# Patient Record
Sex: FEMALE | Race: WHITE | NOT HISPANIC OR LATINO | Employment: FULL TIME | RURAL
[De-identification: names, ages, dates, MRNs, and addresses within clinical notes are randomized per-mention and may not be internally consistent; named-entity substitution may affect disease eponyms.]

---

## 2017-03-08 ENCOUNTER — TRANSCRIBE ORDERS (OUTPATIENT)
Dept: PHYSICAL THERAPY | Facility: HOSPITAL | Age: 42
End: 2017-03-08

## 2017-03-08 DIAGNOSIS — S52.501A CLOSED FRACTURE OF DISTAL END OF RIGHT RADIUS, UNSPECIFIED FRACTURE MORPHOLOGY, INITIAL ENCOUNTER: Primary | ICD-10-CM

## 2017-03-16 ENCOUNTER — HOSPITAL ENCOUNTER (OUTPATIENT)
Dept: PHYSICAL THERAPY | Facility: HOSPITAL | Age: 42
Setting detail: THERAPIES SERIES
Discharge: HOME | End: 2017-03-16

## 2017-03-16 DIAGNOSIS — S52.501A CLOSED FRACTURE OF DISTAL END OF RIGHT RADIUS, UNSPECIFIED FRACTURE MORPHOLOGY, INITIAL ENCOUNTER: ICD-10-CM

## 2017-03-16 PROCEDURE — 97161 PT EVAL LOW COMPLEX 20 MIN: CPT | Performed by: PHYSICAL THERAPIST

## 2017-03-16 NOTE — PROGRESS NOTES
"    Outpatient Physical Therapy Ortho Initial Evaluation  [facility]  Nola Greene, PT  17       Patient Name: Krupa De La Rosa  : 1975  MRN: [id number]  Today's Date: 3/16/2017      Visit Date: 2017     Subjective Improvement: N/A       Attendance:   Approved: Requires authorization           MD follow up: 17          RC date: 17           There is no problem list on file for this patient.       Past Medical History   Diagnosis Date   • Broken wrist      Right   • Diabetes mellitus         Past Surgical History   Procedure Laterality Date   • Hysterectomy     • Knee arthroscopy w/ lateral release Right    • Knee cartilage surgery Right    • Cholecystectomy     • Exploratory laparotomy       Medications:  Xanax  Lantus  NovoLog  Ibuprofen PRN  Protonix  Prilosec  Elavil    Allergies: Ciprofibrate        Visit Dx:     ICD-10-CM ICD-9-CM   1. Closed fracture of distal end of right radius, unspecified fracture morphology, initial encounter S52.501A 813.42             Patient History       17 0800          History    Chief Complaint Pain  -KG      Type of Pain Wrist pain  -KG      Date Current Problem(s) Began 01/15/17  -KG      Brief Description of Current Complaint Pt presents with R wrist pain s/p R distal radial fracture. Reports it was a clean break and did not have to have surgery. Pt states she went roller skating and fell on an outstretched arm. States she stopped wearing her splint full time ~3 weeks ago and only wears it at work now.Pt states she still has difficulty pronating/supinating her wrist as it feels like its going to \"pop\". Pt states she is still able to do much of what she needs to do but there are still some things she can't do. Pt limited at work with certain things. 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   MD 17  -KG      Hand Dominance left-handed  -KG      Occupation/sports/leisure activities Pt is a " "nurse at [employer].  -KG      What clinical tests have you had for this problem? X-ray  -KG      Results of Clinical Tests Wrist healing well.  -KG      Pain     Pain Location Wrist  -KG      Pain at Present 0   Pain 5/10 with pronation/supination  -KG      Pain Comments Pt states her wrist feels like it catches and it needs to \"pop\"  -KG        User Key  (r) = Recorded By, (t) = Taken By, (c) = Cosigned By    Initials Name Provider Type    KG Nola Greene, PT Physical Therapist                PT Ortho       03/16/17 0800    Subjective Comments    Subjective Comments Pt states her wrist hurts mainly throughout pronation/supination; states overall wrist continues to feel stiff  -KG    Precautions and Contraindications    Precautions No pushing at this time  -KG    Subjective Pain    Able to rate subjective pain? yes  -KG    Pre-Treatment Pain Level 0  -KG    Post-Treatment Pain Level 0  -KG    Subjective Pain Comment Pain at rest; pain with wrist ROM 5/10  -KG    Posture/Observations    Posture- WNL Posture is WNL  -KG    Posture/Observations Comments Pt shows no signs of acute distress at this time. Minimal to no guarding of the R wrist.   -KG    Quarter Clearing    Quarter Clearing Upper Quarter Clearing  -KG    Sensory Screen for Light Touch- Upper Quarter Clearing    C4 (posterior shoulder) Bilateral:;Intact  -KG    C5 (lateral upper arm) Bilateral:;Intact  -KG    C6 (tip of thumb) Right:;Diminished  -KG    C7 (tip of 3rd finger) Bilateral:;Intact  -KG    C8 (tip of 5th finger) Bilateral:;Intact  -KG    T1 (medial lower arm) Bilateral:;Intact  -KG    Special Tests/Palpation    Special Tests/Palpation Wrist/Hand  -KG    Hand/Wrist Palpation- Radial Aspect    Radial Styloid Right:;Tender  -KG    Hand/Wrist Palpation- Radial Aspect? Yes  -KG    Hand/Wrist Palpation- Ulnar Aspect    Ulnar Styloid Right:;Tender  -KG    Distal Radioulnar Joint (DRUJ) Right:;Tender  -KG    Hand/Wrist Palpation- " Ulnar Aspect? Yes  -KG    ROM (Range of Motion)    General ROM upper extremity range of motion deficits identified  -KG    General UE Assessment    ROM elbow/forearm, right: UE ROM deficit;wrist, right: UE ROM deficit  -KG    ROM Detail Pt able to make full fist; able to oppose thumb to all digits without difficulty  -KG    Right Elbow/Forearm    Supination AROM Deficit 30  -KG    Pronation AROM Deficit 65  -KG    Right Wrist    Flexion AROM Deficit 32  -KG    Extension AROM Deficit 40  -KG    Ulnar Deviation AROM Deficit 16  -KG    Radial Deviation AROM Deficit 18  -KG    MMT (Manual Muscle Testing)    General MMT Assessment Detail L  55#, R  45#; wrist MMT not tested at this time  -KG      User Key  (r) = Recorded By, (t) = Taken By, (c) = Cosigned By    Initials Name Provider Type    KG Nola Greene PT Physical Therapist                            Therapy Education       03/16/17 0800          Therapy Education    Given HEP;Symptoms/condition management;Pain management   HEP: wrist AROM flex/ext, sup/pro, RD/UD; theraputty /pinch yellow, wrist flex/ext stretch  -KG      Program New  -KG      How Provided Verbal;Demonstration;Written  -KG      Provided to Patient  -KG      Level of Understanding Teach back education performed;Verbalized;Demonstrated  -KG        User Key  (r) = Recorded By, (t) = Taken By, (c) = Cosigned By    Initials Name Provider Type    YESSICA Greene PT Physical Therapist                PT OP Goals       03/16/17 0800       PT Short Term Goals    STG Date to Achieve 03/30/17  -KG     STG 1 Indep with HEP  -KG     STG 1 Progress New  -KG     STG 2 Tolerate 45 min treatment session without increased pain  -KG     STG 2 Progress New  -KG     STG 3 Demonstrate R wrist supination AROM to 40 deg  -KG     STG 3 Progress New  -KG     STG 4 Demonstrate R wrist flexion AROM to 40 deg  -KG     STG 4 Progress New  -KG     Long Term Goals    LTG Date to Achieve 04/06/17  -KG     LTG  1 Subjectively report 65% improvement or greater  -KG     LTG 1 Progress New  -KG     LTG 2 QuickDASH score to 25% or less  -KG     LTG 2 Progress New  -KG     LTG 3 Demonstrate R  strength to 55# or greater  -KG     LTG 3 Progress New  -KG     LTG 4 Demonstrate R wrist flex/ext strength to 4/5 or greater  -KG     LTG 4 Progress New  -KG     LTG 5 Demonstrate R wrist sup/pro strength to 4/5 or greater  -KG     LTG 5 Progress New  -KG     LTG 6 Demonstrate R wrist flex/ext AROM to 60 deg or greater  -KG     LTG 6 Progress New  -KG     LTG 7 Demonstrate R wrist supination AROM to 60 deg or greater  -KG     LTG 7 Progress New  -KG     Time Calculation    PT Goal Re-Cert Due Date 04/06/17   Visit 1/1, MD 4/4/17  -KG       User Key  (r) = Recorded By, (t) = Taken By, (c) = Cosigned By    Initials Name Provider Type    KG Nola Greene, PT Physical Therapist                PT Assessment/Plan       03/16/17 0800       PT Assessment    Functional Limitations Limitation in home management;Limitations in community activities;Performance in leisure activities;Performance in self-care ADL;Performance in work activities  -KG     Impairments Dexterity;Joint mobility;Motor function;Muscle strength;Pain;Range of motion  -KG     Assessment Comments Pt presents with R wrist pain s/p R distal radius fx with non-surgical managment. Pt demonstrates decreased wrist ROM & strenght & decreased  strength/dexterity which is limiting her performance in ADL's/IADL's & work activities. Pt will benefit from skilled PT to address these limitations for improvements in overall RUE functional mobility/strength.  -KG     Rehab Potential Good  -KG     Patient/caregiver participated in establishment of treatment plan and goals Yes  -KG     Patient would benefit from skilled therapy intervention Yes  -KG     PT Plan    PT Frequency 2x/week  -KG     Predicted Duration of Therapy Intervention (days/wks) 4-6 weeks  -KG     Planned CPT's? PT JOSEAL  LOW COMPLEXITY: 54728;PT THER PROC EA 15 MIN: 40589;PT THER ACT EA 15 MIN: 03283;PT MANUAL THERAPY EA 15 MIN: 78871;PT NEUROMUSC RE-EDUCATION EA 15 MIN: 74515;PT ELECTRICAL STIM UNATTEND: ;PT THER SUPP EA 15 MIN  -KG     Physical Therapy Interventions (Optional Details) fine motor skills;gross motor skills;home exercise program;joint mobilization;manual therapy techniques;modalities;neuromuscular re-education;patient/family education;ROM (Range of Motion);strengthening;stretching  -KG     PT Plan Comments HEP, wrist PROM/AROM, wrist stretching/strengthening, manual therapy, fine motor skills, IFC/ice PRN for pain  -KG       User Key  (r) = Recorded By, (t) = Taken By, (c) = Cosigned By    Initials Name Provider Type    YESSICA Greene, PT Physical Therapist                  Exercises       03/16/17 0800          Subjective Comments    Subjective Comments Pt states her wrist hurts mainly throughout pronation/supination; states overall wrist continues to feel stiff  -KG      Subjective Pain    Able to rate subjective pain? yes  -KG      Pre-Treatment Pain Level 0  -KG      Post-Treatment Pain Level 0  -KG      Subjective Pain Comment Pain at rest; pain with wrist ROM 5/10  -KG        User Key  (r) = Recorded By, (t) = Taken By, (c) = Cosigned By    Initials Name Provider Type    YESSICA Greene, SAMY Physical Therapist                              Outcome Measures       03/16/17 0800          Quick DASH    Open a tight or new jar. 4  -KG      Do heavy household chores (e.g., wash walls, wash floors) 3  -KG      Carry a shopping bag or briefcase 3  -KG      Wash your back 3  -KG      Use a knife to cut food 3  -KG      Recreational activities in which you take some force or impact through your arm, should or hand (e.g. golf, hammering, tennis, etc.) 3  -KG      During the past week, to what extent has your arm, shoulder, or hand problem interfered with your normal social activites with family, friends,  neighbors or groups? 3  -KG      During the past week, were you limited in your work or other regular daily activities as a result of your arm, shoulder or hand problem? 2  -KG      Arm, Shoulder, or hand pain 2  -KG      Tingling (pins and needles) in your arm, shoulder, or hand 1  -KG      During the past week, how much difficulty have you had sleeping because of the pain in your arm, shoulder or hand? 2  -KG      Number of Questions Answered 11  -KG      Quick DASH Score 40.91  -KG      Functional Assessment    Outcome Measure Options Quick DASH  -KG        User Key  (r) = Recorded By, (t) = Taken By, (c) = Cosigned By    Initials Name Provider Type    KG Nola Greene, PT Physical Therapist            Time Calculation:   Start Time: 0856  Stop Time: 0924  Time Calculation (min): 28 min  Total Timed Code Minutes- PT: 0 minute(s)     Therapy Charges for Today     Code Description Service Date Service Provider Modifiers Qty    40827937490 HC PT EVAL LOW COMPLEXITY 1 3/16/2017 Nola Greene, PT GP 2          PT G-Codes  Outcome Measure Options: Quick DASH         Nola Greene, PT  3/16/2017

## 2017-03-29 ENCOUNTER — HOSPITAL ENCOUNTER (OUTPATIENT)
Dept: PHYSICAL THERAPY | Facility: HOSPITAL | Age: 42
Setting detail: THERAPIES SERIES
Discharge: HOME | End: 2017-03-29

## 2017-03-29 DIAGNOSIS — S52.501A CLOSED FRACTURE OF DISTAL END OF RIGHT RADIUS, UNSPECIFIED FRACTURE MORPHOLOGY, INITIAL ENCOUNTER: Primary | ICD-10-CM

## 2017-03-29 PROCEDURE — 97110 THERAPEUTIC EXERCISES: CPT

## 2017-03-29 PROCEDURE — 97140 MANUAL THERAPY 1/> REGIONS: CPT

## 2017-03-29 NOTE — PROGRESS NOTES
Outpatient Physical Therapy Ortho Treatment Note  [facility]  Danyell Pavon, JOSE  17  10:15 AM       Patient Name: Krupa De La Rosa  : 1975  MRN: [id number]  Today's Date: 3/29/2017      Visit Date: 2017  Subjective Improvement:  50%     Attendance:   Approved:        8 visits   MD follow up:            date:     17    Visit Dx:    ICD-10-CM ICD-9-CM   1. Closed fracture of distal end of right radius, unspecified fracture morphology, initial encounter S52.501A 813.42       There is no problem list on file for this patient.       Past Medical History:   Diagnosis Date   • Broken wrist     Right   • Diabetes mellitus         Past Surgical History:   Procedure Laterality Date   • CHOLECYSTECTOMY     • EXPLORATORY LAPAROTOMY     • HYSTERECTOMY     • KNEE ARTHROSCOPY W/ LATERAL RELEASE Right    • KNEE CARTILAGE SURGERY Right              PT Ortho       17 0800    Subjective Comments    Subjective Comments Pt states that wrist hurts with certain movements; not working at present; notes she is R handed. Notes she has been busy and not a lot of HEP.  -PM    Precautions and Contraindications    Precautions No pushing at this time  -PM    Subjective Pain    Able to rate subjective pain? yes  -PM    Pre-Treatment Pain Level 0  -PM    Post-Treatment Pain Level 0  -PM    Posture/Observations    Posture- WNL Posture is WNL  -PM    Posture/Observations Comments Pt shows no signs of acute distress at this time. Minimal to no guarding of the R wrist.   -PM    Quarter Clearing    Quarter Clearing Upper Quarter Clearing  -PM    Sensory Screen for Light Touch- Upper Quarter Clearing    C4 (posterior shoulder) Bilateral:;Intact  -PM    C5 (lateral upper arm) Bilateral:;Intact  -PM    C6 (tip of thumb) Right:;Diminished  -PM    C7 (tip of 3rd finger) Bilateral:;Intact  -PM    C8 (tip of 5th finger) Bilateral:;Intact  -PM    T1 (medial lower arm) Bilateral:;Intact  -PM     Special Tests/Palpation    Special Tests/Palpation Wrist/Hand  -PM    Hand/Wrist Palpation- Radial Aspect    Radial Styloid Right:;Tender  -PM    Hand/Wrist Palpation- Ulnar Aspect    Ulnar Styloid Right:;Tender  -PM    Distal Radioulnar Joint (DRUJ) Right:;Tender  -PM    Hand/Wrist Palpation- Ulnar Aspect? Yes  -PM    Right Wrist    Flexion AROM Deficit 56  -PM    Extension AROM Deficit 44  -PM      User Key  (r) = Recorded By, (t) = Taken By, (c) = Cosigned By    Initials Name Provider Type    PM Danyell Pavon PTA Physical Therapy Assistant                            PT Assessment/Plan       03/29/17 0800       PT Assessment    Functional Limitations Limitation in home management;Limitations in community activities;Performance in leisure activities;Performance in self-care ADL;Performance in work activities  -PM     Impairments Dexterity;Joint mobility;Motor function;Muscle strength;Pain;Range of motion  -PM     Assessment Comments Incr AROM. Cues ther ex; encouraged HEP performance; stiffness with taniat austin duron; and 2/3 MD. Luis Eduardo Rx well  -PM     Rehab Potential Good  -PM     Patient/caregiver participated in establishment of treatment plan and goals Yes  -PM     Patient would benefit from skilled therapy intervention Yes  -PM     PT Plan    PT Frequency 2x/week  -PM     Predicted Duration of Therapy Intervention (days/wks) 4-6 weeks  -PM     PT Plan Comments wrist roller; ; cont manual  -PM       User Key  (r) = Recorded By, (t) = Taken By, (c) = Cosigned By    Initials Name Provider Type    PM Danyell Pavon PTA Physical Therapy Assistant                Modalities       03/29/17 0800          Ice    Patient denies application of Ice Yes  -PM        User Key  (r) = Recorded By, (t) = Taken By, (c) = Cosigned By    Initials Name Provider Type    PM Danyell Pavon PTA Physical Therapy Assistant                Exercises       03/29/17 0800          Subjective Comments    Subjective Comments Pt  states that wrist hurts with certain movements; not working at present; notes she is R handed. Notes she has been busy and not a lot of HEP.  -PM      Subjective Pain    Able to rate subjective pain? yes  -PM      Pre-Treatment Pain Level 0  -PM      Post-Treatment Pain Level 0  -PM      Exercise 1    Exercise Name 1 table top tendon gliding  -PM      Cueing 1 Verbal  -PM      Reps 1 20  -PM      Exercise 2    Exercise Name 2 claw tendon gliding  -PM      Cueing 2 Verbal  -PM      Reps 2 20  -PM      Exercise 3    Exercise Name 3 wrist AROM Flex/Ext/RD-UD  -PM      Cueing 3 Verbal  -PM      Sets 3 2  -PM      Reps 3 15  -PM      Exercise 4    Exercise Name 4 wrist pro-sup AROM  -PM      Cueing 4 Verbal  -PM      Sets 4 2  -PM      Reps 4 15  -PM      Exercise 5    Exercise Name 5 T-Putty /pinc  -PM      Time (Minutes) 5 3  -PM      Exercise 6    Exercise Name 6 T-Putty starburst  -PM      Cueing 6 Verbal  -PM      Time (Minutes) 6 2  -PM      Exercise 7    Exercise Name 7 Dexaciser  -PM      Cueing 7 Demo  -PM      Time (Minutes) 7 2  -PM      Exercise 8    Exercise Name 8 CT1CT2 wrist stretches  -PM      Cueing 8 Verbal  -PM      Sets 8 1  -PM      Reps 8 2  -PM      Time (Seconds) 8 30  -PM        User Key  (r) = Recorded By, (t) = Taken By, (c) = Cosigned By    Initials Name Provider Type    PM Danyell Pavon PTA Physical Therapy Assistant                        Manual Rx (last 36 hours)      Manual Treatments       03/29/17 0800          Manual Rx 1    Manual Rx 1 Location R wrist  -PM      Manual Rx 1 Type MC glides; carpal glides; RU glides; wrist mobs  -PM      Manual Rx 1 Grade I-II  -PM      Manual Rx 1 Duration 10  -PM        User Key  (r) = Recorded By, (t) = Taken By, (c) = Cosigned By    Initials Name Provider Type    PM Danyell Pavon PTA Physical Therapy Assistant                PT OP Goals       03/29/17 0800       PT Short Term Goals    STG Date to Achieve 03/30/17  -PM     STG 1 Indep  with HEP  -PM     STG 1 Progress Progressing  -PM     STG 2 Tolerate 45 min treatment session without increased pain  -PM     STG 2 Progress Progressing  -PM     STG 3 Demonstrate R wrist supination AROM to 40 deg  -PM     STG 3 Progress Progressing  -PM     STG 4 Demonstrate R wrist flexion AROM to 40 deg  -PM     STG 4 Progress Progressing  -PM     Long Term Goals    LTG Date to Achieve 04/06/17  -PM     LTG 1 Subjectively report 65% improvement or greater  -PM     LTG 1 Progress New  -PM     LTG 2 QuickDASH score to 25% or less  -PM     LTG 2 Progress New  -PM     LTG 3 Demonstrate R  strength to 55# or greater  -PM     LTG 3 Progress New  -PM     LTG 4 Demonstrate R wrist flex/ext strength to 4/5 or greater  -PM     LTG 4 Progress New  -PM     LTG 5 Demonstrate R wrist sup/pro strength to 4/5 or greater  -PM     LTG 5 Progress New  -PM     LTG 6 Demonstrate R wrist flex/ext AROM to 60 deg or greater  -PM     LTG 6 Progress New  -PM     LTG 7 Demonstrate R wrist supination AROM to 60 deg or greater  -PM     LTG 7 Progress New  -PM     Time Calculation    PT Goal Re-Cert Due Date 04/06/17 2/2; MD 4/4/2017; 50%  -PM       User Key  (r) = Recorded By, (t) = Taken By, (c) = Cosigned By    Initials Name Provider Type    PM Danyell Pavon PTA Physical Therapy Assistant                Therapy Education       03/29/17 0800          Therapy Education    Given HEP;Symptoms/condition management;Pain management   HEP: wrist AROM flex/ext, sup/pro, RD/UD; theraputty /pinch yellow, wrist flex/ext stretch  -PM      Program Reinforced  -PM      How Provided Verbal;Demonstration;Written  -PM      Provided to Patient  -PM      Level of Understanding Teach back education performed;Verbalized;Demonstrated  -PM        User Key  (r) = Recorded By, (t) = Taken By, (c) = Cosigned By    Initials Name Provider Type    IVONNE Pavon PTA Physical Therapy Assistant                Time Calculation:   Start Time:  0850  Stop Time: 0935  Time Calculation (min): 45 min  Total Timed Code Minutes- PT: 45 minute(s)    Therapy Charges for Today     Code Description Service Date Service Provider Modifiers Qty    23757900358 HC PT THER PROC EA 15 MIN 3/29/2017 Danyell Pavon PTA GP 2    34818624191 HC PT MANUAL THERAPY EA 15 MIN 3/29/2017 Danyell Pavon PTA GP 1                    Danyell Pavon PTA  3/29/2017

## 2017-03-31 ENCOUNTER — HOSPITAL ENCOUNTER (OUTPATIENT)
Dept: PHYSICAL THERAPY | Facility: HOSPITAL | Age: 42
Setting detail: THERAPIES SERIES
Discharge: HOME | End: 2017-03-31

## 2017-03-31 DIAGNOSIS — S52.501A CLOSED FRACTURE OF DISTAL END OF RIGHT RADIUS, UNSPECIFIED FRACTURE MORPHOLOGY, INITIAL ENCOUNTER: Primary | ICD-10-CM

## 2017-03-31 PROCEDURE — 97110 THERAPEUTIC EXERCISES: CPT

## 2017-03-31 PROCEDURE — 97140 MANUAL THERAPY 1/> REGIONS: CPT

## 2017-04-07 ENCOUNTER — HOSPITAL ENCOUNTER (OUTPATIENT)
Dept: PHYSICAL THERAPY | Facility: HOSPITAL | Age: 42
Setting detail: THERAPIES SERIES
Discharge: HOME | End: 2017-04-07

## 2017-04-07 DIAGNOSIS — S52.501A CLOSED FRACTURE OF DISTAL END OF RIGHT RADIUS, UNSPECIFIED FRACTURE MORPHOLOGY, INITIAL ENCOUNTER: Primary | ICD-10-CM

## 2017-04-07 PROCEDURE — 97110 THERAPEUTIC EXERCISES: CPT

## 2017-04-07 PROCEDURE — 97140 MANUAL THERAPY 1/> REGIONS: CPT

## 2017-04-07 NOTE — PROGRESS NOTES
Outpatient Physical Therapy Ortho Re-Evaluation  [facility]  Maninder Bryan, PT  17  10:02 AM       Patient Name: Krupa De La Rosa  : 1975  MRN: [id number]  Today's Date: 2017      Visit Date: 2017     Subjective Improvement: 75%      Attendance:   Approved:          MD follow up: PRN           RC date: 17           There is no problem list on file for this patient.       Past Medical History:   Diagnosis Date   • Broken wrist     Right   • Diabetes mellitus         Past Surgical History:   Procedure Laterality Date   • CHOLECYSTECTOMY     • EXPLORATORY LAPAROTOMY     • HYSTERECTOMY     • KNEE ARTHROSCOPY W/ LATERAL RELEASE Right    • KNEE CARTILAGE SURGERY Right        Visit Dx:     ICD-10-CM ICD-9-CM   1. Closed fracture of distal end of right radius, unspecified fracture morphology, initial encounter S52.501A 813.42                 PT Ortho       17 0900    Precautions and Contraindications    Precautions No pushing at home.  -MD    Posture/Observations    Posture- WNL Posture is WNL  -MD    Posture/Observations Comments Patient shows no signs of acute distress, scar healing well.   -MD    Right Wrist    Flexion AROM Deficit 62  -MD    Extension AROM Deficit 53  -MD    Ulnar Deviation AROM Deficit 30  -MD    Radial Deviation AROM Deficit 22  -MD    MMT (Manual Muscle Testing)    General MMT Assessment Detail R  52#  -MD    Right Wrist    Wrist Flexion Gross Movement (4/5) good  -MD    Wrist Extension Gross Movement (4/5) good  -MD      User Key  (r) = Recorded By, (t) = Taken By, (c) = Cosigned By    Initials Name Provider Type    MD Maninder Bryan, PT Physical Therapist                            Therapy Education       17 1000          Therapy Education    Given HEP;Symptoms/condition management;Pain management;Posture/body mechanics  -MD      Program Reinforced  -MD      How Provided Verbal;Demonstration  -MD      Provided to  Patient  -MD      Level of Understanding Teach back education performed;Verbalized;Demonstrated  -MD        User Key  (r) = Recorded By, (t) = Taken By, (c) = Cosigned By    Initials Name Provider Type    MD Maninder Bryan, PT Physical Therapist                PT OP Goals       04/07/17 0900       PT Short Term Goals    STG Date to Achieve 04/28/17  -MD     STG 1 Indep with HEP  -MD     STG 1 Progress Progressing  -MD     STG 2 Tolerate 45 min treatment session without increased pain  -MD     STG 2 Progress Met  -MD     STG 3 Demonstrate R wrist supination AROM to 40 deg  -MD     STG 3 Progress Met  -MD     STG 4 Demonstrate R wrist flexion AROM to 40 deg  -MD     STG 4 Progress Met  -MD     Long Term Goals    LTG Date to Achieve 04/28/17  -MD     LTG 1 Subjectively report 65% improvement or greater  -MD     LTG 1 Progress Met  -MD     LTG 2 QuickDASH score to 25% or less  -MD     LTG 2 Progress New  -MD     LTG 3 Demonstrate R  strength to 55# or greater  -MD     LTG 3 Progress Progressing  -MD     LTG 4 Demonstrate R wrist flex/ext strength to 4/5 or greater  -MD     LTG 4 Progress Met  -MD     LTG 5 Demonstrate R wrist sup/pro strength to 4/5 or greater  -MD     LTG 5 Progress New  -MD     LTG 6 Demonstrate R wrist flex/ext AROM to 60 deg or greater  -MD     LTG 6 Progress New  -MD     LTG 7 Demonstrate R wrist supination AROM to 60 deg or greater  -MD     LTG 7 Progress Met  -MD     LTG 8 4+/5 R wrist Flexion/Ext or greater.   -MD     LTG 8 Progress New  -MD     Time Calculation    PT Goal Re-Cert Due Date 04/28/17   Visits 4/4, MD PRN, 75%  -MD       User Key  (r) = Recorded By, (t) = Taken By, (c) = Cosigned By    Initials Name Provider Type    MD Maninder Bryan, PT Physical Therapist                PT Assessment/Plan       04/07/17 0900       PT Assessment    Functional Limitations Decreased safety during functional activities;Limitation in home management;Limitations in community  activities;Limitations in functional capacity and performance;Performance in leisure activities;Performance in self-care ADL;Performance in work activities  -MD     Impairments Dexterity;Endurance;Muscle strength;Pain;Range of motion  -MD     Assessment Comments Good AROM and strength noted,  improving at this time.   -MD     Rehab Potential Good  -MD     Patient/caregiver participated in establishment of treatment plan and goals Yes  -MD     Patient would benefit from skilled therapy intervention Yes  -MD     PT Plan    PT Frequency 2x/week  -MD     Predicted Duration of Therapy Intervention (days/wks) 1 week  -MD     PT Plan Comments Make I with HEP next week. DC at that time.   -MD       User Key  (r) = Recorded By, (t) = Taken By, (c) = Cosigned By    Initials Name Provider Type    MD Maninder Bryan, PT Physical Therapist                  Exercises       04/07/17 0900          Subjective Comments    Subjective Comments Wrist is getting better.   -MD      Subjective Pain    Able to rate subjective pain? yes  -MD      Pre-Treatment Pain Level 0  -MD      Post-Treatment Pain Level 0  -MD      Exercise 1    Exercise Name 1 Table Top Tendon Gliding  -MD      Cueing 1 Verbal  -MD      Reps 1 20  -MD      Exercise 2    Exercise Name 2 Wrist Flex/ext AROM  -MD      Reps 2 30  -MD      Exercise 3    Exercise Name 3 PRO/SUP Hammer  -MD      Weights/Plates 3 1  -MD      Sets 3 2  -MD      Reps 3 10  -MD      Exercise 4    Exercise Name 4 Wrsit Roller  -MD      Reps 4 2  -MD      Time (Minutes) 4 1  -MD      Exercise 5    Exercise Name 5  Putty  -MD      Resistance 5 Yellow  -MD      Time (Minutes) 5 2  -MD      Exercise 6    Exercise Name 6 Dexaciser  -MD      Cueing 6 Verbal  -MD      Reps 6 2  -MD      Time (Minutes) 6 1  -MD      Exercise 7    Exercise Name 7 Wrist flex/ext S  -MD      Reps 7 2  -MD      Time (Seconds) 7 30  -MD        User Key  (r) = Recorded By, (t) = Taken By, (c) = Cosigned By    Initials  Name Provider Type    MD Maninder Bryan, PT Physical Therapist           Manual Rx (last 36 hours)      Manual Treatments       04/07/17 0900          Manual Rx 1    Manual Rx 1 Location R wrist   -MD      Manual Rx 1 Type MC glides; carpal glides; RU glides; wrist mobs  -MD      Manual Rx 1 Grade Grade II/III  -MD      Manual Rx 1 Duration 8 min  -MD        User Key  (r) = Recorded By, (t) = Taken By, (c) = Cosigned By    Initials Name Provider Type    MD Maninder Bryan, PT Physical Therapist                            Time Calculation:   Start Time: 0930  Stop Time: 1000  Time Calculation (min): 30 min  Total Timed Code Minutes- PT: 30 minute(s)     Therapy Charges for Today     Code Description Service Date Service Provider Modifiers Qty    82441336798  PT THER PROC EA 15 MIN 4/7/2017 Maninder Bryan, PT GP 1    [id number]  PT MANUAL THERAPY EA 15 MIN 4/7/2017 Maninder Bryan, PT GP 1                    Maninder Bryan, PT  4/7/2017

## 2017-04-11 ENCOUNTER — HOSPITAL ENCOUNTER (OUTPATIENT)
Dept: PHYSICAL THERAPY | Facility: HOSPITAL | Age: 42
Setting detail: THERAPIES SERIES
Discharge: HOME | End: 2017-04-11

## 2017-04-11 DIAGNOSIS — S52.501A CLOSED FRACTURE OF DISTAL END OF RIGHT RADIUS, UNSPECIFIED FRACTURE MORPHOLOGY, INITIAL ENCOUNTER: Primary | ICD-10-CM

## 2017-04-11 PROCEDURE — 97110 THERAPEUTIC EXERCISES: CPT

## 2017-04-11 NOTE — THERAPY DISCHARGE NOTE
Outpatient Physical Therapy Ortho Treatment Note/Discharge Summary  [facility], [place]  Tiffany Jean PTA  17  4:11 PM       Patient Name: Krupa De La Rosa  : 1975  MRN: [id number]  Today's Date: 2017      Visit Date: 2017  Subjective Improvement: 75%       Attendance:    Approved: 8 visits          MD follow up: PRN          RC date: n/a         Visit Dx:    ICD-10-CM ICD-9-CM   1. Closed fracture of distal end of right radius, unspecified fracture morphology, initial encounter S52.501A 813.42       There is no problem list on file for this patient.       Past Medical History:   Diagnosis Date   • Broken wrist     Right   • Diabetes mellitus         Past Surgical History:   Procedure Laterality Date   • CHOLECYSTECTOMY     • EXPLORATORY LAPAROTOMY     • HYSTERECTOMY     • KNEE ARTHROSCOPY W/ LATERAL RELEASE Right    • KNEE CARTILAGE SURGERY Right                              PT Assessment/Plan       17 1600       PT Assessment    Functional Limitations Decreased safety during functional activities;Limitation in home management;Limitations in community activities;Limitations in functional capacity and performance;Performance in leisure activities;Performance in self-care ADL;Performance in work activities  -     Impairments Dexterity;Endurance;Muscle strength;Pain;Range of motion  -     Assessment Comments pt met all STG and LTGs today. Pt had 4+/5 R wrist MMT in all directions.  -ADRI     Rehab Potential Good  -ADRI     Patient/caregiver participated in establishment of treatment plan and goals Yes  -ADRI     Patient would benefit from skilled therapy intervention Yes  -ADRI     PT Plan    PT Plan Comments D/C to I management. Free 30 day fitness membership.  -ADRI       User Key  (r) = Recorded By, (t) = Taken By, (c) = Cosigned By    Initials Name Provider Type    ADRI Jean PTA Physical Therapy Assistant                    Exercises        04/11/17 1500          Subjective Comments    Subjective Comments pt states she is ready to be D/C from therapy. She is able to perform all of her exercises at home.  -ADRI      Subjective Pain    Able to rate subjective pain? yes  -ADRI      Pre-Treatment Pain Level 0  -ADRI      Post-Treatment Pain Level 0  -ADRI      Exercise 1    Exercise Name 1 Table Top Tendon Gliding  -ADRI      Cueing 1 Verbal  -ADRI      Reps 1 20  -ADRI      Exercise 2    Exercise Name 2 Wrist Flex/ext curls  -ADRI      Weights/Plates 2 1  -ADRI      Reps 2 30  -ADRI      Exercise 3    Exercise Name 3 PRO/SUP Hammer  -ADRI      Weights/Plates 3 1  -ADRI      Sets 3 2  -ADRI      Reps 3 10  -ADRI      Exercise 4    Exercise Name 4 Wrsit Roller  -ADRI      Reps 4 2  -ADRI      Time (Minutes) 4 1  -ADRI      Exercise 5    Exercise Name 5  Putty  -ADRI      Resistance 5 Yellow  -ADRI      Time (Minutes) 5 2  -ADRI      Exercise 6    Exercise Name 6 Dexaciser  -ADRI      Cueing 6 Verbal  -ADRI      Reps 6 2  -ADRI      Time (Minutes) 6 1  -ADRI      Exercise 7    Exercise Name 7 Wrist flex/ext S  -ADRI      Reps 7 2  -ADRI      Time (Seconds) 7 30  -ADRI      Exercise 8    Exercise Name 8 Power web: /ext  -ADRI      Reps 8 20   each  -ADRI        User Key  (r) = Recorded By, (t) = Taken By, (c) = Cosigned By    Initials Name Provider Type    ADRI Jean, PTA Physical Therapy Assistant                               PT OP Goals       04/11/17 1600 04/11/17 1500    PT Short Term Goals    STG Date to Achieve  04/28/17  -ADRI    STG 1  Indep with HEP  -ADRI    STG 1 Progress  Progressing  -    STG 2  Tolerate 45 min treatment session without increased pain  -    STG 2 Progress  Met  -ADRI    STG 3  Demonstrate R wrist supination AROM to 40 deg  -ADRI    STG 3 Progress  Met  -ADRI    STG 4  Demonstrate R wrist flexion AROM to 40 deg  -ADRI    STG 4 Progress  Met  -ADRI    Long Term Goals    LTG Date to Achieve  04/28/17  -ADRI    LTG 1  Subjectively report 65% improvement or greater  -ADRI    LTG 1  Progress  Met  -ADRI    LTG 2  QuickDASH score to 25% or less  -ADRI    LTG 2 Progress  Met  -ADRI    LTG 3  Demonstrate R  strength to 55# or greater  -ADRI    LTG 3 Progress  Progressing  -ADRI    LTG 4  Demonstrate R wrist flex/ext strength to 4/5 or greater  -ADRI    LTG 4 Progress  Met  -ADRI    LTG 5  Demonstrate R wrist sup/pro strength to 4/5 or greater  -ADRI    LTG 5 Progress  Met  -ADRI    LTG 6  Demonstrate R wrist flex/ext AROM to 60 deg or greater  -ADRI    LTG 6 Progress  Met  -ADRI    LTG 7  Demonstrate R wrist supination AROM to 60 deg or greater  -ADRI    LTG 7 Progress  Met  -ADRI    LTG 8  4+/5 R wrist Flexion/Ext or greater.   -ADRI    LTG 8 Progress  Met  -ADRI    Time Calculation    PT Goal Re-Cert Due Date 04/28/17   Visits: 5/5 MD: PRN Improvement: 75%  -ADRI       User Key  (r) = Recorded By, (t) = Taken By, (c) = Cosigned By    Initials Name Provider Type    ADRI Jean PTA Physical Therapy Assistant                Therapy Education       04/11/17 1600          Therapy Education    Given HEP  -ADRI      Program Reinforced  -ADRI      How Provided Verbal;Demonstration  -ADRI      Provided to Patient  -ADRI      Level of Understanding Teach back education performed;Verbalized;Demonstrated  -ADRI        User Key  (r) = Recorded By, (t) = Taken By, (c) = Cosigned By    Initials Name Provider Type    ADRI Jean PTA Physical Therapy Assistant                Outcome Measures       04/11/17 1600          Quick DASH    Open a tight or new jar. 2  -ADRI      Do heavy household chores (e.g., wash walls, wash floors) 1  -ADRI      Carry a shopping bag or briefcase 1  -ADRI      Wash your back 2  -ADRI      Use a knife to cut food 2  -ADRI      Recreational activities in which you take some force or impact through your arm, should or hand (e.g. golf, hammering, tennis, etc.) 2  -ADRI      During the past week, to what extent has your arm, shoulder, or hand problem interfered with your normal social activites with family,  friends, neighbors or groups? 2  -ADRI      During the past week, were you limited in your work or other regular daily activities as a result of your arm, shoulder or hand problem? 2  -ADRI      Arm, Shoulder, or hand pain 2  -ADRI      Tingling (pins and needles) in your arm, shoulder, or hand 1  -ADRI      During the past week, how much difficulty have you had sleeping because of the pain in your arm, shoulder or hand? 2  -ADRI      Number of Questions Answered 11  -ADRI      Quick DASH Score 18.18  -ADRI      Functional Assessment    Outcome Measure Options Quick DASH  -ADRI        User Key  (r) = Recorded By, (t) = Taken By, (c) = Cosigned By    Initials Name Provider Type    ADRI Tiffany Jean PTA Physical Therapy Assistant            Time Calculation:   Start Time: 1515  Stop Time: 1600  Time Calculation (min): 45 min  Total Timed Code Minutes- PT: 45 minute(s)    Therapy Charges for Today     Code Description Service Date Service Provider Modifiers Qty    16053827933 HC PT THER PROC EA 15 MIN 4/11/2017 Tiffany Jean PTA GP 3          PT G-Codes  Outcome Measure Options: Quick DASH     OP PT Discharge Summary  Date of Discharge: 04/11/17  Reason for Discharge: All goals achieved  Outcomes Achieved: Able to achieve all goals within established timeline  Discharge Destination: Home with home program      Tiffany Jean PTA  4/11/2017

## 2017-05-05 ENCOUNTER — APPOINTMENT (OUTPATIENT)
Dept: PHYSICAL THERAPY | Facility: HOSPITAL | Age: 42
End: 2017-05-05

## 2018-06-04 ENCOUNTER — OFFICE VISIT (OUTPATIENT)
Dept: CARDIOLOGY | Age: 43
End: 2018-06-04
Payer: MEDICAID

## 2018-06-04 VITALS
WEIGHT: 182 LBS | HEART RATE: 106 BPM | BODY MASS INDEX: 31.07 KG/M2 | DIASTOLIC BLOOD PRESSURE: 72 MMHG | HEIGHT: 64 IN | SYSTOLIC BLOOD PRESSURE: 112 MMHG

## 2018-06-04 DIAGNOSIS — Z88.9 DRUG ALLERGY: ICD-10-CM

## 2018-06-04 DIAGNOSIS — I34.1 MITRAL VALVE PROLAPSE: Primary | ICD-10-CM

## 2018-06-04 PROBLEM — F32.A ANXIETY AND DEPRESSION: Status: ACTIVE | Noted: 2018-06-04

## 2018-06-04 PROBLEM — E10.49 TYPE 1 DIABETES MELLITUS WITH NEUROLOGICAL MANIFESTATIONS (HCC): Status: ACTIVE | Noted: 2018-06-04

## 2018-06-04 PROBLEM — M19.90 DJD (DEGENERATIVE JOINT DISEASE): Status: ACTIVE | Noted: 2018-06-04

## 2018-06-04 PROBLEM — R89.9 ABNORMAL LABORATORY TEST: Status: ACTIVE | Noted: 2018-06-04

## 2018-06-04 PROBLEM — Z72.0 TOBACCO ABUSE: Status: ACTIVE | Noted: 2018-06-04

## 2018-06-04 PROBLEM — F41.9 ANXIETY AND DEPRESSION: Status: ACTIVE | Noted: 2018-06-04

## 2018-06-04 PROCEDURE — 93000 ELECTROCARDIOGRAM COMPLETE: CPT | Performed by: INTERNAL MEDICINE

## 2018-06-04 PROCEDURE — 99202 OFFICE O/P NEW SF 15 MIN: CPT | Performed by: INTERNAL MEDICINE

## 2018-06-04 RX ORDER — GABAPENTIN 100 MG/1
300 CAPSULE ORAL 3 TIMES DAILY
COMMUNITY
End: 2019-12-02 | Stop reason: ALTCHOICE

## 2018-06-04 RX ORDER — PROMETHAZINE HYDROCHLORIDE 25 MG/1
25 TABLET ORAL PRN
COMMUNITY

## 2018-06-04 ASSESSMENT — ENCOUNTER SYMPTOMS
CHOKING: 0
APNEA: 0
ABDOMINAL DISTENTION: 0
WHEEZING: 0
STRIDOR: 0
BLOOD IN STOOL: 0
CHEST TIGHTNESS: 0
NAUSEA: 0
SHORTNESS OF BREATH: 1
BACK PAIN: 0

## 2018-06-15 ENCOUNTER — HOSPITAL ENCOUNTER (OUTPATIENT)
Dept: NUCLEAR MEDICINE | Age: 43
Discharge: HOME | End: 2018-06-17
Payer: MEDICAID

## 2018-06-15 ENCOUNTER — HOSPITAL ENCOUNTER (OUTPATIENT)
Dept: NUCLEAR MEDICINE | Age: 43
End: 2018-06-15
Payer: MEDICAID

## 2018-06-15 ENCOUNTER — HOSPITAL ENCOUNTER (OUTPATIENT)
Dept: NON INVASIVE DIAGNOSTICS | Age: 43
Discharge: HOME | End: 2018-06-15
Payer: MEDICAID

## 2018-06-15 DIAGNOSIS — I34.1 MITRAL VALVE PROLAPSE: ICD-10-CM

## 2018-06-15 LAB
LV EF: 63 %
LVEF MODALITY: NORMAL

## 2018-06-15 PROCEDURE — 78452 HT MUSCLE IMAGE SPECT MULT: CPT

## 2018-06-15 PROCEDURE — 93017 CV STRESS TEST TRACING ONLY: CPT

## 2018-06-15 PROCEDURE — 6360000002 HC RX W HCPCS: Performed by: CLINICAL NURSE SPECIALIST

## 2018-06-15 PROCEDURE — C8929 TTE W OR WO FOL WCON,DOPPLER: HCPCS

## 2018-06-15 PROCEDURE — 3430000000 HC RX DIAGNOSTIC RADIOPHARMACEUTICAL: Performed by: CLINICAL NURSE SPECIALIST

## 2018-06-15 PROCEDURE — 2580000003 HC RX 258: Performed by: INTERNAL MEDICINE

## 2018-06-15 PROCEDURE — 6360000004 HC RX CONTRAST MEDICATION: Performed by: INTERNAL MEDICINE

## 2018-06-15 PROCEDURE — A9500 TC99M SESTAMIBI: HCPCS | Performed by: CLINICAL NURSE SPECIALIST

## 2018-06-15 RX ORDER — SODIUM CHLORIDE 0.9 % (FLUSH) 0.9 %
10 SYRINGE (ML) INJECTION PRN
Status: ACTIVE | OUTPATIENT
Start: 2018-06-15 | End: 2018-06-16

## 2018-06-15 RX ADMIN — TETRAKIS(2-METHOXYISOBUTYLISOCYANIDE)COPPER(I) TETRAFLUOROBORATE 30 MILLICURIE: 1 INJECTION, POWDER, LYOPHILIZED, FOR SOLUTION INTRAVENOUS at 11:36

## 2018-06-15 RX ADMIN — Medication 10 ML: at 09:02

## 2018-06-15 RX ADMIN — REGADENOSON 0.4 MG: 0.08 INJECTION, SOLUTION INTRAVENOUS at 11:36

## 2018-06-15 RX ADMIN — PERFLUTREN 2.2 MG: 6.52 INJECTION, SUSPENSION INTRAVENOUS at 09:04

## 2018-06-15 RX ADMIN — TETRAKIS(2-METHOXYISOBUTYLISOCYANIDE)COPPER(I) TETRAFLUOROBORATE 10 MILLICURIE: 1 INJECTION, POWDER, LYOPHILIZED, FOR SOLUTION INTRAVENOUS at 11:36

## 2018-06-21 ENCOUNTER — OFFICE VISIT (OUTPATIENT)
Dept: CARDIOLOGY | Age: 43
End: 2018-06-21
Payer: MEDICAID

## 2018-06-21 VITALS
WEIGHT: 182 LBS | SYSTOLIC BLOOD PRESSURE: 134 MMHG | HEART RATE: 96 BPM | HEIGHT: 64 IN | RESPIRATION RATE: 18 BRPM | BODY MASS INDEX: 31.07 KG/M2 | DIASTOLIC BLOOD PRESSURE: 84 MMHG

## 2018-06-21 DIAGNOSIS — I10 HYPERTENSION, UNSPECIFIED TYPE: Primary | ICD-10-CM

## 2018-06-21 PROCEDURE — 93000 ELECTROCARDIOGRAM COMPLETE: CPT | Performed by: INTERNAL MEDICINE

## 2018-06-21 PROCEDURE — 99212 OFFICE O/P EST SF 10 MIN: CPT | Performed by: INTERNAL MEDICINE

## 2018-06-26 ENCOUNTER — HOSPITAL ENCOUNTER (INPATIENT)
Dept: CARDIAC CATH/INVASIVE PROCEDURES | Age: 43
LOS: 17 days | Discharge: HOME HEALTH | DRG: 233 | End: 2018-07-13
Attending: INTERNAL MEDICINE | Admitting: INTERNAL MEDICINE
Payer: MEDICAID

## 2018-06-26 ENCOUNTER — APPOINTMENT (OUTPATIENT)
Dept: GENERAL RADIOLOGY | Age: 43
DRG: 233 | End: 2018-06-26
Attending: INTERNAL MEDICINE
Payer: MEDICAID

## 2018-06-26 DIAGNOSIS — G89.18 POSTOPERATIVE PAIN: Primary | ICD-10-CM

## 2018-06-26 DIAGNOSIS — E87.1 HYPONATREMIA: ICD-10-CM

## 2018-06-26 PROBLEM — I20.0 UNSTABLE ANGINA (HCC): Status: ACTIVE | Noted: 2018-06-26

## 2018-06-26 LAB
ANION GAP SERPL CALCULATED.3IONS-SCNC: 11 MMOL/L (ref 7–19)
BUN BLDV-MCNC: 8 MG/DL (ref 6–20)
CALCIUM SERPL-MCNC: 9.4 MG/DL (ref 8.6–10)
CHLORIDE BLD-SCNC: 102 MMOL/L (ref 98–111)
CO2: 26 MMOL/L (ref 22–29)
CREAT SERPL-MCNC: 0.6 MG/DL (ref 0.5–0.9)
GFR NON-AFRICAN AMERICAN: >60
GLUCOSE BLD-MCNC: 181 MG/DL (ref 74–109)
GLUCOSE BLD-MCNC: 188 MG/DL (ref 70–99)
HCT VFR BLD CALC: 44.4 % (ref 37–47)
HEMOGLOBIN: 15 G/DL (ref 12–16)
MCH RBC QN AUTO: 30.4 PG (ref 27–31)
MCHC RBC AUTO-ENTMCNC: 33.8 G/DL (ref 33–37)
MCV RBC AUTO: 89.9 FL (ref 81–99)
PDW BLD-RTO: 12.5 % (ref 11.5–14.5)
PERFORMED ON: ABNORMAL
PLATELET # BLD: 233 K/UL (ref 130–400)
PMV BLD AUTO: 10 FL (ref 9.4–12.3)
POTASSIUM REFLEX MAGNESIUM: 4.2 MMOL/L (ref 3.5–5)
RBC # BLD: 4.94 M/UL (ref 4.2–5.4)
SODIUM BLD-SCNC: 139 MMOL/L (ref 136–145)
WBC # BLD: 10.7 K/UL (ref 4.8–10.8)

## 2018-06-26 PROCEDURE — 6360000002 HC RX W HCPCS

## 2018-06-26 PROCEDURE — 2709999900 HC NON-CHARGEABLE SUPPLY

## 2018-06-26 PROCEDURE — 2580000003 HC RX 258: Performed by: INTERNAL MEDICINE

## 2018-06-26 PROCEDURE — C1887 CATHETER, GUIDING: HCPCS

## 2018-06-26 PROCEDURE — B2111ZZ FLUOROSCOPY OF MULTIPLE CORONARY ARTERIES USING LOW OSMOLAR CONTRAST: ICD-10-PCS | Performed by: INTERNAL MEDICINE

## 2018-06-26 PROCEDURE — 2140000000 HC CCU INTERMEDIATE R&B

## 2018-06-26 PROCEDURE — 71046 X-RAY EXAM CHEST 2 VIEWS: CPT

## 2018-06-26 PROCEDURE — 6360000002 HC RX W HCPCS: Performed by: INTERNAL MEDICINE

## 2018-06-26 PROCEDURE — 2500000003 HC RX 250 WO HCPCS

## 2018-06-26 PROCEDURE — 85027 COMPLETE CBC AUTOMATED: CPT

## 2018-06-26 PROCEDURE — C1894 INTRO/SHEATH, NON-LASER: HCPCS

## 2018-06-26 PROCEDURE — 6370000000 HC RX 637 (ALT 250 FOR IP): Performed by: INTERNAL MEDICINE

## 2018-06-26 PROCEDURE — 93458 L HRT ARTERY/VENTRICLE ANGIO: CPT | Performed by: INTERNAL MEDICINE

## 2018-06-26 PROCEDURE — 82948 REAGENT STRIP/BLOOD GLUCOSE: CPT

## 2018-06-26 PROCEDURE — 4A023N7 MEASUREMENT OF CARDIAC SAMPLING AND PRESSURE, LEFT HEART, PERCUTANEOUS APPROACH: ICD-10-PCS | Performed by: INTERNAL MEDICINE

## 2018-06-26 PROCEDURE — B31N1ZZ FLUOROSCOPY OF OTHER UPPER ARTERIES USING LOW OSMOLAR CONTRAST: ICD-10-PCS | Performed by: INTERNAL MEDICINE

## 2018-06-26 PROCEDURE — B2151ZZ FLUOROSCOPY OF LEFT HEART USING LOW OSMOLAR CONTRAST: ICD-10-PCS | Performed by: INTERNAL MEDICINE

## 2018-06-26 PROCEDURE — 80048 BASIC METABOLIC PNL TOTAL CA: CPT

## 2018-06-26 PROCEDURE — 36415 COLL VENOUS BLD VENIPUNCTURE: CPT

## 2018-06-26 RX ORDER — PROMETHAZINE HYDROCHLORIDE 25 MG/1
25 TABLET ORAL PRN
Status: DISCONTINUED | OUTPATIENT
Start: 2018-06-26 | End: 2018-07-02

## 2018-06-26 RX ORDER — SODIUM CHLORIDE 9 MG/ML
INJECTION, SOLUTION INTRAVENOUS CONTINUOUS
Status: DISCONTINUED | OUTPATIENT
Start: 2018-06-26 | End: 2018-06-27 | Stop reason: ALTCHOICE

## 2018-06-26 RX ORDER — SODIUM CHLORIDE 0.9 % (FLUSH) 0.9 %
10 SYRINGE (ML) INJECTION EVERY 12 HOURS SCHEDULED
Status: DISCONTINUED | OUTPATIENT
Start: 2018-06-26 | End: 2018-07-02 | Stop reason: SDUPTHER

## 2018-06-26 RX ORDER — POLYETHYLENE GLYCOL 3350 17 G/17G
17 POWDER, FOR SOLUTION ORAL DAILY
Status: DISCONTINUED | OUTPATIENT
Start: 2018-06-26 | End: 2018-07-02

## 2018-06-26 RX ORDER — SODIUM CHLORIDE 0.9 % (FLUSH) 0.9 %
10 SYRINGE (ML) INJECTION PRN
Status: DISCONTINUED | OUTPATIENT
Start: 2018-06-26 | End: 2018-07-02 | Stop reason: SDUPTHER

## 2018-06-26 RX ORDER — SODIUM CHLORIDE 9 MG/ML
INJECTION, SOLUTION INTRAVENOUS CONTINUOUS
Status: DISCONTINUED | OUTPATIENT
Start: 2018-06-26 | End: 2018-07-02

## 2018-06-26 RX ORDER — DEXTROSE MONOHYDRATE 25 G/50ML
12.5 INJECTION, SOLUTION INTRAVENOUS PRN
Status: DISCONTINUED | OUTPATIENT
Start: 2018-06-26 | End: 2018-07-02

## 2018-06-26 RX ORDER — ALPRAZOLAM 1 MG/1
1 TABLET ORAL 4 TIMES DAILY
Status: DISCONTINUED | OUTPATIENT
Start: 2018-06-26 | End: 2018-06-27

## 2018-06-26 RX ORDER — GABAPENTIN 300 MG/1
300 CAPSULE ORAL 3 TIMES DAILY
Status: DISCONTINUED | OUTPATIENT
Start: 2018-06-26 | End: 2018-07-02

## 2018-06-26 RX ORDER — NICOTINE POLACRILEX 4 MG
15 LOZENGE BUCCAL PRN
Status: DISCONTINUED | OUTPATIENT
Start: 2018-06-26 | End: 2018-07-02

## 2018-06-26 RX ORDER — IBUPROFEN 400 MG/1
800 TABLET ORAL EVERY 6 HOURS PRN
Status: DISCONTINUED | OUTPATIENT
Start: 2018-06-26 | End: 2018-07-03

## 2018-06-26 RX ORDER — AMITRIPTYLINE HYDROCHLORIDE 50 MG/1
100 TABLET, FILM COATED ORAL NIGHTLY
Status: DISCONTINUED | OUTPATIENT
Start: 2018-06-26 | End: 2018-07-02

## 2018-06-26 RX ORDER — DEXTROSE MONOHYDRATE 50 MG/ML
100 INJECTION, SOLUTION INTRAVENOUS PRN
Status: DISCONTINUED | OUTPATIENT
Start: 2018-06-26 | End: 2018-07-02

## 2018-06-26 RX ORDER — INSULIN GLARGINE 100 [IU]/ML
46 INJECTION, SOLUTION SUBCUTANEOUS DAILY
Status: DISCONTINUED | OUTPATIENT
Start: 2018-06-27 | End: 2018-06-29

## 2018-06-26 RX ADMIN — SODIUM CHLORIDE: 9 INJECTION, SOLUTION INTRAVENOUS at 18:26

## 2018-06-26 RX ADMIN — IBUPROFEN 800 MG: 400 TABLET ORAL at 21:56

## 2018-06-26 RX ADMIN — GABAPENTIN 300 MG: 300 CAPSULE ORAL at 19:59

## 2018-06-26 RX ADMIN — SODIUM CHLORIDE: 9 INJECTION, SOLUTION INTRAVENOUS at 13:34

## 2018-06-26 RX ADMIN — AMITRIPTYLINE HYDROCHLORIDE 100 MG: 50 TABLET, FILM COATED ORAL at 19:59

## 2018-06-26 RX ADMIN — PROMETHAZINE HYDROCHLORIDE 25 MG: 25 TABLET ORAL at 22:55

## 2018-06-26 RX ADMIN — ALPRAZOLAM 1 MG: 1 TABLET ORAL at 19:59

## 2018-06-26 ASSESSMENT — PAIN SCALES - GENERAL: PAINLEVEL_OUTOF10: 10

## 2018-06-26 NOTE — PROCEDURES
Cardiac Catheterization    Patient name:  Urbano Biswas    :  1975  Med Rec No:  [id number]    Room:  [facility] CATH POOL/NONE  Account No:  [unfilled]  Admission date:  2018  Physician:  SEE Salguero MD      Date of procedure:  18    Procedure:    Left heart catheterization with DRAPER left ventriculography, selective coronary arteriography and arteriography of bilateral native internal mammary arteries. Catheters:  5-Icelandic Nathan. Type and site of entry:  Percutaneous right radial artery with modified Seldinger technique. Contrast material:  Isovue-300. Comments: There were no complications. The patient is taken to her room in satisfactory condition. Description:  The right wrist was prepped with chlorhexidine solution, draped in a sterile fashion and anesthetized with 2% plain Xylocaine. With ultrasound-guided and safe 5-Icelandic sheath was placed in the right radial artery. The catheter was advanced into the ascending aorta and the procedure was then performed in the standard fashion without incident after which the access site was closed using a TR band with adequate hemostasis and intact distal pulses. FINDINGS:    Hemodynamics:  LV pressure 136/6. A oh pressure 136/76    DRAPER Left Ventriculography:  The left ventricle has a normal size and configuration and a normal ejection fraction of over 60%. There is no noted mitral regurgitation. Selective Coronary Arteriography:      1. The right coronary artery is nondominant with diffuse disease manifested by multiple areas of luminal irregularity. 2.  The left main coronary artery is free of focal disease. 3.  The circumflex coronary artery is dominant. It gives rise to a fairly small 1st obtuse marginal branch and then a larger 2nd obtuse marginal branch which has a focal 90% stenosis in its proximal segment.  The circumflex distal to this obtuse marginal branch has an irregular area of 50-60% narrowing proximal to the

## 2018-06-26 NOTE — H&P
Cardiology: I have interviewed and examined the patient independently and I agree with Dr. Ulloa Failing office note which also serves as the history and physical for this encounter. The only interval change since his initial note is that a nuclear stress test is positive for anterolateral ischemia. Patient has episodes of vague chest discomfort in the upper center of her chest, she had an episode of borderline elevated troponin and has type I diabetes mellitus and is a smoker. Plan cardiac catheterization with possible percutaneous intervention. Risks benefits and other options have been explained in detail and patient agrees to proceed.

## 2018-06-27 LAB
GLUCOSE BLD-MCNC: 183 MG/DL (ref 70–99)
GLUCOSE BLD-MCNC: 217 MG/DL (ref 70–99)
GLUCOSE BLD-MCNC: 287 MG/DL (ref 70–99)
PERFORMED ON: ABNORMAL

## 2018-06-27 PROCEDURE — 2140000000 HC CCU INTERMEDIATE R&B

## 2018-06-27 PROCEDURE — 82948 REAGENT STRIP/BLOOD GLUCOSE: CPT

## 2018-06-27 PROCEDURE — 6360000002 HC RX W HCPCS: Performed by: INTERNAL MEDICINE

## 2018-06-27 PROCEDURE — 6370000000 HC RX 637 (ALT 250 FOR IP): Performed by: INTERNAL MEDICINE

## 2018-06-27 PROCEDURE — 2580000003 HC RX 258: Performed by: INTERNAL MEDICINE

## 2018-06-27 PROCEDURE — 99225 PR SBSQ OBSERVATION CARE/DAY 25 MINUTES: CPT | Performed by: INTERNAL MEDICINE

## 2018-06-27 RX ORDER — METOPROLOL SUCCINATE 25 MG/1
25 TABLET, EXTENDED RELEASE ORAL DAILY
Status: DISCONTINUED | OUTPATIENT
Start: 2018-06-27 | End: 2018-06-28

## 2018-06-27 RX ORDER — ASPIRIN 81 MG/1
81 TABLET, CHEWABLE ORAL DAILY
Status: DISCONTINUED | OUTPATIENT
Start: 2018-06-27 | End: 2018-07-02

## 2018-06-27 RX ORDER — ALPRAZOLAM 1 MG/1
1 TABLET ORAL EVERY 4 HOURS
Status: DISCONTINUED | OUTPATIENT
Start: 2018-06-27 | End: 2018-06-27

## 2018-06-27 RX ORDER — ALPRAZOLAM 1 MG/1
1 TABLET ORAL EVERY 4 HOURS
Status: DISCONTINUED | OUTPATIENT
Start: 2018-06-27 | End: 2018-07-02

## 2018-06-27 RX ADMIN — ENOXAPARIN SODIUM 40 MG: 40 INJECTION SUBCUTANEOUS at 17:46

## 2018-06-27 RX ADMIN — GABAPENTIN 300 MG: 300 CAPSULE ORAL at 13:31

## 2018-06-27 RX ADMIN — Medication 10 ML: at 08:51

## 2018-06-27 RX ADMIN — Medication 10 ML: at 20:49

## 2018-06-27 RX ADMIN — ALPRAZOLAM 1 MG: 1 TABLET ORAL at 17:41

## 2018-06-27 RX ADMIN — INSULIN GLARGINE 46 UNITS: 100 INJECTION, SOLUTION SUBCUTANEOUS at 07:49

## 2018-06-27 RX ADMIN — GABAPENTIN 300 MG: 300 CAPSULE ORAL at 20:49

## 2018-06-27 RX ADMIN — ALPRAZOLAM 1 MG: 1 TABLET ORAL at 08:51

## 2018-06-27 RX ADMIN — POLYETHYLENE GLYCOL 3350 17 G: 17 POWDER, FOR SOLUTION ORAL at 08:51

## 2018-06-27 RX ADMIN — METOPROLOL SUCCINATE 25 MG: 25 TABLET, EXTENDED RELEASE ORAL at 18:00

## 2018-06-27 RX ADMIN — LINACLOTIDE 290 MCG: 145 CAPSULE, GELATIN COATED ORAL at 05:51

## 2018-06-27 RX ADMIN — ASPIRIN 81 MG CHEWABLE TABLET 81 MG: 81 TABLET CHEWABLE at 17:46

## 2018-06-27 RX ADMIN — GABAPENTIN 300 MG: 300 CAPSULE ORAL at 08:51

## 2018-06-27 RX ADMIN — ALPRAZOLAM 1 MG: 1 TABLET ORAL at 20:49

## 2018-06-27 RX ADMIN — PROMETHAZINE HYDROCHLORIDE 25 MG: 25 TABLET ORAL at 19:48

## 2018-06-27 RX ADMIN — AMITRIPTYLINE HYDROCHLORIDE 100 MG: 50 TABLET, FILM COATED ORAL at 20:49

## 2018-06-27 RX ADMIN — ALPRAZOLAM 1 MG: 1 TABLET ORAL at 13:07

## 2018-06-27 ASSESSMENT — PAIN SCALES - GENERAL
PAINLEVEL_OUTOF10: 0
PAINLEVEL_OUTOF10: 0

## 2018-06-27 NOTE — PROGRESS NOTES
Received care of Pt at [address]. Pt in bed, alert and oriented. Pt had recently come from cath lab. Pt has a right radial puncture site. Site shows no signs of bleeding or hematoma. Pt had c/o headache with a 10/10. Medications given see MAR. At 2200 RN deflated half of the air in the radial band, pt continued to show no signs of hematoma or bleeding, RN deflated radial band completely after five minutes. assessment performed, will continue to monitor.  Electronically signed by Benitez Dodson RN on 6/27/2018 at 3:38 AM

## 2018-06-27 NOTE — PROGRESS NOTES
the last 72 hours. FASTING LIPID PANEL:No results found for: HDL, LDLDIRECT, LDLCALC, TRIG  LIVER PROFILE:No results for input(s): AST, ALT, LABALBU in the last 72 hours. NURSE:  ARGENIS Madison : 1975, Female, 40 y.o. History:  Severe coronary artery disease    Nursing note and diagnostics above reviewed and evaluated    [Allergies/Contraindications:  Ciprofloxacin and Levofloxacin]     Todays status: no major complaints. Anxious. Dyspneic with exertion    Physical Examination    Respiratory -  clear. Cardiovascular   regular  Abdominal -  Soft. Bowel sounds present. Nontender. Extremities -  Cath access site looks good. Assessment/Plan     CTS consult. Patient wishes to stay in the hospital until she discusses her situation with cardiothoracic surgery. Will adjust medications. Discussed with patient and nursing      SEE Lakhani MD  [facility]

## 2018-06-27 NOTE — PLAN OF CARE
Problem: Pain:  Goal: Pain level will decrease  Pain level will decrease   Outcome: Met This Shift    Goal: Control of acute pain  Control of acute pain   Outcome: Met This Shift      Problem: Bleeding:  Goal: Will show no signs and symptoms of excessive bleeding  Will show no signs and symptoms of excessive bleeding  Outcome: Met This Shift

## 2018-06-28 PROBLEM — I25.10 CAD IN NATIVE ARTERY: Status: ACTIVE | Noted: 2018-06-28

## 2018-06-28 LAB
GLUCOSE BLD-MCNC: 166 MG/DL (ref 70–99)
GLUCOSE BLD-MCNC: 209 MG/DL (ref 70–99)
GLUCOSE BLD-MCNC: 289 MG/DL (ref 70–99)
GLUCOSE BLD-MCNC: 313 MG/DL (ref 70–99)
LV EF: 60 %
LVEF MODALITY: NORMAL
PERFORMED ON: ABNORMAL

## 2018-06-28 PROCEDURE — 99232 SBSQ HOSP IP/OBS MODERATE 35: CPT | Performed by: INTERNAL MEDICINE

## 2018-06-28 PROCEDURE — 2580000003 HC RX 258: Performed by: INTERNAL MEDICINE

## 2018-06-28 PROCEDURE — 6360000002 HC RX W HCPCS: Performed by: INTERNAL MEDICINE

## 2018-06-28 PROCEDURE — 99253 IP/OBS CNSLTJ NEW/EST LOW 45: CPT | Performed by: THORACIC SURGERY (CARDIOTHORACIC VASCULAR SURGERY)

## 2018-06-28 PROCEDURE — 6370000000 HC RX 637 (ALT 250 FOR IP): Performed by: INTERNAL MEDICINE

## 2018-06-28 PROCEDURE — 6370000000 HC RX 637 (ALT 250 FOR IP): Performed by: CLINICAL NURSE SPECIALIST

## 2018-06-28 PROCEDURE — 99254 IP/OBS CNSLTJ NEW/EST MOD 60: CPT | Performed by: CLINICAL NURSE SPECIALIST

## 2018-06-28 PROCEDURE — 82948 REAGENT STRIP/BLOOD GLUCOSE: CPT

## 2018-06-28 PROCEDURE — 2140000000 HC CCU INTERMEDIATE R&B

## 2018-06-28 RX ORDER — METOPROLOL SUCCINATE 25 MG/1
25 TABLET, EXTENDED RELEASE ORAL 2 TIMES DAILY
Status: DISCONTINUED | OUTPATIENT
Start: 2018-06-28 | End: 2018-06-29

## 2018-06-28 RX ADMIN — Medication 10 ML: at 21:07

## 2018-06-28 RX ADMIN — ASPIRIN 81 MG CHEWABLE TABLET 81 MG: 81 TABLET CHEWABLE at 08:16

## 2018-06-28 RX ADMIN — ALPRAZOLAM 1 MG: 1 TABLET ORAL at 17:52

## 2018-06-28 RX ADMIN — GABAPENTIN 300 MG: 300 CAPSULE ORAL at 21:05

## 2018-06-28 RX ADMIN — INSULIN GLARGINE 46 UNITS: 100 INJECTION, SOLUTION SUBCUTANEOUS at 09:27

## 2018-06-28 RX ADMIN — INSULIN LISPRO 4 UNITS: 100 INJECTION, SOLUTION INTRAVENOUS; SUBCUTANEOUS at 21:06

## 2018-06-28 RX ADMIN — ALPRAZOLAM 1 MG: 1 TABLET ORAL at 05:29

## 2018-06-28 RX ADMIN — ENOXAPARIN SODIUM 40 MG: 40 INJECTION SUBCUTANEOUS at 17:52

## 2018-06-28 RX ADMIN — ALPRAZOLAM 1 MG: 1 TABLET ORAL at 08:16

## 2018-06-28 RX ADMIN — GABAPENTIN 300 MG: 300 CAPSULE ORAL at 14:04

## 2018-06-28 RX ADMIN — PROMETHAZINE HYDROCHLORIDE 25 MG: 25 TABLET ORAL at 21:11

## 2018-06-28 RX ADMIN — METOPROLOL SUCCINATE 25 MG: 25 TABLET, EXTENDED RELEASE ORAL at 17:52

## 2018-06-28 RX ADMIN — METOPROLOL SUCCINATE 25 MG: 25 TABLET, EXTENDED RELEASE ORAL at 08:16

## 2018-06-28 RX ADMIN — ALPRAZOLAM 1 MG: 1 TABLET ORAL at 21:04

## 2018-06-28 RX ADMIN — ALPRAZOLAM 1 MG: 1 TABLET ORAL at 14:04

## 2018-06-28 RX ADMIN — AMITRIPTYLINE HYDROCHLORIDE 100 MG: 50 TABLET, FILM COATED ORAL at 21:04

## 2018-06-28 RX ADMIN — LINACLOTIDE 290 MCG: 145 CAPSULE, GELATIN COATED ORAL at 05:29

## 2018-06-28 RX ADMIN — ALPRAZOLAM 1 MG: 1 TABLET ORAL at 01:02

## 2018-06-28 RX ADMIN — GABAPENTIN 300 MG: 300 CAPSULE ORAL at 08:15

## 2018-06-28 RX ADMIN — POLYETHYLENE GLYCOL 3350 17 G: 17 POWDER, FOR SOLUTION ORAL at 08:18

## 2018-06-28 ASSESSMENT — ENCOUNTER SYMPTOMS
EYES NEGATIVE: 1
RESPIRATORY NEGATIVE: 1
GASTROINTESTINAL NEGATIVE: 1

## 2018-06-28 ASSESSMENT — PAIN SCALES - GENERAL
PAINLEVEL_OUTOF10: 0
PAINLEVEL_OUTOF10: 0

## 2018-06-28 NOTE — PLAN OF CARE
Problem: Pain:  Goal: Pain level will decrease  Pain level will decrease   Outcome: Ongoing    Goal: Control of acute pain  Control of acute pain   Outcome: Ongoing    Goal: Control of chronic pain  Control of chronic pain   Outcome: Ongoing      Problem: Bleeding:  Goal: Will show no signs and symptoms of excessive bleeding  Will show no signs and symptoms of excessive bleeding   Outcome: Ongoing      Problem: Falls - Risk of:  Goal: Will remain free from falls  Will remain free from falls   Outcome: Ongoing    Goal: Absence of physical injury  Absence of physical injury   Outcome: Ongoing

## 2018-06-28 NOTE — PROGRESS NOTES
[facility]  Progress Note                            Date:  6/28/2018  Patient: Freddy Levy  Admission:  6/26/2018 11:35 AM  Admit DX: Abnormal result of other cardiovascular function study [R94.39]  Unstable angina ([address] [I20.0]  Age:  40 y.o., 1975     LOS: 2 days     Reason for evaluation:   coronary artery disease      SUBJECTIVE:    The patient was seen and examined. Notes and labs reviewed. There were not complications over night. Patient's cardiac review of systems: negative for chest pain. The patient is unchanged. Awaiting CTS evaluation. OBJECTIVE:    Telemetry: Sinus. Lungs clear.  ALPRAZolam  1 mg Oral Q4H    metoprolol succinate  25 mg Oral Daily    aspirin  81 mg Oral Daily    enoxaparin  40 mg Subcutaneous Q24H    amitriptyline  100 mg Oral Nightly    linaclotide  290 mcg Oral QAM AC    gabapentin  300 mg Oral TID    polyethylene glycol  17 g Oral Daily    sodium chloride flush  10 mL Intravenous 2 times per day    insulin glargine  46 Units Subcutaneous Daily        sodium chloride 125 mL/hr at 06/26/18 1334    dextrose             /75   Pulse 87   Temp 96.6 °F (35.9 °C) (Temporal)   Resp 19   Ht 5' 4\" (1.626 m)   Wt 180 lb 8 oz (81.9 kg)   LMP 01/22/2012   SpO2 97%   Breastfeeding? No   BMI 30.98 kg/m²     Intake/Output Summary (Last 24 hours) at 06/28/18 1308  Last data filed at 06/28/18 0916   Gross per 24 hour   Intake             1270 ml   Output             3000 ml   Net            -1730 ml           Labs:   CBC:   Recent Labs      06/26/18   1158   WBC  10.7   HGB  15.0   HCT  44.4   PLT  233     BMP: Recent Labs      06/26/18   1158   NA  139   K  4.2   CO2  26   BUN  8   CREATININE  0.6   LABGLOM  >60   GLUCOSE  181*     BNP: No results for input(s): BNP in the last 72 hours. PT/INR: No results for input(s): PROTIME, INR in the last 72 hours. APTT:No results for input(s): APTT in the last 72 hours.   CARDIAC ENZYMES:No results for input(s): CKTOTAL, CKMB, CKMBINDEX, TROPONINI in the last 72 hours. FASTING LIPID PANEL:No results found for: HDL, LDLDIRECT, LDLCALC, TRIG  LIVER PROFILE:No results for input(s): AST, ALT, LABALBU in the last 72 hours. NURSE:  ARGENIS Diaz : 1975, Female, 40 y.o. History:  Angina with coronary artery disease    Nursing note and diagnostics above reviewed and evaluated    [Allergies/Contraindications:  Ciprofloxacin and Levofloxacin]     Todays status: had an episode of chest discomfort earlier today which resolved spontaneously. Physical Examination    Respiratory -  Lungs clear. Cardiovascular   Regular rhythm without murmur or gallop  Abdominal -  Soft. Bowel sounds present. Nontender. Extremities -  Pulses intact with no edema. Assessment/Plan     adjust anti-anginal medication . Dr. Moe Bourgeois to see in order to arrange potential CABG. Discussed with patient and nursing      C.  Luis Alarcon MD  [facility]

## 2018-06-28 NOTE — CONSULTS
Hospital Medicine Consult    Patient:  Steve Hyde  MRN: [id number]    Consulting Physician: SEE Sunshine MD  Reason for Consult: medical management  Primacy Care Physician: Sandhya Kolb    HISTORY OF PRESENT ILLNESS:   The patient is a 40 y.o. female admitted after cardiac catheterization. Patient is noted to have 3 vessel disease with CTS consult placed. We have been asked to assist with medical management of patient's diabetes, GERD, and IBS. Currently, patient is not having any distress, no complaints of chest pain, shortness of breath, or diaphoresis. Past Medical History:        Diagnosis Date    Arthritis     [facility] ([facility]     CAD (coronary artery disease)     [facility]     Cervical CA    Depression     Diabetes mellitus ([facility] 20 years    Dysplasia of cervix     Endometriosis     GERD (gastroesophageal reflux disease)     History of cone biopsy of cervix     Snapping hip syndrome        Past Surgical History:        Procedure Laterality Date    CHOLECYSTECTOMY      COLONOSCOPY      COLPOSCOPY  2000    ENDOSCOPY, COLON, DIAGNOSTIC      HYSTERECTOMY      Left ovary still there.     KNEE SURGERY Right     Lateral Release    KNEE SURGERY Right     Meniscus Repair    LAPAROSCOPY         Medications: Scheduled Meds:   metoprolol succinate  25 mg Oral BID    ALPRAZolam  1 mg Oral Q4H    aspirin  81 mg Oral Daily    enoxaparin  40 mg Subcutaneous Q24H    amitriptyline  100 mg Oral Nightly    linaclotide  290 mcg Oral QAM AC    gabapentin  300 mg Oral TID    polyethylene glycol  17 g Oral Daily    sodium chloride flush  10 mL Intravenous 2 times per day    insulin glargine  46 Units Subcutaneous Daily     Continuous Infusions:   sodium chloride 125 mL/hr at 06/26/18 1334    dextrose       PRN Meds:.promethazine, sodium chloride flush, ibuprofen, glucose, dextrose, glucagon (rDNA), dextrose    Allergies:  Ciprofloxacin and Levofloxacin    Social History:   TOBACCO:

## 2018-06-28 NOTE — CONSULTS
Inpatient consult to Cardiothoracic Surgery  Consult performed by: Chi Portillo  Consult ordered by: Alexander Tyler  Assessment/Recommendations: Dictated # [id number]

## 2018-06-28 NOTE — CONSULTS
[facility] [facility] [address], [address] [facility]                                   CONSULTATION    PATIENT NAME: Ruth Enriquez                    :        1975  MED REC NO:   [id number]                              ROOM:       [facility]  ACCOUNT NO:   [de-identified]                           ADMIT DATE: 2018  PROVIDER:     Abdi Gore MD    CONSULT DATE:  2018    CONSULTING PHYSICIAN:  Dr. Georgi Ballard. REASON FOR CONSULTATION:  Severe multivessel coronary artery disease. HISTORY:  The patient is a 36-year-old overweight, type 1 juvenile  diabetic, who underwent cardiac catheterization yesterday. Her history  relates back about a month ago when she was visiting a GI physician in  [facility] for diabetic gastroparesis and bowel motility problems. During  that visit, the patient experienced episodes of weakness, dizziness and  near syncope. She was found to be orthostatic hypotensive and was taken to  the emergency department for evaluation. She was given IV fluids. During  that evaluation, a troponin level was drawn that was slightly elevated. The patient was subsequently discharged home and was told to visit her  primary care physician for further cardiac evaluation. The patient denies  any symptoms of angina or congestive heart failure. She has had some  weakness while at work, but this comes and goes, is not on a regular basis. She underwent a Lexiscan, which demonstrated evidence of anterolateral wall  ischemia. For this reason, she was referred to Dr. Katha Mortimer for elective  heart catheterization. Catheterization demonstrates a fairly  normal-appearing left ventricle with an ejection fraction of 70%. The left  main artery is unremarkable. The LAD has severe diffuse disease throughout  the entire vessel. There is a long proximal 60% to 70% stenosis.   There is  a heavily diseased diagonal branch that is severely diseased and not  operable. There is an 80% mid and distal stenosis and disease all the way  to the apex. The left circumflex system is a dominant system. There are  very tiny first and second obtuse marginal branches, which are not  operable. This is followed by 99% stenosis in the midportion of the third  obtuse marginal branch with distal disease in this vessel as well. The  distal circumflex system has tiny PLM branches. They are too small for  bypass grafting. The RCA has 99% mid stenosis. It is nondominant and too  small for bypass grafting. Because of the presence of severe multivessel  disease in a type 1 juvenile diabetic who also smokes cigarettes, I was  asked to see her to consider bypass surgery. PAST MEDICAL HISTORY:  Significant for the above-mentioned diagnosis of  type 1 juvenile diabetes diagnosed at age 13. She tells me that she has  had symptoms since age 7, but there was a delay in diagnosis. She of  course has been on insulin since age 13. Other illnesses include  gastroesophageal reflux disease, bowel dysmotility, endometriosis,  dysplasia of the cervix, cervical cancer, bipolar disorder, arthritis. PREVIOUS OPERATIONS:  Include laparoscopy, knee surgery, hysterectomy,  colposcopy, cholecystectomy. HOME MEDICATIONS:  MiraLax, NovoLog insulin, Neurontin, Phenergan, Motrin,  Linzess, Elavil and Xanax. ALLERGIES:  To CIPRO and LEVOFLOXACIN. HABITS:  Until last month she had smoked one pack of cigarettes per day and  has done so for the past 20 years. Does not drink alcohol on a regular  basis. Does not use illicit drugs. SOCIAL HISTORY:  The patient has been  twice. She has recently  adopted a young girl, who lives with her. She also lives with a brother. 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    FAMILY HISTORY:  Significant for coronary artery disease and hypertension.     REVIEW OF SYSTEMS:  HEENT:  Denies headache, diplopia, sore throat, difficulty swallowing. RESPIRATORY:  Denies shortness of breath, wheezes or hemoptysis. CARDIOVASCULAR:  Denies angina symptoms, palpitations or lower extremity  edema. GI:  She does have some GI dysmotility secondary to complications of her  diabetes, but denies any constipation. :  Denies urinary frequency or dysuria. MUSCULOSKELETAL:  Denies joint pain or swelling. NEUROLOGIC:  Denies diplopia, headache, ataxia. The other 14 systems have been reviewed and are negative. PHYSICAL EXAMINATION:  GENERAL:  Physical exam demonstrates a pleasant middle-aged lady, who  appears in no acute distress at this time. VITAL SIGNS:  5 feet 4 inches tall, 179 pounds, blood pressure is 107/75,  pulse is 87 and regular, respirations 19, afebrile, O2 saturation is 97% on  room air. HEENT EXAMINATION:  Demonstrates a clear throat. There are no neck masses  present. No carotid bruits are heard. Trachea is midline. No oral  lesions are seen. Dentition is in good repair. CHEST:  Slightly coarse breath sounds in the bases, otherwise clear and  equal without wheezes or rhonchi. There is no fremitus or use of accessory  muscles. CARDIOVASCULAR:  Regular rate and rhythm without murmurs or gallops. Peripheral pulses are intact in the femoral, carotid and radial areas. ABDOMEN:  Obese without obvious masses or tenderness. She has normal bowel  sounds. Liver and spleen cannot be palpable. EXTREMITIES:  Show normal range of motion of upper and lower extremities  without joint swelling or tenderness. There are no rashes or lesions seen. NEUROLOGIC:  Cranial nerves II through XII are normal.  There are no  lateralizing findings. PSYCHIATRIC:  No evidence of anxiety, depression or agitation. IMPRESSION:  1. Asymptomatic severe multivessel coronary artery disease. 2.  Type 1 juvenile diabetes, insulin dependent. 3.  Tobacco addiction, now resolved. 4.  Hypertension.   5.

## 2018-06-28 NOTE — PROGRESS NOTES
Patient visited by Spiritual Care volunteer SEE Burnette. ish[employer]owl. Prayer/emotional support. Family present.

## 2018-06-29 LAB
ANION GAP SERPL CALCULATED.3IONS-SCNC: 11 MMOL/L (ref 7–19)
BUN BLDV-MCNC: 10 MG/DL (ref 6–20)
CALCIUM SERPL-MCNC: 8.8 MG/DL (ref 8.6–10)
CHLORIDE BLD-SCNC: 102 MMOL/L (ref 98–111)
CO2: 25 MMOL/L (ref 22–29)
CREAT SERPL-MCNC: 0.7 MG/DL (ref 0.5–0.9)
GFR NON-AFRICAN AMERICAN: >60
GLUCOSE BLD-MCNC: 143 MG/DL (ref 74–109)
GLUCOSE BLD-MCNC: 202 MG/DL (ref 70–99)
GLUCOSE BLD-MCNC: 230 MG/DL (ref 70–99)
GLUCOSE BLD-MCNC: 263 MG/DL (ref 70–99)
GLUCOSE BLD-MCNC: 287 MG/DL (ref 70–99)
GLUCOSE BLD-MCNC: 290 MG/DL (ref 70–99)
HCT VFR BLD CALC: 42.8 % (ref 37–47)
HEMOGLOBIN: 14.3 G/DL (ref 12–16)
MCH RBC QN AUTO: 30 PG (ref 27–31)
MCHC RBC AUTO-ENTMCNC: 33.4 G/DL (ref 33–37)
MCV RBC AUTO: 89.9 FL (ref 81–99)
PDW BLD-RTO: 12.2 % (ref 11.5–14.5)
PERFORMED ON: ABNORMAL
PLATELET # BLD: 233 K/UL (ref 130–400)
PMV BLD AUTO: 10.3 FL (ref 9.4–12.3)
POTASSIUM SERPL-SCNC: 3.6 MMOL/L (ref 3.5–5)
RBC # BLD: 4.76 M/UL (ref 4.2–5.4)
SODIUM BLD-SCNC: 138 MMOL/L (ref 136–145)
WBC # BLD: 11.3 K/UL (ref 4.8–10.8)

## 2018-06-29 PROCEDURE — 6370000000 HC RX 637 (ALT 250 FOR IP): Performed by: INTERNAL MEDICINE

## 2018-06-29 PROCEDURE — 85027 COMPLETE CBC AUTOMATED: CPT

## 2018-06-29 PROCEDURE — 2140000000 HC CCU INTERMEDIATE R&B

## 2018-06-29 PROCEDURE — 80048 BASIC METABOLIC PNL TOTAL CA: CPT

## 2018-06-29 PROCEDURE — 6370000000 HC RX 637 (ALT 250 FOR IP): Performed by: CLINICAL NURSE SPECIALIST

## 2018-06-29 PROCEDURE — 2580000003 HC RX 258: Performed by: INTERNAL MEDICINE

## 2018-06-29 PROCEDURE — 82948 REAGENT STRIP/BLOOD GLUCOSE: CPT

## 2018-06-29 PROCEDURE — 36415 COLL VENOUS BLD VENIPUNCTURE: CPT

## 2018-06-29 PROCEDURE — 99232 SBSQ HOSP IP/OBS MODERATE 35: CPT | Performed by: FAMILY MEDICINE

## 2018-06-29 PROCEDURE — 99232 SBSQ HOSP IP/OBS MODERATE 35: CPT | Performed by: INTERNAL MEDICINE

## 2018-06-29 PROCEDURE — 6360000002 HC RX W HCPCS: Performed by: INTERNAL MEDICINE

## 2018-06-29 RX ORDER — ZOLPIDEM TARTRATE 5 MG/1
5 TABLET ORAL NIGHTLY PRN
Status: DISCONTINUED | OUTPATIENT
Start: 2018-06-29 | End: 2018-07-02

## 2018-06-29 RX ORDER — METOPROLOL SUCCINATE 50 MG/1
50 TABLET, EXTENDED RELEASE ORAL 2 TIMES DAILY
Status: DISCONTINUED | OUTPATIENT
Start: 2018-06-29 | End: 2018-06-30

## 2018-06-29 RX ORDER — INSULIN GLARGINE 100 [IU]/ML
50 INJECTION, SOLUTION SUBCUTANEOUS DAILY
Status: DISCONTINUED | OUTPATIENT
Start: 2018-06-30 | End: 2018-06-30

## 2018-06-29 RX ADMIN — GABAPENTIN 300 MG: 300 CAPSULE ORAL at 14:00

## 2018-06-29 RX ADMIN — ALPRAZOLAM 1 MG: 1 TABLET ORAL at 12:50

## 2018-06-29 RX ADMIN — METOPROLOL SUCCINATE 25 MG: 25 TABLET, EXTENDED RELEASE ORAL at 08:46

## 2018-06-29 RX ADMIN — LINACLOTIDE 290 MCG: 145 CAPSULE, GELATIN COATED ORAL at 06:06

## 2018-06-29 RX ADMIN — INSULIN GLARGINE 46 UNITS: 100 INJECTION, SOLUTION SUBCUTANEOUS at 08:45

## 2018-06-29 RX ADMIN — Medication 10 ML: at 20:00

## 2018-06-29 RX ADMIN — METOPROLOL SUCCINATE 50 MG: 50 TABLET, EXTENDED RELEASE ORAL at 17:53

## 2018-06-29 RX ADMIN — GABAPENTIN 300 MG: 300 CAPSULE ORAL at 08:46

## 2018-06-29 RX ADMIN — ALPRAZOLAM 1 MG: 1 TABLET ORAL at 21:54

## 2018-06-29 RX ADMIN — INSULIN LISPRO 4 UNITS: 100 INJECTION, SOLUTION INTRAVENOUS; SUBCUTANEOUS at 12:50

## 2018-06-29 RX ADMIN — ALPRAZOLAM 1 MG: 1 TABLET ORAL at 06:06

## 2018-06-29 RX ADMIN — ALPRAZOLAM 1 MG: 1 TABLET ORAL at 17:53

## 2018-06-29 RX ADMIN — GABAPENTIN 300 MG: 300 CAPSULE ORAL at 19:59

## 2018-06-29 RX ADMIN — POLYETHYLENE GLYCOL 3350 17 G: 17 POWDER, FOR SOLUTION ORAL at 08:46

## 2018-06-29 RX ADMIN — ASPIRIN 81 MG CHEWABLE TABLET 81 MG: 81 TABLET CHEWABLE at 08:46

## 2018-06-29 RX ADMIN — ALPRAZOLAM 1 MG: 1 TABLET ORAL at 08:46

## 2018-06-29 RX ADMIN — INSULIN LISPRO 6 UNITS: 100 INJECTION, SOLUTION INTRAVENOUS; SUBCUTANEOUS at 17:54

## 2018-06-29 RX ADMIN — AMITRIPTYLINE HYDROCHLORIDE 100 MG: 50 TABLET, FILM COATED ORAL at 19:59

## 2018-06-29 RX ADMIN — INSULIN LISPRO 3 UNITS: 100 INJECTION, SOLUTION INTRAVENOUS; SUBCUTANEOUS at 21:54

## 2018-06-29 RX ADMIN — Medication 10 ML: at 10:39

## 2018-06-29 RX ADMIN — INSULIN LISPRO 6 UNITS: 100 INJECTION, SOLUTION INTRAVENOUS; SUBCUTANEOUS at 08:47

## 2018-06-29 RX ADMIN — ENOXAPARIN SODIUM 40 MG: 40 INJECTION SUBCUTANEOUS at 17:53

## 2018-06-29 RX ADMIN — ALPRAZOLAM 1 MG: 1 TABLET ORAL at 01:14

## 2018-06-29 ASSESSMENT — PAIN SCALES - GENERAL: PAINLEVEL_OUTOF10: 0

## 2018-06-29 NOTE — PROGRESS NOTES
Assumed care of patient at 1900. Pt is alert and oriented. No complaints voiced. Assessment complete, medications given, call light within reach. Will continue to monitor.  Electronically signed by Violet Elkins RN on 6/29/2018 at 12:43 AM

## 2018-06-30 ENCOUNTER — PREP FOR PROCEDURE (OUTPATIENT)
Dept: CARDIOTHORACIC SURGERY | Age: 43
End: 2018-06-30

## 2018-06-30 LAB
ABO/RH: NORMAL
ANTIBODY SCREEN: NORMAL
BILIRUBIN URINE: NEGATIVE
BLOOD, URINE: NEGATIVE
CHOLESTEROL, TOTAL: 179 MG/DL (ref 160–199)
CLARITY: CLEAR
COLOR: YELLOW
GLUCOSE BLD-MCNC: 157 MG/DL (ref 70–99)
GLUCOSE BLD-MCNC: 242 MG/DL (ref 70–99)
GLUCOSE BLD-MCNC: 285 MG/DL (ref 70–99)
GLUCOSE BLD-MCNC: 57 MG/DL (ref 70–99)
GLUCOSE BLD-MCNC: 71 MG/DL (ref 70–99)
GLUCOSE BLD-MCNC: 77 MG/DL (ref 70–99)
GLUCOSE BLD-MCNC: 91 MG/DL (ref 70–99)
GLUCOSE URINE: >=1000 MG/DL
HDLC SERPL-MCNC: 41 MG/DL (ref 65–121)
KETONES, URINE: NEGATIVE MG/DL
LDL CHOLESTEROL CALCULATED: 119 MG/DL
LEUKOCYTE ESTERASE, URINE: NEGATIVE
NITRITE, URINE: NEGATIVE
PERFORMED ON: ABNORMAL
PERFORMED ON: NORMAL
PH UA: 7.5
PROTEIN UA: NEGATIVE MG/DL
SPECIFIC GRAVITY UA: 1.01
TRIGL SERPL-MCNC: 96 MG/DL (ref 0–149)
UROBILINOGEN, URINE: 0.2 E.U./DL

## 2018-06-30 PROCEDURE — 6370000000 HC RX 637 (ALT 250 FOR IP): Performed by: CLINICAL NURSE SPECIALIST

## 2018-06-30 PROCEDURE — 2140000000 HC CCU INTERMEDIATE R&B

## 2018-06-30 PROCEDURE — 81003 URINALYSIS AUTO W/O SCOPE: CPT

## 2018-06-30 PROCEDURE — 99233 SBSQ HOSP IP/OBS HIGH 50: CPT | Performed by: INTERNAL MEDICINE

## 2018-06-30 PROCEDURE — 82948 REAGENT STRIP/BLOOD GLUCOSE: CPT

## 2018-06-30 PROCEDURE — 87070 CULTURE OTHR SPECIMN AEROBIC: CPT

## 2018-06-30 PROCEDURE — 2580000003 HC RX 258: Performed by: INTERNAL MEDICINE

## 2018-06-30 PROCEDURE — 94375 RESPIRATORY FLOW VOLUME LOOP: CPT

## 2018-06-30 PROCEDURE — 6360000002 HC RX W HCPCS: Performed by: INTERNAL MEDICINE

## 2018-06-30 PROCEDURE — 80061 LIPID PANEL: CPT

## 2018-06-30 PROCEDURE — 86901 BLOOD TYPING SEROLOGIC RH(D): CPT

## 2018-06-30 PROCEDURE — 99232 SBSQ HOSP IP/OBS MODERATE 35: CPT | Performed by: FAMILY MEDICINE

## 2018-06-30 PROCEDURE — 86900 BLOOD TYPING SEROLOGIC ABO: CPT

## 2018-06-30 PROCEDURE — 6370000000 HC RX 637 (ALT 250 FOR IP): Performed by: FAMILY MEDICINE

## 2018-06-30 PROCEDURE — 36415 COLL VENOUS BLD VENIPUNCTURE: CPT

## 2018-06-30 PROCEDURE — 6370000000 HC RX 637 (ALT 250 FOR IP): Performed by: INTERNAL MEDICINE

## 2018-06-30 PROCEDURE — 86850 RBC ANTIBODY SCREEN: CPT

## 2018-06-30 PROCEDURE — 99999 PR OFFICE/OUTPT VISIT,PROCEDURE ONLY: CPT | Performed by: THORACIC SURGERY (CARDIOTHORACIC VASCULAR SURGERY)

## 2018-06-30 RX ORDER — SODIUM CHLORIDE 0.9 % (FLUSH) 0.9 %
10 SYRINGE (ML) INJECTION PRN
Status: CANCELLED | OUTPATIENT
Start: 2018-06-30 | End: 2019-06-30

## 2018-06-30 RX ORDER — SODIUM CHLORIDE 0.9 % (FLUSH) 0.9 %
10 SYRINGE (ML) INJECTION EVERY 12 HOURS SCHEDULED
Status: CANCELLED | OUTPATIENT
Start: 2018-06-30 | End: 2019-06-30

## 2018-06-30 RX ORDER — INSULIN GLARGINE 100 [IU]/ML
46 INJECTION, SOLUTION SUBCUTANEOUS DAILY
Status: DISCONTINUED | OUTPATIENT
Start: 2018-06-30 | End: 2018-07-02

## 2018-06-30 RX ORDER — ATORVASTATIN CALCIUM 40 MG/1
40 TABLET, FILM COATED ORAL NIGHTLY
Status: DISCONTINUED | OUTPATIENT
Start: 2018-06-30 | End: 2018-07-02

## 2018-06-30 RX ORDER — METOPROLOL SUCCINATE 25 MG/1
12.5 TABLET, EXTENDED RELEASE ORAL 2 TIMES DAILY
Status: DISCONTINUED | OUTPATIENT
Start: 2018-07-01 | End: 2018-07-02

## 2018-06-30 RX ORDER — CHLORHEXIDINE GLUCONATE 4 G/100ML
SOLUTION TOPICAL SEE ADMIN INSTRUCTIONS
Status: CANCELLED | OUTPATIENT
Start: 2018-07-01 | End: 2038-07-01

## 2018-06-30 RX ORDER — CHLORHEXIDINE GLUCONATE 0.12 MG/ML
15 RINSE ORAL ONCE
Status: CANCELLED | OUTPATIENT
Start: 2018-07-01

## 2018-06-30 RX ADMIN — GABAPENTIN 300 MG: 300 CAPSULE ORAL at 21:08

## 2018-06-30 RX ADMIN — GABAPENTIN 300 MG: 300 CAPSULE ORAL at 09:20

## 2018-06-30 RX ADMIN — BISACODYL 10 MG: 5 TABLET, COATED ORAL at 14:59

## 2018-06-30 RX ADMIN — PROMETHAZINE HYDROCHLORIDE 25 MG: 25 TABLET ORAL at 14:59

## 2018-06-30 RX ADMIN — ALPRAZOLAM 1 MG: 1 TABLET ORAL at 21:08

## 2018-06-30 RX ADMIN — Medication 10 ML: at 21:06

## 2018-06-30 RX ADMIN — INSULIN LISPRO 6 UNITS: 100 INJECTION, SOLUTION INTRAVENOUS; SUBCUTANEOUS at 13:00

## 2018-06-30 RX ADMIN — ALPRAZOLAM 1 MG: 1 TABLET ORAL at 05:21

## 2018-06-30 RX ADMIN — INSULIN GLARGINE 46 UNITS: 100 INJECTION, SOLUTION SUBCUTANEOUS at 09:21

## 2018-06-30 RX ADMIN — ALPRAZOLAM 1 MG: 1 TABLET ORAL at 09:20

## 2018-06-30 RX ADMIN — ENOXAPARIN SODIUM 40 MG: 40 INJECTION SUBCUTANEOUS at 21:14

## 2018-06-30 RX ADMIN — ALPRAZOLAM 1 MG: 1 TABLET ORAL at 01:44

## 2018-06-30 RX ADMIN — POLYETHYLENE GLYCOL 3350 17 G: 17 POWDER, FOR SOLUTION ORAL at 09:20

## 2018-06-30 RX ADMIN — INSULIN LISPRO 2 UNITS: 100 INJECTION, SOLUTION INTRAVENOUS; SUBCUTANEOUS at 21:06

## 2018-06-30 RX ADMIN — GABAPENTIN 300 MG: 300 CAPSULE ORAL at 13:00

## 2018-06-30 RX ADMIN — INSULIN LISPRO 2 UNITS: 100 INJECTION, SOLUTION INTRAVENOUS; SUBCUTANEOUS at 18:11

## 2018-06-30 RX ADMIN — ATORVASTATIN CALCIUM 40 MG: 40 TABLET, FILM COATED ORAL at 21:08

## 2018-06-30 RX ADMIN — ALPRAZOLAM 1 MG: 1 TABLET ORAL at 18:11

## 2018-06-30 RX ADMIN — BISACODYL 10 MG: 5 TABLET, COATED ORAL at 21:14

## 2018-06-30 RX ADMIN — ALPRAZOLAM 1 MG: 1 TABLET ORAL at 13:00

## 2018-06-30 RX ADMIN — ASPIRIN 81 MG CHEWABLE TABLET 81 MG: 81 TABLET CHEWABLE at 09:20

## 2018-06-30 RX ADMIN — LINACLOTIDE 290 MCG: 145 CAPSULE, GELATIN COATED ORAL at 05:43

## 2018-06-30 RX ADMIN — Medication 10 ML: at 09:21

## 2018-06-30 RX ADMIN — AMITRIPTYLINE HYDROCHLORIDE 100 MG: 50 TABLET, FILM COATED ORAL at 21:08

## 2018-06-30 ASSESSMENT — PAIN SCALES - GENERAL: PAINLEVEL_OUTOF10: 0

## 2018-06-30 NOTE — PROGRESS NOTES
Consents signed for CABG and blood products administration. Urine specimen sent for analysis and MRSA swab sent to lab for processing. Procedure planned for Monday. Patient still needs BM before surgery time, she has a paralyzed intestine with chronic issues having BM. Patient states Monday was her last BM and this is not uncommon for her. Okay received to restart home medications for bowel regimen. Will continue to follow and address with bowel issues prior to surgery on Monday.     Electronically signed by Joseph Crawford RN on 6/30/2018 at 1:09 PM

## 2018-06-30 NOTE — PROGRESS NOTES
Hospitalist Progress Note  6/30/2018 8:40 AM  Subjective:   Admit Date: 6/26/2018  PCP: Jina Vera    Chief Complaint: diabetes mellitus    Subjective: Patient skipped dinner last night due to lack of hunger and unappetizing appearance. Blood sugars are running a little low today. Sugary snacks are noted within her room. History is otherwise unchanged. Cumulative Hospital History:   6-26: Admitted with chest discomfort for cardiac catheterization. Showed severe multivessel disease. 6-27: Cardiothoracic surgery consulted. 6-28: CTS plans CABG on 7-2. Hospitalist service consulted for medical management. 6-29: Insomnia, Ambien ordered strictly PRN only. Patient cheating on diet, will not adjust Lantus dose as she states she is willing to comply with diet now. 6-30: Lantus adjusted due to hyperglycemia yesterday, will revert changes due to dietary noncompliance and risk of hypoglycemia. ROS: 14 point review of systems is negative except as specifically addressed above. DIET CARB CONTROL; Low Fiber    Intake/Output Summary (Last 24 hours) at 06/30/18 0840  Last data filed at 06/30/18 0458   Gross per 24 hour   Intake             2280 ml   Output             1400 ml   Net              880 ml     Medications:   sodium chloride 125 mL/hr at 06/26/18 1334    dextrose       Current Facility-Administered Medications   Medication Dose Route Frequency Provider Last Rate Last Dose    insulin glargine (LANTUS) injection vial 46 Units  46 Units Subcutaneous Daily Sunday Braun, DO        zolpidem (AMBIEN) tablet 5 mg  5 mg Oral Nightly PRN Mei Elaine DO        metoprolol succinate (TOPROL XL) extended release tablet 50 mg  50 mg Oral BID SEE Keita MD   50 mg at 06/29/18 1753    insulin lispro (HUMALOG) injection vial 0-12 Units  0-12 Units Subcutaneous TID  NAVDEEP Alejo   6 Units at 06/29/18 1754    insulin lispro (HUMALOG) injection vial 0-6 Units  0-6 Units Subcutaneous Nightly Trey Santos

## 2018-07-01 ENCOUNTER — ANESTHESIA EVENT (OUTPATIENT)
Dept: OPERATING ROOM | Age: 43
DRG: 233 | End: 2018-07-01
Payer: MEDICAID

## 2018-07-01 LAB
ALBUMIN SERPL-MCNC: 3.3 G/DL (ref 3.5–5.2)
ALP BLD-CCNC: 67 U/L (ref 35–104)
ALT SERPL-CCNC: 7 U/L (ref 5–33)
ANION GAP SERPL CALCULATED.3IONS-SCNC: 12 MMOL/L (ref 7–19)
AST SERPL-CCNC: 8 U/L (ref 5–32)
BASOPHILS ABSOLUTE: 0 K/UL (ref 0–0.2)
BASOPHILS MANUAL: 0 %
BASOPHILS RELATIVE PERCENT: 0 % (ref 0–1)
BILIRUB SERPL-MCNC: <0.2 MG/DL (ref 0.2–1.2)
BUN BLDV-MCNC: 12 MG/DL (ref 6–20)
CALCIUM SERPL-MCNC: 8.9 MG/DL (ref 8.6–10)
CHLORIDE BLD-SCNC: 100 MMOL/L (ref 98–111)
CO2: 26 MMOL/L (ref 22–29)
CREAT SERPL-MCNC: 0.8 MG/DL (ref 0.5–0.9)
EOSINOPHILS ABSOLUTE: 0.25 K/UL (ref 0–0.6)
EOSINOPHILS RELATIVE PERCENT: 2 % (ref 0–5)
FIBRINOGEN: 362 MG/DL (ref 238–505)
GFR NON-AFRICAN AMERICAN: >60
GLUCOSE BLD-MCNC: 133 MG/DL (ref 70–99)
GLUCOSE BLD-MCNC: 135 MG/DL (ref 74–109)
GLUCOSE BLD-MCNC: 170 MG/DL (ref 70–99)
GLUCOSE BLD-MCNC: 220 MG/DL (ref 70–99)
GLUCOSE BLD-MCNC: 251 MG/DL (ref 70–99)
HBA1C MFR BLD: 8.2 %
HCT VFR BLD CALC: 38.1 % (ref 37–47)
HEMOGLOBIN: 12.9 G/DL (ref 12–16)
INR BLD: 1.02 (ref 0.88–1.18)
LYMPHOCYTES ABSOLUTE: 4.4 K/UL (ref 1.1–4.5)
LYMPHOCYTES RELATIVE PERCENT: 35 % (ref 20–40)
MAGNESIUM: 1.8 MG/DL (ref 1.6–2.6)
MCH RBC QN AUTO: 30.6 PG (ref 27–31)
MCHC RBC AUTO-ENTMCNC: 33.9 G/DL (ref 33–37)
MCV RBC AUTO: 90.3 FL (ref 81–99)
MONOCYTES ABSOLUTE: 0.5 K/UL (ref 0–0.9)
MONOCYTES RELATIVE PERCENT: 4 % (ref 0–10)
MRSA CULTURE ONLY: NORMAL
NEUTROPHILS ABSOLUTE: 7.4 K/UL (ref 1.5–7.5)
NEUTROPHILS MANUAL: 59 %
NEUTROPHILS RELATIVE PERCENT: 59 % (ref 50–65)
PDW BLD-RTO: 12.3 % (ref 11.5–14.5)
PERFORMED ON: ABNORMAL
PLATELET # BLD: 218 K/UL (ref 130–400)
PLATELET SLIDE REVIEW: ADEQUATE
PMV BLD AUTO: 10.9 FL (ref 9.4–12.3)
POTASSIUM REFLEX MAGNESIUM: 3.8 MMOL/L (ref 3.5–5)
PROTHROMBIN TIME: 13.3 SEC (ref 12–14.6)
RBC # BLD: 4.22 M/UL (ref 4.2–5.4)
RBC # BLD: NORMAL 10*6/UL
SODIUM BLD-SCNC: 138 MMOL/L (ref 136–145)
TOTAL PROTEIN: 5.8 G/DL (ref 6.6–8.7)
WBC # BLD: 12.5 K/UL (ref 4.8–10.8)

## 2018-07-01 PROCEDURE — 83036 HEMOGLOBIN GLYCOSYLATED A1C: CPT

## 2018-07-01 PROCEDURE — 6360000002 HC RX W HCPCS: Performed by: INTERNAL MEDICINE

## 2018-07-01 PROCEDURE — 99231 SBSQ HOSP IP/OBS SF/LOW 25: CPT | Performed by: INTERNAL MEDICINE

## 2018-07-01 PROCEDURE — 6370000000 HC RX 637 (ALT 250 FOR IP): Performed by: FAMILY MEDICINE

## 2018-07-01 PROCEDURE — 99232 SBSQ HOSP IP/OBS MODERATE 35: CPT | Performed by: FAMILY MEDICINE

## 2018-07-01 PROCEDURE — 85610 PROTHROMBIN TIME: CPT

## 2018-07-01 PROCEDURE — 6370000000 HC RX 637 (ALT 250 FOR IP): Performed by: CLINICAL NURSE SPECIALIST

## 2018-07-01 PROCEDURE — 6370000000 HC RX 637 (ALT 250 FOR IP): Performed by: INTERNAL MEDICINE

## 2018-07-01 PROCEDURE — 82948 REAGENT STRIP/BLOOD GLUCOSE: CPT

## 2018-07-01 PROCEDURE — 85025 COMPLETE CBC W/AUTO DIFF WBC: CPT

## 2018-07-01 PROCEDURE — 85384 FIBRINOGEN ACTIVITY: CPT

## 2018-07-01 PROCEDURE — 36415 COLL VENOUS BLD VENIPUNCTURE: CPT

## 2018-07-01 PROCEDURE — 80053 COMPREHEN METABOLIC PANEL: CPT

## 2018-07-01 PROCEDURE — 2580000003 HC RX 258: Performed by: INTERNAL MEDICINE

## 2018-07-01 PROCEDURE — 83735 ASSAY OF MAGNESIUM: CPT

## 2018-07-01 PROCEDURE — 2140000000 HC CCU INTERMEDIATE R&B

## 2018-07-01 RX ADMIN — ATORVASTATIN CALCIUM 40 MG: 40 TABLET, FILM COATED ORAL at 20:41

## 2018-07-01 RX ADMIN — METOPROLOL SUCCINATE 12.5 MG: 25 TABLET, EXTENDED RELEASE ORAL at 20:46

## 2018-07-01 RX ADMIN — INSULIN LISPRO 2 UNITS: 100 INJECTION, SOLUTION INTRAVENOUS; SUBCUTANEOUS at 20:51

## 2018-07-01 RX ADMIN — Medication 10 ML: at 20:41

## 2018-07-01 RX ADMIN — PROMETHAZINE HYDROCHLORIDE 25 MG: 25 TABLET ORAL at 20:41

## 2018-07-01 RX ADMIN — ALPRAZOLAM 1 MG: 1 TABLET ORAL at 00:24

## 2018-07-01 RX ADMIN — GABAPENTIN 300 MG: 300 CAPSULE ORAL at 08:09

## 2018-07-01 RX ADMIN — BISACODYL 10 MG: 5 TABLET, COATED ORAL at 08:09

## 2018-07-01 RX ADMIN — ALPRAZOLAM 1 MG: 1 TABLET ORAL at 08:10

## 2018-07-01 RX ADMIN — ZOLPIDEM TARTRATE 5 MG: 5 TABLET ORAL at 23:33

## 2018-07-01 RX ADMIN — AMITRIPTYLINE HYDROCHLORIDE 100 MG: 50 TABLET, FILM COATED ORAL at 20:41

## 2018-07-01 RX ADMIN — ALPRAZOLAM 1 MG: 1 TABLET ORAL at 18:16

## 2018-07-01 RX ADMIN — GABAPENTIN 300 MG: 300 CAPSULE ORAL at 14:14

## 2018-07-01 RX ADMIN — Medication 10 ML: at 08:09

## 2018-07-01 RX ADMIN — METOPROLOL SUCCINATE 12.5 MG: 25 TABLET, EXTENDED RELEASE ORAL at 08:09

## 2018-07-01 RX ADMIN — ALPRAZOLAM 1 MG: 1 TABLET ORAL at 20:41

## 2018-07-01 RX ADMIN — POLYETHYLENE GLYCOL 3350 17 G: 17 POWDER, FOR SOLUTION ORAL at 08:09

## 2018-07-01 RX ADMIN — ASPIRIN 81 MG CHEWABLE TABLET 81 MG: 81 TABLET CHEWABLE at 08:09

## 2018-07-01 RX ADMIN — ALPRAZOLAM 1 MG: 1 TABLET ORAL at 05:15

## 2018-07-01 RX ADMIN — INSULIN LISPRO 6 UNITS: 100 INJECTION, SOLUTION INTRAVENOUS; SUBCUTANEOUS at 18:16

## 2018-07-01 RX ADMIN — INSULIN GLARGINE 46 UNITS: 100 INJECTION, SOLUTION SUBCUTANEOUS at 08:16

## 2018-07-01 RX ADMIN — LINACLOTIDE 290 MCG: 145 CAPSULE, GELATIN COATED ORAL at 05:15

## 2018-07-01 RX ADMIN — ENOXAPARIN SODIUM 40 MG: 40 INJECTION SUBCUTANEOUS at 20:53

## 2018-07-01 RX ADMIN — GABAPENTIN 300 MG: 300 CAPSULE ORAL at 20:41

## 2018-07-01 RX ADMIN — ALPRAZOLAM 1 MG: 1 TABLET ORAL at 14:14

## 2018-07-01 ASSESSMENT — PAIN SCALES - GENERAL: PAINLEVEL_OUTOF10: 0

## 2018-07-01 NOTE — PROGRESS NOTES
[address]    Daily HOSPITAL Progress Note        I personally saw the patient and rounded with:  Duy Washington, charge nurse on PCU                           Date:  6/30/18  Patient: Jerri Barrientos  Admission:  6/26/2018 11:35 AM  Admit DX: Abnormal result of other cardiovascular function study [R94.39]  Unstable angina ([address] [I20.0]  Age:  40 y.o., 1975     LOS: 4 days       Reason for initial evaluation or the patient's initial complaint    CAD      SUBJECTIVE:      The patient was seen and examined. All daily progress notes were reviewed. Pertinent laboratory and imaging studies were critiqued. Family present and in room during examination:  No     There were no new complications over night. History of the Present Illness / Chief Complaint / current status today:       According to the patient:  \"going to have surgery on Monday\"    Subjective:  Ms. Jerri Barrientos is:  unchanged. Stable, the patient has no complaints or symptoms. There are no new problems noted overnight. Still no BM. Complaint / Symptom Yes / No and Description if Yes       Chest Pain No   Shortness of Air No   Presyncope / Syncope No   Palpitations No       The patient has the following cardiac specific problems listed in the problem list:      Specialty Problems        Cardiology Problems    Unstable angina (HCC)        Unstable angina pectoris (HCC)        * (Principal)CAD (coronary artery disease)                PFSH:  New information:  no        Any Change: no          ROS:    System Any new information? Any change? Cardiovascular No No   Pulmonary / Respiratory No No         OBJECTIVE:      BP (!) 135/93   Pulse 92   Temp 98 °F (36.7 °C) (Temporal)   Resp 14   Ht 5' 4\" (1.626 m)   Wt 182 lb 9.6 oz (82.8 kg)   LMP 01/22/2012   SpO2 99%   Breastfeeding?  No   BMI 31.34 kg/m²     Intake/Output Summary (Last 24 hours) at 06/30/18 1947  Last data filed at 06/30/18 1830   Gross per results for input(s): AST, ALT, LABALBU in the last 72 hours. Current Inpatient Medications:     insulin glargine  46 Units Subcutaneous Daily    [START ON 7/1/2018] metoprolol succinate  12.5 mg Oral BID    insulin lispro  0-12 Units Subcutaneous TID WC    insulin lispro  0-6 Units Subcutaneous Nightly    ALPRAZolam  1 mg Oral Q4H    aspirin  81 mg Oral Daily    enoxaparin  40 mg Subcutaneous Q24H    amitriptyline  100 mg Oral Nightly    linaclotide  290 mcg Oral QAM AC    gabapentin  300 mg Oral TID    polyethylene glycol  17 g Oral Daily    sodium chloride flush  10 mL Intravenous 2 times per day       IV Infusions (if any):     sodium chloride 125 mL/hr at 06/26/18 1334    dextrose         Diagnostics:      EKG and or Telemetry:  which was personally reviewed me:  Sinus rhythm,   Pulse Readings from Last 3 Encounters:   06/30/18 92   06/21/18 96   06/04/18 106    bpm      ASSESSMENT:    CARDIOLOGY PROBLEMS ARE LISTED ABOVE; HOWEVER, THE FOLLOWING SPECIFIC CARDIAC PROBLEMS EITHER LISTED ABOVE OR NOT,  WERE ADDRESSED AND TREATED DURING THE HOSPITAL VISIT TODAY:                                                                                                 MEDICAL DECISION MAKING             Cardiac Specific Problem / Diagnosis  Discussion and Data Reviewed Diagnostic / Therapeutic  Procedures Ordered Management Options Selected           1.  CAD Initial presentation during this evaluation    6/15/2018  Echo  Normal LVFX  6/15/2018  lexiscan Positive for anterior lateral myocardial ischemia, EF 65%,   6/28/2018  Cath  Severe TVD, normal LVFX   Yes: OHS soon Continue current medications             2. hypertension show no change   The blood pressure for the lastr 36 hours has been:  Systolic (23PEI), LRK:998 , Min:82 , KAYCEE:272    Diastolic (03VEG), LHE:11, Min:55, Max:93    No Continue current medications:                      3. hypercholesteremia show no change Patient has a history of hypercholesteremia, which is managed and is on current therapy. The lipid profile is:    Lab Results   Component Value Date    HDL 41 06/30/2018    LDLCALC 119 06/30/2018    TRIG 96 06/30/2018     No Continue current medications:                 PLAN:    1. Continue present medications except for changes as noted above  2. Continue to monitor rhythm  3. Further orders per clinical course. 4. Will add lipitor           Discussed with patient and nursing.     Electronically signed by Sanjeev Burgess MD on 6/30/18    [facility]

## 2018-07-01 NOTE — PROGRESS NOTES
Patient has had many stools after bowel regimen started this morning. Patient is sleepy and decreased appetite at this time. Patient wants to wait on insulin admin at this time due to no appetite. Will continue to monitor and give medications as able.   Electronically signed by Alea Montes RN on 7/1/2018 at 12:30 PM

## 2018-07-01 NOTE — PROGRESS NOTES
06/26/2018     Priority: Low    Abnormal laboratory test 06/04/2018     Priority: Low    [facility] 06/04/2018     Priority: Low    Anxiety and depression 06/04/2018     Priority: Low    DJD (degenerative joint disease) 06/04/2018     Priority: Low    Tobacco abuse 06/04/2018     Priority: Low    Drug allergy 06/04/2018     Priority: Low    Change in bowel habits 07/21/2014     Priority: Low    Constipation 07/21/2014     Priority: Low    Abdominal distention 07/21/2014     Priority: Low    Acid reflux 07/21/2014     Priority: Low    Insomnia 06/19/2012     Priority: Low     Current Facility-Administered Medications   Medication Dose Route Frequency Provider Last Rate Last Dose    insulin glargine (LANTUS) injection vial 46 Units  46 Units Subcutaneous Daily Sunday Braun, DO   46 Units at 07/01/18 0816    bisacodyl (DULCOLAX) EC tablet 10 mg  10 mg Oral BID PRN Nroman Jerome MD   10 mg at 07/01/18 0809    metoprolol succinate (TOPROL XL) extended release tablet 12.5 mg  12.5 mg Oral BID Norman Jerome MD   12.5 mg at 07/01/18 0809    atorvastatin (LIPITOR) tablet 40 mg  40 mg Oral Nightly Norman Jreome MD   40 mg at 06/30/18 2108    zolpidem (AMBIEN) tablet 5 mg  5 mg Oral Nightly PRN Nory Torres,         insulin lispro (HUMALOG) injection vial 0-12 Units  0-12 Units Subcutaneous TID WC Fantasma Ramírez, APRN   2 Units at 06/30/18 1811    insulin lispro (HUMALOG) injection vial 0-6 Units  0-6 Units Subcutaneous Nightly Fantasma Ramírez, APRN   2 Units at 06/30/18 2106    ALPRAZolam Nohemi Dines) tablet 1 mg  1 mg Oral Q4H SEE Rivera MD   1 mg at 07/01/18 1414    aspirin chewable tablet 81 mg  81 mg Oral Daily SEE Rivera MD   81 mg at 07/01/18 0809    enoxaparin (LOVENOX) injection 40 mg  40 mg Subcutaneous Q24H SEE Rivera MD   40 mg at 06/30/18 2114    0.9 % sodium chloride infusion   Intravenous Continuous C.  Elva Rivera MD 125 mL/hr at 06/26/18 1334      amitriptyline (ELAVIL) tablet 100 mg  100 mg Oral Nightly SEE Gilliland MD   100 mg at 06/30/18 2108    linaclotide (LINZESS) capsule 290 mcg  290 mcg Oral QAM AC SEE Gilliland MD   290 mcg at 07/01/18 0515    gabapentin (NEURONTIN) capsule 300 mg  300 mg Oral TID C. Caitlin Gilliland MD   300 mg at 07/01/18 1414    promethazine (PHENERGAN) tablet 25 mg  25 mg Oral PRN C. Caitlin Gilliland MD   25 mg at 06/30/18 1459    polyethylene glycol (GLYCOLAX) packet 17 g  17 g Oral Daily SEE Gilliland MD   17 g at 07/01/18 0809    sodium chloride flush 0.9 % injection 10 mL  10 mL Intravenous 2 times per day SEE Gilliland MD   10 mL at 07/01/18 0809    sodium chloride flush 0.9 % injection 10 mL  10 mL Intravenous PRN CReynaldo Gilliland MD        ibuprofen (ADVIL;MOTRIN) tablet 800 mg  800 mg Oral Q6H PRN C. Caitlin Gilliland MD   800 mg at 06/26/18 2156    glucose (GLUTOSE) 40 % oral gel 15 g  15 g Oral PRN CReynaldo Gilliland MD        dextrose 50 % solution 12.5 g  12.5 g Intravenous PRN SEE Gilliland MD        glucagon (rDNA) injection 1 mg  1 mg Intramuscular PRN GINA. Caitlin Gilliland MD        dextrose 5 % solution  100 mL/hr Intravenous PRN SEE Gilliland MD         Allergies: Ciprofloxacin and Levofloxacin  Past Medical History:   Diagnosis Date    Arthritis     [facility]     CAD (coronary artery disease)     Cancer ([facility]     Cervical CA    Depression     Diabetes mellitus ([facility] 20 years    Dysplasia of cervix     Endometriosis     GERD (gastroesophageal reflux disease)     History of cone biopsy of cervix     Snapping hip syndrome      Past Surgical History:   Procedure Laterality Date    CHOLECYSTECTOMY      COLONOSCOPY      COLPOSCOPY  2000    ENDOSCOPY, COLON, DIAGNOSTIC      HYSTERECTOMY      Left ovary still there.     KNEE SURGERY Right     Lateral Release    KNEE SURGERY Right     Meniscus Repair    LAPAROSCOPY       Family History   Problem Relation Age of Onset    Cancer Maternal Grandmother     Cancer Paternal Grandfather     Colon Polyps Mother     Colon Cancer Neg Hx     Esophageal Cancer Neg Hx     Liver Cancer Neg Hx     Stomach Cancer Neg Hx      Social History   Substance Use Topics    Smoking status: Former Smoker     Types: Cigarettes     Quit date: 5/30/2018    Smokeless tobacco: Never Used    Alcohol use Yes      Comment: social          Review of Systems:    General:      Complaint / Symptom Yes / No / Description if Yes       Fatigue Yes:  chronic   Weight gain NA   Insomnia NA       Respiratory:        Complaint / Symptom Yes / No / Description if Yes       Cough No   Horseness NA       Cardiovascular:    Complaint / Symptom Yes / No / Description if Yes       Chest Pain No   Shortness of Air / Orthopnea Yes: chronic and stable   Presyncope / Syncope No   Palpitations No         Objective:    BP (!) 92/53   Pulse 92   Temp 98.5 °F (36.9 °C) (Temporal)   Resp 18   Ht 5' 4\" (1.626 m)   Wt 185 lb 12.8 oz (84.3 kg)   LMP 01/22/2012   SpO2 96%   Breastfeeding? No   BMI 31.89 kg/m² ,   Intake/Output Summary (Last 24 hours) at 07/01/18 1435  Last data filed at 07/01/18 1422   Gross per 24 hour   Intake             3810 ml   Output                0 ml   Net             3810 ml       GENERAL - well developed and well nourished, is an active participant in this examination  HEENT   PERRLA, Hearing appears normal, conjunctiva and lids are normal, ears and nose appear normal  NECK - no thyromegaly, no JVD, trachea is in the midline  CARDIOVASCULAR  PMI is in the left mid line clavicular position, Normal S1 and S2 with a grade 1/6 systolic murmur. No S3 or S4    PULMONARY  No respiratory distress. scattered wheezes and rales.   Breath sounds in both  lung fields are Normal  ABDOMEN   soft, non tender, no rebound, no hepatomegaly or splenomegaly  MUSCULOSKELETAL   Prone/Supine,

## 2018-07-01 NOTE — PROGRESS NOTES
MD   40 mg at 06/30/18 2108    zolpidem (AMBIEN) tablet 5 mg  5 mg Oral Nightly PRN Ivy Bermudez,         insulin lispro (HUMALOG) injection vial 0-12 Units  0-12 Units Subcutaneous TID  Johnathon Brunner, APRN   2 Units at 06/30/18 1811    insulin lispro (HUMALOG) injection vial 0-6 Units  0-6 Units Subcutaneous Nightly Johnathon Brunner, APRN   2 Units at 06/30/18 2106    ALPRAZolam Vonzella Deck) tablet 1 mg  1 mg Oral Q4H SEE Bonilla MD   1 mg at 07/01/18 0810    aspirin chewable tablet 81 mg  81 mg Oral Daily SEE Bonilla MD   81 mg at 07/01/18 0809    enoxaparin (LOVENOX) injection 40 mg  40 mg Subcutaneous Q24H SEE Bonilla MD   40 mg at 06/30/18 2114    0.9 % sodium chloride infusion   Intravenous Continuous SEE Bonilla  mL/hr at 06/26/18 1334      amitriptyline (ELAVIL) tablet 100 mg  100 mg Oral Nightly SEE Bonilla MD   100 mg at 06/30/18 2108    linaclotide (LINZESS) capsule 290 mcg  290 mcg Oral QAM AC SEE Bonilla MD   290 mcg at 07/01/18 0515    gabapentin (NEURONTIN) capsule 300 mg  300 mg Oral TID SEE Bonilla MD   300 mg at 07/01/18 0809    promethazine (PHENERGAN) tablet 25 mg  25 mg Oral PRN SEE Bonilla MD   25 mg at 06/30/18 1459    polyethylene glycol (GLYCOLAX) packet 17 g  17 g Oral Daily SEE Bonilla MD   17 g at 07/01/18 0809    sodium chloride flush 0.9 % injection 10 mL  10 mL Intravenous 2 times per day SEE Bonilla MD   10 mL at 07/01/18 0809    sodium chloride flush 0.9 % injection 10 mL  10 mL Intravenous PRN SEE Bonilla MD        ibuprofen (ADVIL;MOTRIN) tablet 800 mg  800 mg Oral Q6H PRN SEE Bonilla MD   800 mg at 06/26/18 2156    glucose (GLUTOSE) 40 % oral gel 15 g  15 g Oral PRN SEE Bonilla MD        dextrose 50 % solution 12.5 g  12.5 g Intravenous PRN SEE Bonilla MD        glucagon (rDNA) injection 1 mg  1 mg Intramuscular PRN SEE Bonilla MD        dextrose 5 % solution  100 mL/hr Intravenous PRN SEE Ballard MD            Labs:     Recent Labs      06/29/18   0154  07/01/18 0156   WBC  11.3*  12.5*   RBC  4.76  4.22   HGB  14.3  12.9   HCT  42.8  38.1   MCV  89.9  90.3   MCH  30.0  30.6   MCHC  33.4  33.9   PLT  233  218     Recent Labs      06/29/18   0154  07/01/18   0156   NA  138  138   K  3.6  3.8   ANIONGAP  11  12   CL  102  100   CO2  25  26   BUN  10  12   CREATININE  0.7  0.8   GLUCOSE  143*  135*   CALCIUM  8.8  8.9     Recent Labs      07/01/18   0156   MG  1.8     Recent Labs      07/01/18 0156   AST  8   ALT  7   BILITOT  <0.2   ALKPHOS  67     ABGs:No results for input(s): PHART, TOI3RQN, PO2ART, SEE7NME, BEART, HGBAE, J5OMJJWZ, CARBOXHGBART, 02THERAPY in the last 72 hours. HgBA1c: Invalid input(s): HGBA1C  FLP:    Lab Results   Component Value Date    TRIG 96 06/30/2018    HDL 41 06/30/2018    LDLCALC 119 06/30/2018     TSH:  No results found for: TSH  Troponin T: No results for input(s): TROPONINI in the last 72 hours. INR:   Recent Labs      07/01/18 0156   INR  1.02       Objective:   Vitals: /80   Pulse 95   Temp 97 °F (36.1 °C) (Temporal)   Resp 16   Ht 5' 4\" (1.626 m)   Wt 185 lb 12.8 oz (84.3 kg)   LMP 01/22/2012   SpO2 96%   Breastfeeding?  No   BMI 31.89 kg/m²   24HR INTAKE/OUTPUT:      Intake/Output Summary (Last 24 hours) at 07/01/18 3871  Last data filed at 07/01/18 0024   Gross per 24 hour   Intake             4520 ml   Output                0 ml   Net             4520 ml     General appearance: alert and cooperative with exam  HEENT: atraumatic, eyes with clear conjunctiva and normal lids, pupils and irises normal, external ears and nose are normal, lips normal.  Neck without masses, lympadenopathy, bruit, thyroid normal  Lungs: no increased work of breathing, \"clear to auscultation bilaterally\" without rales, rhonchi or wheezes  Heart: regular rate and rhythm, S1, S2 normal, no murmur, click, rub or gallop  Abdomen: soft, non-tender; bowel sounds normal; no masses,  no organomegaly  Extremities: extremities normal, atraumatic, no cyanosis or edema  Neurologic: No focal neurologic deficits, normal sensation, alert and oriented, affect and mood appropriate. Skin: no rashes, nodules. Assessment and Plan:   Principal Problem:    CAD (coronary artery disease)  Active Problems:    Insomnia    Constipation    Unstable angina (HCC)    Abnormal nuclear stress test    Unstable angina pectoris (HCC)    Type 1 diabetes mellitus with complication (HCC)  Resolved Problems:    * No resolved hospital problems. *    Discussed multiple treatments for constipation but patient refuses any change in current therapy including dietary changes. Blood sugars currently well controlled. Advance Directive: Full Code    DVT prophylaxis: SCDs    Discharge planning: To home following CABG.       DO Ivett Sidhu Hospitalist

## 2018-07-02 ENCOUNTER — ANESTHESIA (OUTPATIENT)
Dept: OPERATING ROOM | Age: 43
DRG: 233 | End: 2018-07-02
Payer: MEDICAID

## 2018-07-02 ENCOUNTER — APPOINTMENT (OUTPATIENT)
Dept: GENERAL RADIOLOGY | Age: 43
DRG: 233 | End: 2018-07-02
Attending: INTERNAL MEDICINE
Payer: MEDICAID

## 2018-07-02 VITALS
TEMPERATURE: 98.2 F | DIASTOLIC BLOOD PRESSURE: 54 MMHG | OXYGEN SATURATION: 100 % | RESPIRATION RATE: 9 BRPM | SYSTOLIC BLOOD PRESSURE: 84 MMHG

## 2018-07-02 LAB
ANION GAP SERPL CALCULATED.3IONS-SCNC: 14 MMOL/L (ref 7–19)
APTT: 30.9 SEC (ref 26–36.2)
BASE EXCESS ARTERIAL: -1.1 MMOL/L (ref -2–2)
BASE EXCESS ARTERIAL: -2 (ref -3–3)
BASE EXCESS ARTERIAL: -2 (ref -3–3)
BASE EXCESS ARTERIAL: -2.8 MMOL/L (ref -2–2)
BASE EXCESS ARTERIAL: 0 (ref -3–3)
BASE EXCESS ARTERIAL: 1 (ref -3–3)
BASE EXCESS VENOUS: -2
BUN BLDV-MCNC: 7 MG/DL (ref 6–20)
CALCIUM IONIZED: 0.99 MMOL/L (ref 1.1–1.3)
CALCIUM IONIZED: 1.04 MMOL/L (ref 1.1–1.3)
CALCIUM IONIZED: 1.08 MMOL/L (ref 1.1–1.3)
CALCIUM IONIZED: 1.18 MMOL/L (ref 1.1–1.3)
CALCIUM IONIZED: 1.22 MMOL/L (ref 1.1–1.3)
CALCIUM SERPL-MCNC: 7.6 MG/DL (ref 8.6–10)
CARBOXYHEMOGLOBIN ARTERIAL: 1.9 % (ref 0–5)
CARBOXYHEMOGLOBIN ARTERIAL: 2 % (ref 0–5)
CHLORIDE BLD-SCNC: 106 MMOL/L (ref 98–111)
CO2: 20 MMOL/L (ref 22–29)
CO2: 23 MEQ/L (ref 21–32)
CO2: 23 MEQ/L (ref 21–32)
CO2: 24 MEQ/L (ref 21–32)
CO2: 24 MEQ/L (ref 21–32)
CO2: 25 MEQ/L (ref 21–32)
CREAT SERPL-MCNC: 0.5 MG/DL (ref 0.5–0.9)
GFR NON-AFRICAN AMERICAN: >60
GFR NON-AFRICAN AMERICAN: >60
GLUCOSE BLD-MCNC: 104 MG/DL (ref 70–99)
GLUCOSE BLD-MCNC: 105 MG/DL (ref 70–99)
GLUCOSE BLD-MCNC: 114 MG/DL (ref 70–99)
GLUCOSE BLD-MCNC: 115 MG/DL (ref 70–99)
GLUCOSE BLD-MCNC: 117 MG/DL (ref 70–99)
GLUCOSE BLD-MCNC: 121 MG/DL (ref 70–99)
GLUCOSE BLD-MCNC: 121 MG/DL (ref 70–99)
GLUCOSE BLD-MCNC: 123 MG/DL (ref 70–99)
GLUCOSE BLD-MCNC: 127 MG/DL (ref 70–99)
GLUCOSE BLD-MCNC: 131 MG/DL (ref 70–99)
GLUCOSE BLD-MCNC: 138 MG/DL (ref 70–99)
GLUCOSE BLD-MCNC: 145 MG/DL (ref 70–99)
GLUCOSE BLD-MCNC: 167 MG/DL (ref 74–109)
GLUCOSE BLD-MCNC: 182 MG/DL (ref 70–99)
GLUCOSE BLD-MCNC: 196 MG/DL (ref 70–99)
GLUCOSE BLD-MCNC: 200 MG/DL (ref 70–99)
GLUCOSE BLD-MCNC: 229 MG/DL (ref 70–99)
GLUCOSE BLD-MCNC: 99 MG/DL (ref 70–99)
HCO3 ARTERIAL: 21.9 MMOL/L (ref 22–26)
HCO3 ARTERIAL: 22.4 MMOL/L (ref 22–26)
HCT VFR BLD CALC: 35.4 % (ref 37–47)
HCT VFR BLD CALC: 38.3 % (ref 37–47)
HEMOGLOBIN, ART, EXTENDED: 12.8 G/DL (ref 12–16)
HEMOGLOBIN, ART, EXTENDED: 13.3 G/DL (ref 12–16)
HEMOGLOBIN: 12.1 G/DL (ref 12–16)
HEMOGLOBIN: 12.9 G/DL (ref 12–16)
HEMOGLOBIN: 13.1 GM/DL (ref 12–18)
HEMOGLOBIN: 13.6 GM/DL (ref 12–18)
HEMOGLOBIN: 8.9 GM/DL (ref 12–18)
HEMOGLOBIN: 9.4 GM/DL (ref 12–18)
HEMOGLOBIN: 9.6 GM/DL (ref 12–18)
INR BLD: 1.17 (ref 0.88–1.18)
MAGNESIUM: 1.6 MG/DL (ref 1.6–2.6)
MCH RBC QN AUTO: 30.4 PG (ref 27–31)
MCH RBC QN AUTO: 30.7 PG (ref 27–31)
MCHC RBC AUTO-ENTMCNC: 33.7 G/DL (ref 33–37)
MCHC RBC AUTO-ENTMCNC: 34.2 G/DL (ref 33–37)
MCV RBC AUTO: 89.8 FL (ref 81–99)
MCV RBC AUTO: 90.3 FL (ref 81–99)
METHEMOGLOBIN ARTERIAL: 0.9 %
METHEMOGLOBIN ARTERIAL: 1.3 %
O2 CONTENT ARTERIAL: 18 ML/DL
O2 CONTENT ARTERIAL: 18.2 ML/DL
O2 SAT, ARTERIAL: 100 % (ref 93–100)
O2 SAT, ARTERIAL: 96.3 %
O2 SAT, ARTERIAL: 97.4 %
O2 SAT, VEN: 76 %
O2 THERAPY: ABNORMAL
O2 THERAPY: ABNORMAL
PCO2 ARTERIAL: 33 MMHG (ref 35–45)
PCO2 ARTERIAL: 35 MM HG (ref 35–48)
PCO2 ARTERIAL: 35 MM HG (ref 35–48)
PCO2 ARTERIAL: 36 MM HG (ref 35–48)
PCO2 ARTERIAL: 36 MM HG (ref 35–48)
PCO2 ARTERIAL: 37 MMHG (ref 35–45)
PCO2, VEN: 46.6 MM HG (ref 40–50)
PDW BLD-RTO: 12.3 % (ref 11.5–14.5)
PDW BLD-RTO: 12.5 % (ref 11.5–14.5)
PERFORMED ON: ABNORMAL
PH ARTERIAL: 7.38 (ref 7.35–7.45)
PH ARTERIAL: 7.4 (ref 7.3–7.5)
PH ARTERIAL: 7.4 (ref 7.3–7.5)
PH ARTERIAL: 7.44 (ref 7.35–7.45)
PH ARTERIAL: 7.44 (ref 7.3–7.5)
PH ARTERIAL: 7.46 (ref 7.3–7.5)
PH VENOUS: 7.33
PLATELET # BLD: 144 K/UL (ref 130–400)
PLATELET # BLD: 177 K/UL (ref 130–400)
PMV BLD AUTO: 10.7 FL (ref 9.4–12.3)
PMV BLD AUTO: 10.9 FL (ref 9.4–12.3)
PO2 ARTERIAL: 112 MMHG (ref 80–100)
PO2 ARTERIAL: 212 MMHG (ref 80–100)
PO2 ARTERIAL: 219 MM HG (ref 83–108)
PO2 ARTERIAL: 474 MM HG (ref 83–108)
PO2 ARTERIAL: 607 MM HG (ref 83–108)
PO2 ARTERIAL: 655 MM HG (ref 83–108)
PO2, VEN: 44 MM HG
POC ANION GAP: 14
POC CHLORIDE: 105 MEQ/L (ref 99–110)
POC CREATININE: 0.4 MG/DL (ref 0.3–1.3)
POC HEMATOCRIT: 26 % (ref 37–52)
POC HEMATOCRIT: 28 % (ref 37–52)
POC HEMATOCRIT: 28 % (ref 37–52)
POC HEMATOCRIT: 38 % (ref 37–52)
POC HEMATOCRIT: 40 % (ref 37–52)
POC POTASSIUM: 3.5 MEQ/L (ref 3.5–5.1)
POC POTASSIUM: 3.7 MEQ/L (ref 3.5–5.1)
POC POTASSIUM: 3.7 MEQ/L (ref 3.5–5.1)
POC POTASSIUM: 3.8 MEQ/L (ref 3.5–5.1)
POC POTASSIUM: 4 MEQ/L (ref 3.5–5.1)
POC SAMPLE TYPE: ABNORMAL
POC SODIUM: 142 MEQ/L (ref 136–145)
POC SODIUM: 143 MEQ/L (ref 136–145)
POC SODIUM: 144 MEQ/L (ref 136–145)
POTASSIUM SERPL-SCNC: 3.7 MMOL/L (ref 3.5–5)
POTASSIUM SERPL-SCNC: 3.9 MMOL/L (ref 3.5–5)
POTASSIUM SERPL-SCNC: 4 MMOL/L (ref 3.5–5)
POTASSIUM, WHOLE BLOOD: 3.4
POTASSIUM, WHOLE BLOOD: 3.8
PROTHROMBIN TIME: 14.8 SEC (ref 12–14.6)
RBC # BLD: 3.94 M/UL (ref 4.2–5.4)
RBC # BLD: 4.24 M/UL (ref 4.2–5.4)
SODIUM BLD-SCNC: 140 MMOL/L (ref 136–145)
TCO2 ARTERIAL: 24 MMOL/L
TCO2 ARTERIAL: 24 MMOL/L
TCO2 ARTERIAL: 25 MMOL/L
TCO2 ARTERIAL: 26 MMOL/L
TCO2 CALC VENOUS: 26 MMOL/L
WBC # BLD: 14.3 K/UL (ref 4.8–10.8)
WBC # BLD: 19.6 K/UL (ref 4.8–10.8)

## 2018-07-02 PROCEDURE — 33517 CABG ARTERY-VEIN SINGLE: CPT | Performed by: THORACIC SURGERY (CARDIOTHORACIC VASCULAR SURGERY)

## 2018-07-02 PROCEDURE — 94002 VENT MGMT INPAT INIT DAY: CPT

## 2018-07-02 PROCEDURE — 85730 THROMBOPLASTIN TIME PARTIAL: CPT

## 2018-07-02 PROCEDURE — 80048 BASIC METABOLIC PNL TOTAL CA: CPT

## 2018-07-02 PROCEDURE — C1729 CATH, DRAINAGE: HCPCS | Performed by: THORACIC SURGERY (CARDIOTHORACIC VASCULAR SURGERY)

## 2018-07-02 PROCEDURE — 2500000003 HC RX 250 WO HCPCS

## 2018-07-02 PROCEDURE — C1894 INTRO/SHEATH, NON-LASER: HCPCS | Performed by: THORACIC SURGERY (CARDIOTHORACIC VASCULAR SURGERY)

## 2018-07-02 PROCEDURE — 85610 PROTHROMBIN TIME: CPT

## 2018-07-02 PROCEDURE — 85027 COMPLETE CBC AUTOMATED: CPT

## 2018-07-02 PROCEDURE — 3700000001 HC ADD 15 MINUTES (ANESTHESIA): Performed by: THORACIC SURGERY (CARDIOTHORACIC VASCULAR SURGERY)

## 2018-07-02 PROCEDURE — 84295 ASSAY OF SERUM SODIUM: CPT

## 2018-07-02 PROCEDURE — 3600000018 HC SURGERY OHS ADDTL 15MIN: Performed by: THORACIC SURGERY (CARDIOTHORACIC VASCULAR SURGERY)

## 2018-07-02 PROCEDURE — 82803 BLOOD GASES ANY COMBINATION: CPT

## 2018-07-02 PROCEDURE — 2720000010 HC SURG SUPPLY STERILE: Performed by: THORACIC SURGERY (CARDIOTHORACIC VASCULAR SURGERY)

## 2018-07-02 PROCEDURE — 2000000000 HC ICU R&B

## 2018-07-02 PROCEDURE — 2580000003 HC RX 258: Performed by: NURSE ANESTHETIST, CERTIFIED REGISTERED

## 2018-07-02 PROCEDURE — 36600 WITHDRAWAL OF ARTERIAL BLOOD: CPT

## 2018-07-02 PROCEDURE — 2780000010 HC IMPLANT OTHER: Performed by: THORACIC SURGERY (CARDIOTHORACIC VASCULAR SURGERY)

## 2018-07-02 PROCEDURE — 2580000003 HC RX 258: Performed by: THORACIC SURGERY (CARDIOTHORACIC VASCULAR SURGERY)

## 2018-07-02 PROCEDURE — 2720000001 HC MISC SURG SUPPLY STERILE $51-500: Performed by: THORACIC SURGERY (CARDIOTHORACIC VASCULAR SURGERY)

## 2018-07-02 PROCEDURE — 6370000000 HC RX 637 (ALT 250 FOR IP): Performed by: THORACIC SURGERY (CARDIOTHORACIC VASCULAR SURGERY)

## 2018-07-02 PROCEDURE — 2500000003 HC RX 250 WO HCPCS: Performed by: THORACIC SURGERY (CARDIOTHORACIC VASCULAR SURGERY)

## 2018-07-02 PROCEDURE — 82374 ASSAY BLOOD CARBON DIOXIDE: CPT

## 2018-07-02 PROCEDURE — 84132 ASSAY OF SERUM POTASSIUM: CPT

## 2018-07-02 PROCEDURE — 36556 INSERT NON-TUNNEL CV CATH: CPT | Performed by: NURSE ANESTHETIST, CERTIFIED REGISTERED

## 2018-07-02 PROCEDURE — 82810 BLOOD GASES O2 SAT ONLY: CPT

## 2018-07-02 PROCEDURE — 6360000002 HC RX W HCPCS: Performed by: THORACIC SURGERY (CARDIOTHORACIC VASCULAR SURGERY)

## 2018-07-02 PROCEDURE — 85530 HEPARIN-PROTAMINE TOLERANCE: CPT | Performed by: THORACIC SURGERY (CARDIOTHORACIC VASCULAR SURGERY)

## 2018-07-02 PROCEDURE — 02100Z9 BYPASS CORONARY ARTERY, ONE ARTERY FROM LEFT INTERNAL MAMMARY, OPEN APPROACH: ICD-10-PCS | Performed by: THORACIC SURGERY (CARDIOTHORACIC VASCULAR SURGERY)

## 2018-07-02 PROCEDURE — 94664 DEMO&/EVAL PT USE INHALER: CPT

## 2018-07-02 PROCEDURE — 85014 HEMATOCRIT: CPT

## 2018-07-02 PROCEDURE — 36592 COLLECT BLOOD FROM PICC: CPT

## 2018-07-02 PROCEDURE — 71045 X-RAY EXAM CHEST 1 VIEW: CPT

## 2018-07-02 PROCEDURE — 021009W BYPASS CORONARY ARTERY, ONE ARTERY FROM AORTA WITH AUTOLOGOUS VENOUS TISSUE, OPEN APPROACH: ICD-10-PCS | Performed by: THORACIC SURGERY (CARDIOTHORACIC VASCULAR SURGERY)

## 2018-07-02 PROCEDURE — 2700000000 HC OXYGEN THERAPY PER DAY

## 2018-07-02 PROCEDURE — 82435 ASSAY OF BLOOD CHLORIDE: CPT

## 2018-07-02 PROCEDURE — 3600000090 HC PERFUSION PUMP ON: Performed by: THORACIC SURGERY (CARDIOTHORACIC VASCULAR SURGERY)

## 2018-07-02 PROCEDURE — 3700000000 HC ANESTHESIA ATTENDED CARE: Performed by: THORACIC SURGERY (CARDIOTHORACIC VASCULAR SURGERY)

## 2018-07-02 PROCEDURE — 94640 AIRWAY INHALATION TREATMENT: CPT

## 2018-07-02 PROCEDURE — 6370000000 HC RX 637 (ALT 250 FOR IP): Performed by: INTERNAL MEDICINE

## 2018-07-02 PROCEDURE — 99233 SBSQ HOSP IP/OBS HIGH 50: CPT | Performed by: FAMILY MEDICINE

## 2018-07-02 PROCEDURE — 5A1221Z PERFORMANCE OF CARDIAC OUTPUT, CONTINUOUS: ICD-10-PCS | Performed by: THORACIC SURGERY (CARDIOTHORACIC VASCULAR SURGERY)

## 2018-07-02 PROCEDURE — 85347 COAGULATION TIME ACTIVATED: CPT | Performed by: THORACIC SURGERY (CARDIOTHORACIC VASCULAR SURGERY)

## 2018-07-02 PROCEDURE — 33533 CABG ARTERIAL SINGLE: CPT | Performed by: THORACIC SURGERY (CARDIOTHORACIC VASCULAR SURGERY)

## 2018-07-02 PROCEDURE — L8612 AQUEOUS SHUNT PROSTHESIS: HCPCS | Performed by: THORACIC SURGERY (CARDIOTHORACIC VASCULAR SURGERY)

## 2018-07-02 PROCEDURE — 33508 ENDOSCOPIC VEIN HARVEST: CPT | Performed by: THORACIC SURGERY (CARDIOTHORACIC VASCULAR SURGERY)

## 2018-07-02 PROCEDURE — 2720000000 HC MISC SURG SUPPLY STERILE $0-50: Performed by: THORACIC SURGERY (CARDIOTHORACIC VASCULAR SURGERY)

## 2018-07-02 PROCEDURE — 82800 BLOOD PH: CPT

## 2018-07-02 PROCEDURE — 6360000002 HC RX W HCPCS: Performed by: NURSE ANESTHETIST, CERTIFIED REGISTERED

## 2018-07-02 PROCEDURE — 2720000003 HC MISC SUTURE/STAPLES/RELOADS/ETC: Performed by: THORACIC SURGERY (CARDIOTHORACIC VASCULAR SURGERY)

## 2018-07-02 PROCEDURE — 2500000003 HC RX 250 WO HCPCS: Performed by: NURSE ANESTHETIST, CERTIFIED REGISTERED

## 2018-07-02 PROCEDURE — 82330 ASSAY OF CALCIUM: CPT

## 2018-07-02 PROCEDURE — 82565 ASSAY OF CREATININE: CPT

## 2018-07-02 PROCEDURE — 6370000000 HC RX 637 (ALT 250 FOR IP): Performed by: FAMILY MEDICINE

## 2018-07-02 PROCEDURE — 82948 REAGENT STRIP/BLOOD GLUCOSE: CPT

## 2018-07-02 PROCEDURE — 06BP4ZZ EXCISION OF RIGHT SAPHENOUS VEIN, PERCUTANEOUS ENDOSCOPIC APPROACH: ICD-10-PCS | Performed by: THORACIC SURGERY (CARDIOTHORACIC VASCULAR SURGERY)

## 2018-07-02 PROCEDURE — 3600000008 HC SURGERY OHS BASE: Performed by: THORACIC SURGERY (CARDIOTHORACIC VASCULAR SURGERY)

## 2018-07-02 PROCEDURE — 83735 ASSAY OF MAGNESIUM: CPT

## 2018-07-02 DEVICE — DISK-SHAPED STYLE, SILICONE (1 PER STERILE PKG)
Type: IMPLANTABLE DEVICE | Site: AORTA | Status: FUNCTIONAL
Brand: SCANLAN® RADIOMARK® GRAFT MARKERS

## 2018-07-02 RX ORDER — PROPOFOL 10 MG/ML
INJECTION, EMULSION INTRAVENOUS PRN
Status: DISCONTINUED | OUTPATIENT
Start: 2018-07-02 | End: 2018-07-02 | Stop reason: SDUPTHER

## 2018-07-02 RX ORDER — SODIUM CHLORIDE 9 MG/ML
INJECTION, SOLUTION INTRAVENOUS CONTINUOUS PRN
Status: DISCONTINUED | OUTPATIENT
Start: 2018-07-02 | End: 2018-07-02 | Stop reason: SDUPTHER

## 2018-07-02 RX ORDER — IPRATROPIUM BROMIDE AND ALBUTEROL SULFATE 2.5; .5 MG/3ML; MG/3ML
1 SOLUTION RESPIRATORY (INHALATION)
Status: DISCONTINUED | OUTPATIENT
Start: 2018-07-02 | End: 2018-07-06

## 2018-07-02 RX ORDER — KETOROLAC TROMETHAMINE 30 MG/ML
30 INJECTION, SOLUTION INTRAMUSCULAR; INTRAVENOUS EVERY 6 HOURS PRN
Status: DISCONTINUED | OUTPATIENT
Start: 2018-07-02 | End: 2018-07-05

## 2018-07-02 RX ORDER — SUFENTANIL CITRATE 50 UG/ML
INJECTION EPIDURAL; INTRAVENOUS PRN
Status: DISCONTINUED | OUTPATIENT
Start: 2018-07-02 | End: 2018-07-02 | Stop reason: SDUPTHER

## 2018-07-02 RX ORDER — MIDAZOLAM HYDROCHLORIDE 1 MG/ML
INJECTION INTRAMUSCULAR; INTRAVENOUS PRN
Status: DISCONTINUED | OUTPATIENT
Start: 2018-07-02 | End: 2018-07-02 | Stop reason: SDUPTHER

## 2018-07-02 RX ORDER — ONDANSETRON 2 MG/ML
4 INJECTION INTRAMUSCULAR; INTRAVENOUS EVERY 8 HOURS PRN
Status: DISCONTINUED | OUTPATIENT
Start: 2018-07-02 | End: 2018-07-13 | Stop reason: HOSPADM

## 2018-07-02 RX ORDER — FENTANYL CITRATE 50 UG/ML
25 INJECTION, SOLUTION INTRAMUSCULAR; INTRAVENOUS
Status: CANCELLED | OUTPATIENT
Start: 2018-07-02 | End: 2018-07-02

## 2018-07-02 RX ORDER — ACETAMINOPHEN 325 MG/1
650 TABLET ORAL EVERY 4 HOURS PRN
Status: DISCONTINUED | OUTPATIENT
Start: 2018-07-02 | End: 2018-07-13 | Stop reason: HOSPADM

## 2018-07-02 RX ORDER — SODIUM CHLORIDE, SODIUM LACTATE, POTASSIUM CHLORIDE, CALCIUM CHLORIDE 600; 310; 30; 20 MG/100ML; MG/100ML; MG/100ML; MG/100ML
INJECTION, SOLUTION INTRAVENOUS CONTINUOUS
Status: CANCELLED | OUTPATIENT
Start: 2018-07-02

## 2018-07-02 RX ORDER — CLOPIDOGREL BISULFATE 75 MG/1
75 TABLET ORAL DAILY
Status: DISCONTINUED | OUTPATIENT
Start: 2018-07-03 | End: 2018-07-13 | Stop reason: HOSPADM

## 2018-07-02 RX ORDER — MORPHINE SULFATE 1 MG/ML
2 INJECTION, SOLUTION EPIDURAL; INTRATHECAL; INTRAVENOUS
Status: DISCONTINUED | OUTPATIENT
Start: 2018-07-02 | End: 2018-07-05

## 2018-07-02 RX ORDER — NICOTINE POLACRILEX 4 MG
15 LOZENGE BUCCAL PRN
Status: DISCONTINUED | OUTPATIENT
Start: 2018-07-02 | End: 2018-07-06 | Stop reason: SDUPTHER

## 2018-07-02 RX ORDER — OXYCODONE HYDROCHLORIDE 5 MG/1
10 TABLET ORAL EVERY 4 HOURS PRN
Status: DISCONTINUED | OUTPATIENT
Start: 2018-07-02 | End: 2018-07-13 | Stop reason: HOSPADM

## 2018-07-02 RX ORDER — PROTAMINE SULFATE 10 MG/ML
INJECTION, SOLUTION INTRAVENOUS PRN
Status: DISCONTINUED | OUTPATIENT
Start: 2018-07-02 | End: 2018-07-02 | Stop reason: SDUPTHER

## 2018-07-02 RX ORDER — POTASSIUM CHLORIDE 29.8 MG/ML
10 INJECTION INTRAVENOUS PRN
Status: DISCONTINUED | OUTPATIENT
Start: 2018-07-02 | End: 2018-07-13 | Stop reason: HOSPADM

## 2018-07-02 RX ORDER — ROCURONIUM BROMIDE 10 MG/ML
INJECTION, SOLUTION INTRAVENOUS PRN
Status: DISCONTINUED | OUTPATIENT
Start: 2018-07-02 | End: 2018-07-02 | Stop reason: SDUPTHER

## 2018-07-02 RX ORDER — SODIUM CHLORIDE 0.9 % (FLUSH) 0.9 %
10 SYRINGE (ML) INJECTION EVERY 12 HOURS SCHEDULED
Status: DISCONTINUED | OUTPATIENT
Start: 2018-07-02 | End: 2018-07-13 | Stop reason: HOSPADM

## 2018-07-02 RX ORDER — FENTANYL CITRATE 50 UG/ML
50 INJECTION, SOLUTION INTRAMUSCULAR; INTRAVENOUS
Status: CANCELLED | OUTPATIENT
Start: 2018-07-02 | End: 2018-07-02

## 2018-07-02 RX ORDER — MEPERIDINE HYDROCHLORIDE 50 MG/ML
25 INJECTION INTRAMUSCULAR; INTRAVENOUS; SUBCUTANEOUS
Status: COMPLETED | OUTPATIENT
Start: 2018-07-02 | End: 2018-07-02

## 2018-07-02 RX ORDER — GABAPENTIN 300 MG/1
300 CAPSULE ORAL 3 TIMES DAILY
Status: DISCONTINUED | OUTPATIENT
Start: 2018-07-02 | End: 2018-07-13 | Stop reason: HOSPADM

## 2018-07-02 RX ORDER — INSULIN GLARGINE 100 [IU]/ML
0.15 INJECTION, SOLUTION SUBCUTANEOUS NIGHTLY
Status: DISCONTINUED | OUTPATIENT
Start: 2018-07-03 | End: 2018-07-03

## 2018-07-02 RX ORDER — CEFAZOLIN SODIUM 1 G/3ML
INJECTION, POWDER, FOR SOLUTION INTRAMUSCULAR; INTRAVENOUS PRN
Status: DISCONTINUED | OUTPATIENT
Start: 2018-07-02 | End: 2018-07-02 | Stop reason: SDUPTHER

## 2018-07-02 RX ORDER — SODIUM CHLORIDE 0.9 % (FLUSH) 0.9 %
10 SYRINGE (ML) INJECTION PRN
Status: DISCONTINUED | OUTPATIENT
Start: 2018-07-02 | End: 2018-07-13 | Stop reason: HOSPADM

## 2018-07-02 RX ORDER — ATORVASTATIN CALCIUM 20 MG/1
20 TABLET, FILM COATED ORAL NIGHTLY
Status: DISCONTINUED | OUTPATIENT
Start: 2018-07-03 | End: 2018-07-04

## 2018-07-02 RX ORDER — METOPROLOL TARTRATE 5 MG/5ML
INJECTION INTRAVENOUS
Status: COMPLETED
Start: 2018-07-02 | End: 2018-07-02

## 2018-07-02 RX ORDER — MIDAZOLAM HYDROCHLORIDE 1 MG/ML
2 INJECTION INTRAMUSCULAR; INTRAVENOUS
Status: CANCELLED | OUTPATIENT
Start: 2018-07-02 | End: 2018-07-02

## 2018-07-02 RX ORDER — OXYCODONE HYDROCHLORIDE 5 MG/1
5 TABLET ORAL EVERY 4 HOURS PRN
Status: DISCONTINUED | OUTPATIENT
Start: 2018-07-02 | End: 2018-07-13 | Stop reason: HOSPADM

## 2018-07-02 RX ORDER — PROTAMINE SULFATE 10 MG/ML
50 INJECTION, SOLUTION INTRAVENOUS
Status: ACTIVE | OUTPATIENT
Start: 2018-07-02 | End: 2018-07-02

## 2018-07-02 RX ORDER — SODIUM CHLORIDE 9 MG/ML
INJECTION, SOLUTION INTRAVENOUS CONTINUOUS
Status: DISCONTINUED | OUTPATIENT
Start: 2018-07-02 | End: 2018-07-04

## 2018-07-02 RX ORDER — AMITRIPTYLINE HYDROCHLORIDE 50 MG/1
100 TABLET, FILM COATED ORAL NIGHTLY
Status: DISCONTINUED | OUTPATIENT
Start: 2018-07-02 | End: 2018-07-13 | Stop reason: HOSPADM

## 2018-07-02 RX ORDER — LIDOCAINE HYDROCHLORIDE 10 MG/ML
INJECTION, SOLUTION INFILTRATION; PERINEURAL PRN
Status: DISCONTINUED | OUTPATIENT
Start: 2018-07-02 | End: 2018-07-02 | Stop reason: SDUPTHER

## 2018-07-02 RX ORDER — SODIUM CHLORIDE 450 MG/100ML
INJECTION, SOLUTION INTRAVENOUS CONTINUOUS PRN
Status: DISCONTINUED | OUTPATIENT
Start: 2018-07-02 | End: 2018-07-02 | Stop reason: SDUPTHER

## 2018-07-02 RX ORDER — FAMOTIDINE 20 MG/1
20 TABLET, FILM COATED ORAL 2 TIMES DAILY
Status: DISCONTINUED | OUTPATIENT
Start: 2018-07-02 | End: 2018-07-08

## 2018-07-02 RX ORDER — SODIUM CHLORIDE 0.9 % (FLUSH) 0.9 %
10 SYRINGE (ML) INJECTION EVERY 12 HOURS SCHEDULED
Status: CANCELLED | OUTPATIENT
Start: 2018-07-02

## 2018-07-02 RX ORDER — ALPRAZOLAM 1 MG/1
1 TABLET ORAL 4 TIMES DAILY PRN
Status: DISCONTINUED | OUTPATIENT
Start: 2018-07-02 | End: 2018-07-03

## 2018-07-02 RX ORDER — ASPIRIN 81 MG/1
81 TABLET ORAL DAILY
Status: DISCONTINUED | OUTPATIENT
Start: 2018-07-02 | End: 2018-07-13 | Stop reason: HOSPADM

## 2018-07-02 RX ORDER — DEXTROSE MONOHYDRATE 25 G/50ML
12.5 INJECTION, SOLUTION INTRAVENOUS PRN
Status: DISCONTINUED | OUTPATIENT
Start: 2018-07-02 | End: 2018-07-06 | Stop reason: SDUPTHER

## 2018-07-02 RX ORDER — SODIUM CHLORIDE 0.9 % (FLUSH) 0.9 %
10 SYRINGE (ML) INJECTION PRN
Status: CANCELLED | OUTPATIENT
Start: 2018-07-02

## 2018-07-02 RX ORDER — METHYLENE BLUE 10 MG/ML
INJECTION INTRAVENOUS PRN
Status: DISCONTINUED | OUTPATIENT
Start: 2018-07-02 | End: 2018-07-02 | Stop reason: HOSPADM

## 2018-07-02 RX ORDER — MORPHINE SULFATE 1 MG/ML
4 INJECTION, SOLUTION EPIDURAL; INTRATHECAL; INTRAVENOUS
Status: DISCONTINUED | OUTPATIENT
Start: 2018-07-02 | End: 2018-07-05

## 2018-07-02 RX ORDER — DEXTROSE MONOHYDRATE 50 MG/ML
100 INJECTION, SOLUTION INTRAVENOUS PRN
Status: DISCONTINUED | OUTPATIENT
Start: 2018-07-02 | End: 2018-07-06 | Stop reason: SDUPTHER

## 2018-07-02 RX ORDER — HEPARIN SODIUM 1000 [USP'U]/ML
INJECTION, SOLUTION INTRAVENOUS; SUBCUTANEOUS PRN
Status: DISCONTINUED | OUTPATIENT
Start: 2018-07-02 | End: 2018-07-02 | Stop reason: SDUPTHER

## 2018-07-02 RX ORDER — METOPROLOL TARTRATE 5 MG/5ML
5 INJECTION INTRAVENOUS ONCE
Status: COMPLETED | OUTPATIENT
Start: 2018-07-02 | End: 2018-07-02

## 2018-07-02 RX ORDER — ZOLPIDEM TARTRATE 5 MG/1
5 TABLET ORAL NIGHTLY PRN
Status: DISCONTINUED | OUTPATIENT
Start: 2018-07-03 | End: 2018-07-13 | Stop reason: HOSPADM

## 2018-07-02 RX ORDER — DOCUSATE SODIUM 100 MG/1
100 CAPSULE, LIQUID FILLED ORAL 2 TIMES DAILY
Status: DISCONTINUED | OUTPATIENT
Start: 2018-07-02 | End: 2018-07-13 | Stop reason: HOSPADM

## 2018-07-02 RX ORDER — LIDOCAINE HYDROCHLORIDE 10 MG/ML
1 INJECTION, SOLUTION EPIDURAL; INFILTRATION; INTRACAUDAL; PERINEURAL
Status: CANCELLED | OUTPATIENT
Start: 2018-07-02 | End: 2018-07-02

## 2018-07-02 RX ORDER — 0.9 % SODIUM CHLORIDE 0.9 %
500 INTRAVENOUS SOLUTION INTRAVENOUS CONTINUOUS PRN
Status: DISCONTINUED | OUTPATIENT
Start: 2018-07-02 | End: 2018-07-05

## 2018-07-02 RX ADMIN — Medication 2 G: at 15:51

## 2018-07-02 RX ADMIN — IPRATROPIUM BROMIDE AND ALBUTEROL SULFATE 1 AMPULE: .5; 3 SOLUTION RESPIRATORY (INHALATION) at 11:17

## 2018-07-02 RX ADMIN — Medication 4 MG: at 11:20

## 2018-07-02 RX ADMIN — SUFENTANIL CITRATE 10 MCG: 50 INJECTION EPIDURAL; INTRAVENOUS at 09:05

## 2018-07-02 RX ADMIN — Medication 4 MG: at 16:31

## 2018-07-02 RX ADMIN — PHENYLEPHRINE HYDROCHLORIDE 40 MCG: 10 INJECTION INTRAVENOUS at 08:37

## 2018-07-02 RX ADMIN — OXYCODONE HYDROCHLORIDE 10 MG: 5 TABLET ORAL at 20:45

## 2018-07-02 RX ADMIN — Medication 10 ML: at 13:06

## 2018-07-02 RX ADMIN — SODIUM CHLORIDE: 9 INJECTION, SOLUTION INTRAVENOUS at 17:53

## 2018-07-02 RX ADMIN — Medication 2 G: at 23:47

## 2018-07-02 RX ADMIN — SUFENTANIL CITRATE 20 MCG: 50 INJECTION EPIDURAL; INTRAVENOUS at 07:21

## 2018-07-02 RX ADMIN — METOPROLOL TARTRATE 5 MG: 5 INJECTION INTRAVENOUS at 10:40

## 2018-07-02 RX ADMIN — MEPERIDINE HYDROCHLORIDE 25 MG: 50 INJECTION, SOLUTION INTRAMUSCULAR; INTRAVENOUS; SUBCUTANEOUS at 10:59

## 2018-07-02 RX ADMIN — SODIUM CHLORIDE 4.2 UNITS/HR: 9 INJECTION, SOLUTION INTRAVENOUS at 11:14

## 2018-07-02 RX ADMIN — PHENYLEPHRINE HYDROCHLORIDE 80 MCG: 10 INJECTION INTRAVENOUS at 08:19

## 2018-07-02 RX ADMIN — OXYCODONE HYDROCHLORIDE 10 MG: 5 TABLET ORAL at 15:50

## 2018-07-02 RX ADMIN — AMITRIPTYLINE HYDROCHLORIDE 100 MG: 50 TABLET, FILM COATED ORAL at 20:45

## 2018-07-02 RX ADMIN — ALPRAZOLAM 1 MG: 1 TABLET ORAL at 05:53

## 2018-07-02 RX ADMIN — SODIUM CHLORIDE: 4.5 INJECTION, SOLUTION INTRAVENOUS at 07:05

## 2018-07-02 RX ADMIN — SODIUM CHLORIDE: 9 INJECTION, SOLUTION INTRAVENOUS at 23:13

## 2018-07-02 RX ADMIN — MIDAZOLAM HYDROCHLORIDE 2 MG: 1 INJECTION, SOLUTION INTRAMUSCULAR; INTRAVENOUS at 07:09

## 2018-07-02 RX ADMIN — SODIUM CHLORIDE: 9 INJECTION, SOLUTION INTRAVENOUS at 07:29

## 2018-07-02 RX ADMIN — Medication 10 ML: at 20:46

## 2018-07-02 RX ADMIN — ROCURONIUM BROMIDE 20 MG: 10 INJECTION INTRAVENOUS at 09:05

## 2018-07-02 RX ADMIN — FAMOTIDINE 20 MG: 20 TABLET ORAL at 20:45

## 2018-07-02 RX ADMIN — ROCURONIUM BROMIDE 50 MG: 10 INJECTION INTRAVENOUS at 07:22

## 2018-07-02 RX ADMIN — PHENYLEPHRINE HYDROCHLORIDE 40 MCG: 10 INJECTION INTRAVENOUS at 08:34

## 2018-07-02 RX ADMIN — HEPARIN SODIUM 25000 UNITS: 1000 INJECTION, SOLUTION INTRAVENOUS; SUBCUTANEOUS at 08:20

## 2018-07-02 RX ADMIN — IPRATROPIUM BROMIDE AND ALBUTEROL SULFATE 1 AMPULE: .5; 3 SOLUTION RESPIRATORY (INHALATION) at 15:27

## 2018-07-02 RX ADMIN — SUFENTANIL CITRATE 10 MCG: 50 INJECTION EPIDURAL; INTRAVENOUS at 09:15

## 2018-07-02 RX ADMIN — LIDOCAINE HYDROCHLORIDE 5 ML: 10 INJECTION, SOLUTION INFILTRATION; PERINEURAL at 07:22

## 2018-07-02 RX ADMIN — ONDANSETRON 4 MG: 2 INJECTION INTRAMUSCULAR; INTRAVENOUS at 22:08

## 2018-07-02 RX ADMIN — MIDAZOLAM HYDROCHLORIDE 2 MG: 1 INJECTION, SOLUTION INTRAMUSCULAR; INTRAVENOUS at 07:16

## 2018-07-02 RX ADMIN — DEXTROSE MONOHYDRATE 5 MG/HR: 50 INJECTION, SOLUTION INTRAVENOUS at 10:55

## 2018-07-02 RX ADMIN — GABAPENTIN 300 MG: 300 CAPSULE ORAL at 20:45

## 2018-07-02 RX ADMIN — IPRATROPIUM BROMIDE AND ALBUTEROL SULFATE 1 AMPULE: .5; 3 SOLUTION RESPIRATORY (INHALATION) at 19:48

## 2018-07-02 RX ADMIN — INSULIN LISPRO 2 UNITS: 100 INJECTION, SOLUTION INTRAVENOUS; SUBCUTANEOUS at 17:00

## 2018-07-02 RX ADMIN — INSULIN LISPRO 2 UNITS: 100 INJECTION, SOLUTION INTRAVENOUS; SUBCUTANEOUS at 23:09

## 2018-07-02 RX ADMIN — HYDROMORPHONE HYDROCHLORIDE 0.5 MG: 1 INJECTION, SOLUTION INTRAMUSCULAR; INTRAVENOUS; SUBCUTANEOUS at 09:57

## 2018-07-02 RX ADMIN — SUFENTANIL CITRATE 20 MCG: 50 INJECTION EPIDURAL; INTRAVENOUS at 07:41

## 2018-07-02 RX ADMIN — ROCURONIUM BROMIDE 10 MG: 10 INJECTION INTRAVENOUS at 09:43

## 2018-07-02 RX ADMIN — GABAPENTIN 300 MG: 300 CAPSULE ORAL at 17:18

## 2018-07-02 RX ADMIN — SUFENTANIL CITRATE 20 MCG: 50 INJECTION EPIDURAL; INTRAVENOUS at 07:47

## 2018-07-02 RX ADMIN — DOCUSATE SODIUM 100 MG: 100 CAPSULE, LIQUID FILLED ORAL at 20:45

## 2018-07-02 RX ADMIN — SODIUM CHLORIDE: 9 INJECTION, SOLUTION INTRAVENOUS at 10:54

## 2018-07-02 RX ADMIN — SUFENTANIL CITRATE 20 MCG: 50 INJECTION EPIDURAL; INTRAVENOUS at 07:54

## 2018-07-02 RX ADMIN — PROPOFOL 140 MG: 10 INJECTION, EMULSION INTRAVENOUS at 07:22

## 2018-07-02 RX ADMIN — SUFENTANIL CITRATE 20 MCG: 50 INJECTION EPIDURAL; INTRAVENOUS at 07:32

## 2018-07-02 RX ADMIN — CEFAZOLIN 1000 MG: 1 INJECTION, POWDER, FOR SOLUTION INTRAMUSCULAR; INTRAVENOUS; PARENTERAL at 07:27

## 2018-07-02 RX ADMIN — SODIUM CHLORIDE 500 ML: 9 INJECTION, SOLUTION INTRAVENOUS at 17:20

## 2018-07-02 RX ADMIN — SUFENTANIL CITRATE 10 MCG: 50 INJECTION EPIDURAL; INTRAVENOUS at 09:23

## 2018-07-02 RX ADMIN — METOPROLOL SUCCINATE 12.5 MG: 25 TABLET, EXTENDED RELEASE ORAL at 05:12

## 2018-07-02 RX ADMIN — Medication 2 MG: at 14:30

## 2018-07-02 RX ADMIN — METOROPROLOL TARTRATE 5 MG: 5 INJECTION, SOLUTION INTRAVENOUS at 10:40

## 2018-07-02 RX ADMIN — KETOROLAC TROMETHAMINE 30 MG: 30 INJECTION, SOLUTION INTRAMUSCULAR at 19:37

## 2018-07-02 RX ADMIN — ALPRAZOLAM 1 MG: 1 TABLET ORAL at 16:43

## 2018-07-02 RX ADMIN — PROTAMINE SULFATE 200 MG: 10 INJECTION, SOLUTION INTRAVENOUS at 09:28

## 2018-07-02 RX ADMIN — ROCURONIUM BROMIDE 10 MG: 10 INJECTION INTRAVENOUS at 07:54

## 2018-07-02 RX ADMIN — SODIUM CHLORIDE: 9 INJECTION, SOLUTION INTRAVENOUS at 08:23

## 2018-07-02 RX ADMIN — SUFENTANIL CITRATE 20 MCG: 50 INJECTION EPIDURAL; INTRAVENOUS at 09:29

## 2018-07-02 RX ADMIN — HYDROMORPHONE HYDROCHLORIDE 0.5 MG: 1 INJECTION, SOLUTION INTRAMUSCULAR; INTRAVENOUS; SUBCUTANEOUS at 10:23

## 2018-07-02 ASSESSMENT — PAIN SCALES - GENERAL
PAINLEVEL_OUTOF10: 10
PAINLEVEL_OUTOF10: 9
PAINLEVEL_OUTOF10: 10
PAINLEVEL_OUTOF10: 10
PAINLEVEL_OUTOF10: 7
PAINLEVEL_OUTOF10: 10
PAINLEVEL_OUTOF10: 10

## 2018-07-02 ASSESSMENT — LIFESTYLE VARIABLES: SMOKING_STATUS: 0

## 2018-07-02 ASSESSMENT — PULMONARY FUNCTION TESTS
PIF_VALUE: 9.9
PIF_VALUE: 7.8
PIF_VALUE: 7.1

## 2018-07-02 NOTE — PROGRESS NOTES
Second hibiclens bath given at this time.  Electronically signed by Reagan Maravilla RN on 7/2/2018 at 5:16 AM

## 2018-07-02 NOTE — ANESTHESIA PRE PROCEDURE
Department of Anesthesiology  Preprocedure Note       Name:  John Isabel   Age:  40 y.o.  :  1975                                          MRN:  [id number]         Date:  2018      Surgeon: Mejia Marlow):  Carl Mares MD    Procedure: Procedure(s):  CABG CORONARY ARTERY BYPASS    Medications prior to admission:   Prior to Admission medications    Medication Sig Start Date End Date Taking? Authorizing Provider   Alpha-D-Galactosidase (BEANO PO) Take by mouth   Yes Historical Provider, MD   insulin aspart (NOVOLOG) 100 UNIT/ML injection pen Inject into the skin daily Sliding scale 14  Yes Historical Provider, MD   gabapentin (NEURONTIN) 100 MG capsule Take 300 mg by mouth 3 times daily. .   Yes Historical Provider, MD   promethazine (PHENERGAN) 25 MG tablet Take 25 mg by mouth as needed for Nausea   Yes Historical Provider, MD   linaclotide (LINZESS) 290 MCG CAPS capsule Take 1 capsule by mouth every morning (before breakfast). 14  Yes NAVDEEP Bradshaw   amitriptyline (ELAVIL) 100 MG tablet Take 100 mg by mouth nightly. Yes Historical Provider, MD   alprazolam Orbie Melgar) 1 MG tablet Take 1 mg by mouth 4 times daily. Yes Historical Provider, MD   Polyethylene Glycol 3350 (MIRALAX PO) Take by mouth    Historical Provider, MD   ibuprofen (ADVIL;MOTRIN) 800 MG tablet Take 1 tablet by mouth every 6 hours as needed for Pain.  14   Zulma Saldaña MD       Current medications:    Current Facility-Administered Medications   Medication Dose Route Frequency Provider Last Rate Last Dose    insulin glargine (LANTUS) injection vial 46 Units  46 Units Subcutaneous Daily Dimitry Van DO   46 Units at 18 0816    bisacodyl (DULCOLAX) EC tablet 10 mg  10 mg Oral BID PRN Moriah Vasquez MD   10 mg at 18 0809    metoprolol succinate (TOPROL XL) extended release tablet 12.5 mg  12.5 mg Oral BID Moriah Vasquez MD   12.5 mg at 18 0512    atorvastatin (LIPITOR) tablet 40 mg  40 mg Oral Nightly Rohit Raymond MD   40 mg at 07/01/18 2041    zolpidem (AMBIEN) tablet 5 mg  5 mg Oral Nightly PRN Mei Elaine DO   5 mg at 07/01/18 2333    insulin lispro (HUMALOG) injection vial 0-12 Units  0-12 Units Subcutaneous TID WC Chanuna Kugel, APRN   6 Units at 07/01/18 1816    insulin lispro (HUMALOG) injection vial 0-6 Units  0-6 Units Subcutaneous Nightly Chanuna Kugel, APRN   2 Units at 07/01/18 2051    ALPRAZolam Danauvkelly Gomes) tablet 1 mg  1 mg Oral Q4H SEE Keita MD   1 mg at 07/02/18 0553    aspirin chewable tablet 81 mg  81 mg Oral Daily SEE Keita MD   81 mg at 07/01/18 0809    enoxaparin (LOVENOX) injection 40 mg  40 mg Subcutaneous Q24H SEE Keita MD   40 mg at 07/01/18 2053    0.9 % sodium chloride infusion   Intravenous Continuous SEE Keita  mL/hr at 06/26/18 1334      amitriptyline (ELAVIL) tablet 100 mg  100 mg Oral Nightly SEE Keita MD   100 mg at 07/01/18 2041    linaclotide (LINZESS) capsule 290 mcg  290 mcg Oral QAM AC SEE Keita MD   290 mcg at 07/01/18 0515    gabapentin (NEURONTIN) capsule 300 mg  300 mg Oral TID SEE Keita MD   300 mg at 07/01/18 2041    promethazine (PHENERGAN) tablet 25 mg  25 mg Oral PRN SEE Keita MD   25 mg at 07/01/18 2041    polyethylene glycol (GLYCOLAX) packet 17 g  17 g Oral Daily SEE Keita MD   17 g at 07/01/18 0809    sodium chloride flush 0.9 % injection 10 mL  10 mL Intravenous 2 times per day SEE Keita MD   10 mL at 07/01/18 2041    sodium chloride flush 0.9 % injection 10 mL  10 mL Intravenous PRN SEE Keita MD        ibuprofen (ADVIL;MOTRIN) tablet 800 mg  800 mg Oral Q6H PRN SEE Keita MD   800 mg at 06/26/18 2156    glucose (GLUTOSE) 40 % oral gel 15 g  15 g Oral PRN SEE Keita MD        dextrose 50 % solution 12.5 g  12.5 g Intravenous PRN SEE Keita MD        glucagon (rDNA) injection 1 mg  1 mg [facility]    Anesthesia Evaluation  Patient summary reviewed and Nursing notes reviewed no history of anesthetic complications:   Airway:         Dental:          Pulmonary:       (-) not a current smoker          Patient did not smoke on day of surgery. Cardiovascular:    (+) CAD:,       ECG reviewed               Beta Blocker:  Not on Beta Blocker      ROS comment: Hemodynamics:  LV pressure 136/6. A oh pressure 136/76    DRAPER Left Ventriculography:  The left ventricle has a normal size   and configuration and a normal ejection fraction of over 60%. There is no noted mitral regurgitation. Selective Coronary Arteriography:      1.  The right coronary artery is nondominant with diffuse disease   manifested by multiple areas of luminal irregularity. 2.  The left main coronary artery is free of focal disease. 3.  The circumflex coronary artery is dominant. It gives rise to   a fairly small 1st obtuse marginal branch and then a larger 2nd   obtuse marginal branch which has a focal 90% stenosis in its   proximal segment. The circumflex distal to this obtuse marginal   branch has an irregular area of 50-60% narrowing proximal to the   posterior descending and posterolateral branches. The 2nd obtuse   marginal branch appears bypassable and at least one of the   posterior branches is also likely to be bypassable. 4.  The left anterior descending coronary artery is involved with   a severe diffuse disease process beginning at its ostium. There   is a very long area of up to 90% stenosis involving the proximal   3rd of the vessel . The LAD then becomes larger in diameter but   there is a 75% stenosis in the mid vessel. The distal and apical   LAD appears to be a fairly normal diameter vessel and would be   bypassable. Both native internal mammary arteries are large caliber vessels   that would appear to be useful his bypass conduits.     Impression:      1.  Normal left ventricular systolic

## 2018-07-02 NOTE — PROGRESS NOTES
pH, Arterial 7.350 - 7.450 7.440    pCO2, Arterial 35.0 - 45.0 mmHg 33.0     pO2, Arterial 80.0 - 100.0 mmHg 212.0     HCO3, Arterial 22.0 - 26.0 mmol/L 22.4    Base Excess, Arterial -2.0 - 2.0 mmol/L -1.1    Hemoglobin, Art, Extended 12.0 - 16.0 g/dL 12.8    O2 Sat, Arterial >92 % 97.4    Carboxyhgb, Arterial 0.0 - 5.0 % 1.9    Comment:      0.0-1.5   (Smokers 1.5-5.0)    Methemoglobin, Arterial <1.5 % 0.9    O2 Content, Arterial Not Established mL/dL 18.0    O2 Therapy  Unknown    Resulting Agency  [address] [facility]        Specimen Collected: 07/02/18 1035 Last Resulted: 07/02/18 1042 View Other Order Details        Patient on SIMV 10, 650, 100% + 5 peep. Drawn via arterial line.

## 2018-07-02 NOTE — PROGRESS NOTES
Hospitalist Progress Note  7/2/2018 4:00 PM  Subjective:   Admit Date: 6/26/2018  PCP: Sukhwinder Preston    Chief Complaint: diabetes mellitus    Subjective: Patient had a bowel movement. Underwent CABG this morning, is in severe pain in her chest wall since arriving to ICU. Also complaining of some chronic pain in her back and neck. Would like home medications reviewed and restarted. History is otherwise unchanged. Cumulative Hospital History:   6-26: Admitted with chest discomfort for cardiac catheterization. Showed severe multivessel disease. 6-27: Cardiothoracic surgery consulted. 6-28: CTS plans CABG on 7-2. Hospitalist service consulted for medical management. 6-29: Insomnia, Ambien ordered strictly PRN only. Patient cheating on diet, will not adjust Lantus dose as she states she is willing to comply with diet now. 6-30: Lantus adjusted due to hyperglycemia yesterday, will revert changes due to dietary noncompliance and risk of hypoglycemia. 7-1: Constipated, refuses change in pharmacotherapy. 7-2: CABG, postoperative pain. ROS: 14 point review of systems is negative except as specifically addressed above.          Intake/Output Summary (Last 24 hours) at 07/02/18 1600  Last data filed at 07/02/18 1500   Gross per 24 hour   Intake           1528.7 ml   Output             1521 ml   Net              7.7 ml     Medications:   sodium chloride 75 mL/hr at 07/02/18 1054    sodium chloride      insulin (HUMAN R) non-weight based infusion 1.8 Units/hr (07/02/18 1557)    dextrose      niCARdipine Stopped (07/02/18 1105)     Current Facility-Administered Medications   Medication Dose Route Frequency Provider Last Rate Last Dose    protamine injection 50 mg  50 mg Intravenous Once PRN Rosalia Pacheco MD        0.9 % sodium chloride infusion   Intravenous Continuous Rosalia Pacheco MD 75 mL/hr at 07/02/18 1054      sodium chloride flush 0.9 % injection 10 mL  10 mL Intravenous 2 times per day Hunter Ramos Katharine Haywood MD   10 mL at 07/02/18 1306    sodium chloride flush 0.9 % injection 10 mL  10 mL Intravenous PRN Rosalio Brown MD        potassium chloride 20 mEq/50 mL IVPB (Central Line)  10 mEq Intravenous PRN Rosalio Brown MD        ceFAZolin (ANCEF) 2 g in 0.9% sodium chloride 50 mL IVPB  2 g Intravenous Q8H Rosalio Brown MD   Stopped at 07/02/18 1621    acetaminophen (TYLENOL) tablet 650 mg  650 mg Oral Q4H PRN Rosalio Brown MD        oxyCODONE (ROXICODONE) immediate release tablet 5 mg  5 mg Oral Q4H PRN Rosalio Brown MD        Or    oxyCODONE (ROXICODONE) immediate release tablet 10 mg  10 mg Oral Q4H PRN Rosalio Brown MD   10 mg at 07/02/18 1550    morphine (PF) injection 2 mg  2 mg Intravenous Q2H PRN Rosalio Brown MD   2 mg at 07/02/18 1430    Or    morphine (PF) injection 4 mg  4 mg Intravenous Q2H PRN Rosalio Brown MD   4 mg at 07/02/18 1120    [START ON 7/3/2018] zolpidem (AMBIEN) tablet 5 mg  5 mg Oral Nightly PRN Rosalio Brown MD        docusate sodium (COLACE) capsule 100 mg  100 mg Oral BID Rosalio Brown MD        magnesium hydroxide (MILK OF MAGNESIA) 400 MG/5ML suspension 30 mL  30 mL Oral Daily PRN Rosalio Brown MD        ondansetron [facility]) injection 4 mg  4 mg Intravenous Q8H PRN Rosalio Brown MD        famotidine (PEPCID) tablet 20 mg  20 mg Oral BID MD Blaire Romero ON 7/3/2018] atorvastatin (LIPITOR) tablet 20 mg  20 mg Oral Nightly Rosalio Brown MD        aspirin EC tablet 81 mg  81 mg Oral Daily Rosalio Brown MD   Stopped at 07/02/18 1305    [START ON 7/3/2018] clopidogrel (PLAVIX) tablet 75 mg  75 mg Oral Daily Rosalio Brown MD        ipratropium-albuterol (DUONEB) nebulizer solution 1 ampule  1 ampule Inhalation Q4H WA Rosalio Brown MD   1 ampule at 07/02/18 1527    0.9 % sodium chloride bolus  500 mL Intravenous Continuous PRN Rosalio Brown MD        insulin regular (HUMULIN R;NOVOLIN R) 100 Units in sodium chloride 0.9 % 100 mL infusion  1 Units/hr Intravenous Continuous Padmini Saldana MD 1.8 mL/hr at 07/02/18 1557 1.8 Units/hr at 07/02/18 1557    [START ON 7/3/2018] insulin glargine (LANTUS) injection vial 13 Units  0.15 Units/kg Subcutaneous Nightly Padmini Saldana MD        glucose (GLUTOSE) 40 % oral gel 15 g  15 g Oral PRN Padmini Saldana MD        dextrose 50 % solution 12.5 g  12.5 g Intravenous PRN Padmini Saldana MD        glucagon (rDNA) injection 1 mg  1 mg Intramuscular PRN Padmini Saldnaa MD        dextrose 5 % solution  100 mL/hr Intravenous PRN Padmini Saldana MD        insulin lispro (HUMALOG) injection vial 0-12 Units  0-12 Units Subcutaneous TID WC Padmini Saldana MD        insulin lispro (HUMALOG) injection vial 0-6 Units  0-6 Units Subcutaneous Nightly Padmini Saldana MD        niCARdipine (CARDENE) 25 mg in dextrose 5 % 250 mL infusion  5 mg/hr Intravenous Continuous Padmini Saldana MD   Stopped at 07/02/18 1105    amitriptyline (ELAVIL) tablet 100 mg  100 mg Oral Nightly Jerrald [facility] Braun, DO        ALPRAZolam Davian Mariza) tablet 1 mg  1 mg Oral 4x Daily PRN Soundra Copping, DO        gabapentin (NEURONTIN) capsule 300 mg  300 mg Oral TID Soundra Copping, DO        [facility] Hold] ibuprofen (ADVIL;MOTRIN) tablet 800 mg  800 mg Oral Q6H PRN C. Freya Mckeon MD   800 mg at 06/26/18 2156        Labs:     Recent Labs      07/01/18   0156   07/02/18   0947  07/02/18   1031  07/02/18   1400   WBC  12.5*   --    --   14.3*  19.6*   RBC  4.22   --    --   3.94*  4.24   HGB  12.9   < >  8.9*  12.1  12.9   HCT  38.1   --    --   35.4*  38.3   MCV  90.3   --    --   89.8  90.3   MCH  30.6   --    --   30.7  30.4   MCHC  33.9   --    --   34.2  33.7   PLT  218   --    --   144  177    < > = values in this interval not displayed.      Recent Labs      07/01/18   0156   07/02/18   0849  07/02/18   0919  07/02/18   0947  07/02/18   1031  07/02/18   1035  07/02/18   1400  07/02/18 ml   Net              7.7 ml     General appearance: alert and cooperative with exam  HEENT: atraumatic, eyes with clear conjunctiva and normal lids, pupils and irises normal, external ears and nose are normal, lips normal.  Neck without masses, lympadenopathy, bruit, thyroid normal  Lungs: no increased work of breathing, \"clear to auscultation bilaterally\" without rales, rhonchi or wheezes  Heart: regular rate and rhythm, S1, S2 normal, no murmur, click, rub or gallop  Abdomen: soft, non-tender; bowel sounds normal; no masses,  no organomegaly  Extremities: extremities normal, atraumatic, no cyanosis or edema  Neurologic: No focal neurologic deficits, normal sensation, alert and oriented, affect and mood appropriate. Skin: no rashes, nodules. Assessment and Plan:   Principal Problem:    CAD (coronary artery disease)  Active Problems:    Insomnia    Constipation    Unstable angina (HCC)    Abnormal nuclear stress test    Unstable angina pectoris (HCC)    Type 1 diabetes mellitus with complication (HCC)  Resolved Problems:    * No resolved hospital problems. *    Reviewed home medications and restarted as appropriate. Patient is on opiates and benzodiazepines, will watch closely. Advance Directive: Full Code    DVT prophylaxis: SCDs    Discharge planning: To home.       DO Ivett Carlson Hospitalist

## 2018-07-02 NOTE — PROGRESS NOTES
[facility]  Progress Note                            Date:  7/2/2018  Patient: Katharina Schultz  Admission:  6/26/2018 11:35 AM  Admit DX: Abnormal result of other cardiovascular function study [R94.39]  Unstable angina ([address] [I20.0]  Age:  40 y.o., 1975     LOS: 6 days     Reason for evaluation:   coronary artery disease and CABG      SUBJECTIVE:    The patient was seen and examined. Notes and labs reviewed. There were not complications over night. Patient's cardiac review of systems: negative. The patient is  Seen in ICU post op. OBJECTIVE:    Telemetry: Sinus       sodium chloride flush  10 mL Intravenous 2 times per day    ceFAZolin (ANCEF) IVPB  2 g Intravenous Q8H    docusate sodium  100 mg Oral BID    famotidine  20 mg Oral BID    [START ON 7/3/2018] atorvastatin  20 mg Oral Nightly    aspirin  81 mg Oral Daily    [START ON 7/3/2018] clopidogrel  75 mg Oral Daily    ipratropium-albuterol  1 ampule Inhalation Q4H WA    [START ON 7/3/2018] insulin glargine  0.15 Units/kg Subcutaneous Nightly    insulin lispro  0-12 Units Subcutaneous TID WC    insulin lispro  0-6 Units Subcutaneous Nightly        sodium chloride 75 mL/hr at 07/02/18 1054    sodium chloride      insulin (HUMAN R) non-weight based infusion 4.08 Units/hr (07/02/18 1156)    dextrose      niCARdipine Stopped (07/02/18 1105)           /67   Pulse 104   Temp 98.6 °F (37 °C) (Core)   Resp 16   Ht 5' 4\" (1.626 m)   Wt 185 lb 12.8 oz (84.3 kg)   LMP 01/22/2012   SpO2 97%   Breastfeeding?  No   BMI 31.89 kg/m²     Intake/Output Summary (Last 24 hours) at 07/02/18 1222  Last data filed at 07/02/18 1200   Gross per 24 hour   Intake          1644.05 ml   Output             1224 ml   Net           420.05 ml           Labs:   CBC:   Recent Labs      07/01/18   0156   07/02/18   0947  07/02/18   1031   WBC  12.5*   --    --   14.3*   HGB  12.9   < >  8.9*  12.1   HCT  38.1   --    --

## 2018-07-02 NOTE — OP NOTE
[facility] MELY [address], [address] [facility]                                 OPERATIVE REPORT    PATIENT NAME: Serena Murphy                    :        1975  MED REC NO:   [id number]                              ROOM:       [facility]  ACCOUNT NO:   [de-identified]                           ADMIT DATE: 2018  PROVIDER:     Dante Gusman MD    DATE OF PROCEDURE:  2018    PREOPERATIVE DIAGNOSES:  1. Severe multivessel coronary artery disease. 2.  Type 1 juvenile diabetes. POSTOPERATIVE DIAGNOSES:  1. Severe multivessel coronary artery disease. 2.  Type 1 juvenile diabetes. 3.  Severe diffuse advanced coronary atherosclerosis. OPERATIVE PROCEDURE:  1.  Two-vessel perfusion-assisted coronary artery bypass grafting placing  the left internal mammary artery to the left anterior descending artery and  a saphenous vein bypass graft to the third obtuse marginal branch. 2.  Endoscopic saphenous vein harvest, right leg. SURGEON:  Dante Gusman MD    ASSISTANT:  Amada Lebron CFA    ANESTHESIA:  General    HISTORY AND FINDINGS:  The patient is a 43-year-old type 1 juvenile  diabetic, who received some of her care at [facility] at the [address] [place] [address] for diabetic gastroparesis. During a recent visit, she was found to  be orthostatic and hypotensive due to dehydration. She was taken to the  emergency room. During the evaluation and resuscitation, her troponin was  slightly elevated and therefore, it was recommended that she undergo  further cardiac evaluation; although, she was having no angina and returned  home. She underwent Lexiscan, which demonstrated evidence of anterior wall  ischemia. Cardiac catheterization demonstrates a fairly normal-appearing  left ventricle with ejection fraction of 70%. The left main artery is  unremarkable. The LAD has severely diseased and calcified in the proximal  third.   There is severe diffuse surgery. DESCRIPTION OF OPERATION:  The patient was taken to the operating room and  placed in the supine position. General anesthesia was then administered. The patient was then prepped and draped in sterile fashion for cardiac  surgery. The chest was then entered through a median sternotomy incision. The left internal mammary artery was then dissected off the left chest wall  as a pedicle. The patient was then systemically heparinized. It was  cannulated in usual fashion. The mammary artery was then retrieved from  the left chest.  This was a fairly good conduit with good length. The  distal lumen measured about 2 mm in size with excellent flows. The patient  was then placed on cardiopulmonary bypass and maintained at 37-degree  centigrade using the perfusion-assisted beating heart technique. I then  used a cardiac stabilizer to help dissect out the distal vessels. I  approached the third obtuse marginal branch first.  A proximal vessel loop  was then placed. The cardiac stabilizer was then applied. The vessels  opened sharply. I was able to squeeze in a 1.5 mm shunt and a segment of  saphenous vein was anastomosed to the side to the third obtuse marginal  branch with a running 7-0 Prolene suture. Attention was then turned to the  LAD. The left internal mammary artery was then brought through a  pericardial tunnel and prepared for distal anastomosis. A proximal vessel  loop was then placed. A cardiac stabilizer was then applied. The vessels  opened sharply. I can barely squeeze in a 1.5 mm shunt in this very tiny  vessel and the left internal mammary artery was anastomosed to the side to  the LAD with a running 7-0 Prolene suture. The stabilizer was then removed  and hemostasis was secured. A partial clamp was then placed on the  ascending aorta. The single proximal vein graft anastomosis was then  performed with a running 6-0 Prolene suture.   The partial clamp was then  removed and the vein graft was de-aired. Both distal anastomoses were then  checked for hemostasis. I then placed a 24 Jorge Luis drain into the left  pleural space. Two right atrial and single bipolar right ventricular  epicardial pacemaker lead were then inserted. The patient maintained a  sinus rhythm in the 80s. Ventilations then resumed. The patient was then  weaned from cardiopulmonary bypass, which she tolerated well, coming off  with a systemic pressure of 110, a cardiac output of 4.5 liters per minute  and pulmonary artery pressures of 24/12. Protamine was then administered  and the cannulas were then removed. As the patient remained stable, the  pericardium was reapproximated. Two Jorge Luis mediastinal chest drains were  then placed. The sternum was then reapproximated with 6 single stainless  steel wires. The fascia was then closed with a running 0 PDS suture. The  subcutaneous tissue and skin were closed with 2-0 and 4-0 Vicryl suture  with a perineal dressing. Sponge and needle counts were correct. There  were no apparent complications during the operation. No blood was  transfused. Total cardiopulmonary bypass time was 45 minutes. The  crossclamp was not used. The patient was then taken to the Intensive Care  Unit in stable serious condition.         Ck Mendoza MD    D: 07/02/2018 11:17:42      T: 07/02/2018 11:20:49     [email or web address]  Job#: [id number]     Doc#: [id number]    CC:  Dale Petit, MD Esaw Claude, MD

## 2018-07-02 NOTE — ANESTHESIA PROCEDURE NOTES
Procedure Performed: ABHISHEK       Start Time:  7/2/2018 8:06 AM       End Time:   7/2/2018 8:06 AM    Preanesthesia Checklist:  Patient identified, IV assessed, risks and benefits discussed, monitors and equipment assessed, procedure being performed at surgeon's request and anesthesia consent obtained. General Procedure Information  Diagnostic Indications for Echo:  assessment of ascending aorta, assessment of surgical repair, defect repair evaluation and assessment of valve function  Physician Requesting Echo: Cindy Agosto  Location performed:  OR  Intubated  Bite block placed  Heart visualized  Probe Insertion:  Easy  Probe Type:  Mulitplane  Modalities:  2D only, color flow mapping, continuous wave Doppler and pulse wave Doppler    Echocardiographic and Doppler Measurements    Ventricles    Right Ventricle:  Cavity size dilated. Hypertrophy not present. Thrombus not present. Global function normal.    Left Ventricle:  Cavity size normal.  Hypertrophy present. Thrombus not present. Global Function mildly impaired. Valves    Aortic Valve: Annulus normal.  Stenosis not present. Regurgitation none. Leaflets normal.  Leaflet motions normal.      Mitral Valve:  Stenosis not present. Regurgitation none. Leaflets normal, myxomatous and thickened. Tricuspid Valve: Annulus normal.  Stenosis mild. Regurgitation none. Leaflets normal.          Aorta    Ascending Aorta:  Size normal.  Dissection not present. Aortic Arch:  Size normal.  Dissection not present. Descending Aorta:  Size normal.  Dissection not present. Atria    Right Atrium:  Size normal.  Spontaneous echo contrast not present. Thrombus not present. Tumor not present. Device present. Left Atrium:  Size normal.  Spontaneous echo contrast not present. Thrombus not present. Tumor not present. Device not present.     Left atrial appendage normal.      Septa    Atrial Septum:  Intra-atrial septal morphology

## 2018-07-02 NOTE — ANESTHESIA POSTPROCEDURE EVALUATION
Department of Anesthesiology  Postprocedure Note    Patient: Rudi Akhtar  MRN: [id number]  YOB: 1975  Date of evaluation: 7/2/2018  Time:  10:34 AM     Procedure Summary     Date:  07/02/18 Room / Location:  [facility] OR  / [facility] OR    Anesthesia Start:  0712 Anesthesia Stop:      Procedure:  CORONARY ARTERY BYPASS GRAFT X2 WITH LEFT INTERNAL MAMMARY ARTERY WITH ENDOSCOPIC VEIN HARVESTING WITH PERFUSION TRANSESOPHAGEAL ECHOCARDIOGRAM (N/A ) Diagnosis:  (CAD)    Surgeon:  Rehan Link MD Responsible Provider:  NAVDEEP Soria CRNA    Anesthesia Type:  general ASA Status:  4          Anesthesia Type: general    Vasu Phase I:      Vasu Phase II:      Last vitals: Reviewed and per EMR flowsheets.        Anesthesia Post Evaluation    Patient location during evaluation: ICU  Patient participation: complete - patient cannot participate  Level of consciousness: sedated and ventilated  Pain score: 0  Airway patency: patent  Nausea & Vomiting: no nausea and no vomiting  Complications: no  Cardiovascular status: hemodynamically stable (Cardene drip initiated to control rate)  Respiratory status: acceptable, ventilator and intubated  Hydration status: stable

## 2018-07-02 NOTE — PLAN OF CARE
Problem: Pain:  Goal: Pain level will decrease  Pain level will decrease   Outcome: Ongoing    Goal: Control of acute pain  Control of acute pain   Outcome: Ongoing    Goal: Control of chronic pain  Control of chronic pain   Outcome: Ongoing      Problem: Bleeding:  Goal: Will show no signs and symptoms of excessive bleeding  Will show no signs and symptoms of excessive bleeding   Outcome: Ongoing      Problem: Falls - Risk of:  Goal: Will remain free from falls  Will remain free from falls   Outcome: Ongoing    Goal: Absence of physical injury  Absence of physical injury   Outcome: Ongoing      Problem: Tissue Perfusion - Cardiopulmonary, Altered:  Goal: Absence of angina  Absence of angina   Outcome: Ongoing    Goal: Hemodynamic stability will improve  Hemodynamic stability will improve   Outcome: Ongoing

## 2018-07-03 ENCOUNTER — APPOINTMENT (OUTPATIENT)
Dept: GENERAL RADIOLOGY | Age: 43
DRG: 233 | End: 2018-07-03
Attending: INTERNAL MEDICINE
Payer: MEDICAID

## 2018-07-03 PROBLEM — F41.9 ANXIETY AND DEPRESSION: Chronic | Status: ACTIVE | Noted: 2018-06-04

## 2018-07-03 PROBLEM — E10.49 TYPE 1 DIABETES MELLITUS WITH NEUROLOGICAL MANIFESTATIONS (HCC): Chronic | Status: ACTIVE | Noted: 2018-06-04

## 2018-07-03 PROBLEM — F32.A ANXIETY AND DEPRESSION: Chronic | Status: ACTIVE | Noted: 2018-06-04

## 2018-07-03 LAB
ANION GAP SERPL CALCULATED.3IONS-SCNC: 13 MMOL/L (ref 7–19)
BASE EXCESS ARTERIAL: -3.6 MMOL/L (ref -2–2)
BUN BLDV-MCNC: 10 MG/DL (ref 6–20)
CALCIUM IONIZED: 1.02 MMOL/L (ref 1.12–1.32)
CALCIUM SERPL-MCNC: 7.1 MG/DL (ref 8.6–10)
CARBOXYHEMOGLOBIN ARTERIAL: 3 % (ref 0–5)
CHLORIDE BLD-SCNC: 100 MMOL/L (ref 98–111)
CO2: 18 MMOL/L (ref 22–29)
CREAT SERPL-MCNC: 0.6 MG/DL (ref 0.5–0.9)
GFR NON-AFRICAN AMERICAN: >60
GLUCOSE BLD-MCNC: 142 MG/DL (ref 70–99)
GLUCOSE BLD-MCNC: 151 MG/DL (ref 70–99)
GLUCOSE BLD-MCNC: 153 MG/DL (ref 74–109)
GLUCOSE BLD-MCNC: 175 MG/DL (ref 70–99)
GLUCOSE BLD-MCNC: 179 MG/DL (ref 70–99)
GLUCOSE BLD-MCNC: 190 MG/DL (ref 70–99)
GLUCOSE BLD-MCNC: 212 MG/DL (ref 70–99)
GLUCOSE BLD-MCNC: 212 MG/DL (ref 70–99)
GLUCOSE BLD-MCNC: 232 MG/DL (ref 70–99)
GLUCOSE BLD-MCNC: 263 MG/DL (ref 70–99)
GLUCOSE BLD-MCNC: 264 MG/DL (ref 70–99)
GLUCOSE BLD-MCNC: 295 MG/DL (ref 70–99)
GLUCOSE BLD-MCNC: 299 MG/DL (ref 70–99)
GLUCOSE BLD-MCNC: 308 MG/DL (ref 70–99)
GLUCOSE BLD-MCNC: 319 MG/DL (ref 70–99)
HCO3 ARTERIAL: 20.1 MMOL/L (ref 22–26)
HCT VFR BLD CALC: 32.7 % (ref 37–47)
HEMOGLOBIN, ART, EXTENDED: 11.2 G/DL (ref 12–16)
HEMOGLOBIN: 10.9 G/DL (ref 12–16)
MCH RBC QN AUTO: 30.4 PG (ref 27–31)
MCHC RBC AUTO-ENTMCNC: 33.3 G/DL (ref 33–37)
MCV RBC AUTO: 91.1 FL (ref 81–99)
METHEMOGLOBIN ARTERIAL: 1 %
O2 CONTENT ARTERIAL: 14.1 ML/DL
O2 SAT, ARTERIAL: 89.8 %
O2 THERAPY: ABNORMAL
PCO2 ARTERIAL: 31 MMHG (ref 35–45)
PDW BLD-RTO: 12.6 % (ref 11.5–14.5)
PERFORMED ON: ABNORMAL
PH ARTERIAL: 7.42 (ref 7.35–7.45)
PLATELET # BLD: 156 K/UL (ref 130–400)
PMV BLD AUTO: 10.9 FL (ref 9.4–12.3)
PO2 ARTERIAL: 54 MMHG (ref 80–100)
POTASSIUM SERPL-SCNC: 4.1 MMOL/L (ref 3.5–5)
POTASSIUM, WHOLE BLOOD: 3.9
RBC # BLD: 3.59 M/UL (ref 4.2–5.4)
SODIUM BLD-SCNC: 131 MMOL/L (ref 136–145)
WBC # BLD: 15.4 K/UL (ref 4.8–10.8)

## 2018-07-03 PROCEDURE — 2000000000 HC ICU R&B

## 2018-07-03 PROCEDURE — 82803 BLOOD GASES ANY COMBINATION: CPT

## 2018-07-03 PROCEDURE — 82330 ASSAY OF CALCIUM: CPT

## 2018-07-03 PROCEDURE — 6370000000 HC RX 637 (ALT 250 FOR IP): Performed by: THORACIC SURGERY (CARDIOTHORACIC VASCULAR SURGERY)

## 2018-07-03 PROCEDURE — 2700000000 HC OXYGEN THERAPY PER DAY

## 2018-07-03 PROCEDURE — 6370000000 HC RX 637 (ALT 250 FOR IP): Performed by: FAMILY MEDICINE

## 2018-07-03 PROCEDURE — 99024 POSTOP FOLLOW-UP VISIT: CPT | Performed by: THORACIC SURGERY (CARDIOTHORACIC VASCULAR SURGERY)

## 2018-07-03 PROCEDURE — 2580000003 HC RX 258: Performed by: THORACIC SURGERY (CARDIOTHORACIC VASCULAR SURGERY)

## 2018-07-03 PROCEDURE — 71045 X-RAY EXAM CHEST 1 VIEW: CPT

## 2018-07-03 PROCEDURE — 6360000002 HC RX W HCPCS: Performed by: NURSE PRACTITIONER

## 2018-07-03 PROCEDURE — 80048 BASIC METABOLIC PNL TOTAL CA: CPT

## 2018-07-03 PROCEDURE — 82948 REAGENT STRIP/BLOOD GLUCOSE: CPT

## 2018-07-03 PROCEDURE — 36592 COLLECT BLOOD FROM PICC: CPT

## 2018-07-03 PROCEDURE — 84132 ASSAY OF SERUM POTASSIUM: CPT

## 2018-07-03 PROCEDURE — 85027 COMPLETE CBC AUTOMATED: CPT

## 2018-07-03 PROCEDURE — 36600 WITHDRAWAL OF ARTERIAL BLOOD: CPT

## 2018-07-03 PROCEDURE — P9045 ALBUMIN (HUMAN), 5%, 250 ML: HCPCS | Performed by: THORACIC SURGERY (CARDIOTHORACIC VASCULAR SURGERY)

## 2018-07-03 PROCEDURE — 6360000002 HC RX W HCPCS: Performed by: THORACIC SURGERY (CARDIOTHORACIC VASCULAR SURGERY)

## 2018-07-03 PROCEDURE — 99231 SBSQ HOSP IP/OBS SF/LOW 25: CPT | Performed by: INTERNAL MEDICINE

## 2018-07-03 PROCEDURE — 93005 ELECTROCARDIOGRAM TRACING: CPT

## 2018-07-03 PROCEDURE — 99233 SBSQ HOSP IP/OBS HIGH 50: CPT | Performed by: FAMILY MEDICINE

## 2018-07-03 PROCEDURE — 94640 AIRWAY INHALATION TREATMENT: CPT

## 2018-07-03 RX ORDER — NICOTINE POLACRILEX 4 MG
15 LOZENGE BUCCAL PRN
Status: DISCONTINUED | OUTPATIENT
Start: 2018-07-03 | End: 2018-07-13 | Stop reason: HOSPADM

## 2018-07-03 RX ORDER — DEXTROSE MONOHYDRATE 50 MG/ML
100 INJECTION, SOLUTION INTRAVENOUS PRN
Status: DISCONTINUED | OUTPATIENT
Start: 2018-07-03 | End: 2018-07-13 | Stop reason: HOSPADM

## 2018-07-03 RX ORDER — ALBUMIN, HUMAN INJ 5% 5 %
25 SOLUTION INTRAVENOUS ONCE
Status: COMPLETED | OUTPATIENT
Start: 2018-07-03 | End: 2018-07-03

## 2018-07-03 RX ORDER — METOCLOPRAMIDE HYDROCHLORIDE 5 MG/ML
10 INJECTION INTRAMUSCULAR; INTRAVENOUS
Status: DISCONTINUED | OUTPATIENT
Start: 2018-07-03 | End: 2018-07-13 | Stop reason: HOSPADM

## 2018-07-03 RX ORDER — POLYETHYLENE GLYCOL 3350 17 G/17G
17 POWDER, FOR SOLUTION ORAL 2 TIMES DAILY
Status: DISCONTINUED | OUTPATIENT
Start: 2018-07-03 | End: 2018-07-13 | Stop reason: HOSPADM

## 2018-07-03 RX ORDER — INSULIN GLARGINE 100 [IU]/ML
20 INJECTION, SOLUTION SUBCUTANEOUS ONCE
Status: COMPLETED | OUTPATIENT
Start: 2018-07-03 | End: 2018-07-03

## 2018-07-03 RX ORDER — INSULIN GLARGINE 100 [IU]/ML
45 INJECTION, SOLUTION SUBCUTANEOUS DAILY
Status: DISCONTINUED | OUTPATIENT
Start: 2018-07-04 | End: 2018-07-05

## 2018-07-03 RX ORDER — ALPRAZOLAM 1 MG/1
1 TABLET ORAL 4 TIMES DAILY PRN
Status: DISCONTINUED | OUTPATIENT
Start: 2018-07-03 | End: 2018-07-03

## 2018-07-03 RX ORDER — INSULIN GLARGINE 100 [IU]/ML
20 INJECTION, SOLUTION SUBCUTANEOUS NIGHTLY
Status: DISCONTINUED | OUTPATIENT
Start: 2018-07-03 | End: 2018-07-03

## 2018-07-03 RX ORDER — DIAZEPAM 5 MG/1
5 TABLET ORAL EVERY 6 HOURS PRN
Status: DISCONTINUED | OUTPATIENT
Start: 2018-07-03 | End: 2018-07-04

## 2018-07-03 RX ORDER — INSULIN GLARGINE 100 [IU]/ML
20 INJECTION, SOLUTION SUBCUTANEOUS DAILY
Status: DISCONTINUED | OUTPATIENT
Start: 2018-07-03 | End: 2018-07-03

## 2018-07-03 RX ORDER — ALPRAZOLAM 1 MG/1
1 TABLET ORAL EVERY 4 HOURS PRN
Status: DISCONTINUED | OUTPATIENT
Start: 2018-07-03 | End: 2018-07-03

## 2018-07-03 RX ORDER — DEXTROSE MONOHYDRATE 25 G/50ML
12.5 INJECTION, SOLUTION INTRAVENOUS PRN
Status: DISCONTINUED | OUTPATIENT
Start: 2018-07-03 | End: 2018-07-13 | Stop reason: HOSPADM

## 2018-07-03 RX ORDER — CALCIUM CARBONATE 200(500)MG
1000 TABLET,CHEWABLE ORAL 2 TIMES DAILY
Status: DISCONTINUED | OUTPATIENT
Start: 2018-07-03 | End: 2018-07-13 | Stop reason: HOSPADM

## 2018-07-03 RX ADMIN — Medication 10 ML: at 21:03

## 2018-07-03 RX ADMIN — Medication 2 G: at 23:39

## 2018-07-03 RX ADMIN — KETOROLAC TROMETHAMINE 30 MG: 30 INJECTION, SOLUTION INTRAMUSCULAR at 21:02

## 2018-07-03 RX ADMIN — INSULIN GLARGINE 20 UNITS: 100 INJECTION, SOLUTION SUBCUTANEOUS at 09:11

## 2018-07-03 RX ADMIN — INSULIN HUMAN 20 UNITS: 100 INJECTION, SOLUTION PARENTERAL at 15:50

## 2018-07-03 RX ADMIN — DIAZEPAM 5 MG: 5 TABLET ORAL at 16:57

## 2018-07-03 RX ADMIN — OXYCODONE HYDROCHLORIDE 10 MG: 5 TABLET ORAL at 03:17

## 2018-07-03 RX ADMIN — Medication 4 MG: at 21:59

## 2018-07-03 RX ADMIN — CLOPIDOGREL BISULFATE 75 MG: 75 TABLET ORAL at 08:46

## 2018-07-03 RX ADMIN — ALBUMIN (HUMAN) 25 G: 12.5 INJECTION, SOLUTION INTRAVENOUS at 06:14

## 2018-07-03 RX ADMIN — ASPIRIN 81 MG: 81 TABLET, COATED ORAL at 08:46

## 2018-07-03 RX ADMIN — ALPRAZOLAM 1 MG: 1 TABLET ORAL at 11:37

## 2018-07-03 RX ADMIN — IPRATROPIUM BROMIDE AND ALBUTEROL SULFATE 1 AMPULE: .5; 3 SOLUTION RESPIRATORY (INHALATION) at 06:54

## 2018-07-03 RX ADMIN — INSULIN LISPRO 2 UNITS: 100 INJECTION, SOLUTION INTRAVENOUS; SUBCUTANEOUS at 18:06

## 2018-07-03 RX ADMIN — OXYCODONE HYDROCHLORIDE 10 MG: 5 TABLET ORAL at 18:21

## 2018-07-03 RX ADMIN — INSULIN GLARGINE 20 UNITS: 100 INJECTION, SOLUTION SUBCUTANEOUS at 18:23

## 2018-07-03 RX ADMIN — INSULIN LISPRO 1 UNITS: 100 INJECTION, SOLUTION INTRAVENOUS; SUBCUTANEOUS at 09:04

## 2018-07-03 RX ADMIN — OXYCODONE HYDROCHLORIDE 10 MG: 5 TABLET ORAL at 23:05

## 2018-07-03 RX ADMIN — Medication 2 G: at 08:46

## 2018-07-03 RX ADMIN — FAMOTIDINE 20 MG: 20 TABLET ORAL at 08:46

## 2018-07-03 RX ADMIN — CALCIUM CARBONATE 1000 MG: 500 TABLET, CHEWABLE ORAL at 21:03

## 2018-07-03 RX ADMIN — Medication 2 G: at 15:17

## 2018-07-03 RX ADMIN — IPRATROPIUM BROMIDE AND ALBUTEROL SULFATE 1 AMPULE: .5; 3 SOLUTION RESPIRATORY (INHALATION) at 18:24

## 2018-07-03 RX ADMIN — ALPRAZOLAM 1 MG: 1 TABLET ORAL at 07:11

## 2018-07-03 RX ADMIN — Medication 2 MG: at 08:06

## 2018-07-03 RX ADMIN — INSULIN HUMAN 20 UNITS: 100 INJECTION, SOLUTION PARENTERAL at 12:38

## 2018-07-03 RX ADMIN — KETOROLAC TROMETHAMINE 30 MG: 30 INJECTION, SOLUTION INTRAMUSCULAR at 11:04

## 2018-07-03 RX ADMIN — CALCIUM CARBONATE 1000 MG: 500 TABLET, CHEWABLE ORAL at 12:22

## 2018-07-03 RX ADMIN — IPRATROPIUM BROMIDE AND ALBUTEROL SULFATE 1 AMPULE: .5; 3 SOLUTION RESPIRATORY (INHALATION) at 10:08

## 2018-07-03 RX ADMIN — Medication 2 MG: at 15:16

## 2018-07-03 RX ADMIN — IPRATROPIUM BROMIDE AND ALBUTEROL SULFATE 1 AMPULE: .5; 3 SOLUTION RESPIRATORY (INHALATION) at 13:44

## 2018-07-03 RX ADMIN — ATORVASTATIN CALCIUM 20 MG: 20 TABLET, FILM COATED ORAL at 21:03

## 2018-07-03 RX ADMIN — GABAPENTIN 300 MG: 300 CAPSULE ORAL at 08:46

## 2018-07-03 RX ADMIN — Medication 2 MG: at 10:26

## 2018-07-03 RX ADMIN — OXYCODONE HYDROCHLORIDE 10 MG: 5 TABLET ORAL at 12:18

## 2018-07-03 RX ADMIN — DOCUSATE SODIUM 100 MG: 100 CAPSULE, LIQUID FILLED ORAL at 08:46

## 2018-07-03 RX ADMIN — OXYCODONE HYDROCHLORIDE 10 MG: 5 TABLET ORAL at 07:14

## 2018-07-03 RX ADMIN — KETOROLAC TROMETHAMINE 30 MG: 30 INJECTION, SOLUTION INTRAMUSCULAR at 02:06

## 2018-07-03 RX ADMIN — AMITRIPTYLINE HYDROCHLORIDE 100 MG: 50 TABLET, FILM COATED ORAL at 21:03

## 2018-07-03 RX ADMIN — GABAPENTIN 300 MG: 300 CAPSULE ORAL at 21:03

## 2018-07-03 RX ADMIN — FAMOTIDINE 20 MG: 20 TABLET ORAL at 21:03

## 2018-07-03 RX ADMIN — GABAPENTIN 300 MG: 300 CAPSULE ORAL at 14:11

## 2018-07-03 ASSESSMENT — PAIN SCALES - GENERAL
PAINLEVEL_OUTOF10: 10
PAINLEVEL_OUTOF10: 9
PAINLEVEL_OUTOF10: 10
PAINLEVEL_OUTOF10: 8
PAINLEVEL_OUTOF10: 10
PAINLEVEL_OUTOF10: 9
PAINLEVEL_OUTOF10: 10
PAINLEVEL_OUTOF10: 7
PAINLEVEL_OUTOF10: 10
PAINLEVEL_OUTOF10: 7
PAINLEVEL_OUTOF10: 5
PAINLEVEL_OUTOF10: 10
PAINLEVEL_OUTOF10: 5
PAINLEVEL_OUTOF10: 10
PAINLEVEL_OUTOF10: 4
PAINLEVEL_OUTOF10: 6

## 2018-07-03 ASSESSMENT — PAIN DESCRIPTION - LOCATION: LOCATION: CHEST

## 2018-07-03 ASSESSMENT — PAIN DESCRIPTION - PAIN TYPE: TYPE: SURGICAL PAIN

## 2018-07-03 NOTE — PROGRESS NOTES
7/3/2018  4:33 AM - Aj Naqvi Incoming Lab Results From [facility]     Component Results     Component Value Ref Range & Units Status Collected Lab   pH, Arterial 7.420  7.350 - 7.450 Final 07/03/2018  4:30 AM [facility]   pCO2, Arterial 31.0   35.0 - 45.0 mmHg Final 07/03/2018  4:30 AM [facility] Lab   pO2, Arterial 54.0   80.0 - 100.0 mmHg Final 07/03/2018  4:30 AM [facility]   HCO3, Arterial 20.1   22.0 - 26.0 mmol/L Final 07/03/2018  4:30 AM [facility] Excess, Arterial -3.6   -2.0 - 2.0 mmol/L Final 07/03/2018  4:30 AM [facility]   Hemoglobin, Art, Extended 11.2   12.0 - 16.0 g/dL Final 07/03/2018  4:30 AM [facility]   O2 Sat, Arterial 89.8   >92 % Final 07/03/2018  4:30 AM [facility]   Carboxyhgb, Arterial 3.0  0.0 - 5.0 % Final 07/03/2018  4:30 AM [facility]        0.0-1.5   (Smokers 1.5-5.0)

## 2018-07-03 NOTE — PLAN OF CARE
Problem: Pain:  Goal: Pain level will decrease  Pain level will decrease   Outcome: Ongoing    Goal: Control of acute pain  Control of acute pain   Outcome: Ongoing    Goal: Control of chronic pain  Control of chronic pain   Outcome: Ongoing      Problem: Bleeding:  Goal: Will show no signs and symptoms of excessive bleeding  Will show no signs and symptoms of excessive bleeding   Outcome: Ongoing      Problem: Falls - Risk of:  Goal: Will remain free from falls  Will remain free from falls   Outcome: Ongoing    Goal: Absence of physical injury  Absence of physical injury   Outcome: Ongoing      Problem: Tissue Perfusion - Cardiopulmonary, Altered:  Goal: Absence of angina  Absence of angina   Outcome: Ongoing      Problem: Nutrition  Goal: Optimal nutrition therapy  Nutrition Problem: Increased nutrient needs  Intervention: Food and/or Nutrient Delivery: Continue NPO  Nutritional Goals: po intake 50% or greater     Outcome: Ongoing      Problem: Risk for Impaired Skin Integrity  Goal: Tissue integrity - skin and mucous membranes  Structural intactness and normal physiological function of skin and  mucous membranes.    Outcome: Ongoing

## 2018-07-03 NOTE — PROGRESS NOTES
Hospitalist Progress Note  7/3/2018 8:13 AM  Subjective:   Admit Date: 6/26/2018  PCP: Adriana Martinez    Chief Complaint: diabetes mellitus    Subjective: Patient's pain is poorly controlled on oxycodone. Did not receive morphine due to hypotension overnight. Requesting that Ativan be ordered q4H PRN anxiety again as she is having trouble dealing with stress at present. States she takes Lantus in the morning and carb-counting bolus insulin after meals, requests that the same be done here. History is otherwise unchanged. Cumulative Hospital History:   6-26: Admitted with chest discomfort for cardiac catheterization. Showed severe multivessel disease. 6-27: Cardiothoracic surgery consulted. 6-28: CTS plans CABG on 7-2. Hospitalist service consulted for medical management. 6-29: Insomnia, Ambien ordered strictly PRN only. Patient cheating on diet, will not adjust Lantus dose as she states she is willing to comply with diet now. 6-30: Lantus adjusted due to hyperglycemia yesterday, will revert changes due to dietary noncompliance and risk of hypoglycemia. 7-1: Constipated, refuses change in pharmacotherapy. 7-2: CABG, postoperative pain. 7-3: Poor pain control. Increase Xanax frequency. Change Lantus and bolus insulin regimen. ROS: 14 point review of systems is negative except as specifically addressed above. DIET GENERAL; Carb Control: 3 carb choices (45 gms)/meal; Low Sodium (2 GM);  Low Fiber    Intake/Output Summary (Last 24 hours) at 07/03/18 0813  Last data filed at 07/03/18 0800   Gross per 24 hour   Intake          3807.24 ml   Output             2110 ml   Net          1697.24 ml     Medications:   sodium chloride 75 mL/hr at 07/02/18 2313    sodium chloride Stopped (07/02/18 1920)    dextrose      niCARdipine Stopped (07/02/18 1105)     Current Facility-Administered Medications   Medication Dose Route Frequency Provider Last Rate Last Dose    polyethylene glycol (GLYCOLAX) packet 17 g  17 g ANIONGAP  12   --    --    --    --   14   --    --   13   --    CL  100   --    --    --    --   106   --    --   100   --    CO2  26   < >  25   < >  23  20*   --    --   18*   --    BUN  12   --    --    --    --   7   --    --   10   --    CREATININE  0.8   --   0.4   --    --   0.5   --    --   0.6   --    GLUCOSE  135*   --    --    --    --   167*   --    --   153*   --    CALCIUM  8.9   --    --    --    --   7.6*   --    --   7.1*   --     < > = values in this interval not displayed. Recent Labs      07/01/18   0156  07/02/18   1031   MG  1.8  1.6     Recent Labs      07/01/18   0156   AST  8   ALT  7   BILITOT  <0.2   ALKPHOS  67     ABGs:  Recent Labs      07/02/18   0919  07/02/18   0947  07/02/18   1035  07/02/18   1420  07/03/18   0430   PHART  7.439  7.403  7.440  7.380  7.420   PCX8TAL  35  36  33.0*  37.0  31.0*   PO2ART  607*  219*  212.0*  112.0*  54.0*   LPZ2XTX   --    --   22.4  21.9*  20.1*   BEART  0  -2  -1.1  -2.8*  -3.6*   HGBAE   --    --   12.8  13.3  11.2*   Z1JLKGCV  100  100  97.4  96.3  89.8*   CARBOXHGBART   --    --   1.9  2.0  3.0   02THERAPY   --    --   Unknown  Unknown  Unknown     HgBA1c: Invalid input(s): HGBA1C  FLP:    Lab Results   Component Value Date    TRIG 96 06/30/2018    HDL 41 06/30/2018    LDLCALC 119 06/30/2018     TSH:  No results found for: TSH  Troponin T: No results for input(s): TROPONINI in the last 72 hours. INR:   Recent Labs      07/01/18   0156  07/02/18   1031   INR  1.02  1.17       Objective:   Vitals: /80   Pulse 105   Temp 99 °F (37.2 °C) (Temporal)   Resp 23   Ht 5' 4\" (1.626 m)   Wt 194 lb 1.6 oz (88 kg)   LMP 01/22/2012   SpO2 98%   Breastfeeding?  No   BMI 33.32 kg/m²   24HR INTAKE/OUTPUT:      Intake/Output Summary (Last 24 hours) at 07/03/18 0813  Last data filed at 07/03/18 0800   Gross per 24 hour   Intake          3807.24 ml   Output             2110 ml   Net          1697.24 ml     General appearance: alert and cooperative with exam  HEENT: atraumatic, eyes with clear conjunctiva and normal lids, pupils and irises normal, external ears and nose are normal, lips normal.  Neck without masses, lympadenopathy, bruit, thyroid normal  Lungs: no increased work of breathing, \"clear to auscultation bilaterally\" without rales, rhonchi or wheezes  Heart: regular rate and rhythm, S1, S2 normal, no murmur, click, rub or gallop  Abdomen: soft, non-tender; bowel sounds normal; no masses,  no organomegaly  Extremities: extremities normal, atraumatic, no cyanosis or edema  Neurologic: No focal neurologic deficits, normal sensation, alert and oriented, affect and mood appropriate. Skin: no rashes, nodules. 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 *    Agree with the changes that patient requests. Change Lantus to morning dosing and will institute carb-counting bolus regimen rather than sliding scale based on Acchuchecks. Advance Directive: Full Code    DVT prophylaxis: SCDs    Discharge planning: To home.       DO Ivett Garcia Hospitalist

## 2018-07-03 NOTE — PROGRESS NOTES
[id number] [facility]  Progress Note                            Date:  7/3/2018  Patient: Freddy Levy  Admission:  6/26/2018 11:35 AM  Admit DX: Abnormal result of other cardiovascular function study [R94.39]  Unstable angina ([address] [I20.0]  Age:  40 y.o., 1975     LOS: 7 days     Reason for evaluation:   coronary artery disease and CABG      SUBJECTIVE:    The patient was seen and examined. Notes and labs reviewed. There were not complications over night. Patient's cardiac review of systems: negative. The patient is gradually improving. I feel better today      OBJECTIVE:    Telemetry: Sinus  Lungs clear. Chest tube removed  Blood sugar labile. To stay in ICU today.  polyethylene glycol  17 g Oral BID    metoclopramide  10 mg Intravenous 4x Daily AC & HS    insulin glargine  20 Units Subcutaneous Daily    insulin lispro  1-6 Units Subcutaneous TID WC    calcium carbonate  1,000 mg Oral BID    insulin regular  20 Units Subcutaneous Once    sodium chloride flush  10 mL Intravenous 2 times per day    ceFAZolin (ANCEF) IVPB  2 g Intravenous Q8H    docusate sodium  100 mg Oral BID    famotidine  20 mg Oral BID    atorvastatin  20 mg Oral Nightly    aspirin  81 mg Oral Daily    clopidogrel  75 mg Oral Daily    ipratropium-albuterol  1 ampule Inhalation Q4H WA    amitriptyline  100 mg Oral Nightly    gabapentin  300 mg Oral TID        sodium chloride 75 mL/hr at 07/02/18 2313    sodium chloride Stopped (07/02/18 1920)    dextrose      niCARdipine Stopped (07/02/18 1105)           BP (!) 114/54   Pulse 97   Temp 98.3 °F (36.8 °C) (Temporal)   Resp 28   Ht 5' 4\" (1.626 m)   Wt 194 lb 1.6 oz (88 kg)   LMP 01/22/2012   SpO2 96%   Breastfeeding?  No   BMI 33.32 kg/m²     Intake/Output Summary (Last 24 hours) at 07/03/18 1238  Last data filed at 07/03/18 1200   Gross per 24 hour   Intake          3383.19 ml   Output             2036 ml   Net          1347.19 ml

## 2018-07-04 ENCOUNTER — APPOINTMENT (OUTPATIENT)
Dept: GENERAL RADIOLOGY | Age: 43
DRG: 233 | End: 2018-07-04
Attending: INTERNAL MEDICINE
Payer: MEDICAID

## 2018-07-04 PROBLEM — E87.1 HYPONATREMIA: Status: ACTIVE | Noted: 2018-07-04

## 2018-07-04 PROBLEM — D64.9 NORMOCYTIC ANEMIA: Status: ACTIVE | Noted: 2018-07-04

## 2018-07-04 LAB
ANION GAP SERPL CALCULATED.3IONS-SCNC: 16 MMOL/L (ref 7–19)
BLOOD BANK DISPENSE STATUS: NORMAL
BLOOD BANK DISPENSE STATUS: NORMAL
BLOOD BANK PRODUCT CODE: NORMAL
BLOOD BANK PRODUCT CODE: NORMAL
BPU ID: NORMAL
BPU ID: NORMAL
BUN BLDV-MCNC: 8 MG/DL (ref 6–20)
CALCIUM SERPL-MCNC: 8.1 MG/DL (ref 8.6–10)
CHLORIDE BLD-SCNC: 95 MMOL/L (ref 98–111)
CO2: 18 MMOL/L (ref 22–29)
CREAT SERPL-MCNC: 0.5 MG/DL (ref 0.5–0.9)
DESCRIPTION BLOOD BANK: NORMAL
DESCRIPTION BLOOD BANK: NORMAL
EKG P AXIS: 24 DEGREES
EKG P-R INTERVAL: 94 MS
EKG Q-T INTERVAL: 352 MS
EKG QRS DURATION: 76 MS
EKG QTC CALCULATION (BAZETT): 422 MS
EKG T AXIS: 14 DEGREES
GFR NON-AFRICAN AMERICAN: >60
GLUCOSE BLD-MCNC: 204 MG/DL (ref 74–109)
GLUCOSE BLD-MCNC: 289 MG/DL (ref 70–99)
GLUCOSE BLD-MCNC: 303 MG/DL (ref 70–99)
GLUCOSE BLD-MCNC: 316 MG/DL (ref 70–99)
GLUCOSE BLD-MCNC: 345 MG/DL (ref 70–99)
HCT VFR BLD CALC: 28.7 % (ref 37–47)
HEMOGLOBIN: 9.7 G/DL (ref 12–16)
MCH RBC QN AUTO: 30.8 PG (ref 27–31)
MCHC RBC AUTO-ENTMCNC: 33.8 G/DL (ref 33–37)
MCV RBC AUTO: 91.1 FL (ref 81–99)
OSMOLALITY URINE: 350 MOSM/KG (ref 250–1200)
PDW BLD-RTO: 12.7 % (ref 11.5–14.5)
PERFORMED ON: ABNORMAL
PLATELET # BLD: 130 K/UL (ref 130–400)
PMV BLD AUTO: 11.2 FL (ref 9.4–12.3)
POTASSIUM SERPL-SCNC: 4 MMOL/L (ref 3.5–5)
RBC # BLD: 3.15 M/UL (ref 4.2–5.4)
SODIUM BLD-SCNC: 129 MMOL/L (ref 136–145)
SODIUM URINE: <20 MMOL/L
WBC # BLD: 11.9 K/UL (ref 4.8–10.8)

## 2018-07-04 PROCEDURE — 6370000000 HC RX 637 (ALT 250 FOR IP): Performed by: INTERNAL MEDICINE

## 2018-07-04 PROCEDURE — 6370000000 HC RX 637 (ALT 250 FOR IP): Performed by: FAMILY MEDICINE

## 2018-07-04 PROCEDURE — 6370000000 HC RX 637 (ALT 250 FOR IP): Performed by: THORACIC SURGERY (CARDIOTHORACIC VASCULAR SURGERY)

## 2018-07-04 PROCEDURE — 6370000000 HC RX 637 (ALT 250 FOR IP): Performed by: NURSE PRACTITIONER

## 2018-07-04 PROCEDURE — 83935 ASSAY OF URINE OSMOLALITY: CPT

## 2018-07-04 PROCEDURE — 6360000002 HC RX W HCPCS: Performed by: NURSE PRACTITIONER

## 2018-07-04 PROCEDURE — 80048 BASIC METABOLIC PNL TOTAL CA: CPT

## 2018-07-04 PROCEDURE — 84300 ASSAY OF URINE SODIUM: CPT

## 2018-07-04 PROCEDURE — 99233 SBSQ HOSP IP/OBS HIGH 50: CPT | Performed by: INTERNAL MEDICINE

## 2018-07-04 PROCEDURE — 94640 AIRWAY INHALATION TREATMENT: CPT

## 2018-07-04 PROCEDURE — 2140000000 HC CCU INTERMEDIATE R&B

## 2018-07-04 PROCEDURE — 2700000000 HC OXYGEN THERAPY PER DAY

## 2018-07-04 PROCEDURE — 85027 COMPLETE CBC AUTOMATED: CPT

## 2018-07-04 PROCEDURE — 82948 REAGENT STRIP/BLOOD GLUCOSE: CPT

## 2018-07-04 PROCEDURE — 71045 X-RAY EXAM CHEST 1 VIEW: CPT

## 2018-07-04 PROCEDURE — 6360000002 HC RX W HCPCS: Performed by: THORACIC SURGERY (CARDIOTHORACIC VASCULAR SURGERY)

## 2018-07-04 PROCEDURE — 2580000003 HC RX 258: Performed by: THORACIC SURGERY (CARDIOTHORACIC VASCULAR SURGERY)

## 2018-07-04 RX ORDER — ALPRAZOLAM 1 MG/1
1 TABLET ORAL 4 TIMES DAILY PRN
Status: DISCONTINUED | OUTPATIENT
Start: 2018-07-04 | End: 2018-07-06

## 2018-07-04 RX ORDER — ATORVASTATIN CALCIUM 40 MG/1
40 TABLET, FILM COATED ORAL NIGHTLY
Status: DISCONTINUED | OUTPATIENT
Start: 2018-07-04 | End: 2018-07-13 | Stop reason: HOSPADM

## 2018-07-04 RX ORDER — METOPROLOL SUCCINATE 25 MG/1
25 TABLET, EXTENDED RELEASE ORAL DAILY
Status: DISCONTINUED | OUTPATIENT
Start: 2018-07-04 | End: 2018-07-13 | Stop reason: HOSPADM

## 2018-07-04 RX ADMIN — INSULIN GLARGINE 45 UNITS: 100 INJECTION, SOLUTION SUBCUTANEOUS at 09:48

## 2018-07-04 RX ADMIN — INSULIN LISPRO 4 UNITS: 100 INJECTION, SOLUTION INTRAVENOUS; SUBCUTANEOUS at 12:41

## 2018-07-04 RX ADMIN — METOPROLOL SUCCINATE 25 MG: 25 TABLET, EXTENDED RELEASE ORAL at 09:49

## 2018-07-04 RX ADMIN — DOCUSATE SODIUM 100 MG: 100 CAPSULE, LIQUID FILLED ORAL at 20:06

## 2018-07-04 RX ADMIN — ALPRAZOLAM 1 MG: 1 TABLET ORAL at 20:07

## 2018-07-04 RX ADMIN — METOCLOPRAMIDE 10 MG: 5 INJECTION, SOLUTION INTRAMUSCULAR; INTRAVENOUS at 12:30

## 2018-07-04 RX ADMIN — FAMOTIDINE 20 MG: 20 TABLET ORAL at 08:52

## 2018-07-04 RX ADMIN — GABAPENTIN 300 MG: 300 CAPSULE ORAL at 14:35

## 2018-07-04 RX ADMIN — KETOROLAC TROMETHAMINE 30 MG: 30 INJECTION, SOLUTION INTRAMUSCULAR at 06:14

## 2018-07-04 RX ADMIN — INSULIN LISPRO 4 UNITS: 100 INJECTION, SOLUTION INTRAVENOUS; SUBCUTANEOUS at 17:55

## 2018-07-04 RX ADMIN — IPRATROPIUM BROMIDE AND ALBUTEROL SULFATE 1 AMPULE: .5; 3 SOLUTION RESPIRATORY (INHALATION) at 19:49

## 2018-07-04 RX ADMIN — IPRATROPIUM BROMIDE AND ALBUTEROL SULFATE 1 AMPULE: .5; 3 SOLUTION RESPIRATORY (INHALATION) at 07:09

## 2018-07-04 RX ADMIN — FAMOTIDINE 20 MG: 20 TABLET ORAL at 20:07

## 2018-07-04 RX ADMIN — OXYCODONE HYDROCHLORIDE 10 MG: 5 TABLET ORAL at 14:34

## 2018-07-04 RX ADMIN — CLOPIDOGREL BISULFATE 75 MG: 75 TABLET ORAL at 08:52

## 2018-07-04 RX ADMIN — CALCIUM CARBONATE 1000 MG: 500 TABLET, CHEWABLE ORAL at 08:52

## 2018-07-04 RX ADMIN — Medication 4 MG: at 04:48

## 2018-07-04 RX ADMIN — IPRATROPIUM BROMIDE AND ALBUTEROL SULFATE 1 AMPULE: .5; 3 SOLUTION RESPIRATORY (INHALATION) at 14:14

## 2018-07-04 RX ADMIN — POLYETHYLENE GLYCOL 3350 17 G: 17 POWDER, FOR SOLUTION ORAL at 20:06

## 2018-07-04 RX ADMIN — INSULIN LISPRO 2 UNITS: 100 INJECTION, SOLUTION INTRAVENOUS; SUBCUTANEOUS at 21:01

## 2018-07-04 RX ADMIN — ATORVASTATIN CALCIUM 40 MG: 40 TABLET, FILM COATED ORAL at 20:07

## 2018-07-04 RX ADMIN — Medication 10 ML: at 08:47

## 2018-07-04 RX ADMIN — OXYCODONE HYDROCHLORIDE 10 MG: 5 TABLET ORAL at 20:06

## 2018-07-04 RX ADMIN — GABAPENTIN 300 MG: 300 CAPSULE ORAL at 08:52

## 2018-07-04 RX ADMIN — DOCUSATE SODIUM 100 MG: 100 CAPSULE, LIQUID FILLED ORAL at 08:52

## 2018-07-04 RX ADMIN — INSULIN LISPRO 3 UNITS: 100 INJECTION, SOLUTION INTRAVENOUS; SUBCUTANEOUS at 08:36

## 2018-07-04 RX ADMIN — SODIUM CHLORIDE: 9 INJECTION, SOLUTION INTRAVENOUS at 02:16

## 2018-07-04 RX ADMIN — IPRATROPIUM BROMIDE AND ALBUTEROL SULFATE 1 AMPULE: .5; 3 SOLUTION RESPIRATORY (INHALATION) at 11:20

## 2018-07-04 RX ADMIN — ALPRAZOLAM 1 MG: 1 TABLET ORAL at 00:22

## 2018-07-04 RX ADMIN — ASPIRIN 81 MG: 81 TABLET, COATED ORAL at 08:52

## 2018-07-04 RX ADMIN — METOCLOPRAMIDE 10 MG: 5 INJECTION, SOLUTION INTRAMUSCULAR; INTRAVENOUS at 08:52

## 2018-07-04 RX ADMIN — KETOROLAC TROMETHAMINE 30 MG: 30 INJECTION, SOLUTION INTRAMUSCULAR at 17:01

## 2018-07-04 RX ADMIN — METOCLOPRAMIDE 10 MG: 5 INJECTION, SOLUTION INTRAMUSCULAR; INTRAVENOUS at 17:36

## 2018-07-04 RX ADMIN — AMITRIPTYLINE HYDROCHLORIDE 100 MG: 50 TABLET, FILM COATED ORAL at 20:14

## 2018-07-04 RX ADMIN — POLYETHYLENE GLYCOL 3350 17 G: 17 POWDER, FOR SOLUTION ORAL at 08:52

## 2018-07-04 RX ADMIN — OXYCODONE HYDROCHLORIDE 10 MG: 5 TABLET ORAL at 06:14

## 2018-07-04 RX ADMIN — GABAPENTIN 300 MG: 300 CAPSULE ORAL at 20:07

## 2018-07-04 RX ADMIN — CALCIUM CARBONATE 1000 MG: 500 TABLET, CHEWABLE ORAL at 20:06

## 2018-07-04 ASSESSMENT — PAIN SCALES - GENERAL
PAINLEVEL_OUTOF10: 0
PAINLEVEL_OUTOF10: 2
PAINLEVEL_OUTOF10: 0
PAINLEVEL_OUTOF10: 0
PAINLEVEL_OUTOF10: 10
PAINLEVEL_OUTOF10: 10
PAINLEVEL_OUTOF10: 8
PAINLEVEL_OUTOF10: 10
PAINLEVEL_OUTOF10: 9
PAINLEVEL_OUTOF10: 0
PAINLEVEL_OUTOF10: 10
PAINLEVEL_OUTOF10: 7

## 2018-07-04 NOTE — PROGRESS NOTES
Pt care transferred to myself from [facility]. Report given. I went to pt's room and introduced myself and explained I would be taking over her care for the rest of the night per her request.  She explained that she wanted her xanax switched back from valium and she was concerned about her blood glucose being checked q1hr not having any insulin coverage. I told her the hospitalist had been paged and I would address it with him as soon as he called the unit. I asked her if there was anything else I need to address or anything else I could do for at that time. She asked that I shut the door and turn off the vanity light. I did so, then I made sure she had her call button within reach as well as her bedside table and water mug.

## 2018-07-04 NOTE — PROGRESS NOTES
Priority: Low    Unstable angina (HCC) 06/26/2018     Priority: Low    Abnormal laboratory test 06/04/2018     Priority: Low    Type 1 diabetes mellitus with neurological manifestations ([facility] [address] 06/04/2018     Priority: Low    Anxiety and depression 06/04/2018     Priority: Low    DJD (degenerative joint disease) 06/04/2018     Priority: Low    Tobacco abuse 06/04/2018     Priority: Low    Drug allergy 06/04/2018     Priority: Low    Constipation 07/21/2014     Priority: Low    Abdominal distention 07/21/2014     Priority: Low    Gastroesophageal reflux disease without esophagitis 07/21/2014     Priority: Low    Insomnia 06/19/2012     Priority: Low     Current Facility-Administered Medications   Medication Dose Route Frequency Provider Last Rate Last Dose    ALPRAZolam (XANAX) tablet 1 mg  1 mg Oral 4x Daily PRN Anabela Washington MD   1 mg at 07/04/18 0022    polyethylene glycol (GLYCOLAX) packet 17 g  17 g Oral BID Chestine Juan, APRN   17 g at 07/04/18 0852    metoclopramide (REGLAN) injection 10 mg  10 mg Intravenous 4x Daily AC & HS Chestine Juan, APRN   10 mg at 07/04/18 0852    insulin lispro (HUMALOG) injection vial 1-6 Units  1-6 Units Subcutaneous TID  Ivy Bermudez, DO   Stopped at 07/04/18 0847    calcium carbonate (TUMS) chewable tablet 1,000 mg  1,000 mg Oral BID Ivy Bermudez, DO   1,000 mg at 07/04/18 0852    insulin glargine (LANTUS) injection vial 45 Units  45 Units Subcutaneous Daily Sunday Braun DO        glucose (GLUTOSE) 40 % oral gel 15 g  15 g Oral PRN Shorty Aponte MD        dextrose 50 % solution 12.5 g  12.5 g Intravenous PRN Shorty Aponte MD        glucagon (rDNA) injection 1 mg  1 mg Intramuscular PRN Shorty Aponte MD        dextrose 5 % solution  100 mL/hr Intravenous PRN Shorty Aponte MD        insulin lispro (HUMALOG) injection vial 0-6 Units  0-6 Units Subcutaneous TID  Anabela Washington MD   3 Units at 07/04/18 0836    insulin lispro (HUMALOG) injection vial 0-3 Units  0-3 Units Subcutaneous Nightly Alexandro MD Blane   Stopped at 07/04/18 0230    sodium chloride flush 0.9 % injection 10 mL  10 mL Intravenous 2 times per day Shannan Loza MD   10 mL at 07/04/18 0847    sodium chloride flush 0.9 % injection 10 mL  10 mL Intravenous PRN Shannan Loza MD        potassium chloride 20 mEq/50 mL IVPB (Central Line)  10 mEq Intravenous PRN Shannan Loaz MD        acetaminophen (TYLENOL) tablet 650 mg  650 mg Oral Q4H PRN Shannan Loza MD        oxyCODONE (ROXICODONE) immediate release tablet 5 mg  5 mg Oral Q4H PRN Shannan Loza MD        Or    oxyCODONE (ROXICODONE) immediate release tablet 10 mg  10 mg Oral Q4H PRN Shannan Loza MD   10 mg at 07/04/18 0565    morphine (PF) injection 2 mg  2 mg Intravenous Q2H PRN Royanne Banana, APRN   2 mg at 07/03/18 1516    Or    morphine (PF) injection 4 mg  4 mg Intravenous Q2H PRN Royanne Banana, APRN   4 mg at 07/04/18 0448    zolpidem (AMBIEN) tablet 5 mg  5 mg Oral Nightly PRN Shannan Loza MD        docusate sodium (COLACE) capsule 100 mg  100 mg Oral BID Shannan Loza MD   100 mg at 07/04/18 0942    magnesium hydroxide (MILK OF MAGNESIA) 400 MG/5ML suspension 30 mL  30 mL Oral Daily PRN Shannan Loza MD        ondansetron [facility]) injection 4 mg  4 mg Intravenous Q8H PRN Shannan Loza MD   4 mg at 07/02/18 2208    famotidine (PEPCID) tablet 20 mg  20 mg Oral BID Shannan Loza MD   20 mg at 07/04/18 3984    atorvastatin (LIPITOR) tablet 20 mg  20 mg Oral Nightly Shannan Loza MD   20 mg at 07/03/18 2103    aspirin EC tablet 81 mg  81 mg Oral Daily Shannan Loza MD   81 mg at 07/04/18 4102    clopidogrel (PLAVIX) tablet 75 mg  75 mg Oral Daily Shannan Loza MD   75 mg at 07/04/18 0852    ipratropium-albuterol (DUONEB) nebulizer solution 1 ampule  1 ampule Inhalation Q4H WA Shannan Loza MD   1 ampule at 07/04/18 0709    0.9 % sodium chloride bolus 500 mL Intravenous Continuous PRN Kun Alas MD   Stopped at 07/02/18 1920    glucose (GLUTOSE) 40 % oral gel 15 g  15 g Oral PRN Kun Alas MD        dextrose 50 % solution 12.5 g  12.5 g Intravenous PRN Kun Alas MD        glucagon (rDNA) injection 1 mg  1 mg Intramuscular PRN Kun Alas MD        dextrose 5 % solution  100 mL/hr Intravenous PRN Kun Alas MD        niCARdipine (CARDENE) 25 mg in dextrose 5 % 250 mL infusion  5 mg/hr Intravenous Continuous Kun Alas MD   Stopped at 07/02/18 1105    amitriptyline (ELAVIL) tablet 100 mg  100 mg Oral Nightly Sunday Braun, DO   100 mg at 07/03/18 2103    gabapentin (NEURONTIN) capsule 300 mg  300 mg Oral TID Nory Lapine, DO   300 mg at 07/04/18 9347    ketorolac (TORADOL) injection 30 mg  30 mg Intravenous Q6H PRN Kun Alas MD   30 mg at 07/04/18 6132     Allergies: Ciprofloxacin and Levofloxacin  Past Medical History:   Diagnosis Date    Arthritis     Bipolar disorder ([facility] [address]     CAD (coronary artery disease)     Cancer ([facility] [address]     Cervical CA    Depression     Diabetes mellitus ([facility] [address] 20 years    Dysplasia of cervix     Endometriosis     GERD (gastroesophageal reflux disease)     History of cone biopsy of cervix     Snapping hip syndrome      Past Surgical History:   Procedure Laterality Date    CHOLECYSTECTOMY      COLONOSCOPY      COLPOSCOPY  2000    ENDOSCOPY, COLON, DIAGNOSTIC      HYSTERECTOMY      Left ovary still there.     KNEE SURGERY Right     Lateral Release    KNEE SURGERY Right     Meniscus Repair    LAPAROSCOPY      NV CABG, ARTERY-VEIN, TWO N/A 7/2/2018    CORONARY ARTERY BYPASS GRAFT X2 WITH LEFT INTERNAL MAMMARY ARTERY WITH ENDOSCOPIC VEIN HARVESTING WITH PERFUSION TRANSESOPHAGEAL ECHOCARDIOGRAM performed by Kun Alas MD at [facility] OR     Family History   Problem Relation Age of Onset    Cancer Maternal Grandmother     Cancer Paternal Grandfather     Colon Polyps Mother    Cam Seng

## 2018-07-04 NOTE — PROGRESS NOTES
POST OP CARDIOTHORACIC SURGERY PROGRESS NOTE    Post op day 2    SUBJECTIVE:  Feels better    BP (!) 154/68   Pulse 111   Temp 98.2 °F (36.8 °C) (Temporal)   Resp 28   Ht 5' 4\" (1.626 m)   Wt 198 lb (89.8 kg)   LMP 2012   SpO2 90%   Breastfeeding? No   BMI 33.99 kg/m²   Average, Min, and Max for last 24 hours Vitals:  TEMPERATURE:  Temp  Av °F (36.7 °C)  Min: 97.6 °F (36.4 °C)  Max: 98.2 °F (36.8 °C)  RESPIRATIONS RANGE: Resp  Av.3  Min: 16  Max: 35  PULSE RANGE: Pulse  Av.6  Min: 97  Max: 113  BLOOD PRESSURE RANGE:  Systolic (71YHW), SGL:055 , Min:95 , XXS:981   ; Diastolic (55IGJ), IOF:74, Min:54, Max:81    PULSE OXIMETRY RANGE: SpO2  Av.2 %  Min: 90 %  Max: 99 %    I/O last 3 completed shifts: In: 3350 [P.O.:1400;  I.V.:1800; IV Piggyback:150]  Out: 3000 [Urine:3000]    CHEST: lungs clear    CARDIOVASCULAR: regular rhythm, no murmurs    INCISION: no drainage, skin edges intact        LABS:  CBC with Differential:    Lab Results   Component Value Date    WBC 11.9 2018    RBC 3.15 2018    HGB 9.7 2018    HCT 28.7 2018    HCT 44.8 2012     2018     2012    MCV 91.1 2018    MCH 30.8 2018    MCHC 33.8 2018    RDW 12.7 2018    LYMPHOPCT 35.0 2018    MONOPCT 4.0 2018    EOSPCT 2.3 2012    BASOPCT 0.0 2018    MONOSABS 0.50 2018    LYMPHSABS 4.4 2018    EOSABS 0.25 2018    BASOSABS 0.00 2018     Platelets:    Lab Results   Component Value Date     2018     2012     BMP:    Lab Results   Component Value Date     2018     2012    K 4.0 2018    K 3.8 2018    K 4.1 2012    CL 95 2018     2012    CO2 18 2018    BUN 8 2018    LABALBU 3.3 2018    LABALBU 4.7 2012    CREATININE 0.5 2018    CREATININE 0.8 2012    CALCIUM 8.1 2018    LABGLOM >60

## 2018-07-04 NOTE — PROGRESS NOTES
Output             2875 ml   Net              205 ml     General appearance: alert and cooperative with exam  HEENT: atraumatic, eyes with clear conjunctiva and normal lids, pupils and irises normal, external ears and nose are normal, lips normal.  Neck without masses, lympadenopathy, bruit, thyroid normal  Lungs: no increased work of breathing, diminished breath sounds base - left, rales base - left and \"clear to auscultation bilaterally\" without rales, rhonchi or wheezes  Heart: regular rate and rhythm, S1, S2 normal, no murmur, click, rub or gallop and tachycardic, S1 S2, no rubs appreciated  Abdomen: soft, non-tender; bowel sounds normal; no masses,  no organomegaly  Extremities: extremities normal, atraumatic, no cyanosis or edema  Neurologic: No focal neurologic deficits, normal sensation, alert and oriented, affect and mood appropriate. Skin: no rashes, nodules. Assessment and Plan:   Principal Problem:    Coronary artery disease involving native coronary artery of native heart without angina pectoris  - s/p CABG on 7/2  - ICU management, supportive care  - consider transfer out of ICU today  - continue pain management  - Blood glucose still uncontrolled. Will increase to 2 units per10g of carbs. Continue 45 units lantus daily. Active Problems:    Insomnia    Constipation    Gastroesophageal reflux disease without esophagitis    Type 1 diabetes mellitus with neurological manifestations ([address]  - Blood glucose still uncontrolled. Will increase to 2 units per10g of carbs. Continue 45 units lantus daily. Anxiety and depression    Unstable angina (HCC)    Abnormal nuclear stress test    Unstable angina pectoris (HCC)    Type 1 diabetes mellitus with complication (HCC)    Hyponatremia    Normocytic anemia  Resolved Problems:    * No resolved hospital problems. *    Increase insulin to 2 units per 10g of carbs. Continue lantus 45 units daily.   If remains elevated consistently, will increase basal dose

## 2018-07-05 ENCOUNTER — APPOINTMENT (OUTPATIENT)
Dept: GENERAL RADIOLOGY | Age: 43
DRG: 233 | End: 2018-07-05
Attending: INTERNAL MEDICINE
Payer: MEDICAID

## 2018-07-05 PROBLEM — R06.89 RESPIRATORY INSUFFICIENCY: Status: ACTIVE | Noted: 2018-07-05

## 2018-07-05 LAB
ANION GAP SERPL CALCULATED.3IONS-SCNC: 16 MMOL/L (ref 7–19)
BASE EXCESS ARTERIAL: -0.7 MMOL/L (ref -2–2)
BASE EXCESS ARTERIAL: -2.5 MMOL/L (ref -2–2)
BASE EXCESS ARTERIAL: -4.9 MMOL/L (ref -2–2)
BASE EXCESS ARTERIAL: -6.1 MMOL/L (ref -2–2)
BASE EXCESS ARTERIAL: -6.2 MMOL/L (ref -2–2)
BASE EXCESS ARTERIAL: -6.6 MMOL/L (ref -2–2)
BILIRUBIN URINE: NEGATIVE
BLOOD, URINE: NEGATIVE
BUN BLDV-MCNC: 9 MG/DL (ref 6–20)
CALCIUM SERPL-MCNC: 8.7 MG/DL (ref 8.6–10)
CARBOXYHEMOGLOBIN ARTERIAL: 1.9 % (ref 0–5)
CARBOXYHEMOGLOBIN ARTERIAL: 2.1 % (ref 0–5)
CARBOXYHEMOGLOBIN ARTERIAL: 2.2 % (ref 0–5)
CARBOXYHEMOGLOBIN ARTERIAL: 2.3 % (ref 0–5)
CARBOXYHEMOGLOBIN ARTERIAL: 2.4 % (ref 0–5)
CARBOXYHEMOGLOBIN ARTERIAL: 2.4 % (ref 0–5)
CHLORIDE BLD-SCNC: 96 MMOL/L (ref 98–111)
CLARITY: CLEAR
CO2: 16 MMOL/L (ref 22–29)
COLOR: ABNORMAL
CREAT SERPL-MCNC: 0.5 MG/DL (ref 0.5–0.9)
GFR NON-AFRICAN AMERICAN: >60
GLUCOSE BLD-MCNC: 209 MG/DL (ref 70–99)
GLUCOSE BLD-MCNC: 215 MG/DL (ref 74–109)
GLUCOSE BLD-MCNC: 240 MG/DL (ref 70–99)
GLUCOSE BLD-MCNC: 263 MG/DL (ref 70–99)
GLUCOSE BLD-MCNC: 268 MG/DL (ref 70–99)
GLUCOSE BLD-MCNC: 331 MG/DL (ref 70–99)
GLUCOSE URINE: 250 MG/DL
HCO3 ARTERIAL: 18.1 MMOL/L (ref 22–26)
HCO3 ARTERIAL: 18.5 MMOL/L (ref 22–26)
HCO3 ARTERIAL: 18.6 MMOL/L (ref 22–26)
HCO3 ARTERIAL: 19.4 MMOL/L (ref 22–26)
HCO3 ARTERIAL: 20.6 MMOL/L (ref 22–26)
HCO3 ARTERIAL: 22.3 MMOL/L (ref 22–26)
HCT VFR BLD CALC: 27.9 % (ref 37–47)
HEMOGLOBIN, ART, EXTENDED: 8.3 G/DL (ref 12–16)
HEMOGLOBIN, ART, EXTENDED: 8.4 G/DL (ref 12–16)
HEMOGLOBIN, ART, EXTENDED: 8.5 G/DL (ref 12–16)
HEMOGLOBIN, ART, EXTENDED: 8.6 G/DL (ref 12–16)
HEMOGLOBIN, ART, EXTENDED: 9.5 G/DL (ref 12–16)
HEMOGLOBIN, ART, EXTENDED: 9.8 G/DL (ref 12–16)
HEMOGLOBIN: 9.3 G/DL (ref 12–16)
KETONES, URINE: NEGATIVE MG/DL
LEUKOCYTE ESTERASE, URINE: NEGATIVE
MCH RBC QN AUTO: 30.3 PG (ref 27–31)
MCHC RBC AUTO-ENTMCNC: 33.3 G/DL (ref 33–37)
MCV RBC AUTO: 90.9 FL (ref 81–99)
METHEMOGLOBIN ARTERIAL: 1.1 %
METHEMOGLOBIN ARTERIAL: 1.2 %
METHEMOGLOBIN ARTERIAL: 1.2 %
METHEMOGLOBIN ARTERIAL: 1.3 %
METHEMOGLOBIN ARTERIAL: 1.4 %
METHEMOGLOBIN ARTERIAL: 1.4 %
NITRITE, URINE: NEGATIVE
O2 CONTENT ARTERIAL: 10.2 ML/DL
O2 CONTENT ARTERIAL: 10.4 ML/DL
O2 CONTENT ARTERIAL: 11.5 ML/DL
O2 CONTENT ARTERIAL: 11.8 ML/DL
O2 CONTENT ARTERIAL: 9.9 ML/DL
O2 CONTENT ARTERIAL: 9.9 ML/DL
O2 SAT, ARTERIAL: 81.6 %
O2 SAT, ARTERIAL: 85 %
O2 SAT, ARTERIAL: 85.3 %
O2 SAT, ARTERIAL: 86.1 %
O2 SAT, ARTERIAL: 86.5 %
O2 SAT, ARTERIAL: 86.6 %
O2 THERAPY: ABNORMAL
PCO2 ARTERIAL: 29 MMHG (ref 35–45)
PCO2 ARTERIAL: 30 MMHG (ref 35–45)
PCO2 ARTERIAL: 32 MMHG (ref 35–45)
PCO2 ARTERIAL: 33 MMHG (ref 35–45)
PDW BLD-RTO: 12.7 % (ref 11.5–14.5)
PERFORMED ON: ABNORMAL
PH ARTERIAL: 7.36 (ref 7.35–7.45)
PH ARTERIAL: 7.36 (ref 7.35–7.45)
PH ARTERIAL: 7.37 (ref 7.35–7.45)
PH ARTERIAL: 7.39 (ref 7.35–7.45)
PH ARTERIAL: 7.46 (ref 7.35–7.45)
PH ARTERIAL: 7.48 (ref 7.35–7.45)
PH UA: 5.5
PLATELET # BLD: 146 K/UL (ref 130–400)
PMV BLD AUTO: 11.4 FL (ref 9.4–12.3)
PO2 ARTERIAL: 36 MMHG (ref 80–100)
PO2 ARTERIAL: 42 MMHG (ref 80–100)
PO2 ARTERIAL: 44 MMHG (ref 80–100)
PO2 ARTERIAL: 45 MMHG (ref 80–100)
PO2 ARTERIAL: 46 MMHG (ref 80–100)
PO2 ARTERIAL: 46 MMHG (ref 80–100)
POTASSIUM SERPL-SCNC: 4.6 MMOL/L (ref 3.5–5)
POTASSIUM, WHOLE BLOOD: 3.6
POTASSIUM, WHOLE BLOOD: 4.1
POTASSIUM, WHOLE BLOOD: 4.1
POTASSIUM, WHOLE BLOOD: 4.2
POTASSIUM, WHOLE BLOOD: 4.2
POTASSIUM, WHOLE BLOOD: 4.6
PROTEIN UA: NEGATIVE MG/DL
RBC # BLD: 3.07 M/UL (ref 4.2–5.4)
SODIUM BLD-SCNC: 128 MMOL/L (ref 136–145)
SPECIFIC GRAVITY UA: 1.02
UROBILINOGEN, URINE: 1 E.U./DL
WBC # BLD: 11.3 K/UL (ref 4.8–10.8)

## 2018-07-05 PROCEDURE — 6370000000 HC RX 637 (ALT 250 FOR IP): Performed by: INTERNAL MEDICINE

## 2018-07-05 PROCEDURE — 94002 VENT MGMT INPAT INIT DAY: CPT

## 2018-07-05 PROCEDURE — 2580000003 HC RX 258: Performed by: HOSPITALIST

## 2018-07-05 PROCEDURE — G8979 MOBILITY GOAL STATUS: HCPCS

## 2018-07-05 PROCEDURE — 87205 SMEAR GRAM STAIN: CPT

## 2018-07-05 PROCEDURE — 2580000003 HC RX 258: Performed by: THORACIC SURGERY (CARDIOTHORACIC VASCULAR SURGERY)

## 2018-07-05 PROCEDURE — 71045 X-RAY EXAM CHEST 1 VIEW: CPT

## 2018-07-05 PROCEDURE — 80048 BASIC METABOLIC PNL TOTAL CA: CPT

## 2018-07-05 PROCEDURE — 6370000000 HC RX 637 (ALT 250 FOR IP): Performed by: NURSE PRACTITIONER

## 2018-07-05 PROCEDURE — 6370000000 HC RX 637 (ALT 250 FOR IP): Performed by: FAMILY MEDICINE

## 2018-07-05 PROCEDURE — 6360000002 HC RX W HCPCS: Performed by: NURSE PRACTITIONER

## 2018-07-05 PROCEDURE — G8978 MOBILITY CURRENT STATUS: HCPCS

## 2018-07-05 PROCEDURE — 94640 AIRWAY INHALATION TREATMENT: CPT

## 2018-07-05 PROCEDURE — 6360000002 HC RX W HCPCS: Performed by: HOSPITALIST

## 2018-07-05 PROCEDURE — 6370000000 HC RX 637 (ALT 250 FOR IP): Performed by: THORACIC SURGERY (CARDIOTHORACIC VASCULAR SURGERY)

## 2018-07-05 PROCEDURE — 2000000000 HC ICU R&B

## 2018-07-05 PROCEDURE — 31500 INSERT EMERGENCY AIRWAY: CPT

## 2018-07-05 PROCEDURE — 82803 BLOOD GASES ANY COMBINATION: CPT

## 2018-07-05 PROCEDURE — 2500000003 HC RX 250 WO HCPCS: Performed by: NURSE PRACTITIONER

## 2018-07-05 PROCEDURE — 97161 PT EVAL LOW COMPLEX 20 MIN: CPT

## 2018-07-05 PROCEDURE — 2500000003 HC RX 250 WO HCPCS: Performed by: THORACIC SURGERY (CARDIOTHORACIC VASCULAR SURGERY)

## 2018-07-05 PROCEDURE — 85027 COMPLETE CBC AUTOMATED: CPT

## 2018-07-05 PROCEDURE — 99233 SBSQ HOSP IP/OBS HIGH 50: CPT | Performed by: INTERNAL MEDICINE

## 2018-07-05 PROCEDURE — 99231 SBSQ HOSP IP/OBS SF/LOW 25: CPT | Performed by: INTERNAL MEDICINE

## 2018-07-05 PROCEDURE — 84132 ASSAY OF SERUM POTASSIUM: CPT

## 2018-07-05 PROCEDURE — 82948 REAGENT STRIP/BLOOD GLUCOSE: CPT

## 2018-07-05 PROCEDURE — 6360000002 HC RX W HCPCS

## 2018-07-05 PROCEDURE — 87070 CULTURE OTHR SPECIMN AEROBIC: CPT

## 2018-07-05 PROCEDURE — 36415 COLL VENOUS BLD VENIPUNCTURE: CPT

## 2018-07-05 PROCEDURE — 2500000003 HC RX 250 WO HCPCS: Performed by: HOSPITALIST

## 2018-07-05 PROCEDURE — 2700000000 HC OXYGEN THERAPY PER DAY

## 2018-07-05 PROCEDURE — 6360000002 HC RX W HCPCS: Performed by: INTERNAL MEDICINE

## 2018-07-05 PROCEDURE — 36600 WITHDRAWAL OF ARTERIAL BLOOD: CPT

## 2018-07-05 RX ORDER — CHLORHEXIDINE GLUCONATE 0.12 MG/ML
15 RINSE ORAL 2 TIMES DAILY
Status: DISCONTINUED | OUTPATIENT
Start: 2018-07-05 | End: 2018-07-06

## 2018-07-05 RX ORDER — PROPOFOL 10 MG/ML
INJECTION, EMULSION INTRAVENOUS
Status: COMPLETED
Start: 2018-07-05 | End: 2018-07-05

## 2018-07-05 RX ORDER — INSULIN GLARGINE 100 [IU]/ML
50 INJECTION, SOLUTION SUBCUTANEOUS DAILY
Status: DISCONTINUED | OUTPATIENT
Start: 2018-07-06 | End: 2018-07-06

## 2018-07-05 RX ORDER — BUMETANIDE 0.25 MG/ML
2 INJECTION, SOLUTION INTRAMUSCULAR; INTRAVENOUS ONCE
Status: COMPLETED | OUTPATIENT
Start: 2018-07-05 | End: 2018-07-05

## 2018-07-05 RX ORDER — LISINOPRIL 5 MG/1
5 TABLET ORAL DAILY
Status: DISCONTINUED | OUTPATIENT
Start: 2018-07-05 | End: 2018-07-13 | Stop reason: HOSPADM

## 2018-07-05 RX ORDER — INSULIN GLARGINE 100 [IU]/ML
5 INJECTION, SOLUTION SUBCUTANEOUS ONCE
Status: COMPLETED | OUTPATIENT
Start: 2018-07-05 | End: 2018-07-05

## 2018-07-05 RX ORDER — GUAIFENESIN 600 MG/1
600 TABLET, EXTENDED RELEASE ORAL 2 TIMES DAILY
Status: DISCONTINUED | OUTPATIENT
Start: 2018-07-05 | End: 2018-07-13 | Stop reason: HOSPADM

## 2018-07-05 RX ORDER — FUROSEMIDE 10 MG/ML
40 INJECTION INTRAMUSCULAR; INTRAVENOUS ONCE
Status: COMPLETED | OUTPATIENT
Start: 2018-07-05 | End: 2018-07-05

## 2018-07-05 RX ORDER — BUMETANIDE 0.25 MG/ML
2 INJECTION, SOLUTION INTRAMUSCULAR; INTRAVENOUS 2 TIMES DAILY
Status: DISCONTINUED | OUTPATIENT
Start: 2018-07-05 | End: 2018-07-06

## 2018-07-05 RX ADMIN — ALPRAZOLAM 1 MG: 1 TABLET ORAL at 05:47

## 2018-07-05 RX ADMIN — FAMOTIDINE 20 MG: 20 TABLET ORAL at 09:38

## 2018-07-05 RX ADMIN — VASOPRESSIN 0.04 UNITS/MIN: 20 INJECTION INTRAVENOUS at 22:45

## 2018-07-05 RX ADMIN — GABAPENTIN 300 MG: 300 CAPSULE ORAL at 09:37

## 2018-07-05 RX ADMIN — FUROSEMIDE 40 MG: 10 INJECTION, SOLUTION INTRAMUSCULAR; INTRAVENOUS at 07:58

## 2018-07-05 RX ADMIN — GUAIFENESIN 600 MG: 600 TABLET, EXTENDED RELEASE ORAL at 09:37

## 2018-07-05 RX ADMIN — VASOPRESSIN 0.04 UNITS/MIN: 20 INJECTION INTRAVENOUS at 23:32

## 2018-07-05 RX ADMIN — INSULIN LISPRO 6 UNITS: 100 INJECTION, SOLUTION INTRAVENOUS; SUBCUTANEOUS at 18:35

## 2018-07-05 RX ADMIN — IPRATROPIUM BROMIDE AND ALBUTEROL SULFATE 1 AMPULE: .5; 3 SOLUTION RESPIRATORY (INHALATION) at 14:47

## 2018-07-05 RX ADMIN — AMITRIPTYLINE HYDROCHLORIDE 100 MG: 50 TABLET, FILM COATED ORAL at 18:56

## 2018-07-05 RX ADMIN — VASOPRESSIN 0.04 UNITS/MIN: 20 INJECTION INTRAVENOUS at 23:25

## 2018-07-05 RX ADMIN — VASOPRESSIN 0.04 UNITS/MIN: 20 INJECTION INTRAVENOUS at 22:50

## 2018-07-05 RX ADMIN — VASOPRESSIN 0.04 UNITS/MIN: 20 INJECTION INTRAVENOUS at 22:30

## 2018-07-05 RX ADMIN — MIDAZOLAM HYDROCHLORIDE 5 MG/HR: 5 INJECTION, SOLUTION INTRAMUSCULAR; INTRAVENOUS at 23:18

## 2018-07-05 RX ADMIN — CALCIUM CARBONATE 1000 MG: 500 TABLET, CHEWABLE ORAL at 09:38

## 2018-07-05 RX ADMIN — OXYCODONE HYDROCHLORIDE 10 MG: 5 TABLET ORAL at 16:46

## 2018-07-05 RX ADMIN — DOCUSATE SODIUM 100 MG: 100 CAPSULE, LIQUID FILLED ORAL at 09:37

## 2018-07-05 RX ADMIN — IPRATROPIUM BROMIDE AND ALBUTEROL SULFATE 1 AMPULE: .5; 3 SOLUTION RESPIRATORY (INHALATION) at 07:01

## 2018-07-05 RX ADMIN — BUMETANIDE 2 MG: 0.25 INJECTION INTRAMUSCULAR; INTRAVENOUS at 21:06

## 2018-07-05 RX ADMIN — IPRATROPIUM BROMIDE AND ALBUTEROL SULFATE 1 AMPULE: .5; 3 SOLUTION RESPIRATORY (INHALATION) at 18:28

## 2018-07-05 RX ADMIN — OXYCODONE HYDROCHLORIDE 10 MG: 5 TABLET ORAL at 05:47

## 2018-07-05 RX ADMIN — LISINOPRIL 5 MG: 5 TABLET ORAL at 09:38

## 2018-07-05 RX ADMIN — INSULIN LISPRO 9 UNITS: 100 INJECTION, SOLUTION INTRAVENOUS; SUBCUTANEOUS at 13:18

## 2018-07-05 RX ADMIN — METOCLOPRAMIDE 10 MG: 5 INJECTION, SOLUTION INTRAMUSCULAR; INTRAVENOUS at 13:05

## 2018-07-05 RX ADMIN — METOPROLOL SUCCINATE 25 MG: 25 TABLET, EXTENDED RELEASE ORAL at 09:38

## 2018-07-05 RX ADMIN — VASOPRESSIN 0.04 UNITS/MIN: 20 INJECTION INTRAVENOUS at 23:48

## 2018-07-05 RX ADMIN — BUMETANIDE 2 MG: 0.25 INJECTION INTRAMUSCULAR; INTRAVENOUS at 23:24

## 2018-07-05 RX ADMIN — CLOPIDOGREL BISULFATE 75 MG: 75 TABLET ORAL at 09:37

## 2018-07-05 RX ADMIN — METOCLOPRAMIDE 10 MG: 5 INJECTION, SOLUTION INTRAMUSCULAR; INTRAVENOUS at 16:46

## 2018-07-05 RX ADMIN — VANCOMYCIN HYDROCHLORIDE 1250 MG: 10 INJECTION, POWDER, LYOPHILIZED, FOR SOLUTION INTRAVENOUS at 23:58

## 2018-07-05 RX ADMIN — ALPRAZOLAM 1 MG: 1 TABLET ORAL at 16:50

## 2018-07-05 RX ADMIN — PROPOFOL 1000 MG: 10 INJECTION, EMULSION INTRAVENOUS at 22:15

## 2018-07-05 RX ADMIN — METOCLOPRAMIDE 10 MG: 5 INJECTION, SOLUTION INTRAMUSCULAR; INTRAVENOUS at 07:58

## 2018-07-05 RX ADMIN — OXYCODONE HYDROCHLORIDE 10 MG: 5 TABLET ORAL at 01:24

## 2018-07-05 RX ADMIN — IPRATROPIUM BROMIDE AND ALBUTEROL SULFATE 1 AMPULE: .5; 3 SOLUTION RESPIRATORY (INHALATION) at 10:26

## 2018-07-05 RX ADMIN — ENOXAPARIN SODIUM 40 MG: 40 INJECTION SUBCUTANEOUS at 10:49

## 2018-07-05 RX ADMIN — INSULIN GLARGINE 45 UNITS: 100 INJECTION, SOLUTION SUBCUTANEOUS at 09:48

## 2018-07-05 RX ADMIN — ASPIRIN 81 MG: 81 TABLET, COATED ORAL at 09:38

## 2018-07-05 RX ADMIN — OXYCODONE HYDROCHLORIDE 10 MG: 5 TABLET ORAL at 10:49

## 2018-07-05 RX ADMIN — FENTANYL CITRATE 125 MCG/HR: 50 INJECTION, SOLUTION INTRAMUSCULAR; INTRAVENOUS at 23:19

## 2018-07-05 RX ADMIN — INSULIN LISPRO 12 UNITS: 100 INJECTION, SOLUTION INTRAVENOUS; SUBCUTANEOUS at 09:47

## 2018-07-05 RX ADMIN — Medication 1 G: at 23:24

## 2018-07-05 RX ADMIN — Medication 10 ML: at 07:59

## 2018-07-05 RX ADMIN — VASOPRESSIN 0.04 UNITS/MIN: 20 INJECTION INTRAVENOUS at 23:00

## 2018-07-05 RX ADMIN — INSULIN GLARGINE 5 UNITS: 100 INJECTION, SOLUTION SUBCUTANEOUS at 10:50

## 2018-07-05 RX ADMIN — GABAPENTIN 300 MG: 300 CAPSULE ORAL at 13:05

## 2018-07-05 ASSESSMENT — PAIN SCALES - GENERAL
PAINLEVEL_OUTOF10: 0
PAINLEVEL_OUTOF10: 0
PAINLEVEL_OUTOF10: 10
PAINLEVEL_OUTOF10: 10
PAINLEVEL_OUTOF10: 7
PAINLEVEL_OUTOF10: 10
PAINLEVEL_OUTOF10: 7
PAINLEVEL_OUTOF10: 0

## 2018-07-05 ASSESSMENT — PAIN DESCRIPTION - LOCATION: LOCATION: CHEST

## 2018-07-05 ASSESSMENT — PAIN DESCRIPTION - PAIN TYPE: TYPE: SURGICAL PAIN

## 2018-07-05 ASSESSMENT — PULMONARY FUNCTION TESTS: PIF_VALUE: 30.8

## 2018-07-05 NOTE — PROGRESS NOTES
18   0430  18   0431   WBC  11.9*  11.3*   HGB  9.7*  9.3*   HCT  28.7*  27.9*   PLT  130  146     BMP: Recent Labs      18   0430  18   0159  18   0729   NA  129*  128*   --    K  4.0  4.6  4.1   CO2  18*  16*   --    BUN  8  9   --    CREATININE  0.5  0.5   --    LABGLOM  >60  >60   --    GLUCOSE  204*  215*   --      BNP: No results for input(s): BNP in the last 72 hours. PT/INR: No results for input(s): PROTIME, INR in the last 72 hours. APTT:No results for input(s): APTT in the last 72 hours. CARDIAC ENZYMES:No results for input(s): CKTOTAL, CKMB, CKMBINDEX, TROPONINI in the last 72 hours. FASTING LIPID PANEL:  Lab Results   Component Value Date    HDL 41 2018    LDLCALC 119 2018    TRIG 96 2018     LIVER PROFILE:No results for input(s): AST, ALT, LABALBU in the last 72 hours. NURSE:  Irwin Odom, RN     Jenn All : 1975, Female, 40 y.o. History:  Status post CABG    Nursing note and diagnostics above reviewed and evaluated    [Allergies/Contraindications:  Ciprofloxacin and Levofloxacin]     Todays status: dyspneic and edematous. Physical Examination    Respiratory -  Basilar rales. Cardiovascular   Regular rhythm with distant heart tones  Abdominal -  Soft. Bowel sounds present. Nontender. Extremities -  Pulses intact with 2+ pretibial edema. Assessment/Plan     diuresis    Discussed with patient and nursing      CReynaldo Rosado MD  [facility]

## 2018-07-05 NOTE — PROGRESS NOTES
Hospitalist Progress Note  7/5/2018 10:08 AM  Subjective:   Admit Date: 6/26/2018  PCP: Sherry Murray    Chief Complaint: diabetes mellitus    Subjective: patient reports some SOB. Still c/o CP and back pain. Patient has become hypoxic and increased O2 supplementation now on ventimask. Sugars remain uncontrolled. No other acute events per nursing. Cumulative Hospital History:   6-26: Admitted with chest discomfort for cardiac catheterization. Showed severe multivessel disease. 6-27: Cardiothoracic surgery consulted. 6-28: CTS plans CABG on 7-2. Hospitalist service consulted for medical management. 6-29: Insomnia, Ambien ordered strictly PRN only. Patient cheating on diet, will not adjust Lantus dose as she states she is willing to comply with diet now. 6-30: Lantus adjusted due to hyperglycemia yesterday, will revert changes due to dietary noncompliance and risk of hypoglycemia. 7-1: Constipated, refuses change in pharmacotherapy. 7-2: CABG, postoperative pain. 7-3: Poor pain control. Increase Xanax frequency. Change Lantus and bolus insulin regimen. 7-4: Patient clinically improved. Glucose with better control. Sodium trending down. D/c IVF. Urine studies sent. Likely transfer out of ICU today. 7-5: Patient requiring increased O2. Receiving diuresis per CTS. CXR with effusions/opacities ? ARDS. Sugars uncontrolled    ROS: 14 point review of systems is negative except as specifically addressed above. DIET GENERAL; Carb Control: 4 carb choices (60 gms)/meal; Low Sodium (2 GM);  Daily Fluid Restriction: 2000 ml; Low Fiber    Intake/Output Summary (Last 24 hours) at 07/05/18 1008  Last data filed at 07/05/18 0929   Gross per 24 hour   Intake             1240 ml   Output             3600 ml   Net            -2360 ml     Medications:   dextrose      dextrose       Current Facility-Administered Medications   Medication Dose Route Frequency Provider Last Rate Last Dose    guaiFENesin (Sandy 598) extended release tablet 600 mg  600 mg Oral BID Andrew Wick, APRN   600 mg at 07/05/18 5364    lisinopril (PRINIVIL;ZESTRIL) tablet 5 mg  5 mg Oral Daily Andrew Wick, APRN   5 mg at 07/05/18 1609    insulin lispro (HUMALOG) injection vial 0-18 Units  0-18 Units Subcutaneous TID WC Andrew Wick, APRN   12 Units at 07/05/18 5600    insulin lispro (HUMALOG) injection vial 0-9 Units  0-9 Units Subcutaneous Nightly Andrew Wick, APRN        bumetanide (BUMEX) injection 2 mg  2 mg Intravenous BID Andrew Wick, APRN        insulin glargine (LANTUS) injection vial 5 Units  5 Units Subcutaneous Once Leno Ribeiro MD        [START ON 7/6/2018] insulin glargine (LANTUS) injection vial 50 Units  50 Units Subcutaneous Daily Leno Ribeiro MD        enoxaparin (LOVENOX) injection 40 mg  40 mg Subcutaneous Daily Leno Ribeiro MD        ALPRAZolam Royetta Field) tablet 1 mg  1 mg Oral 4x Daily PRN Mahsa Garcia MD   1 mg at 07/05/18 0547    atorvastatin (LIPITOR) tablet 40 mg  40 mg Oral Nightly Sam Frey MD   40 mg at 07/04/18 2007    metoprolol succinate (TOPROL XL) extended release tablet 25 mg  25 mg Oral Daily Sam Frey MD   25 mg at 07/05/18 4343    polyethylene glycol (GLYCOLAX) packet 17 g  17 g Oral BID Andrew Wick, APRN   17 g at 07/04/18 2006    metoclopramide (REGLAN) injection 10 mg  10 mg Intravenous 4x Daily AC & HS Andrew Wick, APRN   10 mg at 07/05/18 0758    calcium carbonate (TUMS) chewable tablet 1,000 mg  1,000 mg Oral BID Jamarcus Kennedy DO   1,000 mg at 07/05/18 1471    glucose (GLUTOSE) 40 % oral gel 15 g  15 g Oral PRN Shorty Aponte MD        dextrose 50 % solution 12.5 g  12.5 g Intravenous PRN Mahsa Garcia MD        glucagon (rDNA) injection 1 mg  1 mg Intramuscular PRN Shorty Aponte MD        dextrose 5 % solution  100 mL/hr Intravenous PRN Shorty Aponte MD        sodium chloride flush 0.9 % injection 10 mL  10 mL Intravenous 2 times to monitor      Insomnia    Constipation    Gastroesophageal reflux disease without esophagitis    Anxiety and depression    Unstable angina (HCC)    Abnormal nuclear stress test    Unstable angina pectoris ([address]    Type 1 diabetes mellitus with complication ([address]        Advance Directive: Full Code    DVT prophylaxis: lovenox    Discharge planning:  To home      Eugenio No MD  Rounding Hospitalist

## 2018-07-05 NOTE — PROGRESS NOTES
POST OP CARDIOTHORACIC SURGERY PROGRESS NOTE    Post op day 3    SUBJECTIVE:  Sitting up in bed. Mother at bedside. In fair pain control. /76   Pulse 116   Temp 98.7 °F (37.1 °C) (Temporal)   Resp 18   Ht 5' 4\" (1.626 m)   Wt 200 lb (90.7 kg)   LMP 2012   SpO2 91%   Breastfeeding? No   BMI 34.33 kg/m²   Average, Min, and Max for last 24 hours Vitals:  TEMPERATURE:  Temp  Av.8 °F (36.6 °C)  Min: 96.8 °F (36 °C)  Max: 99 °F (37.2 °C)  RESPIRATIONS RANGE: Resp  Av  Min: 14  Max: 25  PULSE RANGE: Pulse  Av.3  Min: 94  Max: 116  BLOOD PRESSURE RANGE:  Systolic (82CEV), XNO:740 , Min:83 , PVN:479   ; Diastolic (58UDC), ZEX:94, Min:54, Max:83    PULSE OXIMETRY RANGE: SpO2  Av.9 %  Min: 89 %  Max: 97 %    I/O last 3 completed shifts: In: 8891 [P.O.:1220]  Out: 2700 [Urine:2700]    CHEST: Clear, but diminished bilateral breath sounds without wheezes or rhonchi. CARDIOVASCULAR: Normal HT with RRR. No murmurs, gallops or rubs. INCISION:  Sternal incision with Mepilex dressing dry and intact. Right EVH incision with Mepilex dressing dry and intact. LABS:  CBC:   Lab Results   Component Value Date    WBC 11.3 2018    RBC 3.07 2018    HGB 9.3 2018    HCT 27.9 2018    MCV 90.9 2018    MCH 30.3 2018    MCHC 33.3 2018    RDW 12.7 2018     2018    MPV 11.4 2018     BMP:    Lab Results   Component Value Date     2018    K 4.6 2018    CL 96 2018    CO2 16 2018    BUN 9 2018    CREATININE 0.5 2018    CALCIUM 8.7 2018    LABGLOM >60 2018    GLUCOSE 215 2018     CXR: Normal postoperative changes with increased vascular congestion, small left pleural effusion. ? ARDS pattern. ASSESSMENT:     1. Severe Multivessel Coronary Artery Disease - S/P 2V PACABG on 2018  2. Type 1 Juvenile DM  3.          PMHx Chronic

## 2018-07-05 NOTE — PROGRESS NOTES
Complexity  REQUIRES PT FOLLOW UP: Yes  Activity Tolerance  Activity Tolerance: Patient limited by endurance         Plan   Plan  Times per week: 7  Times per day: Daily  Plan weeks: 2  Current Treatment Recommendations: Strengthening, Gait Training    G-Code  PT G-Codes  Functional Assessment Tool Used: Ambulate 150 feet with 2 turns  Score: SBA  Functional Limitation: Mobility: Walking and moving around  Mobility: Walking and Moving Around Current Status (): At least 1 percent but less than 20 percent impaired, limited or restricted  Mobility: Walking and Moving Around Goal Status ():  At least 1 percent but less than 20 percent impaired, limited or restricted  OutComes Score                                           AM-PAC Score             Goals  Short term goals  Time Frame for Short term goals: 2 weeks  Short term goal 1: Ambulate 400 feet independently       Therapy Time   Individual Concurrent Group Co-treatment   Time In           Time Out           Minutes                   Marlen Rosas PT       Electronically signed by Marlen Rosas PT on 7/5/2018 at 11:21 AM

## 2018-07-06 ENCOUNTER — APPOINTMENT (OUTPATIENT)
Dept: GENERAL RADIOLOGY | Age: 43
DRG: 233 | End: 2018-07-06
Attending: INTERNAL MEDICINE
Payer: MEDICAID

## 2018-07-06 PROBLEM — J80 ARDS (ADULT RESPIRATORY DISTRESS SYNDROME) (HCC): Status: ACTIVE | Noted: 2018-07-06

## 2018-07-06 PROBLEM — J18.9 PNEUMONIA: Status: ACTIVE | Noted: 2018-07-06

## 2018-07-06 PROBLEM — E87.29 INCREASED ANION GAP METABOLIC ACIDOSIS: Status: ACTIVE | Noted: 2018-07-06

## 2018-07-06 LAB
ALBUMIN SERPL-MCNC: 2.5 G/DL (ref 3.5–5.2)
ALP BLD-CCNC: 170 U/L (ref 35–104)
ALT SERPL-CCNC: 19 U/L (ref 5–33)
ANION GAP SERPL CALCULATED.3IONS-SCNC: 14 MMOL/L (ref 7–19)
ANION GAP SERPL CALCULATED.3IONS-SCNC: 15 MMOL/L (ref 7–19)
ANION GAP SERPL CALCULATED.3IONS-SCNC: 16 MMOL/L (ref 7–19)
ANION GAP SERPL CALCULATED.3IONS-SCNC: 16 MMOL/L (ref 7–19)
ANION GAP SERPL CALCULATED.3IONS-SCNC: 17 MMOL/L (ref 7–19)
ANION GAP SERPL CALCULATED.3IONS-SCNC: 17 MMOL/L (ref 7–19)
AST SERPL-CCNC: 27 U/L (ref 5–32)
BASE EXCESS ARTERIAL: -1.5 MMOL/L (ref -2–2)
BASE EXCESS ARTERIAL: -4.1 MMOL/L (ref -2–2)
BASE EXCESS ARTERIAL: -4.4 MMOL/L (ref -2–2)
BASE EXCESS ARTERIAL: 0.3 MMOL/L (ref -2–2)
BASE EXCESS ARTERIAL: 3.4 MMOL/L (ref -2–2)
BILIRUB SERPL-MCNC: 1 MG/DL (ref 0.2–1.2)
BUN BLDV-MCNC: 10 MG/DL (ref 6–20)
BUN BLDV-MCNC: 10 MG/DL (ref 6–20)
BUN BLDV-MCNC: 11 MG/DL (ref 6–20)
BUN BLDV-MCNC: 12 MG/DL (ref 6–20)
BUN BLDV-MCNC: 12 MG/DL (ref 6–20)
BUN BLDV-MCNC: 13 MG/DL (ref 6–20)
CALCIUM SERPL-MCNC: 7.4 MG/DL (ref 8.6–10)
CALCIUM SERPL-MCNC: 7.7 MG/DL (ref 8.6–10)
CALCIUM SERPL-MCNC: 7.9 MG/DL (ref 8.6–10)
CALCIUM SERPL-MCNC: 7.9 MG/DL (ref 8.6–10)
CALCIUM SERPL-MCNC: 8 MG/DL (ref 8.6–10)
CALCIUM SERPL-MCNC: 8 MG/DL (ref 8.6–10)
CARBOXYHEMOGLOBIN ARTERIAL: 1.7 % (ref 0–5)
CARBOXYHEMOGLOBIN ARTERIAL: 2 % (ref 0–5)
CARBOXYHEMOGLOBIN ARTERIAL: 2.2 % (ref 0–5)
CHLORIDE BLD-SCNC: 89 MMOL/L (ref 98–111)
CHLORIDE BLD-SCNC: 91 MMOL/L (ref 98–111)
CHLORIDE BLD-SCNC: 92 MMOL/L (ref 98–111)
CHLORIDE BLD-SCNC: 93 MMOL/L (ref 98–111)
CO2: 17 MMOL/L (ref 22–29)
CO2: 20 MMOL/L (ref 22–29)
CO2: 21 MMOL/L (ref 22–29)
CO2: 22 MMOL/L (ref 22–29)
CO2: 24 MMOL/L (ref 22–29)
CO2: 25 MMOL/L (ref 22–29)
CREAT SERPL-MCNC: 0.6 MG/DL (ref 0.5–0.9)
CREAT SERPL-MCNC: 0.8 MG/DL (ref 0.5–0.9)
CREAT SERPL-MCNC: 0.8 MG/DL (ref 0.5–0.9)
GFR NON-AFRICAN AMERICAN: >60
GLUCOSE BLD-MCNC: 101 MG/DL (ref 70–99)
GLUCOSE BLD-MCNC: 101 MG/DL (ref 70–99)
GLUCOSE BLD-MCNC: 125 MG/DL (ref 70–99)
GLUCOSE BLD-MCNC: 127 MG/DL (ref 74–109)
GLUCOSE BLD-MCNC: 140 MG/DL (ref 70–99)
GLUCOSE BLD-MCNC: 150 MG/DL (ref 74–109)
GLUCOSE BLD-MCNC: 154 MG/DL (ref 70–99)
GLUCOSE BLD-MCNC: 157 MG/DL (ref 70–99)
GLUCOSE BLD-MCNC: 159 MG/DL (ref 70–99)
GLUCOSE BLD-MCNC: 170 MG/DL (ref 70–99)
GLUCOSE BLD-MCNC: 175 MG/DL (ref 70–99)
GLUCOSE BLD-MCNC: 179 MG/DL (ref 70–99)
GLUCOSE BLD-MCNC: 189 MG/DL (ref 74–109)
GLUCOSE BLD-MCNC: 248 MG/DL (ref 70–99)
GLUCOSE BLD-MCNC: 251 MG/DL (ref 70–99)
GLUCOSE BLD-MCNC: 257 MG/DL (ref 70–99)
GLUCOSE BLD-MCNC: 305 MG/DL (ref 74–109)
GLUCOSE BLD-MCNC: 363 MG/DL (ref 70–99)
GLUCOSE BLD-MCNC: 368 MG/DL (ref 70–99)
GLUCOSE BLD-MCNC: 412 MG/DL (ref 74–109)
GLUCOSE BLD-MCNC: 92 MG/DL (ref 70–99)
GLUCOSE BLD-MCNC: 99 MG/DL (ref 74–109)
HCO3 ARTERIAL: 18.3 MMOL/L (ref 22–26)
HCO3 ARTERIAL: 18.8 MMOL/L (ref 22–26)
HCO3 ARTERIAL: 19.7 MMOL/L (ref 22–26)
HCO3 ARTERIAL: 22.2 MMOL/L (ref 22–26)
HCO3 ARTERIAL: 26.1 MMOL/L (ref 22–26)
HCT VFR BLD CALC: 24.3 % (ref 37–47)
HEMOGLOBIN, ART, EXTENDED: 10.1 G/DL (ref 12–16)
HEMOGLOBIN, ART, EXTENDED: 8.7 G/DL (ref 12–16)
HEMOGLOBIN, ART, EXTENDED: 8.7 G/DL (ref 12–16)
HEMOGLOBIN, ART, EXTENDED: 9.4 G/DL (ref 12–16)
HEMOGLOBIN, ART, EXTENDED: 9.4 G/DL (ref 12–16)
HEMOGLOBIN: 7.9 G/DL (ref 12–16)
INR BLD: 1.3 (ref 0.88–1.18)
MAGNESIUM: 1.5 MG/DL (ref 1.6–2.6)
MAGNESIUM: 1.6 MG/DL (ref 1.6–2.6)
MAGNESIUM: 1.7 MG/DL (ref 1.6–2.6)
MAGNESIUM: 2 MG/DL (ref 1.6–2.6)
MAGNESIUM: 2.1 MG/DL (ref 1.6–2.6)
MCH RBC QN AUTO: 29.6 PG (ref 27–31)
MCHC RBC AUTO-ENTMCNC: 32.5 G/DL (ref 33–37)
MCV RBC AUTO: 91 FL (ref 81–99)
METHEMOGLOBIN ARTERIAL: 1.1 %
METHEMOGLOBIN ARTERIAL: 1.1 %
METHEMOGLOBIN ARTERIAL: 1.2 %
METHEMOGLOBIN ARTERIAL: 1.3 %
METHEMOGLOBIN ARTERIAL: 1.4 %
O2 CONTENT ARTERIAL: 12.1 ML/DL
O2 CONTENT ARTERIAL: 12.1 ML/DL
O2 CONTENT ARTERIAL: 12.9 ML/DL
O2 CONTENT ARTERIAL: 13.2 ML/DL
O2 CONTENT ARTERIAL: 13.7 ML/DL
O2 SAT, ARTERIAL: 95.9 %
O2 SAT, ARTERIAL: 96.1 %
O2 SAT, ARTERIAL: 96.8 %
O2 SAT, ARTERIAL: 96.9 %
O2 SAT, ARTERIAL: 97 %
O2 THERAPY: ABNORMAL
PCO2 ARTERIAL: 22 MMHG (ref 35–45)
PCO2 ARTERIAL: 24 MMHG (ref 35–45)
PCO2 ARTERIAL: 26 MMHG (ref 35–45)
PCO2 ARTERIAL: 27 MMHG (ref 35–45)
PCO2 ARTERIAL: 32 MMHG (ref 35–45)
PDW BLD-RTO: 12.4 % (ref 11.5–14.5)
PERFORMED ON: ABNORMAL
PERFORMED ON: NORMAL
PH ARTERIAL: 7.45 (ref 7.35–7.45)
PH ARTERIAL: 7.49 (ref 7.35–7.45)
PH ARTERIAL: 7.52 (ref 7.35–7.45)
PH ARTERIAL: 7.54 (ref 7.35–7.45)
PH ARTERIAL: 7.56 (ref 7.35–7.45)
PHOSPHORUS: 3.3 MG/DL (ref 2.5–4.5)
PHOSPHORUS: 3.4 MG/DL (ref 2.5–4.5)
PHOSPHORUS: 3.4 MG/DL (ref 2.5–4.5)
PHOSPHORUS: 3.5 MG/DL (ref 2.5–4.5)
PHOSPHORUS: 3.7 MG/DL (ref 2.5–4.5)
PLATELET # BLD: 166 K/UL (ref 130–400)
PMV BLD AUTO: 11.2 FL (ref 9.4–12.3)
PO2 ARTERIAL: 131 MMHG (ref 80–100)
PO2 ARTERIAL: 139 MMHG (ref 80–100)
PO2 ARTERIAL: 168 MMHG (ref 80–100)
PO2 ARTERIAL: 90 MMHG (ref 80–100)
PO2 ARTERIAL: 96 MMHG (ref 80–100)
POTASSIUM REFLEX MAGNESIUM: 4.1 MMOL/L (ref 3.5–5)
POTASSIUM SERPL-SCNC: 2.8 MMOL/L (ref 3.5–5)
POTASSIUM SERPL-SCNC: 3 MMOL/L (ref 3.5–5)
POTASSIUM SERPL-SCNC: 3.1 MMOL/L (ref 3.5–5)
POTASSIUM SERPL-SCNC: 3.1 MMOL/L (ref 3.5–5)
POTASSIUM SERPL-SCNC: 3.5 MMOL/L (ref 3.5–5)
POTASSIUM, WHOLE BLOOD: 2.7
POTASSIUM, WHOLE BLOOD: 3.1
POTASSIUM, WHOLE BLOOD: 3.7
POTASSIUM, WHOLE BLOOD: 3.8
POTASSIUM, WHOLE BLOOD: 4
PROTHROMBIN TIME: 16.1 SEC (ref 12–14.6)
RBC # BLD: 2.67 M/UL (ref 4.2–5.4)
SODIUM BLD-SCNC: 123 MMOL/L (ref 136–145)
SODIUM BLD-SCNC: 128 MMOL/L (ref 136–145)
SODIUM BLD-SCNC: 130 MMOL/L (ref 136–145)
SODIUM BLD-SCNC: 130 MMOL/L (ref 136–145)
SODIUM BLD-SCNC: 131 MMOL/L (ref 136–145)
SODIUM BLD-SCNC: 131 MMOL/L (ref 136–145)
TOTAL PROTEIN: 4.8 G/DL (ref 6.6–8.7)
WBC # BLD: 15 K/UL (ref 4.8–10.8)

## 2018-07-06 PROCEDURE — 2580000003 HC RX 258: Performed by: THORACIC SURGERY (CARDIOTHORACIC VASCULAR SURGERY)

## 2018-07-06 PROCEDURE — 36620 INSERTION CATHETER ARTERY: CPT

## 2018-07-06 PROCEDURE — 2500000003 HC RX 250 WO HCPCS: Performed by: HOSPITALIST

## 2018-07-06 PROCEDURE — 36415 COLL VENOUS BLD VENIPUNCTURE: CPT

## 2018-07-06 PROCEDURE — 84132 ASSAY OF SERUM POTASSIUM: CPT

## 2018-07-06 PROCEDURE — 99232 SBSQ HOSP IP/OBS MODERATE 35: CPT | Performed by: INTERNAL MEDICINE

## 2018-07-06 PROCEDURE — 36592 COLLECT BLOOD FROM PICC: CPT

## 2018-07-06 PROCEDURE — 94644 CONT INHLJ TX 1ST HOUR: CPT

## 2018-07-06 PROCEDURE — 84100 ASSAY OF PHOSPHORUS: CPT

## 2018-07-06 PROCEDURE — 2580000003 HC RX 258: Performed by: HOSPITALIST

## 2018-07-06 PROCEDURE — 2500000003 HC RX 250 WO HCPCS: Performed by: INTERNAL MEDICINE

## 2018-07-06 PROCEDURE — 87086 URINE CULTURE/COLONY COUNT: CPT

## 2018-07-06 PROCEDURE — 81003 URINALYSIS AUTO W/O SCOPE: CPT

## 2018-07-06 PROCEDURE — 85610 PROTHROMBIN TIME: CPT

## 2018-07-06 PROCEDURE — 2500000003 HC RX 250 WO HCPCS: Performed by: THORACIC SURGERY (CARDIOTHORACIC VASCULAR SURGERY)

## 2018-07-06 PROCEDURE — 71045 X-RAY EXAM CHEST 1 VIEW: CPT

## 2018-07-06 PROCEDURE — 94640 AIRWAY INHALATION TREATMENT: CPT

## 2018-07-06 PROCEDURE — 87040 BLOOD CULTURE FOR BACTERIA: CPT

## 2018-07-06 PROCEDURE — 99291 CRITICAL CARE FIRST HOUR: CPT | Performed by: INTERNAL MEDICINE

## 2018-07-06 PROCEDURE — 36556 INSERT NON-TUNNEL CV CATH: CPT

## 2018-07-06 PROCEDURE — 2580000003 HC RX 258: Performed by: INTERNAL MEDICINE

## 2018-07-06 PROCEDURE — 2000000000 HC ICU R&B

## 2018-07-06 PROCEDURE — 6370000000 HC RX 637 (ALT 250 FOR IP): Performed by: HOSPITALIST

## 2018-07-06 PROCEDURE — 6360000002 HC RX W HCPCS: Performed by: INTERNAL MEDICINE

## 2018-07-06 PROCEDURE — 82803 BLOOD GASES ANY COMBINATION: CPT

## 2018-07-06 PROCEDURE — 6360000002 HC RX W HCPCS: Performed by: THORACIC SURGERY (CARDIOTHORACIC VASCULAR SURGERY)

## 2018-07-06 PROCEDURE — 83735 ASSAY OF MAGNESIUM: CPT

## 2018-07-06 PROCEDURE — 80053 COMPREHEN METABOLIC PANEL: CPT

## 2018-07-06 PROCEDURE — 82948 REAGENT STRIP/BLOOD GLUCOSE: CPT

## 2018-07-06 PROCEDURE — 6370000000 HC RX 637 (ALT 250 FOR IP): Performed by: INTERNAL MEDICINE

## 2018-07-06 PROCEDURE — 94645 CONT INHLJ TX EACH ADDL HOUR: CPT

## 2018-07-06 PROCEDURE — 6360000002 HC RX W HCPCS: Performed by: HOSPITALIST

## 2018-07-06 PROCEDURE — 51702 INSERT TEMP BLADDER CATH: CPT

## 2018-07-06 PROCEDURE — 94002 VENT MGMT INPAT INIT DAY: CPT

## 2018-07-06 PROCEDURE — 6360000002 HC RX W HCPCS: Performed by: NURSE PRACTITIONER

## 2018-07-06 PROCEDURE — 36600 WITHDRAWAL OF ARTERIAL BLOOD: CPT

## 2018-07-06 PROCEDURE — 36569 INSJ PICC 5 YR+ W/O IMAGING: CPT | Performed by: HOSPITALIST

## 2018-07-06 PROCEDURE — 2700000000 HC OXYGEN THERAPY PER DAY

## 2018-07-06 PROCEDURE — 85027 COMPLETE CBC AUTOMATED: CPT

## 2018-07-06 RX ORDER — BUMETANIDE 0.25 MG/ML
0.5 INJECTION, SOLUTION INTRAMUSCULAR; INTRAVENOUS CONTINUOUS
Status: DISCONTINUED | OUTPATIENT
Start: 2018-07-06 | End: 2018-07-06

## 2018-07-06 RX ORDER — DEXTROSE AND SODIUM CHLORIDE 5; .45 G/100ML; G/100ML
INJECTION, SOLUTION INTRAVENOUS CONTINUOUS PRN
Status: DISCONTINUED | OUTPATIENT
Start: 2018-07-06 | End: 2018-07-13 | Stop reason: HOSPADM

## 2018-07-06 RX ORDER — DEXTROSE MONOHYDRATE 25 G/50ML
12.5 INJECTION, SOLUTION INTRAVENOUS PRN
Status: DISCONTINUED | OUTPATIENT
Start: 2018-07-06 | End: 2018-07-13 | Stop reason: HOSPADM

## 2018-07-06 RX ORDER — SODIUM CHLORIDE 9 MG/ML
INJECTION, SOLUTION INTRAVENOUS CONTINUOUS
Status: DISCONTINUED | OUTPATIENT
Start: 2018-07-06 | End: 2018-07-06

## 2018-07-06 RX ORDER — ACETAMINOPHEN 650 MG/1
650 SUPPOSITORY RECTAL ONCE
Status: COMPLETED | OUTPATIENT
Start: 2018-07-06 | End: 2018-07-06

## 2018-07-06 RX ORDER — MAGNESIUM SULFATE 1 G/100ML
1 INJECTION INTRAVENOUS PRN
Status: DISCONTINUED | OUTPATIENT
Start: 2018-07-06 | End: 2018-07-10

## 2018-07-06 RX ORDER — PROPOFOL 10 MG/ML
10 INJECTION, EMULSION INTRAVENOUS
Status: DISCONTINUED | OUTPATIENT
Start: 2018-07-06 | End: 2018-07-10

## 2018-07-06 RX ORDER — MAGNESIUM SULFATE IN WATER 40 MG/ML
2 INJECTION, SOLUTION INTRAVENOUS ONCE
Status: COMPLETED | OUTPATIENT
Start: 2018-07-06 | End: 2018-07-06

## 2018-07-06 RX ORDER — 0.9 % SODIUM CHLORIDE 0.9 %
15 INTRAVENOUS SOLUTION INTRAVENOUS ONCE
Status: DISCONTINUED | OUTPATIENT
Start: 2018-07-06 | End: 2018-07-10

## 2018-07-06 RX ORDER — CHLORHEXIDINE GLUCONATE 0.12 MG/ML
15 RINSE ORAL 2 TIMES DAILY
Status: DISCONTINUED | OUTPATIENT
Start: 2018-07-06 | End: 2018-07-10

## 2018-07-06 RX ORDER — POLYVINYL ALCOHOL 14 MG/ML
1 SOLUTION/ DROPS OPHTHALMIC EVERY 4 HOURS
Status: DISCONTINUED | OUTPATIENT
Start: 2018-07-06 | End: 2018-07-06

## 2018-07-06 RX ORDER — LORAZEPAM 2 MG/ML
2 INJECTION INTRAMUSCULAR
Status: DISCONTINUED | OUTPATIENT
Start: 2018-07-06 | End: 2018-07-10

## 2018-07-06 RX ORDER — IPRATROPIUM BROMIDE AND ALBUTEROL SULFATE 2.5; .5 MG/3ML; MG/3ML
1 SOLUTION RESPIRATORY (INHALATION) 4 TIMES DAILY
Status: DISCONTINUED | OUTPATIENT
Start: 2018-07-06 | End: 2018-07-07 | Stop reason: ALTCHOICE

## 2018-07-06 RX ORDER — MIDAZOLAM HYDROCHLORIDE 1 MG/ML
2 INJECTION INTRAMUSCULAR; INTRAVENOUS EVERY 10 MIN PRN
Status: ACTIVE | OUTPATIENT
Start: 2018-07-06 | End: 2018-07-06

## 2018-07-06 RX ADMIN — POTASSIUM CHLORIDE 10 MEQ: 400 INJECTION, SOLUTION INTRAVENOUS at 19:01

## 2018-07-06 RX ADMIN — MAGNESIUM SULFATE HEPTAHYDRATE 1 G: 1 INJECTION, SOLUTION INTRAVENOUS at 07:54

## 2018-07-06 RX ADMIN — PROPOFOL 10 MCG/KG/MIN: 10 INJECTION, EMULSION INTRAVENOUS at 11:47

## 2018-07-06 RX ADMIN — SODIUM CHLORIDE 12 NG/KG/MIN: 9 INJECTION, SOLUTION INTRAVENOUS at 08:03

## 2018-07-06 RX ADMIN — ENOXAPARIN SODIUM 40 MG: 40 INJECTION SUBCUTANEOUS at 11:22

## 2018-07-06 RX ADMIN — MAGNESIUM SULFATE HEPTAHYDRATE 1 G: 1 INJECTION, SOLUTION INTRAVENOUS at 05:37

## 2018-07-06 RX ADMIN — METOCLOPRAMIDE 10 MG: 5 INJECTION, SOLUTION INTRAMUSCULAR; INTRAVENOUS at 11:23

## 2018-07-06 RX ADMIN — CHLORHEXIDINE GLUCONATE 15 ML: 1.2 RINSE ORAL at 09:00

## 2018-07-06 RX ADMIN — Medication 10 ML: at 20:56

## 2018-07-06 RX ADMIN — MAGNESIUM SULFATE HEPTAHYDRATE 2 G: 40 INJECTION, SOLUTION INTRAVENOUS at 19:35

## 2018-07-06 RX ADMIN — Medication 1 G: at 08:06

## 2018-07-06 RX ADMIN — POTASSIUM CHLORIDE 20 MEQ: 400 INJECTION, SOLUTION INTRAVENOUS at 04:09

## 2018-07-06 RX ADMIN — VANCOMYCIN HYDROCHLORIDE 1250 MG: 10 INJECTION, POWDER, LYOPHILIZED, FOR SOLUTION INTRAVENOUS at 12:58

## 2018-07-06 RX ADMIN — FENTANYL CITRATE 125 MCG/HR: 50 INJECTION, SOLUTION INTRAMUSCULAR; INTRAVENOUS at 08:38

## 2018-07-06 RX ADMIN — METOCLOPRAMIDE 10 MG: 5 INJECTION, SOLUTION INTRAMUSCULAR; INTRAVENOUS at 08:06

## 2018-07-06 RX ADMIN — SODIUM CHLORIDE 12 NG/KG/MIN: 9 INJECTION, SOLUTION INTRAVENOUS at 01:25

## 2018-07-06 RX ADMIN — WATER: 1 SOLUTION INTRAVENOUS at 11:02

## 2018-07-06 RX ADMIN — MINERAL OIL AND PETROLATUM: 150; 830 OINTMENT OPHTHALMIC at 17:52

## 2018-07-06 RX ADMIN — METOCLOPRAMIDE 10 MG: 5 INJECTION, SOLUTION INTRAMUSCULAR; INTRAVENOUS at 20:54

## 2018-07-06 RX ADMIN — PROPOFOL 35 MCG/KG/MIN: 10 INJECTION, EMULSION INTRAVENOUS at 23:11

## 2018-07-06 RX ADMIN — SODIUM CHLORIDE 4.76 UNITS/HR: 9 INJECTION, SOLUTION INTRAVENOUS at 14:17

## 2018-07-06 RX ADMIN — SODIUM CHLORIDE 9.1 UNITS/HR: 9 INJECTION, SOLUTION INTRAVENOUS at 02:47

## 2018-07-06 RX ADMIN — ACETAMINOPHEN 650 MG: 650 SUPPOSITORY RECTAL at 01:08

## 2018-07-06 RX ADMIN — BUMETANIDE 0.5 MG/HR: 0.25 INJECTION INTRAMUSCULAR; INTRAVENOUS at 07:53

## 2018-07-06 RX ADMIN — PROPOFOL 25 MCG/KG/MIN: 10 INJECTION, EMULSION INTRAVENOUS at 18:41

## 2018-07-06 RX ADMIN — Medication 1 G: at 16:56

## 2018-07-06 RX ADMIN — VECURONIUM BROMIDE 1 MCG/KG/MIN: 1 INJECTION, POWDER, LYOPHILIZED, FOR SOLUTION INTRAVENOUS at 00:35

## 2018-07-06 RX ADMIN — VASOPRESSIN 0.04 UNITS/MIN: 20 INJECTION INTRAVENOUS at 08:05

## 2018-07-06 RX ADMIN — WATER: 1 SOLUTION INTRAVENOUS at 20:02

## 2018-07-06 RX ADMIN — CHLORHEXIDINE GLUCONATE 15 ML: 1.2 RINSE ORAL at 20:54

## 2018-07-06 RX ADMIN — Medication 1 G: at 23:46

## 2018-07-06 RX ADMIN — METOCLOPRAMIDE 10 MG: 5 INJECTION, SOLUTION INTRAMUSCULAR; INTRAVENOUS at 16:56

## 2018-07-06 RX ADMIN — FENTANYL CITRATE 125 MCG/HR: 50 INJECTION, SOLUTION INTRAMUSCULAR; INTRAVENOUS at 20:50

## 2018-07-06 RX ADMIN — MINERAL OIL AND PETROLATUM: 150; 830 OINTMENT OPHTHALMIC at 23:07

## 2018-07-06 ASSESSMENT — PULMONARY FUNCTION TESTS
PIF_VALUE: 31.4
PIF_VALUE: 35.1
PIF_VALUE: 28.1
PIF_VALUE: 27.6

## 2018-07-06 NOTE — PROGRESS NOTES
Ref Range & Units 07/06/18 0105    pH, Arterial 7.350 - 7.450 7.490     pCO2, Arterial 35.0 - 45.0 mmHg 24.0     pO2, Arterial 80.0 - 100.0 mmHg 131.0     HCO3, Arterial 22.0 - 26.0 mmol/L 18.3     Base Excess, Arterial -2.0 - 2.0 mmol/L -4.1     Hemoglobin, Art, Extended 12.0 - 16.0 g/dL 8.7     O2 Sat, Arterial >92 % 96.9    Carboxyhgb, Arterial 0.0 - 5.0 % 2.2    Comment:      0.0-1.5   (Smokers 1.5-5.0)    Methemoglobin, Arterial <1.5 % 1.2    O2 Content, Arterial Not Established mL/dL 12.1      PRVC 28, Vt 420, 80% FIO2, +14 PEEP  A-LINE GAS

## 2018-07-06 NOTE — PROGRESS NOTES
Component Results     Component Value Ref Range & Units Status Collected Lab   pH, Arterial 7.560   7.350 - 7.450 Final 07/06/2018  3:42 AM [facility]   pCO2, Arterial 22.0   35.0 - 45.0 mmHg Final 07/06/2018  3:42 AM [facility]   pO2, Arterial 168.0   80.0 - 100.0 mmHg Final 07/06/2018  3:42 AM [facility]   HCO3, Arterial 19.7   22.0 - 26.0 mmol/L Final 07/06/2018  3:42 AM [facility] Excess, Arterial -1.5  -2.0 - 2.0 mmol/L Final 07/06/2018  3:42 AM [facility]   Hemoglobin, Art, Extended 9.4   12.0 - 16.0 g/dL Final 07/06/2018  3:42 AM [facility]   O2 Sat, Arterial 97.0  >92 % Final 07/06/2018  3:42 AM [facility]   Carboxyhgb, Arterial 2.0  0.0 - 5.0 % Final 07/06/2018  3:42 AM [facility]        0.0-1.5   (Smokers 1.5-5.0)    Methemoglobin, Arterial 1.1  <1.5 % Final 07/06/2018  3:42 AM [facility]   O2 Content, Arterial 13.2  Not Established mL/dL Final 07/06/2018  3:42 AM [facility]   O2 Therapy          Prvc,28,420,70% + 14 peep  rei

## 2018-07-06 NOTE — PROGRESS NOTES
7/5/18 2200 Report received on pt while en route to ICU. Pt arrived, Dr Casey Newsome and Dr Shania Beach at bedside. Pt alert, speech slurred, slightly disoriented. At 2205, 50mg of propofol given IVP for sedation per Dr Shania Beach verbal order, unsuccessful attempt made to intubate by Dr Shania Beach. 2210 propofol 50mg IVP administered per Dr Shania Beach verbal order, 2212 propofol 50mg IVP give per Dr Shania Beach VO, 2213 propofol 50mg IVP given per Dr Shania Beach VO, 2214 propofol 50mg given per Dr Shania Beach. At 2218 100mg of succinylcholine IVP given per Dr Casey Newsome VO. At 2219 pt intubated per Dr Casey Newsome, 7.5mm tube secured at 23 at lip. At 2235 2 units of vasopressin given IVP per Dr Casey Newsome verbal order followed by bolus. Pt received a total of 122 units of vasopressin before being placed on IVP at 0.04units/hr at 2348  At 2240 Left femoral art line placed by Dr Casey Newsome, immediately followed by left femoral CVC placed by Dr Shania Beach  During this time we were unable to obtain o2 sats on pt, orders received for ABG. At 2255 10 mg vecuronium given IVP per Dr Sneha Mullins  8699 gaspar cath placed per Dr Shania Beach verbal order  5042 Dr Valerie Guillen at bedside. 7/6/18 0000 pt placed in prone position  0015 Flolan (per RT) started  Decision made to transfer pt to [facility] for ECMO. ECMO team at bedside at  [facility]. After discussing pt's rapid improvements with Dr Casey Newsome, decision was made not to transfer pt.

## 2018-07-06 NOTE — CONSULTS
CRITICAL CARE NOTE - Denzel Ochoa   MR# [id number]  Acct# [de-identified]  7/6/2018   12:01 AM    Referring Provider: SEE Muir MD    Chief Complaint: acute respiratory failure    HPI: We are consulted by Dr. Portillo Settler on call for Dr. Ximena Hollingsworth to see this critically ill 40y.o. year old female born on 1975 who presented on 6/26/2018. She was found to have CAD in setting of type 1 diabetes, and underwent CABG on 7/2, 2 vessel LIMA to LAD and SVG to OM3. Preoperative pft were normal.  Pt has had some postoperative hypoxemia which has been mild but quickly deteriorated over the course of today leading to transfer to icu, intubation and mechanical ventilation. She has had refractory hypoxemia despite this. Pt is unable to provide history. Pt had fever earlier    Past Medical History  Past Medical History:   Diagnosis Date    Arthritis     [facility]     CAD (coronary artery disease)     Cancer ([facility]     Cervical CA    Depression     Diabetes mellitus ([facility] 20 years    Dysplasia of cervix     Endometriosis     GERD (gastroesophageal reflux disease)     History of cone biopsy of cervix     Snapping hip syndrome      Past Surgical History:   Procedure Laterality Date    CHOLECYSTECTOMY      COLONOSCOPY      COLPOSCOPY  2000    ENDOSCOPY, COLON, DIAGNOSTIC      HYSTERECTOMY      Left ovary still there.     KNEE SURGERY Right     Lateral Release    KNEE SURGERY Right     Meniscus Repair    LAPAROSCOPY      MD CABG, ARTERY-VEIN, TWO N/A 7/2/2018    CORONARY ARTERY BYPASS GRAFT X2 WITH LEFT INTERNAL MAMMARY ARTERY WITH ENDOSCOPIC VEIN HARVESTING WITH PERFUSION TRANSESOPHAGEAL ECHOCARDIOGRAM performed by Vee Patton MD at [address] [facility] [place] OR     Allergies   Allergen Reactions    Ciprofloxacin     Levofloxacin Rash     Medications    guaiFENesin 600 mg Oral BID   lisinopril 5 mg Oral Daily   insulin lispro 0-18 Units Subcutaneous TID WC   insulin lispro 0-9 Units 18.1*  18.6*  18.5*   [id number]  85.0*  86.5*  86.6*   BEART  -6.6*  -6.2*  -6.1*     Recent Labs      07/05/18   0159   CALCIUM  8.7     No results for input(s): BC, LABGRAM, CULTRESP, BFCX in the last 72 hours. Other noteworthy results in past 24 hours: Color, UA DK YELLOW Clarity, UA Clear Glucose, Ur 250 (A) mg/dL Bilirubin Urine Negative Ketones, Urine Negative mg/dL Specific Gravity, UA 1.019 Blood, Urine Negative pH, UA 5.5 Protein, UA Negative mg/dL Urobilinogen, Urine 1.0 E.U./dL Nitrite, Urine Negative Leukocyte Esterase, Urine Negative    Recent films:  Xr Chest Standard (2 Vw)    Result Date: 6/26/2018  Examination. XR CHEST (2 VW) History: Precath workup. The PA and lateral views of the chest are compared with the previous study dated 3/27/2012. There are several small granulomas in the lungs bilaterally. There is no pleural effusion, pulmonary congestion or pneumothorax. The heart size in the normal range. There is no bony abnormality. No active cardiopulmonary disease. Old granulomatous lung changes. The above finding are recorded on a digital voice clip in PACS. Signed by Dr Jenna Lentz on 6/26/2018 12:35 PM    Xr Chest Portable    Result Date: 7/5/2018  XR CHEST PORTABLE 7/5/2018 8:30 PM HISTORY: Hypoxia COMPARISON: 7/5/2018. FINDINGS: Increasing bilateral perihilar consolidations on this exam as compared with the recent prior. There is dense consolidation in the left lung base which appears to be secondary to a layering pleural effusion. This is stable. Stable heart size. Reidentified pulmonary congestion and interstitial edema. Prior sternotomy. No pneumothorax. No acute bony abnormality is. 1. Increasing bilateral perihilar infiltrates reflecting worsening pulmonary edema. 2. Stable layering left pleural effusion.  Signed by Dr Rosa Joseph on 7/5/2018 9:44 PM    Xr Chest Portable    Result Date: 7/5/2018  EXAMINATION: XR CHEST PORTABLE 7/5/2018 7:57 AM HISTORY: Hypoxia, possible ARDS COMPARISON: 7/4/2018 FINDINGS: The heart appears normal in size. There has been prior median sternotomy. The aorta is mildly tortuous. There is consolidation at the left lung base. Interstitial opacities are seen bilaterally. Opacities are also noted in the medial right lung base. A left pleural effusion is present. There is no appreciable pneumothorax. The pulmonary vasculature is indistinct. Findings suggest pulmonary vascular congestion and pulmonary edema. A left pleural effusion is present as well as bibasilar atelectasis. Consolidation in the left lung base could represent an infectious process. Correlate clinically. Signed by Dr Kandis Jarvis on 7/5/2018 7:59 AM    Xr Chest Portable    Result Date: 7/4/2018  XR CHEST PORTABLE 7/4/2018 4:00 AM HISTORY:   Postop CABG  Single view. COMPARISONS:  7/3/2018 FINDINGS: Mediastinal drains and left chest tube no longer observed. There are no pneumothoraces. The level inspiration is shallow and lung volumes diminished. Perihilar interstitial prominence appreciated with left-sided pleural effusion and probable small right pleural effusion with lower lobe airspace disease, differential considerations include atelectasis. Increasing left pleural effusion suspected. 1. Mediastinal drain and left chest tube have been removed. No pneumothorax. 2. Increasing left pleural effusion suspected with persistent bilateral perihilar interstitial infiltrates and lower lobe airspace opacities. Signed by Dr Randi Anders on 7/4/2018 7:51 AM    Xr Chest Portable    Result Date: 7/3/2018  EXAMINATION: XR CHEST PORTABLE 7/3/2018 7:57 AM HISTORY: Postop CABG COMPARISON: 7/2/2018 FINDINGS: Heart appears normal in size. There has been prior median sternotomy. Low lung volumes exaggerate the interstitial markings. There is pulmonary vascular congestion and mild pulmonary edema. The right IJ Harriman-Alex catheter endotracheal tube have been removed.  Mediastinal drain is coiled over

## 2018-07-06 NOTE — DISCHARGE SUMMARY
Discharge Summary    Marissa Carcamo  :  1975  MRN:  [id number]    ADMIT DATE:  2018  DISCHARGE DATE:  2018    PRIMARY CARE PHYSICIAN:  Neena Garcia    VISIT STATUS: Admission    CODE STATUS:  Full Code    DISCHARGE DIAGNOSES:  Principal Problem:    Coronary artery disease involving native coronary artery of native heart without angina pectoris  Active Problems:    Insomnia    Constipation    Gastroesophageal reflux disease without esophagitis    Type 1 diabetes mellitus with neurological manifestations (HCC)    Anxiety and depression    Unstable angina (HCC)    Abnormal nuclear stress test    Unstable angina pectoris ([address]    Type 1 diabetes mellitus with complication (HCC)    Hyponatremia    Normocytic anemia    Respiratory insufficiency  Resolved Problems:    * No resolved hospital problems. *      HOSPITAL COURSE:  Admitted following a cardic cath revealing 3vv CAD she was scheduled for CABG. Two vessels were grafted, The patient has had a course complicated by apparent HCAP of RUL and bilateral ARDS with pulmonary edema. SIGNIFICANT DIAGNOSTIC STUDIES:  Chest Radiographs  CONSULTANTS:  Pulmonary Medicine  RECOMMENDED NEXT STEPS:   Transfer for emergent ECMO     DISCHARGE MEDICATIONS:       Eladia Gates   Home Medication Instructions LTR:[id number]    Printed on:18 [id number]   Medication Information                      Alpha-D-Galactosidase (BEANO PO)  Take by mouth             alprazolam (XANAX) 1 MG tablet  Take 1 mg by mouth 4 times daily. amitriptyline (ELAVIL) 100 MG tablet  Take 100 mg by mouth nightly.             gabapentin (NEURONTIN) 100 MG capsule  Take 300 mg by mouth 3 times daily. Harriet Urbina ibuprofen (ADVIL;MOTRIN) 800 MG tablet  Take 1 tablet by mouth every 6 hours as needed for Pain.              insulin aspart (NOVOLOG) 100 UNIT/ML injection pen  Inject into the skin daily Sliding scale             linaclotide (LINZESS) 290 MCG CAPS capsule  Take 1 capsule

## 2018-07-06 NOTE — PROGRESS NOTES
Component Value Ref Range & Units Status Collected Lab   pH, Arterial 7.360  7.350 - 7.450 Final 07/05/2018 11:39 PM [facility] Lab   pCO2, Arterial 33.0   35.0 - 45.0 mmHg Final 07/05/2018 11:39 PM [facility] Lab   pO2, Arterial 45.0   80.0 - 100.0 mmHg Final 07/05/2018 11:39 PM [facility] Lab   HCO3, Arterial 18.6   22.0 - 26.0 mmol/L Final 07/05/2018 11:39 PM [facility] Excess, Arterial -6.2   -2.0 - 2.0 mmol/L Final 07/05/2018 11:39 PM [address] [place] [address] Lab   Hemoglobin, Art, Extended 8.4   12.0 - 16.0 g/dL Final 07/05/2018 11:39 PM [address] [place] [address] Lab   O2 Sat, Arterial 86.5   >92 % Final 07/05/2018 11:39 PM [facility]   Carboxyhgb, Arterial 2.1  0.0 - 5.0 % Final 07/05/2018 11:39 PM [facility] Lab        0.0-1.5   (Smokers 1.5-5.0)    Methemoglobin, Arterial 1.3  <1.5 % Final 07/05/2018 11:39 PM [address] [place] [address] Lab   O2 Content, Arterial 10.2  Not Established mL/dL Final 07/05/2018 11:39 PM [address] [place] [address] Lab   O2 Therapy Unknown   Final 07/05/2018 11:39 PM [address] [place] [address] Lab   Testing Performed By     [facility],16,550,100% + 15 louise minaya

## 2018-07-06 NOTE — PROGRESS NOTES
[facility]  Progress Note                            Date:  7/6/2018  Patient: Reva Roper  Admission:  6/26/2018 11:35 AM  Admit DX: Abnormal result of other cardiovascular function study [R94.39]  Unstable angina ([address] [I20.0]  Age:  40 y.o., 1975     LOS: 10 days     Reason for evaluation:   coronary artery disease, CABG and ARDS      SUBJECTIVE:    The patient was seen and examined. Notes and labs reviewed. There were not complications over night. Patient's cardiac review of systems: positive for respiratory distress requiring ventilation. The patient is  unchanged. Pt sedated and intubated and on ventilator.         OBJECTIVE:    Telemetry: Sinus    Pt is lying prone per pulmonology     polyvinyl alcohol  1 drop Both Eyes Q4H    And    AKWA TEARS   Both Eyes Q4H    chlorhexidine  15 mL Mouth/Throat BID    sodium chloride  15 mL/kg Intravenous Once    ipratropium-albuterol  1 ampule Inhalation 4x daily    guaiFENesin  600 mg Oral BID    lisinopril  5 mg Oral Daily    enoxaparin  40 mg Subcutaneous Q24H    cefepime  1 g Intravenous Q8H    vancomycin  1,250 mg Intravenous Q12H    vancomycin (VANCOCIN) intermittent dosing (placeholder)   Other RX Placeholder    atorvastatin  40 mg Oral Nightly    metoprolol succinate  25 mg Oral Daily    polyethylene glycol  17 g Oral BID    metoclopramide  10 mg Intravenous 4x Daily AC & HS    calcium carbonate  1,000 mg Oral BID    sodium chloride flush  10 mL Intravenous 2 times per day    docusate sodium  100 mg Oral BID    famotidine  20 mg Oral BID    aspirin  81 mg Oral Daily    clopidogrel  75 mg Oral Daily    amitriptyline  100 mg Oral Nightly    gabapentin  300 mg Oral TID        vecuronium (NORCURON) infusion 0.6 mcg/kg/min (07/06/18 1142)    propofol 10 mcg/kg/min (07/06/18 1147)    dextrose 5 % and 0.45 % NaCl      insulin (HUMAN R) non-weight based infusion 3.2 Units/hr (07/06/18 1300)    dextrose

## 2018-07-06 NOTE — PROGRESS NOTES
patient started on empiric abx Vanc/Cefepime. Pulm colnsulted.     ROS: Unable to obtain    Diet NPO Effective Now    Intake/Output Summary (Last 24 hours) at 07/06/18 0806  Last data filed at 07/05/18 2028   Gross per 24 hour   Intake              930 ml   Output             3050 ml   Net            -2120 ml     Medications:   vecuronium (NORCURON) infusion 0.7 mcg/kg/min (07/06/18 0727)    propofol      epoprostenol (VELETRI) nebulization solution 12 ng/kg/min (07/06/18 0803)    dextrose 5 % and 0.45 % NaCl      insulin (HUMAN R) non-weight based infusion 9.5 Units/hr (07/06/18 0727)    dextrose 5 % and 0.45 % NaCl      bumetanide 0.1 mg/mL infusion 0.5 mg/hr (07/06/18 0753)    midazolam 5 mg/hr (07/05/18 2318)    fentaNYL (SUBLIMAZE) 1250 mcg in sodium chloride 0.9 % 250 mL 125 mcg/hr (07/05/18 2319)    vasopressin infusion 0.04 Units/min (07/06/18 0805)    dextrose      dextrose       Current Facility-Administered Medications   Medication Dose Route Frequency Provider Last Rate Last Dose    polyvinyl alcohol (LIQUIFILM TEARS) 1.4 % ophthalmic solution 1 drop  1 drop Both Eyes Q4H Paradise Garcia MD        And    AKWA TEARS (LACRILUBE) 2-15-83 % opthalmic ointment   Both Eyes Q4H Paradise Garcia MD        chlorhexidine (PERIDEX) 0.12 % solution 15 mL  15 mL Mouth/Throat BID Paradise Garcia MD        LORazepam (ATIVAN) injection 2 mg  2 mg Intravenous Q1H PRN Paradise Garcia MD        vecuronium (NORCURON) 100 mg in sodium chloride 0.9 % 100 mL infusion  1 mcg/kg/min Intravenous Continuous Fidelia Tapia MD 3.8 mL/hr at 07/06/18 0727 0.7 mcg/kg/min at 07/06/18 6457    propofol 1000 MG/100ML injection  10 mcg/kg/min Intravenous Titrated Paradise Garcia MD        epoprostenol 500 mcg in sodium chloride 0.9 % 50 mL nebulization solution  12 ng/kg/min Nebulization Continuous Paradise Garcia MD 6.5 mL/hr at 07/06/18 0803 12 ng/kg/min at 07/06/18 0803    dextrose 50 % solution 12.5 g  12.5 g Intravenous --    --   16   --    CL  96*   --   89*   --    --   92*   --    CO2  16*   --   17*   --    --   20*   --    BUN  9   --   13   --    --   12   --    CREATININE  0.5   --   0.8   --    --   0.8   --    GLUCOSE  215*   --   412*   --    --   305*   --    CALCIUM  8.7   --   7.4*   --    --   7.7*   --     < > = values in this interval not displayed. Recent Labs      07/06/18   0420   MG  1.5*   PHOS  3.5     Recent Labs      07/06/18   0015   AST  27   ALT  19   BILITOT  1.0   ALKPHOS  170*     ABGs:  Recent Labs      07/05/18   2351  07/06/18   0041  07/06/18   0105  07/06/18   0342  07/06/18   0638   PHART  7.370  7.450  7.490*  7.560*  7.540*   OAZ5PGH  32.0*  27.0*  24.0*  22.0*  26.0*   PO2ART  44.0*  139.0*  131.0*  168.0*  96.0   WTT6VVS  18.5*  18.8*  18.3*  19.7*  22.2   BEART  -6.1*  -4.4*  -4.1*  -1.5  0.3   HGBAE  8.5*  8.7*  8.7*  9.4*  9.4*   V3BJKQZC  86.6*  96.8  96.9  97.0  96.1   CARBOXHGBART  2.2  2.0  2.2  2.0  2.0   02THERAPY  Unknown  Unknown  Unknown  Unknown  Unknown     HgBA1c: Invalid input(s): HGBA1C  FLP:    Lab Results   Component Value Date    TRIG 96 06/30/2018    HDL 41 06/30/2018    LDLCALC 119 06/30/2018     TSH:  No results found for: TSH  Troponin T: No results for input(s): TROPONINI in the last 72 hours. INR:   Recent Labs      07/06/18   0015   INR  1.30*       Objective:   Vitals: BP (!) 63/35   Pulse 89   Temp 101.1 °F (38.4 °C) (Temporal)   Resp 22   Ht 5' 4\" (1.626 m)   Wt 200 lb (90.7 kg)   LMP 01/22/2012   SpO2 100%   Breastfeeding? No   BMI 34.33 kg/m²   24HR INTAKE/OUTPUT:      Intake/Output Summary (Last 24 hours) at 07/06/18 0806  Last data filed at 07/05/18 2028   Gross per 24 hour   Intake              930 ml   Output             3050 ml   Net            -2120 ml     General appearance: intubated, sedated, paralyzed  HEENT: atraumatic, ET tube in place  Lungs: Mechanically ventilated, posteriorly crackles heard BL without wheeze.   Heart: unable to appreciate heart sounds posteriorly as patient is prone. NSR on tele. Extremities: +1 edema in BL upper ext. Compression stockings over BLLE  Neurologic: sedated and paralyzed  Skin: no rashes, nodules. Assessment and Plan:   Principal Problem:  # ARDS  - Tansferred to ICU.  - MV day 1  - Pulm consult  - Paralyzed and pronated at this time  - FiO2 requirements down trending  - Continue to monitor  - Diureses per CTS  - daily CXR and ABG  - Evaluated by [place] for transport and ?ECMO, but patient was stabilizing by time of evaluation and decision made not to transfer. #  Coronary artery disease involving native coronary artery of native heart without angina pectoris  - s/p CABG on 7/2  - Transferred out of ICU 07/04, transferred back 07/05 for ARDS. - Insulin gtt restarted on 07/05  - Continue medical management per cards. #  Type 1 diabetes mellitus with neurological manifestations ([address]  - Insulin gtt restarted 07/05  - will continue while in ICU  - q4 BMP  - monitor lytes and replete as needed    # Anion Gap metabolic acidosis  - insulin gtt as above  - Patient currently hypervolemic, being diuresed. - Gap downtrending  - Will continue to monitor      #  Hyponatremia, likely due to elevated glucose  - Continue with insulin gtt as above  - Monitor BMP    #  Normocytic anemia  - Acute, likely multifactorial in the setting of hemodilution and blood loss postop  -Continue to monitor    #Hospital acquired pneumonia  - New infiltrate seen on CXR  - Broad spectrum abx started 07/05 Vanc, cefepime  - Cultures redrawn, will follow      Insomnia    Constipation    Gastroesophageal reflux disease without esophagitis    Anxiety and depression    Unstable angina (HCC)    Abnormal nuclear stress test    Unstable angina pectoris ([address]    Type 1 diabetes mellitus with complication ([address]        Advance Directive: Full Code    DVT prophylaxis: lovenox    Discharge planning:  To home      MD Ivett Laurent Hospitalist

## 2018-07-06 NOTE — PROGRESS NOTES
7/5/2018  9:48 PM - Aj Naqvi Incoming Lab Results From [employer]     Component Results     Component Value Ref Range & Units Status Collected Lab   pH, Arterial 7.480   7.350 - 7.450 Final 07/05/2018  9:47 PM [address] [place] [address] Lab   pCO2, Arterial 30.0   35.0 - 45.0 mmHg Final 07/05/2018  9:47 PM [facility] Lab   pO2, Arterial 46.0   80.0 - 100.0 mmHg Final 07/05/2018  9:47 PM [facility] Lab   HCO3, Arterial 22.3  22.0 - 26.0 mmol/L Final 07/05/2018  9:47 PM [facility] Excess, Arterial -0.7  -2.0 - 2.0 mmol/L Final 07/05/2018  9:47 PM [address] [place] [address] Lab   Hemoglobin, Art, Extended 9.5   12.0 - 16.0 g/dL Final 07/05/2018  9:47 PM [address] [place] [address] Lab   O2 Sat, Arterial 86.1   >92 % Final 07/05/2018  9:47 PM [facility] Lab   Carboxyhgb, Arterial 1.9  0.0 - 5.0 % Final 07/05/2018  9:47 PM [facility] Lab        0.0-1.5   (Smokers 1.5-5.0)    Methemoglobin, Arterial 1.4  <1.5 % Final 07/05/2018  9:47 PM [address] [place] [address] Lab   O2 Content, Arterial 11.5  Not Established mL/dL Final 07/05/2018  9:47 PM [address] [place] [address] Lab   O2 Therapy Unknown   Final 07/05/2018  9:47 PM [address] [place] [address] Lab   Testing Performed By     Juan Miguel Espinosa Name Director Address Valid Date Range   [facility] - [address] [facility] [place] [place]  Elisabet Espinosa,[address]  [address] [facility] [address] [address] 08/30/17 1233-Present   Narrative     Nicole Brooks [facility] tel. , called to Dr. Judy Sandy  called to Dr. Judy Sandy, 07/05/2018 21:48, by OZZJONY   Lab and Collection     Blood Gas, Arterial on 7/5/2018   Result History     Lt.  Brach, RR 22, 100% non rebreather

## 2018-07-06 NOTE — PLAN OF CARE
Problem: Nutrition  Goal: Optimal nutrition therapy  Nutrition Problem: Inadequate oral intake  Intervention: Food and/or Nutrient Delivery: Continue NPO  Nutritional Goals: Nutritional needs will be met via EN or PO intake once pt is extubated       Outcome: Ongoing

## 2018-07-06 NOTE — PROGRESS NOTES
POST OP CARDIOTHORACIC SURGERY PROGRESS NOTE    Post op day 4    SUBJECTIVE:  Events of last night noted. She developed \"full blown \" ARDS. With resp distress. Had to be emergently intubated for profound hypoxia. Evaluated by the ECMO team from [facility] . They were satisfied that she would stabilize, which she has the past 8 hours. Now down to 45% FIO2. . Sedated Chemically paralyzed    BP (!) 63/35   Pulse 89   Temp 101.1 °F (38.4 °C) (Temporal)   Resp 22   Ht 5' 4\" (1.626 m)   Wt 200 lb (90.7 kg)   LMP 2012   SpO2 99%   Breastfeeding? No   BMI 34.33 kg/m²   Average, Min, and Max for last 24 hours Vitals:  TEMPERATURE:  Temp  Av.8 °F (37.1 °C)  Min: 97.4 °F (36.3 °C)  Max: 101.1 °F (38.4 °C)  RESPIRATIONS RANGE: Resp  Av.8  Min: 18  Max: 38  PULSE RANGE: Pulse  Av.1  Min: 89  Max: 117  BLOOD PRESSURE RANGE:  Systolic (18VHL), DWK:86 , Min:63 , RRV:992   ; Diastolic (75QXH), EBA:15, Min:28, Max:73    PULSE OXIMETRY RANGE: SpO2  Av %  Min: 63 %  Max: 99 %    I/O last 3 completed shifts: In: 1080 [P.O.:1070; I.V.:10]  Out: 3650 [Urine:3650]    CHEST: coarse breath  sounds.      CARDIOVASCULAR: regular rhythm, no murmurs    INCISION: no drainage, skin edges intact    DRAINS:     LABS:  CBC with Differential:    Lab Results   Component Value Date    WBC 15.0 2018    RBC 2.67 2018    HGB 7.9 2018    HCT 24.3 2018    HCT 44.8 2012     2018     2012    MCV 91.0 2018    MCH 29.6 2018    MCHC 32.5 2018    RDW 12.4 2018    LYMPHOPCT 35.0 2018    MONOPCT 4.0 2018    EOSPCT 2.3 2012    BASOPCT 0.0 2018    MONOSABS 0.50 2018    LYMPHSABS 4.4 2018    EOSABS 0.25 2018    BASOSABS 0.00 2018     BMP:    Lab Results   Component Value Date     2018     2012    K 4.0 2018    K 2.8 2018    K 4.1 2018    K 4.1 2012    CL 92

## 2018-07-06 NOTE — PROGRESS NOTES
IM Follow Up Consult       Contacted by RN team that patient was hypoxemic  Patient seen and examined      SUBJECTIVE:    Patient states that she feels fine. Her mother states that she has been having altered mental status, specifically drowsiness, since approximately 5:30pm.  The patient again states that she felt fine. OBJECTIVE:    Blood pressure (!) 92/56, pulse 116, temperature 101.1 °F (38.4 °C), temperature source Temporal, resp. rate 18, height 5' 4\" (1.626 m), weight 200 lb (90.7 kg), last menstrual period 01/22/2012, SpO2 93 %, not currently breastfeeding. Physical Exam:  VS as per above, hypotensive and warm  Head: NC, AT  Neck: Supple, Trachea Appears Midline  PUL: lungs have decreased but symmetric breath sounds, breathing on NRB mask when initially seen and now on vent  COR: RRR, No M/R/G, has large well healed thoracotomy scar  ABD: Normal Bowel sounds, No G/R/T, has abdominal obesity   MSK: no pitting pretibial edema, no wasting of muscle stores, increased fat stores  Integument: warm, nondiaphoretic  PSY: appropriate affect expressed, answered questions appropriately      ASESSMENTS & PLANS:    ARDS  HCAP  Acute Hypoxemic Respiratory Failure    Transferred to MICU  Intubated, sedated, and mechanically ventilated  Placed on Vasopressin GGT  Cefepime 1g IVPB Q8h begun  Vancomycin 1g IVPB begun, pharmacy consulted for further vanco dosing  Placed ET tube (CT surgery)  Placed Arterial line (CT Surgery)  Placed Femoral line (IM Hospitalist)  Contacted [place] for emergent ECMO, patient status pending administration?  The triage DM has already agreed that she is a good cancidiate for intervention  [address], pending discussion with possible accepting provider      1 hour of critical care time, excludes all separately billable procedures

## 2018-07-06 NOTE — PROGRESS NOTES
Pulmonary and Critical Care Progress Note [facility]    Patient: Aubrey Zaragoza    1975    MR# [id number]    Acct# [de-identified]   07/06/18   11:37 AM  Referring Provider: SEE Alexandra MD    Chief Complaint: Mechanically ventilated    Interval history: Since my last visit early this am, she has had dramatic response to what at the time were hoped to be stopgap interventions, including prone positioning and flolan aerosol. Pt remains prone currently. Remains paralyzed on vecuronium, midazolam, fentanyl and insulin drips, vasopressin and basal ivf.   Pt unable to provide history    polyvinyl alcohol 1 drop Both Eyes Q4H   And      AKWA TEARS  Both Eyes Q4H   chlorhexidine 15 mL Mouth/Throat BID   sodium chloride 15 mL/kg Intravenous Once   guaiFENesin 600 mg Oral BID   lisinopril 5 mg Oral Daily   enoxaparin 40 mg Subcutaneous Q24H   cefepime 1 g Intravenous Q8H   vancomycin 1,250 mg Intravenous Q12H   vancomycin (VANCOCIN) intermittent dosing (placeholder)  Other RX Placeholder   atorvastatin 40 mg Oral Nightly   metoprolol succinate 25 mg Oral Daily   polyethylene glycol 17 g Oral BID   metoclopramide 10 mg Intravenous 4x Daily AC & HS   calcium carbonate 1,000 mg Oral BID   sodium chloride flush 10 mL Intravenous 2 times per day   docusate sodium 100 mg Oral BID   famotidine 20 mg Oral BID   aspirin 81 mg Oral Daily   clopidogrel 75 mg Oral Daily   ipratropium-albuterol 1 ampule Inhalation Q4H WA   amitriptyline 100 mg Oral Nightly   gabapentin 300 mg Oral TID      vecuronium (NORCURON) infusion 0.8 mcg/kg/min (07/06/18 0830)    propofol      dextrose 5 % and 0.45 % NaCl      insulin (HUMAN R) non-weight based infusion 6.45 Units/hr (07/06/18 1100)    dextrose 5 % and 0.45 % NaCl      bumetanide 0.1 mg/mL infusion 0.5 mg/hr (07/06/18 2713)    inhalation builder      midazolam 5 mg/hr (07/05/18 7846)    fentaNYL (SUBLIMAZE) 1250 mcg in sodium chloride 0.9 % 250 mL 125 mcg/hr (07/06/18 MCV  91.1  90.9  91.0   MCH  30.8  30.3  29.6   MCHC  33.8  33.3  32.5*   RDW  12.7  12.7  12.4      Recent Labs      07/06/18   0015   07/06/18   0420  07/06/18   0638  07/06/18   1000   NA  123*   --   128*   --   130*   K  4.1   < >  2.8*  4.0  3.5   CL  89*   --   92*   --   93*   CO2  17*   --   20*   --   21*   BUN  13   --   12   --   12   CREATININE  0.8   --   0.8   --   0.6   CALCIUM  7.4*   --   7.7*   --   8.0*   GLUCOSE  412*   --   305*   --   189*    < > = values in this interval not displayed. Recent Labs      07/06/18   0105  07/06/18   0342  07/06/18   0638   PHART  7.490*  7.560*  7.540*   VNN9VTU  24.0*  22.0*  26.0*   PO2ART  131.0*  168.0*  96.0   HCZ5DSJ  18.3*  19.7*  22.2   R4PVDKEU  96.9  97.0  96.1   BEART  -4.1*  -1.5  0.3     Recent Labs      07/06/18   0015   07/06/18   1000   AST  27   --    --    ALT  19   --    --    ALKPHOS  170*   --    --    BILITOT  1.0   --    --    MG   --    < >  2.1   CALCIUM  7.4*   < >  8.0*   PHOS   --    < >  3.4   INR  1.30*   --    --     < > = values in this interval not displayed. Phosphorus 3.4 mg/dL  Magnesium 2.1 mg/dL    No results for input(s): BC, LABGRAM, CULTRESP, BFCX in the last 72 hours. Radiograph: pending for today. Unable to do pcxr in prone position   My radiograph interpretation: pending    Pulmonary Assessment:  1. ARDS with severe acute RF with hypoxia. Postop. Responding to low Vt high peep vent, flolan and prone positioning  2. Cad s/p cabg, stable blood pressure  3. Diabetes poorly controlled, on insulin drip currently  4. Abn alk phos, uncertain significance  5.  Fever and questionable pneumoinia    Recommend:   · Discussed plan below with nursing  · Lengthy visit with family to discuss current status and treatment plan  · Transition midazolam to propofol to try to avoid adverse effect of accumulated midazolam metabolites  · Wean off vasopressin  · Taper down and if possible wean off paralytics, mostly helpful in first

## 2018-07-06 NOTE — PROGRESS NOTES
Ref Range & Units Status Collected Lab    pH, Arterial 7.540   7.350 - 7.450 Final 07/06/2018  6:38 AM [facility] Lab   pCO2, Arterial 26.0   35.0 - 45.0 mmHg Final 07/06/2018  6:38 AM [facility]   pO2, Arterial 96.0  80.0 - 100.0 mmHg Final 07/06/2018  6:38 AM [facility]   HCO3, Arterial 22.2  22.0 - 26.0 mmol/L Final 07/06/2018  6:38 AM [facility] Excess, Arterial 0.3  -2.0 - 2.0 mmol/L Final 07/06/2018  6:38 AM [facility]   Hemoglobin, Art, Extended 9.4   12.0 - 16.0 g/dL Final 07/06/2018  6:38 AM [facility]   O2 Sat, Arterial 96.1  >92 % Final 07/06/2018  6:38 AM [facility]   Carboxyhgb, Arterial 2.0  0.0 - 5.0 % Final 07/06/2018  6:38 AM [facility]        0.0-1.5   (Smokers 1.5-5.0)    Methemoglobin, Arterial 1.3  <1.5 % Final 07/06/2018  6:38 AM [facility]   O2 Content, Arterial 12.9  Not Established mL/dL Final 07/06/2018  6:38 AM [facility]   O2 Therapy Unknown          Pt on prvc 22 420 +12 40%  Art line

## 2018-07-07 ENCOUNTER — APPOINTMENT (OUTPATIENT)
Dept: GENERAL RADIOLOGY | Age: 43
DRG: 233 | End: 2018-07-07
Attending: INTERNAL MEDICINE
Payer: MEDICAID

## 2018-07-07 LAB
ANION GAP SERPL CALCULATED.3IONS-SCNC: 13 MMOL/L (ref 7–19)
ANION GAP SERPL CALCULATED.3IONS-SCNC: 15 MMOL/L (ref 7–19)
ANION GAP SERPL CALCULATED.3IONS-SCNC: 17 MMOL/L (ref 7–19)
BASE EXCESS ARTERIAL: 5.3 MMOL/L (ref -2–2)
BASE EXCESS ARTERIAL: 6.2 MMOL/L (ref -2–2)
BUN BLDV-MCNC: 10 MG/DL (ref 6–20)
BUN BLDV-MCNC: 10 MG/DL (ref 6–20)
BUN BLDV-MCNC: 11 MG/DL (ref 6–20)
BUN BLDV-MCNC: 7 MG/DL (ref 6–20)
BUN BLDV-MCNC: 9 MG/DL (ref 6–20)
CALCIUM SERPL-MCNC: 5.6 MG/DL (ref 8.6–10)
CALCIUM SERPL-MCNC: 8.2 MG/DL (ref 8.6–10)
CALCIUM SERPL-MCNC: 8.8 MG/DL (ref 8.6–10)
CALCIUM SERPL-MCNC: 8.8 MG/DL (ref 8.6–10)
CALCIUM SERPL-MCNC: 9 MG/DL (ref 8.6–10)
CARBOXYHEMOGLOBIN ARTERIAL: 2 % (ref 0–5)
CARBOXYHEMOGLOBIN ARTERIAL: 2 % (ref 0–5)
CHLORIDE BLD-SCNC: 111 MMOL/L (ref 98–111)
CHLORIDE BLD-SCNC: 95 MMOL/L (ref 98–111)
CHLORIDE BLD-SCNC: 96 MMOL/L (ref 98–111)
CHLORIDE BLD-SCNC: 96 MMOL/L (ref 98–111)
CHLORIDE BLD-SCNC: 97 MMOL/L (ref 98–111)
CO2: 18 MMOL/L (ref 22–29)
CO2: 22 MMOL/L (ref 22–29)
CO2: 25 MMOL/L (ref 22–29)
CO2: 27 MMOL/L (ref 22–29)
CO2: 27 MMOL/L (ref 22–29)
CREAT SERPL-MCNC: 0.5 MG/DL (ref 0.5–0.9)
CREAT SERPL-MCNC: 0.6 MG/DL (ref 0.5–0.9)
GFR NON-AFRICAN AMERICAN: >60
GLUCOSE BLD-MCNC: 108 MG/DL (ref 70–99)
GLUCOSE BLD-MCNC: 110 MG/DL (ref 70–99)
GLUCOSE BLD-MCNC: 113 MG/DL (ref 70–99)
GLUCOSE BLD-MCNC: 119 MG/DL (ref 70–99)
GLUCOSE BLD-MCNC: 123 MG/DL (ref 70–99)
GLUCOSE BLD-MCNC: 140 MG/DL (ref 70–99)
GLUCOSE BLD-MCNC: 155 MG/DL (ref 74–109)
GLUCOSE BLD-MCNC: 156 MG/DL (ref 74–109)
GLUCOSE BLD-MCNC: 167 MG/DL (ref 70–99)
GLUCOSE BLD-MCNC: 179 MG/DL (ref 74–109)
GLUCOSE BLD-MCNC: 87 MG/DL (ref 74–109)
GLUCOSE BLD-MCNC: 90 MG/DL (ref 74–109)
GLUCOSE BLD-MCNC: 99 MG/DL (ref 70–99)
HCO3 ARTERIAL: 29 MMOL/L (ref 22–26)
HCO3 ARTERIAL: 29.2 MMOL/L (ref 22–26)
HCT VFR BLD CALC: 26.8 % (ref 37–47)
HEMOGLOBIN, ART, EXTENDED: 11.2 G/DL (ref 12–16)
HEMOGLOBIN, ART, EXTENDED: 9.5 G/DL (ref 12–16)
HEMOGLOBIN: 9 G/DL (ref 12–16)
MAGNESIUM: 1.2 MG/DL (ref 1.6–2.6)
MAGNESIUM: 1.9 MG/DL (ref 1.6–2.6)
MAGNESIUM: 2 MG/DL (ref 1.6–2.6)
MAGNESIUM: 2.3 MG/DL (ref 1.6–2.6)
MAGNESIUM: 2.4 MG/DL (ref 1.6–2.6)
MCH RBC QN AUTO: 30.3 PG (ref 27–31)
MCHC RBC AUTO-ENTMCNC: 33.6 G/DL (ref 33–37)
MCV RBC AUTO: 90.2 FL (ref 81–99)
METHEMOGLOBIN ARTERIAL: 0.9 %
METHEMOGLOBIN ARTERIAL: 1.5 %
O2 CONTENT ARTERIAL: 13.1 ML/DL
O2 CONTENT ARTERIAL: 15.3 ML/DL
O2 SAT, ARTERIAL: 95.9 %
O2 SAT, ARTERIAL: 96.7 %
O2 THERAPY: ABNORMAL
O2 THERAPY: ABNORMAL
PCO2 ARTERIAL: 35 MMHG (ref 35–45)
PCO2 ARTERIAL: 38 MMHG (ref 35–45)
PDW BLD-RTO: 12.4 % (ref 11.5–14.5)
PERFORMED ON: ABNORMAL
PERFORMED ON: NORMAL
PH ARTERIAL: 7.49 (ref 7.35–7.45)
PH ARTERIAL: 7.53 (ref 7.35–7.45)
PHOSPHORUS: 2 MG/DL (ref 2.5–4.5)
PHOSPHORUS: 3 MG/DL (ref 2.5–4.5)
PHOSPHORUS: 4.1 MG/DL (ref 2.5–4.5)
PHOSPHORUS: 5.4 MG/DL (ref 2.5–4.5)
PHOSPHORUS: 5.6 MG/DL (ref 2.5–4.5)
PLATELET # BLD: 219 K/UL (ref 130–400)
PMV BLD AUTO: 10.3 FL (ref 9.4–12.3)
PO2 ARTERIAL: 102 MMHG (ref 80–100)
PO2 ARTERIAL: 107 MMHG (ref 80–100)
POTASSIUM SERPL-SCNC: 2.3 MMOL/L (ref 3.5–5)
POTASSIUM SERPL-SCNC: 3.2 MMOL/L (ref 3.5–5)
POTASSIUM SERPL-SCNC: 3.3 MMOL/L (ref 3.5–5)
POTASSIUM, WHOLE BLOOD: 3
POTASSIUM, WHOLE BLOOD: 3.4
PRO-BNP: 568 PG/ML (ref 0–450)
RBC # BLD: 2.97 M/UL (ref 4.2–5.4)
SODIUM BLD-SCNC: 136 MMOL/L (ref 136–145)
SODIUM BLD-SCNC: 136 MMOL/L (ref 136–145)
SODIUM BLD-SCNC: 137 MMOL/L (ref 136–145)
SODIUM BLD-SCNC: 138 MMOL/L (ref 136–145)
SODIUM BLD-SCNC: 142 MMOL/L (ref 136–145)
VANCOMYCIN TROUGH: 10.7 UG/ML (ref 10–20)
WBC # BLD: 8.8 K/UL (ref 4.8–10.8)

## 2018-07-07 PROCEDURE — 2500000003 HC RX 250 WO HCPCS: Performed by: THORACIC SURGERY (CARDIOTHORACIC VASCULAR SURGERY)

## 2018-07-07 PROCEDURE — 84100 ASSAY OF PHOSPHORUS: CPT

## 2018-07-07 PROCEDURE — 80048 BASIC METABOLIC PNL TOTAL CA: CPT

## 2018-07-07 PROCEDURE — 36592 COLLECT BLOOD FROM PICC: CPT

## 2018-07-07 PROCEDURE — 71045 X-RAY EXAM CHEST 1 VIEW: CPT

## 2018-07-07 PROCEDURE — 6360000002 HC RX W HCPCS: Performed by: NURSE PRACTITIONER

## 2018-07-07 PROCEDURE — 2000000000 HC ICU R&B

## 2018-07-07 PROCEDURE — 80202 ASSAY OF VANCOMYCIN: CPT

## 2018-07-07 PROCEDURE — 83735 ASSAY OF MAGNESIUM: CPT

## 2018-07-07 PROCEDURE — 82803 BLOOD GASES ANY COMBINATION: CPT

## 2018-07-07 PROCEDURE — 6360000002 HC RX W HCPCS: Performed by: HOSPITALIST

## 2018-07-07 PROCEDURE — 99233 SBSQ HOSP IP/OBS HIGH 50: CPT | Performed by: INTERNAL MEDICINE

## 2018-07-07 PROCEDURE — 6360000002 HC RX W HCPCS: Performed by: THORACIC SURGERY (CARDIOTHORACIC VASCULAR SURGERY)

## 2018-07-07 PROCEDURE — 2700000000 HC OXYGEN THERAPY PER DAY

## 2018-07-07 PROCEDURE — 2580000003 HC RX 258: Performed by: INTERNAL MEDICINE

## 2018-07-07 PROCEDURE — 2580000003 HC RX 258: Performed by: HOSPITALIST

## 2018-07-07 PROCEDURE — 6360000002 HC RX W HCPCS: Performed by: INTERNAL MEDICINE

## 2018-07-07 PROCEDURE — 36600 WITHDRAWAL OF ARTERIAL BLOOD: CPT

## 2018-07-07 PROCEDURE — 94645 CONT INHLJ TX EACH ADDL HOUR: CPT

## 2018-07-07 PROCEDURE — 2580000003 HC RX 258: Performed by: THORACIC SURGERY (CARDIOTHORACIC VASCULAR SURGERY)

## 2018-07-07 PROCEDURE — 84132 ASSAY OF SERUM POTASSIUM: CPT

## 2018-07-07 PROCEDURE — 94644 CONT INHLJ TX 1ST HOUR: CPT

## 2018-07-07 PROCEDURE — 85027 COMPLETE CBC AUTOMATED: CPT

## 2018-07-07 PROCEDURE — 6370000000 HC RX 637 (ALT 250 FOR IP): Performed by: INTERNAL MEDICINE

## 2018-07-07 PROCEDURE — 94003 VENT MGMT INPAT SUBQ DAY: CPT

## 2018-07-07 PROCEDURE — 99024 POSTOP FOLLOW-UP VISIT: CPT | Performed by: NURSE PRACTITIONER

## 2018-07-07 PROCEDURE — 83880 ASSAY OF NATRIURETIC PEPTIDE: CPT

## 2018-07-07 PROCEDURE — 94640 AIRWAY INHALATION TREATMENT: CPT

## 2018-07-07 PROCEDURE — 2500000003 HC RX 250 WO HCPCS: Performed by: HOSPITALIST

## 2018-07-07 PROCEDURE — 82948 REAGENT STRIP/BLOOD GLUCOSE: CPT

## 2018-07-07 RX ORDER — MORPHINE SULFATE 1 MG/ML
2 INJECTION, SOLUTION EPIDURAL; INTRATHECAL; INTRAVENOUS
Status: DISCONTINUED | OUTPATIENT
Start: 2018-07-07 | End: 2018-07-10

## 2018-07-07 RX ORDER — MORPHINE SULFATE 1 MG/ML
1 INJECTION, SOLUTION EPIDURAL; INTRATHECAL; INTRAVENOUS
Status: DISCONTINUED | OUTPATIENT
Start: 2018-07-07 | End: 2018-07-10

## 2018-07-07 RX ORDER — IPRATROPIUM BROMIDE AND ALBUTEROL SULFATE 2.5; .5 MG/3ML; MG/3ML
1 SOLUTION RESPIRATORY (INHALATION) EVERY 4 HOURS PRN
Status: DISCONTINUED | OUTPATIENT
Start: 2018-07-07 | End: 2018-07-13 | Stop reason: HOSPADM

## 2018-07-07 RX ORDER — BUMETANIDE 0.25 MG/ML
2 INJECTION, SOLUTION INTRAMUSCULAR; INTRAVENOUS EVERY 12 HOURS
Status: DISCONTINUED | OUTPATIENT
Start: 2018-07-07 | End: 2018-07-09

## 2018-07-07 RX ADMIN — Medication 2 MG: at 16:55

## 2018-07-07 RX ADMIN — POTASSIUM CHLORIDE 10 MEQ: 400 INJECTION, SOLUTION INTRAVENOUS at 13:29

## 2018-07-07 RX ADMIN — LORAZEPAM 2 MG: 2 INJECTION INTRAMUSCULAR; INTRAVENOUS at 16:10

## 2018-07-07 RX ADMIN — SODIUM PHOSPHATE, MONOBASIC, MONOHYDRATE 15 MMOL: 276; 142 INJECTION, SOLUTION INTRAVENOUS at 11:00

## 2018-07-07 RX ADMIN — MINERAL OIL AND PETROLATUM: 150; 830 OINTMENT OPHTHALMIC at 14:32

## 2018-07-07 RX ADMIN — PROPOFOL 50 MCG/KG/MIN: 10 INJECTION, EMULSION INTRAVENOUS at 02:42

## 2018-07-07 RX ADMIN — INSULIN LISPRO 2 UNITS: 100 INJECTION, SOLUTION INTRAVENOUS; SUBCUTANEOUS at 12:13

## 2018-07-07 RX ADMIN — PROPOFOL 60 MCG/KG/MIN: 10 INJECTION, EMULSION INTRAVENOUS at 20:36

## 2018-07-07 RX ADMIN — VANCOMYCIN HYDROCHLORIDE 1250 MG: 10 INJECTION, POWDER, LYOPHILIZED, FOR SOLUTION INTRAVENOUS at 17:57

## 2018-07-07 RX ADMIN — METOCLOPRAMIDE 10 MG: 5 INJECTION, SOLUTION INTRAMUSCULAR; INTRAVENOUS at 20:41

## 2018-07-07 RX ADMIN — Medication 2 G: at 10:59

## 2018-07-07 RX ADMIN — MAGNESIUM SULFATE HEPTAHYDRATE 1 G: 1 INJECTION, SOLUTION INTRAVENOUS at 09:43

## 2018-07-07 RX ADMIN — Medication 1 G: at 15:47

## 2018-07-07 RX ADMIN — POTASSIUM CHLORIDE 10 MEQ: 400 INJECTION, SOLUTION INTRAVENOUS at 04:28

## 2018-07-07 RX ADMIN — CHLORHEXIDINE GLUCONATE 15 ML: 1.2 RINSE ORAL at 09:58

## 2018-07-07 RX ADMIN — MINERAL OIL AND PETROLATUM: 150; 830 OINTMENT OPHTHALMIC at 09:40

## 2018-07-07 RX ADMIN — BUMETANIDE 0.5 MG/HR: 0.25 INJECTION INTRAMUSCULAR; INTRAVENOUS at 06:28

## 2018-07-07 RX ADMIN — MINERAL OIL AND PETROLATUM: 150; 830 OINTMENT OPHTHALMIC at 06:25

## 2018-07-07 RX ADMIN — MINERAL OIL AND PETROLATUM: 150; 830 OINTMENT OPHTHALMIC at 20:36

## 2018-07-07 RX ADMIN — MAGNESIUM SULFATE HEPTAHYDRATE 1 G: 1 INJECTION, SOLUTION INTRAVENOUS at 13:29

## 2018-07-07 RX ADMIN — MAGNESIUM SULFATE HEPTAHYDRATE 1 G: 1 INJECTION, SOLUTION INTRAVENOUS at 11:00

## 2018-07-07 RX ADMIN — Medication 10 ML: at 19:52

## 2018-07-07 RX ADMIN — METOCLOPRAMIDE 10 MG: 5 INJECTION, SOLUTION INTRAMUSCULAR; INTRAVENOUS at 12:08

## 2018-07-07 RX ADMIN — MINERAL OIL AND PETROLATUM: 150; 830 OINTMENT OPHTHALMIC at 17:35

## 2018-07-07 RX ADMIN — PROPOFOL 50 MCG/KG/MIN: 10 INJECTION, EMULSION INTRAVENOUS at 06:24

## 2018-07-07 RX ADMIN — CHLORHEXIDINE GLUCONATE 15 ML: 1.2 RINSE ORAL at 20:37

## 2018-07-07 RX ADMIN — POTASSIUM CHLORIDE 10 MEQ: 400 INJECTION, SOLUTION INTRAVENOUS at 19:51

## 2018-07-07 RX ADMIN — Medication 10 ML: at 06:24

## 2018-07-07 RX ADMIN — VANCOMYCIN HYDROCHLORIDE 1250 MG: 10 INJECTION, POWDER, LYOPHILIZED, FOR SOLUTION INTRAVENOUS at 02:45

## 2018-07-07 RX ADMIN — LORAZEPAM 2 MG: 2 INJECTION INTRAMUSCULAR; INTRAVENOUS at 20:33

## 2018-07-07 RX ADMIN — METOCLOPRAMIDE 10 MG: 5 INJECTION, SOLUTION INTRAMUSCULAR; INTRAVENOUS at 16:55

## 2018-07-07 RX ADMIN — METOCLOPRAMIDE 10 MG: 5 INJECTION, SOLUTION INTRAMUSCULAR; INTRAVENOUS at 06:24

## 2018-07-07 RX ADMIN — MAGNESIUM SULFATE HEPTAHYDRATE 1 G: 1 INJECTION, SOLUTION INTRAVENOUS at 12:03

## 2018-07-07 RX ADMIN — FENTANYL CITRATE 125 MCG/HR: 50 INJECTION, SOLUTION INTRAMUSCULAR; INTRAVENOUS at 07:40

## 2018-07-07 RX ADMIN — PROPOFOL 60 MCG/KG/MIN: 10 INJECTION, EMULSION INTRAVENOUS at 21:04

## 2018-07-07 RX ADMIN — ENOXAPARIN SODIUM 40 MG: 40 INJECTION SUBCUTANEOUS at 12:07

## 2018-07-07 RX ADMIN — POTASSIUM CHLORIDE 20 MEQ: 400 INJECTION, SOLUTION INTRAVENOUS at 09:40

## 2018-07-07 RX ADMIN — INSULIN LISPRO 2 UNITS: 100 INJECTION, SOLUTION INTRAVENOUS; SUBCUTANEOUS at 19:53

## 2018-07-07 RX ADMIN — POTASSIUM CHLORIDE 10 MEQ: 400 INJECTION, SOLUTION INTRAVENOUS at 17:33

## 2018-07-07 RX ADMIN — PROPOFOL 50 MCG/KG/MIN: 10 INJECTION, EMULSION INTRAVENOUS at 18:23

## 2018-07-07 RX ADMIN — Medication 10 ML: at 20:33

## 2018-07-07 RX ADMIN — INSULIN LISPRO 2 UNITS: 100 INJECTION, SOLUTION INTRAVENOUS; SUBCUTANEOUS at 17:01

## 2018-07-07 RX ADMIN — Medication 1 G: at 09:29

## 2018-07-07 RX ADMIN — POTASSIUM CHLORIDE 10 MEQ: 400 INJECTION, SOLUTION INTRAVENOUS at 00:13

## 2018-07-07 RX ADMIN — MINERAL OIL AND PETROLATUM: 150; 830 OINTMENT OPHTHALMIC at 02:46

## 2018-07-07 RX ADMIN — PROPOFOL 40 MCG/KG/MIN: 10 INJECTION, EMULSION INTRAVENOUS at 14:32

## 2018-07-07 RX ADMIN — BUMETANIDE 2 MG: 0.25 INJECTION INTRAMUSCULAR; INTRAVENOUS at 15:47

## 2018-07-07 RX ADMIN — Medication 2 MG: at 19:52

## 2018-07-07 ASSESSMENT — PAIN SCALES - GENERAL
PAINLEVEL_OUTOF10: 0
PAINLEVEL_OUTOF10: 8
PAINLEVEL_OUTOF10: 0
PAINLEVEL_OUTOF10: 0

## 2018-07-07 ASSESSMENT — PAIN SCALES - WONG BAKER
WONGBAKER_NUMERICALRESPONSE: 0

## 2018-07-07 ASSESSMENT — PULMONARY FUNCTION TESTS
PIF_VALUE: 28.4
PIF_VALUE: 22.5
PIF_VALUE: 18.8
PIF_VALUE: 23.8
PIF_VALUE: 26.3
PIF_VALUE: 18.2
PIF_VALUE: 27.3
PIF_VALUE: 26.9
PIF_VALUE: 25.1
PIF_VALUE: 26.6

## 2018-07-07 NOTE — PROGRESS NOTES
Hospitalist Progress Note  7/7/2018 8:35 AM  Subjective:   Admit Date: 6/26/2018  PCP: Jina Vera    Chief Complaint: diabetes mellitus    Subjective: Patient remains intubated and sedated. Following commands with sedation turned down per nursing. No acute changes. Vent settings being titrated down. Patient not on pressors. Bumex gtt. Cumulative Hospital History:   6-26: Admitted with chest discomfort for cardiac catheterization. Showed severe multivessel disease. 6-27: Cardiothoracic surgery consulted. 6-28: CTS plans CABG on 7-2. Hospitalist service consulted for medical management. 6-29: Insomnia, Ambien ordered strictly PRN only. Patient cheating on diet, will not adjust Lantus dose as she states she is willing to comply with diet now. 6-30: Lantus adjusted due to hyperglycemia yesterday, will revert changes due to dietary noncompliance and risk of hypoglycemia. 7-1: Constipated, refuses change in pharmacotherapy. 7-2: CABG, postoperative pain. 7-3: Poor pain control. Increase Xanax frequency. Change Lantus and bolus insulin regimen. 7-4: Patient clinically improved. Glucose with better control. Sodium trending down. D/c IVF. Urine studies sent. Likely transfer out of ICU today. 7-5: Patient requiring increased O2. Receiving diuresis per CTS. CXR with effusions/opacities ? ARDS. Sugars uncontrolled  7-6: Patient started on diuresis yesterday for increased O2 requirements, Sats remained stable throughout the day and patient appeared to be comfortable, but had a significant drop overnight and respiratory distress. ARDS seen on CXR. Patient intubated, sedated, paralyzed, and pronated. [place] contacted for transport and ECMO, however, by the time of evaluation, patient was stabilizing and the decision was made not to transport. New infiltrate also identified on CXR and patient started on empiric abx Vanc/Cefepime. Pulm colnsulted. 7-7: Patient remains intubated sedated.   On insulin gtt. Vent settings weaning down. Following commands with sedation weaned.     ROS: Unable to obtain    Diet NPO Effective Now    Intake/Output Summary (Last 24 hours) at 07/07/18 0835  Last data filed at 07/07/18 0600   Gross per 24 hour   Intake          1734.06 ml   Output             4425 ml   Net         -2690.94 ml     Medications:   vecuronium (NORCURON) infusion Stopped (07/06/18 1845)    propofol 50 mcg/kg/min (07/07/18 0624)    dextrose 5 % and 0.45 % NaCl      insulin (HUMAN R) non-weight based infusion 0.004 Units/kg/hr (07/07/18 0300)    dextrose 5 % and 0.45 % NaCl      bumetanide 0.1 mg/mL infusion 0.5 mg/hr (07/07/18 2724)    inhalation builder Stopped (07/07/18 0247)    fentaNYL (SUBLIMAZE) 1250 mcg in sodium chloride 0.9 % 250 mL 125 mcg/hr (07/07/18 0740)    dextrose       Current Facility-Administered Medications   Medication Dose Route Frequency Provider Last Rate Last Dose    AKWA TEARS (LACRILUBE) 2-15-83 % opthalmic ointment   Both Eyes Q4H Jada Schuler MD        chlorhexidine (PERIDEX) 0.12 % solution 15 mL  15 mL Mouth/Throat BID Jada Schuler MD   15 mL at 07/06/18 2054    LORazepam (ATIVAN) injection 2 mg  2 mg Intravenous Q1H PRN Jada Schuler MD        vecuronium (NORCURON) 100 mg in sodium chloride 0.9 % 100 mL infusion  1 mcg/kg/min Intravenous Continuous Dom Jacobo MD   Stopped at 07/06/18 1845    propofol 1000 MG/100ML injection  10 mcg/kg/min Intravenous Titrated Jada Schuler MD 27.2 mL/hr at 07/07/18 0624 50 mcg/kg/min at 07/07/18 0624    dextrose 50 % solution 12.5 g  12.5 g Intravenous PRN Gaby Brown MD        magnesium sulfate 1 g in dextrose 5% 100 mL IVPB  1 g Intravenous PRN Gaby Brown MD   Stopped at 07/06/18 0859    sodium phosphate 10 mmol in dextrose 5 % 250 mL IVPB  10 mmol Intravenous PRN Gaby Brown MD        Or    sodium phosphate 15 mmol in dextrose 5 % 250 mL IVPB  15 mmol Intravenous PRN Gaby Brown MD        Or  sodium phosphate 20 mmol in dextrose 5 % 500 mL IVPB  20 mmol Intravenous PRN Swathi Boss MD        dextrose 5 % and 0.45 % sodium chloride infusion   Intravenous Continuous PRN Swathi Boss MD        insulin regular (HUMULIN R;NOVOLIN R) 100 Units in sodium chloride 0.9 % 100 mL infusion  0.1 Units/kg/hr Intravenous Continuous Swathi Boss MD 0.4 mL/hr at 07/07/18 0300 0.004 Units/kg/hr at 07/07/18 0300    dextrose 5 % and 0.45 % sodium chloride infusion   Intravenous Continuous PRN Swathi Boss MD        0.9 % sodium chloride bolus  15 mL/kg Intravenous Once Swathi Boss MD        bumetanide (BUMEX) 12.5 mg in sodium chloride 0.9 % 125 mL infusion  0.5 mg/hr Intravenous Continuous Alta Duarte MD 5 mL/hr at 07/07/18 0628 0.5 mg/hr at 07/07/18 1738    Flolan (epoprostenol) 300 mcg / 30 ml nebulization solution   Inhalation Continuous Eloy MD Jaime   Stopped at 07/07/18 0247    guaiFENesin (MUCINEX) extended release tablet 600 mg  600 mg Oral BID Serge Medicus, APRN   600 mg at 07/05/18 4057    lisinopril (PRINIVIL;ZESTRIL) tablet 5 mg  5 mg Oral Daily Serge Medicus, APRN   5 mg at 07/05/18 7032    enoxaparin (LOVENOX) injection 40 mg  40 mg Subcutaneous Q24H Bobo Chau MD   40 mg at 07/06/18 1122    fentaNYL (SUBLIMAZE) 1250 mcg in sodium chloride 0.9 % 250 mL  25 mcg/hr Intravenous Continuous Swathi Boss MD 25 mL/hr at 07/07/18 0740 125 mcg/hr at 07/07/18 0740    cefepime (MAXIPIME) 1 g in sodium chloride (PF) 10 mL IV syringe  1 g Intravenous Q8H Swathi Boss MD   1 g at 07/06/18 2346    vancomycin (VANCOCIN) 1,250 mg in dextrose 5 % 250 mL IVPB  1,250 mg Intravenous Q12H Swathi Boss MD   Stopped at 07/07/18 0507    vancomycin (VANCOCIN) intermittent dosing (placeholder)   Other RX Placeholder Swathi Boss MD        atorvastatin (LIPITOR) tablet 40 mg  40 mg Oral Nightly Luis Han MD   40 mg at 07/04/18 2007    metoprolol succinate (TOPROL XL) extended release tablet 25 mg  25 mg Oral Daily Dianna Caputo MD   25 mg at 07/05/18 9148    polyethylene glycol (GLYCOLAX) packet 17 g  17 g Oral BID Aubrey Castrob, APRN   17 g at 07/04/18 2006    metoclopramide (REGLAN) injection 10 mg  10 mg Intravenous 4x Daily AC & HS Aubrey Torres, APRN   10 mg at 07/07/18 0465    calcium carbonate (TUMS) chewable tablet 1,000 mg  1,000 mg Oral BID Tere Moon DO   1,000 mg at 07/05/18 5346    glucose (GLUTOSE) 40 % oral gel 15 g  15 g Oral PRN Shorty Aponte MD        dextrose 50 % solution 12.5 g  12.5 g Intravenous PRN Sharath Hernández MD        glucagon (rDNA) injection 1 mg  1 mg Intramuscular PRN Sharath Hernández MD        dextrose 5 % solution  100 mL/hr Intravenous PRN Shorty Aponte MD        sodium chloride flush 0.9 % injection 10 mL  10 mL Intravenous 2 times per day Shar Edwards MD   10 mL at 07/06/18 2056    sodium chloride flush 0.9 % injection 10 mL  10 mL Intravenous PRN Shar Edwards MD   10 mL at 07/07/18 0624    potassium chloride 20 mEq/50 mL IVPB (Central Line)  10 mEq Intravenous PRN Shar Edwards MD   Stopped at 07/07/18 0528    acetaminophen (TYLENOL) tablet 650 mg  650 mg Oral Q4H PRN Shar Edwards MD        oxyCODONE (ROXICODONE) immediate release tablet 5 mg  5 mg Oral Q4H PRN MD José Antonio Jackson oxyCODONE (ROXICODONE) immediate release tablet 10 mg  10 mg Oral Q4H PRN Shar Edwards MD   10 mg at 07/05/18 1646    zolpidem (AMBIEN) tablet 5 mg  5 mg Oral Nightly PRN Shar Edwards MD        docusate sodium (COLACE) capsule 100 mg  100 mg Oral BID Shar Edwards MD   100 mg at 07/05/18 6963    magnesium hydroxide (MILK OF MAGNESIA) 400 MG/5ML suspension 30 mL  30 mL Oral Daily PRN Shar Edwards MD        ondansetron [facility]) injection 4 mg  4 mg Intravenous Q8H PRN Erving Jerry, MD   4 mg at 07/02/18 2208    famotidine (PEPCID) tablet 20 mg  20 mg Oral BID Shar Edwards MD 20 mg at 07/05/18 0067    aspirin EC tablet 81 mg  81 mg Oral Daily Nnamdi Babin MD   81 mg at 07/05/18 7178    clopidogrel (PLAVIX) tablet 75 mg  75 mg Oral Daily Nnamdi Babin MD   75 mg at 07/05/18 3538    amitriptyline (ELAVIL) tablet 100 mg  100 mg Oral Nightly Sunday Braun, DO   100 mg at 07/05/18 1856    gabapentin (NEURONTIN) capsule 300 mg  300 mg Oral TID Niesha Estrella, DO   300 mg at 07/05/18 1305        Labs:     Recent Labs      07/05/18   0431  07/06/18   0015   WBC  11.3*  15.0*   RBC  3.07*  2.67*   HGB  9.3*  7.9*   HCT  27.9*  24.3*   MCV  90.9  91.0   MCH  30.3  29.6   MCHC  33.3  32.5*   PLT  146  166     Recent Labs      07/06/18   1800  07/06/18   2225  07/07/18   0250  07/07/18   0414  07/07/18   0605   NA  130*  131*  136   --    --    K  3.0*  3.1*  3.2*  3.0  3.4   ANIONGAP  14  15  17   --    --    CL  91*  92*  97*   --    --    CO2  25  24  22   --    --    BUN  11  10  11   --    --    CREATININE  0.6  0.6  0.6   --    --    GLUCOSE  99  127*  90   --    --    CALCIUM  7.9*  8.0*  8.2*   --    --      Recent Labs      07/06/18   1800  07/06/18   2225  07/07/18   0250   MG  1.7  2.0  1.9   PHOS  3.7  3.3  3.0     Recent Labs      07/06/18   0015   AST  27   ALT  19   BILITOT  1.0   ALKPHOS  170*     ABGs:  Recent Labs      07/06/18   0342  07/06/18   0638  07/06/18   1315  07/07/18   0414  07/07/18   0605   PHART  7.560*  7.540*  7.520*  7.530*  7.490*   NAC3QNR  22.0*  26.0*  32.0*  35.0  38.0   PO2ART  168.0*  96.0  90.0  102.0*  107.0*   LPB8HWV  19.7*  22.2  26.1*  29.2*  29.0*   BEART  -1.5  0.3  3.4*  6.2*  5.3*   HGBAE  9.4*  9.4*  10.1*  9.5*  11.2*   N2UKJZRB  97.0  96.1  95.9  96.7  95.9   CARBOXHGBART  2.0  2.0  1.7  2.0  2.0   02THERAPY  Unknown  Unknown  Unknown  Unknown  Unknown     HgBA1c: Invalid input(s):  HGBA1C  FLP:    Lab Results   Component Value Date    TRIG 96 06/30/2018    HDL 41 06/30/2018    LDLCALC 119 06/30/2018     TSH:  No results found for: TSH  Troponin T: No results for input(s): TROPONINI in the last 72 hours. INR:   Recent Labs      07/06/18   0015   INR  1.30*       Objective:   Vitals: BP (!) 97/51   Pulse 108   Temp 97.5 °F (36.4 °C) (Temporal)   Resp 18   Ht 5' 4\" (1.626 m)   Wt 184 lb 3 oz (83.5 kg)   LMP 01/22/2012   SpO2 94%   Breastfeeding? No   BMI 31.62 kg/m²   24HR INTAKE/OUTPUT:      Intake/Output Summary (Last 24 hours) at 07/07/18 0835  Last data filed at 07/07/18 0600   Gross per 24 hour   Intake          1734.06 ml   Output             4425 ml   Net         -2690.94 ml     General appearance: intubated, sedated,  HEENT: atraumatic, ET tube in place  Lungs: Mechanically ventilated, posteriorly crackles heard BL, diminished at based. Heart: tachycardic, regular rhythm, No murmurs appreciated  Extremities: +1 edema in BL upper ext. Compression stockings over BLLE  Neurologic: sedated and paralyzed  Skin: no rashes, nodules. Assessment and Plan:   Principal Problem:  # ARDS, improving  - Monitor in ICU.  - MV day 2  - Pulm consult, appreciate recs  - off paralytics, remains sedated Fentanyl and Propofol  - FiO2 requirements down trending  - Continue to monitor  - Diureses per CTS, Bumex gtt  - daily CXR and ABG  - Evaluated by [place] for transport and ?ECMO, but patient was stabilizing by time of evaluation and decision made not to transfer 07/05. #  Coronary artery disease involving native coronary artery of native heart without angina pectoris  - s/p CABG on 7/2  - Transferred out of ICU 07/04, transferred back 07/05 for ARDS. - Insulin gtt restarted on 07/05  - Continue medical management per cards.     #  Type 1 diabetes mellitus with neurological manifestations (HCC)  - Insulin gtt restarted 07/05  - will continue while in ICU  - q4 BMP  - monitor lytes and replete as needed  - Glucose better controlled, will continue insulin gtt at this time and transition to subq closer to time of transfer from ICU    # Anion Gap metabolic acidosis, improved  - insulin gtt as above  - Patient currently hypervolemic, being diuresed. - Gap closed 07/06  - Will continue to monitor      #  Hyponatremia, likely due to elevated glucose, resolved  - Continue with insulin gtt as above  - Monitor BMP    #  Normocytic anemia  - Acute, likely multifactorial in the setting of hemodilution and blood loss postop  -Continue to monitor    #Hospital acquired pneumonia  - New infiltrate seen on CXR  - Broad spectrum abx started 07/05 Vanc, cefepime  - Cultures redrawn, will follow, NGTD      Insomnia    Constipation    Gastroesophageal reflux disease without esophagitis    Anxiety and depression    Unstable angina (HCC)    Abnormal nuclear stress test    Unstable angina pectoris ([facility] [address]    Type 1 diabetes mellitus with complication ([facility] [address]    Disposition: patient remains intubated, sedated, on insulin gtt and bumex gtt. Will continue at this time. Continue Broad spectrum abx. Will transition to subq insulin this afternoon or tomorrow AM depending on clinical course. Advance Directive: Full Code    DVT prophylaxis: lovenox    Discharge planning:  To home      Buddy Calderon MD  [facility] Hospitalist

## 2018-07-07 NOTE — PROGRESS NOTES
POST OP CARDIOTHORACIC SURGERY PROGRESS NOTE    Post op day 5    SUBJECTIVE:  Sedated on ventilator. BP (!) 97/51   Pulse 108   Temp 97.5 °F (36.4 °C) (Temporal)   Resp 18   Ht 5' 4\" (1.626 m)   Wt 184 lb 3 oz (83.5 kg)   LMP 2012   SpO2 94%   Breastfeeding? No   BMI 31.62 kg/m²   Average, Min, and Max for last 24 hours Vitals:  TEMPERATURE:  Temp  Av °F (36.7 °C)  Min: 97.5 °F (36.4 °C)  Max: 98.8 °F (37.1 °C)  RESPIRATIONS RANGE: Resp  Av.6  Min: 14  Max: 27  PULSE RANGE: Pulse  Av.1  Min: 85  Max: 110  BLOOD PRESSURE RANGE:  Systolic (68XNV), MFQ:443 , Min:93 , XTU:016   ; Diastolic (75JYQ), KOM:08, Min:51, Max:94    PULSE OXIMETRY RANGE: SpO2  Av %  Min: 89 %  Max: 100 %    I/O last 3 completed shifts: In: 1844.5 [I.V.:1294.5; IV Piggyback:550]  Out: 4638 [Urine:3875; Emesis/NG output:850]    CHEST: Clear bilateral breath sounds without wheezes or rhonchi. CARDIOVASCULAR: Normal HT with RRR. No murmurs, gallops or rubs. INCISION:  Sternal incision with Mepilex dressing dry and intact.                    Right EVH incision with Mepilex dressing dry and intact.       LABS:  BMP:    Lab Results   Component Value Date     2018    K 3.2 2018    K 4.1 2018    CL 97 2018    CO2 22 2018    BUN 11 2018    CREATININE 0.6 2018    CALCIUM 8.2 2018    LABGLOM >60 2018    GLUCOSE 90 2018     ABG:  PRVC 14, , FiO2 35%, PEEP 15  pH, Arterial 7.490   7.350 - 7.450 Final 2018  6:05 AM   pCO2, Arterial 38.0  35.0 - 45.0 mmHg Final 2018  6:05 AM   pO2, Arterial 107.0   80.0 - 100.0 mmHg Final 2018  6:05 AM   HCO3, Arterial 29.0   22.0 - 26.0 mmol/L Final 2018  6:05 AM   Base Excess, Arterial 5.3   -2.0 - 2.0 mmol/L Final 2018  6:05 AM   Hemoglobin, Art, Extended 11.2   12.0 - 16.0 g/dL Final 2018  6:05 AM   O2 Sat, Arterial 95.9  >92 % Final 2018  6:05 AM   Carboxyhgb, Arterial 2.0  0.0 - 5.0 % Final 07/07/2018  6:05 AM   Methemoglobin, Arterial 1.5  <1.5 % Final 07/07/2018  6:05 AM   O2 Content, Arterial 15.3  Not Established mL/dL Final 07/07/2018  6:05 AM   O2 Therapy Unknown   Final 07/07/2018  6:05 AM     Blood Culture (07/06/2018): No growth to date. Urine Culture (07/05/2018):  Pending    Respiratory Culture: Needs to be collected. CHEST XRAY: Normal postoperative changes. Improvement of ARDS pattern. ASSESSMENT:     1.         Severe Multivessel Coronary Artery Disease - S/P 2V PACABG on 07/02/2018  2.         Type 1 Juvenile DM  3.         PMHx Chronic Constipation 2' to Slow Transit  4.         GERD  5. Postoperative Hypoxic Respiratory Failure 2' to ARDS 2' to Long-term Cigarette Smoking. Re-intubated on 07/05/2018. On Bumex drip. PLAN:     1. Continue Supportive Care.   2. Wean and Discontinue Sedation      Electronically signed by NAVDEEP Thompson on 7/7/18 at 8:45 AM

## 2018-07-07 NOTE — PROGRESS NOTES
Pulmonary and Critical Care Progress Note [address] [facility]    Patient: Tatianna Bailey    1975    MR# [id number]    Acct# [de-identified]   07/07/18   8:41 AM  Referring Provider: SEE Hughes MD    Chief Complaint: Mechanically ventilated    Interval history: Patient remains sedated on propofol and fentanyl and is being mechanically ventilated. She is no longer paralyzed. Her eyes are open and she is following some commands. Sat is 96%. Peep decreased to 10. FIO2 at 0.35. Flolan still being nebulized. CXR showing some improvement. She remains on a bumex gtt with good results. No nutrition. No family present. No other aggravating or alleviating factors or associated symptoms.        AKWA TEARS  Both Eyes Q4H   chlorhexidine 15 mL Mouth/Throat BID   sodium chloride 15 mL/kg Intravenous Once   guaiFENesin 600 mg Oral BID   lisinopril 5 mg Oral Daily   enoxaparin 40 mg Subcutaneous Q24H   cefepime 1 g Intravenous Q8H   vancomycin 1,250 mg Intravenous Q12H   vancomycin (VANCOCIN) intermittent dosing (placeholder)  Other RX Placeholder   atorvastatin 40 mg Oral Nightly   metoprolol succinate 25 mg Oral Daily   polyethylene glycol 17 g Oral BID   metoclopramide 10 mg Intravenous 4x Daily AC & HS   calcium carbonate 1,000 mg Oral BID   sodium chloride flush 10 mL Intravenous 2 times per day   docusate sodium 100 mg Oral BID   famotidine 20 mg Oral BID   aspirin 81 mg Oral Daily   clopidogrel 75 mg Oral Daily   amitriptyline 100 mg Oral Nightly   gabapentin 300 mg Oral TID      propofol 50 mcg/kg/min (07/07/18 0624)    dextrose 5 % and 0.45 % NaCl      insulin (HUMAN R) non-weight based infusion 0.004 Units/kg/hr (07/07/18 0300)    dextrose 5 % and 0.45 % NaCl      bumetanide 0.1 mg/mL infusion 0.5 mg/hr (07/07/18 0628)    dextrose       Review of Systems:     Cannot obtain due to mechanical ventilation    Physical Exam:  Blood pressure (!) 97/51, pulse 108, temperature 97.5 °F (36.4 °C), temperature source Temporal, resp. rate 18, height 5' 4\" (1.626 m), weight 184 lb 3 oz (83.5 kg), last menstrual period 01/22/2012, SpO2 94 %, not currently breastfeeding. Intake/Output Summary (Last 24 hours) at 07/07/18 0841  Last data filed at 07/07/18 0600   Gross per 24 hour   Intake          1734.06 ml   Output             4425 ml   Net         -2690.94 ml        Vent Mode: PRVC/Vt Ordered: 420 mL/Rate Set: 14 bmp/Pressure Support: 5 cmH20/PEEP/CPAP: 10/FiO2 : 35 %/   Peak Inspiratory Pressure: 18.8 cmH2O  Mean Airway Pressure: 13 cmH20  I:E Ratio: 1:2.79  Rate Measured: 17.8 br/min  Minute Volume: 9.1 Liters           Physical Exam:    Constitutional: She appears well-developed and well-nourished. No distress. She is sedated and intubated. orogastric tube, central venous access via groin   HENT:   Head: Normocephalic and atraumatic. Right Ear: External ear normal.   Left Ear: External ear normal.   Eyes: No scleral icterus. Neck: Neck supple. Cardiovascular: Normal rate and regular rhythm. Exam reveals no gallop. Murmur heard. Pulmonary/Chest: She is intubated. No respiratory distress. She has no wheezes. She has no rales. She exhibits no tenderness. Abdominal: + BS, soft   Musculoskeletal: Normal range of motion. She exhibits no edema. Neurological: sedated and intubated but eyes are open and she is attempting to raise her head up. Skin: Skin is warm and dry. Psychiatric:   Not assessable   Vitals reviewed.     Recent Labs      07/05/18   0431  07/06/18   0015   WBC  11.3*  15.0*   RBC  3.07*  2.67*   HGB  9.3*  7.9*   HCT  27.9*  24.3*   PLT  146  166   MCV  90.9  91.0   MCH  30.3  29.6   MCHC  33.3  32.5*   RDW  12.7  12.4      Recent Labs      07/06/18   1800  07/06/18   2225  07/07/18   0250  07/07/18   0414  07/07/18   0605   NA  130*  131*  136   --    --    K  3.0*  3.1*  3.2*  3.0  3.4   CL  91*  92*  97*   --    --    CO2  25  24  22   --    --    BUN  11  10  11   --    --    CREATININE

## 2018-07-07 NOTE — PROGRESS NOTES
Nutrition Assessment    Type and Reason for Visit: Consult    Nutrition Recommendations: start TF if unable to extubate. Suggest Vital High protein @ 25 ml/hr for 600 kcal and 53 g pro, 67 g CHO. 1 bottle proteinex for flush. Malnutrition Assessment:  · Malnutrition Status: At risk for malnutrition  · Context: Acute illness or injury  · Findings of the 6 clinical characteristics of malnutrition (Minimum of 2 out of 6 clinical characteristics is required to make the diagnosis of moderate or severe Protein Calorie Malnutrition based on AND/ASPEN Guidelines):  1. Energy Intake-Less than or equal to 50%, not able to assess    2. Weight Loss-No significant weight loss,    3. Fat Loss-No significant subcutaneous fat loss,    4. Muscle Loss-No significant muscle mass loss,    5. Fluid Accumulation-No significant fluid accumulation, Extremities  6.  Strength-Not measured    Nutrition Diagnosis:   · Problem: Inadequate oral intake  · Etiology: related to Acute injury/trauma     Signs and symptoms:  as evidenced by NPO status due to medical condition, Intubation    Nutrition Assessment:  · Subjective Assessment: Consulted again this am for TF ordering and management. Pt remains on vent, no nutrition, has NG to suction and continues to have significant output per nursing. Propofol has been decreased.   · Nutrition-Focused Physical Findings: edema noted, remains intubated, NG to suction  · Wound Type: Surgical Wound  · Current Nutrition Therapies:  · Oral Diet Orders: NPO   · Oral Diet intake: NPO  · Oral Nutrition Supplement (ONS) Orders: None  · ONS intake: NPO  · Anthropometric Measures:  · Ht: 5' 4\" (162.6 cm)   · Current Body Wt: 184 lb (83.5 kg)  · Admission Body Wt: 182 lb (82.6 kg) (stated)  · Ideal Body Wt: 120 lb (54.4 kg), % Ideal Body 158%  · Adjusted Body Wt: 137 lb 8 oz (62.4 kg), body weight adjusted for Obesity  · BMI Classification: BMI 30.0 - 34.9 Obese Class I  · Comparative Standards (Estimated

## 2018-07-08 ENCOUNTER — APPOINTMENT (OUTPATIENT)
Dept: GENERAL RADIOLOGY | Age: 43
DRG: 233 | End: 2018-07-08
Attending: INTERNAL MEDICINE
Payer: MEDICAID

## 2018-07-08 LAB
ANION GAP SERPL CALCULATED.3IONS-SCNC: 14 MMOL/L (ref 7–19)
ANION GAP SERPL CALCULATED.3IONS-SCNC: 15 MMOL/L (ref 7–19)
ANION GAP SERPL CALCULATED.3IONS-SCNC: 15 MMOL/L (ref 7–19)
ANION GAP SERPL CALCULATED.3IONS-SCNC: 16 MMOL/L (ref 7–19)
ANION GAP SERPL CALCULATED.3IONS-SCNC: 16 MMOL/L (ref 7–19)
ANION GAP SERPL CALCULATED.3IONS-SCNC: 17 MMOL/L (ref 7–19)
ANION GAP SERPL CALCULATED.3IONS-SCNC: 19 MMOL/L (ref 7–19)
BASE EXCESS ARTERIAL: 10.9 MMOL/L (ref -2–2)
BETA-HYDROXYBUTYRATE: 8.5 MG/DL (ref 0–3)
BUN BLDV-MCNC: 10 MG/DL (ref 6–20)
BUN BLDV-MCNC: 11 MG/DL (ref 6–20)
BUN BLDV-MCNC: 11 MG/DL (ref 6–20)
BUN BLDV-MCNC: 12 MG/DL (ref 6–20)
BUN BLDV-MCNC: 12 MG/DL (ref 6–20)
CALCIUM SERPL-MCNC: 8.4 MG/DL (ref 8.6–10)
CALCIUM SERPL-MCNC: 8.6 MG/DL (ref 8.6–10)
CALCIUM SERPL-MCNC: 8.7 MG/DL (ref 8.6–10)
CARBOXYHEMOGLOBIN ARTERIAL: 2.1 % (ref 0–5)
CHLORIDE BLD-SCNC: 94 MMOL/L (ref 98–111)
CHLORIDE BLD-SCNC: 95 MMOL/L (ref 98–111)
CHLORIDE BLD-SCNC: 96 MMOL/L (ref 98–111)
CO2: 27 MMOL/L (ref 22–29)
CO2: 28 MMOL/L (ref 22–29)
CO2: 29 MMOL/L (ref 22–29)
CO2: 30 MMOL/L (ref 22–29)
CREAT SERPL-MCNC: 0.6 MG/DL (ref 0.5–0.9)
CREAT SERPL-MCNC: 0.7 MG/DL (ref 0.5–0.9)
GFR NON-AFRICAN AMERICAN: >60
GLUCOSE BLD-MCNC: 104 MG/DL (ref 74–109)
GLUCOSE BLD-MCNC: 116 MG/DL (ref 70–99)
GLUCOSE BLD-MCNC: 143 MG/DL (ref 74–109)
GLUCOSE BLD-MCNC: 146 MG/DL (ref 74–109)
GLUCOSE BLD-MCNC: 163 MG/DL (ref 70–99)
GLUCOSE BLD-MCNC: 183 MG/DL (ref 74–109)
GLUCOSE BLD-MCNC: 198 MG/DL (ref 70–99)
GLUCOSE BLD-MCNC: 200 MG/DL (ref 74–109)
GLUCOSE BLD-MCNC: 208 MG/DL (ref 70–99)
GLUCOSE BLD-MCNC: 214 MG/DL (ref 74–109)
GLUCOSE BLD-MCNC: 225 MG/DL (ref 70–99)
GLUCOSE BLD-MCNC: 226 MG/DL (ref 70–99)
GLUCOSE BLD-MCNC: 247 MG/DL (ref 74–109)
HCO3 ARTERIAL: 34 MMOL/L (ref 22–26)
HCT VFR BLD CALC: 26.6 % (ref 37–47)
HEMOGLOBIN, ART, EXTENDED: 10.1 G/DL (ref 12–16)
HEMOGLOBIN: 8.7 G/DL (ref 12–16)
MAGNESIUM: 1.8 MG/DL (ref 1.6–2.6)
MAGNESIUM: 1.9 MG/DL (ref 1.6–2.6)
MAGNESIUM: 2 MG/DL (ref 1.6–2.6)
MCH RBC QN AUTO: 30 PG (ref 27–31)
MCHC RBC AUTO-ENTMCNC: 32.7 G/DL (ref 33–37)
MCV RBC AUTO: 91.7 FL (ref 81–99)
METHEMOGLOBIN ARTERIAL: 1.7 %
O2 CONTENT ARTERIAL: 13.4 ML/DL
O2 SAT, ARTERIAL: 93.6 %
O2 THERAPY: ABNORMAL
PCO2 ARTERIAL: 38 MMHG (ref 35–45)
PDW BLD-RTO: 12.5 % (ref 11.5–14.5)
PERFORMED ON: ABNORMAL
PH ARTERIAL: 7.56 (ref 7.35–7.45)
PHOSPHORUS: 4.3 MG/DL (ref 2.5–4.5)
PHOSPHORUS: 4.5 MG/DL (ref 2.5–4.5)
PHOSPHORUS: 5.1 MG/DL (ref 2.5–4.5)
PHOSPHORUS: 5.3 MG/DL (ref 2.5–4.5)
PHOSPHORUS: 5.5 MG/DL (ref 2.5–4.5)
PHOSPHORUS: 5.6 MG/DL (ref 2.5–4.5)
PHOSPHORUS: 5.8 MG/DL (ref 2.5–4.5)
PLATELET # BLD: 276 K/UL (ref 130–400)
PMV BLD AUTO: 10.7 FL (ref 9.4–12.3)
PO2 ARTERIAL: 72 MMHG (ref 80–100)
POTASSIUM SERPL-SCNC: 3.1 MMOL/L (ref 3.5–5)
POTASSIUM SERPL-SCNC: 3.2 MMOL/L (ref 3.5–5)
POTASSIUM SERPL-SCNC: 3.4 MMOL/L (ref 3.5–5)
POTASSIUM SERPL-SCNC: 3.5 MMOL/L (ref 3.5–5)
POTASSIUM SERPL-SCNC: 3.7 MMOL/L (ref 3.5–5)
POTASSIUM, WHOLE BLOOD: 3.2
RBC # BLD: 2.9 M/UL (ref 4.2–5.4)
SODIUM BLD-SCNC: 137 MMOL/L (ref 136–145)
SODIUM BLD-SCNC: 139 MMOL/L (ref 136–145)
SODIUM BLD-SCNC: 140 MMOL/L (ref 136–145)
SODIUM BLD-SCNC: 140 MMOL/L (ref 136–145)
SODIUM BLD-SCNC: 142 MMOL/L (ref 136–145)
URINE CULTURE, ROUTINE: NORMAL
WBC # BLD: 9.9 K/UL (ref 4.8–10.8)

## 2018-07-08 PROCEDURE — 80048 BASIC METABOLIC PNL TOTAL CA: CPT

## 2018-07-08 PROCEDURE — 2580000003 HC RX 258: Performed by: CLINICAL NURSE SPECIALIST

## 2018-07-08 PROCEDURE — 6360000002 HC RX W HCPCS: Performed by: THORACIC SURGERY (CARDIOTHORACIC VASCULAR SURGERY)

## 2018-07-08 PROCEDURE — 6360000002 HC RX W HCPCS: Performed by: NURSE PRACTITIONER

## 2018-07-08 PROCEDURE — 6360000002 HC RX W HCPCS: Performed by: INTERNAL MEDICINE

## 2018-07-08 PROCEDURE — 83735 ASSAY OF MAGNESIUM: CPT

## 2018-07-08 PROCEDURE — 74019 RADEX ABDOMEN 2 VIEWS: CPT

## 2018-07-08 PROCEDURE — C9113 INJ PANTOPRAZOLE SODIUM, VIA: HCPCS | Performed by: INTERNAL MEDICINE

## 2018-07-08 PROCEDURE — 6370000000 HC RX 637 (ALT 250 FOR IP): Performed by: INTERNAL MEDICINE

## 2018-07-08 PROCEDURE — 71045 X-RAY EXAM CHEST 1 VIEW: CPT

## 2018-07-08 PROCEDURE — 2580000003 HC RX 258: Performed by: INTERNAL MEDICINE

## 2018-07-08 PROCEDURE — 2700000000 HC OXYGEN THERAPY PER DAY

## 2018-07-08 PROCEDURE — 6360000002 HC RX W HCPCS: Performed by: HOSPITALIST

## 2018-07-08 PROCEDURE — 36600 WITHDRAWAL OF ARTERIAL BLOOD: CPT

## 2018-07-08 PROCEDURE — 36592 COLLECT BLOOD FROM PICC: CPT

## 2018-07-08 PROCEDURE — 85027 COMPLETE CBC AUTOMATED: CPT

## 2018-07-08 PROCEDURE — 84132 ASSAY OF SERUM POTASSIUM: CPT

## 2018-07-08 PROCEDURE — 6360000002 HC RX W HCPCS: Performed by: CLINICAL NURSE SPECIALIST

## 2018-07-08 PROCEDURE — 84100 ASSAY OF PHOSPHORUS: CPT

## 2018-07-08 PROCEDURE — 2500000003 HC RX 250 WO HCPCS: Performed by: THORACIC SURGERY (CARDIOTHORACIC VASCULAR SURGERY)

## 2018-07-08 PROCEDURE — 2000000000 HC ICU R&B

## 2018-07-08 PROCEDURE — 82803 BLOOD GASES ANY COMBINATION: CPT

## 2018-07-08 PROCEDURE — 99291 CRITICAL CARE FIRST HOUR: CPT | Performed by: INTERNAL MEDICINE

## 2018-07-08 PROCEDURE — 2580000003 HC RX 258: Performed by: HOSPITALIST

## 2018-07-08 PROCEDURE — 94003 VENT MGMT INPAT SUBQ DAY: CPT

## 2018-07-08 PROCEDURE — 6370000000 HC RX 637 (ALT 250 FOR IP): Performed by: THORACIC SURGERY (CARDIOTHORACIC VASCULAR SURGERY)

## 2018-07-08 PROCEDURE — 82948 REAGENT STRIP/BLOOD GLUCOSE: CPT

## 2018-07-08 PROCEDURE — 2580000003 HC RX 258: Performed by: THORACIC SURGERY (CARDIOTHORACIC VASCULAR SURGERY)

## 2018-07-08 RX ORDER — PANTOPRAZOLE SODIUM 40 MG/10ML
40 INJECTION, POWDER, LYOPHILIZED, FOR SOLUTION INTRAVENOUS DAILY
Status: DISCONTINUED | OUTPATIENT
Start: 2018-07-08 | End: 2018-07-12

## 2018-07-08 RX ORDER — BISACODYL 10 MG
10 SUPPOSITORY, RECTAL RECTAL DAILY PRN
Status: DISCONTINUED | OUTPATIENT
Start: 2018-07-08 | End: 2018-07-13 | Stop reason: HOSPADM

## 2018-07-08 RX ORDER — 0.9 % SODIUM CHLORIDE 0.9 %
10 VIAL (ML) INJECTION DAILY
Status: DISCONTINUED | OUTPATIENT
Start: 2018-07-08 | End: 2018-07-12

## 2018-07-08 RX ADMIN — PROPOFOL 60 MCG/KG/MIN: 10 INJECTION, EMULSION INTRAVENOUS at 03:39

## 2018-07-08 RX ADMIN — PROPOFOL 50 MCG/KG/MIN: 10 INJECTION, EMULSION INTRAVENOUS at 22:28

## 2018-07-08 RX ADMIN — PROPOFOL 50 MCG/KG/MIN: 10 INJECTION, EMULSION INTRAVENOUS at 06:41

## 2018-07-08 RX ADMIN — ACETAZOLAMIDE 500 MG: 500 INJECTION, POWDER, LYOPHILIZED, FOR SOLUTION INTRAVENOUS at 05:39

## 2018-07-08 RX ADMIN — Medication 10 ML: at 08:23

## 2018-07-08 RX ADMIN — MINERAL OIL AND PETROLATUM: 150; 830 OINTMENT OPHTHALMIC at 14:11

## 2018-07-08 RX ADMIN — INSULIN LISPRO 4 UNITS: 100 INJECTION, SOLUTION INTRAVENOUS; SUBCUTANEOUS at 12:52

## 2018-07-08 RX ADMIN — METOCLOPRAMIDE 10 MG: 5 INJECTION, SOLUTION INTRAMUSCULAR; INTRAVENOUS at 20:16

## 2018-07-08 RX ADMIN — BISACODYL 10 MG: 10 SUPPOSITORY RECTAL at 12:38

## 2018-07-08 RX ADMIN — METOCLOPRAMIDE 10 MG: 5 INJECTION, SOLUTION INTRAMUSCULAR; INTRAVENOUS at 10:55

## 2018-07-08 RX ADMIN — MINERAL OIL AND PETROLATUM: 150; 830 OINTMENT OPHTHALMIC at 17:48

## 2018-07-08 RX ADMIN — POTASSIUM CHLORIDE 10 MEQ: 400 INJECTION, SOLUTION INTRAVENOUS at 05:01

## 2018-07-08 RX ADMIN — MINERAL OIL AND PETROLATUM: 150; 830 OINTMENT OPHTHALMIC at 03:02

## 2018-07-08 RX ADMIN — VANCOMYCIN HYDROCHLORIDE 1250 MG: 10 INJECTION, POWDER, LYOPHILIZED, FOR SOLUTION INTRAVENOUS at 05:01

## 2018-07-08 RX ADMIN — MINERAL OIL AND PETROLATUM: 150; 830 OINTMENT OPHTHALMIC at 05:01

## 2018-07-08 RX ADMIN — PROPOFOL 50 MCG/KG/MIN: 10 INJECTION, EMULSION INTRAVENOUS at 11:15

## 2018-07-08 RX ADMIN — Medication 10 ML: at 06:25

## 2018-07-08 RX ADMIN — BUMETANIDE 2 MG: 0.25 INJECTION INTRAMUSCULAR; INTRAVENOUS at 15:11

## 2018-07-08 RX ADMIN — PROPOFOL 60 MCG/KG/MIN: 10 INJECTION, EMULSION INTRAVENOUS at 00:24

## 2018-07-08 RX ADMIN — CHLORHEXIDINE GLUCONATE 15 ML: 1.2 RINSE ORAL at 20:14

## 2018-07-08 RX ADMIN — LORAZEPAM 2 MG: 2 INJECTION INTRAMUSCULAR; INTRAVENOUS at 10:46

## 2018-07-08 RX ADMIN — PANTOPRAZOLE SODIUM 40 MG: 40 INJECTION, POWDER, FOR SOLUTION INTRAVENOUS at 08:25

## 2018-07-08 RX ADMIN — VANCOMYCIN HYDROCHLORIDE 1250 MG: 10 INJECTION, POWDER, LYOPHILIZED, FOR SOLUTION INTRAVENOUS at 17:48

## 2018-07-08 RX ADMIN — CHLORHEXIDINE GLUCONATE 15 ML: 1.2 RINSE ORAL at 08:22

## 2018-07-08 RX ADMIN — INSULIN LISPRO 2 UNITS: 100 INJECTION, SOLUTION INTRAVENOUS; SUBCUTANEOUS at 21:43

## 2018-07-08 RX ADMIN — Medication 1 G: at 07:48

## 2018-07-08 RX ADMIN — METOCLOPRAMIDE 10 MG: 5 INJECTION, SOLUTION INTRAMUSCULAR; INTRAVENOUS at 06:24

## 2018-07-08 RX ADMIN — Medication 1 MG: at 12:58

## 2018-07-08 RX ADMIN — PROPOFOL 50 MCG/KG/MIN: 10 INJECTION, EMULSION INTRAVENOUS at 18:04

## 2018-07-08 RX ADMIN — POTASSIUM CHLORIDE 10 MEQ: 400 INJECTION, SOLUTION INTRAVENOUS at 09:23

## 2018-07-08 RX ADMIN — INSULIN LISPRO 4 UNITS: 100 INJECTION, SOLUTION INTRAVENOUS; SUBCUTANEOUS at 06:21

## 2018-07-08 RX ADMIN — MINERAL OIL AND PETROLATUM: 150; 830 OINTMENT OPHTHALMIC at 10:46

## 2018-07-08 RX ADMIN — INSULIN LISPRO 2 UNITS: 100 INJECTION, SOLUTION INTRAVENOUS; SUBCUTANEOUS at 14:14

## 2018-07-08 RX ADMIN — MINERAL OIL AND PETROLATUM: 150; 830 OINTMENT OPHTHALMIC at 21:44

## 2018-07-08 RX ADMIN — Medication 10 ML: at 20:14

## 2018-07-08 RX ADMIN — BUMETANIDE 2 MG: 0.25 INJECTION INTRAMUSCULAR; INTRAVENOUS at 03:03

## 2018-07-08 RX ADMIN — POTASSIUM CHLORIDE 10 MEQ: 400 INJECTION, SOLUTION INTRAVENOUS at 15:11

## 2018-07-08 RX ADMIN — Medication 1 G: at 00:25

## 2018-07-08 RX ADMIN — ENOXAPARIN SODIUM 40 MG: 40 INJECTION SUBCUTANEOUS at 10:55

## 2018-07-08 RX ADMIN — Medication 1 G: at 16:40

## 2018-07-08 RX ADMIN — PROPOFOL 50 MCG/KG/MIN: 10 INJECTION, EMULSION INTRAVENOUS at 14:11

## 2018-07-08 RX ADMIN — Medication 10 ML: at 03:03

## 2018-07-08 RX ADMIN — Medication 2 MG: at 07:07

## 2018-07-08 ASSESSMENT — PULMONARY FUNCTION TESTS
PIF_VALUE: 25.3
PIF_VALUE: 21.5
PIF_VALUE: 22.5
PIF_VALUE: 24.1
PIF_VALUE: 20.5
PIF_VALUE: 24.3
PIF_VALUE: 23.2
PIF_VALUE: 26.9
PIF_VALUE: 28.1

## 2018-07-08 ASSESSMENT — PAIN SCALES - WONG BAKER
WONGBAKER_NUMERICALRESPONSE: 0

## 2018-07-08 ASSESSMENT — PAIN SCALES - GENERAL
PAINLEVEL_OUTOF10: 0

## 2018-07-08 NOTE — PROGRESS NOTES
MD 29.9 mL/hr at 07/08/18 0730 55 mcg/kg/min at 07/08/18 0730    dextrose 50 % solution 12.5 g  12.5 g Intravenous PRN Cherie Paredes MD        magnesium sulfate 1 g in dextrose 5% 100 mL IVPB  1 g Intravenous PRN Cherie Paredes MD   Stopped at 07/07/18 1429    sodium phosphate 10 mmol in dextrose 5 % 250 mL IVPB  10 mmol Intravenous PRN Cherie Paredes MD        Or    sodium phosphate 15 mmol in dextrose 5 % 250 mL IVPB  15 mmol Intravenous PRN Cherie Paredes MD   Stopped at 07/07/18 1500    Or    sodium phosphate 20 mmol in dextrose 5 % 500 mL IVPB  20 mmol Intravenous PRN Cherie Paredes MD        dextrose 5 % and 0.45 % sodium chloride infusion   Intravenous Continuous PRN Cherie Paredes MD        dextrose 5 % and 0.45 % sodium chloride infusion   Intravenous Continuous PRN Cherie Paredes MD        0.9 % sodium chloride bolus  15 mL/kg Intravenous Once Cherie Paredes MD        guaiFENesin [facility]) extended release tablet 600 mg  600 mg Oral BID Lynne Rommel, APRN   600 mg at 07/05/18 6130    lisinopril (PRINIVIL;ZESTRIL) tablet 5 mg  5 mg Oral Daily Lynne Rommel, APRN   5 mg at 07/05/18 5657    enoxaparin (LOVENOX) injection 40 mg  40 mg Subcutaneous Q24H Lucita Land MD   40 mg at 07/07/18 1207    cefepime (MAXIPIME) 1 g in sodium chloride (PF) 10 mL IV syringe  1 g Intravenous Q8H Cherie Paredes MD   1 g at 07/08/18 0748    vancomycin (VANCOCIN) 1,250 mg in dextrose 5 % 250 mL IVPB  1,250 mg Intravenous Q12H Cherie Paredes MD   Stopped at 07/08/18 8300    vancomycin (VANCOCIN) intermittent dosing (placeholder)   Other RX Placeholder Cherie Paredes MD        atorvastatin (LIPITOR) tablet 40 mg  40 mg Oral Nightly Bonnie Boogie MD   40 mg at 07/04/18 2007    metoprolol succinate (TOPROL XL) extended release tablet 25 mg  25 mg Oral Daily Bonnie Boogie MD   25 mg at 07/05/18 9711    polyethylene glycol (GLYCOLAX) packet 17 g  17 g Oral BID NAVDEEP Easton   17 g at 07/04/18 2006    metoclopramide (REGLAN) injection 10 mg  10 mg Intravenous 4x Daily AC & HS Kira Palma, APRN   10 mg at 07/08/18 8272    calcium carbonate (TUMS) chewable tablet 1,000 mg  1,000 mg Oral BID Earnestine Ravinia, DO   1,000 mg at 07/05/18 8126    glucose (GLUTOSE) 40 % oral gel 15 g  15 g Oral PRN Shorty Aponte MD        dextrose 50 % solution 12.5 g  12.5 g Intravenous PRN Levi Jones MD        glucagon (rDNA) injection 1 mg  1 mg Intramuscular PRN Shorty Aponte MD        dextrose 5 % solution  100 mL/hr Intravenous PRN Shorty Aponte MD        sodium chloride flush 0.9 % injection 10 mL  10 mL Intravenous 2 times per day Parish Brooks MD   10 mL at 07/08/18 0823    sodium chloride flush 0.9 % injection 10 mL  10 mL Intravenous PRN Parish Brooks MD   10 mL at 07/08/18 0625    potassium chloride 20 mEq/50 mL IVPB (Central Line)  10 mEq Intravenous PRN Parish Brooks MD   Stopped at 07/08/18 0620    acetaminophen (TYLENOL) tablet 650 mg  650 mg Oral Q4H PRN Parish Brooks MD        oxyCODONE (ROXICODONE) immediate release tablet 5 mg  5 mg Oral Q4H PRN Parish Brooks MD        Or   [facility] oxyCODONE (ROXICODONE) immediate release tablet 10 mg  10 mg Oral Q4H PRN Parish Brooks MD   10 mg at 07/05/18 1646    zolpidem (AMBIEN) tablet 5 mg  5 mg Oral Nightly PRN Parish Brooks MD        docusate sodium (COLACE) capsule 100 mg  100 mg Oral BID Parish Brooks MD   100 mg at 07/05/18 2966    magnesium hydroxide (MILK OF MAGNESIA) 400 MG/5ML suspension 30 mL  30 mL Oral Daily PRN Parish Brooks MD        ondansetron [facility]) injection 4 mg  4 mg Intravenous Q8H PRN Parish Brooks MD   4 mg at 07/02/18 2208    aspirin EC tablet 81 mg  81 mg Oral Daily Parish Brooks MD   81 mg at 07/05/18 0080    clopidogrel (PLAVIX) tablet 75 mg  75 mg Oral Daily Parish Brooks MD   75 mg at 07/05/18 5620    amitriptyline (ELAVIL) tablet 100 mg  100 mg Oral Nightly Earnestine Marina, Intake/Output Summary (Last 24 hours) at 07/08/18 0835  Last data filed at 07/08/18 0800   Gross per 24 hour   Intake          1293.36 ml   Output             4785 ml   Net         -3491.64 ml     General appearance: intubated, sedated,  HEENT: atraumatic, ET tube in place  Lungs: Mechanically ventilated, posteriorly crackles heard BL, diminished at based. Heart: tachycardic, regular rhythm, No murmurs appreciated  Extremities: +1 edema in BL upper ext. Compression stockings over BLLE  Neurologic: sedated and paralyzed  Skin: no rashes, nodules. Assessment and Plan:   Principal Problem:  # ARDS, improving  - Monitor in ICU.  - MV day 3  - Pulm consult, appreciate recs  - off paralytics, remains sedated on Propofol  - FiO2 requirements down trending  - Continue to monitor  - Diureses per CTS, on bumex  - daily CXR and ABG  - Evaluated by [place] for transport and ?ECMO, but patient was stabilizing by time of evaluation and decision made not to transfer 07/05. #  Coronary artery disease involving native coronary artery of native heart without angina pectoris  - s/p CABG on 7/2  - Transferred out of ICU 07/04, transferred back 07/05 for ARDS. - Insulin gtt restarted on 07/05, switched to subq 07/07  - Continue medical management per cards. #  Type 1 diabetes mellitus with neurological manifestations ([facility] [address]  - Insulin gtt restarted 07/05  - transitioned to subq 07/07. Will monitor requirements and adjust as needed  - monitor lytes and replete as needed    # Anion Gap metabolic acidosis, improved  - Patient currently hypervolemic, being diuresed.   - Gap closed 07/06  - Will continue to monitor      #  Hyponatremia, likely due to elevated glucose, resolved  - Continue with insulin gtt as above  - Monitor BMP    #  Normocytic anemia  - Acute, likely multifactorial in the setting of hemodilution and blood loss postop  - Increased output from OG tube, switch GI ppx to protonix IV  -Continue to

## 2018-07-08 NOTE — PROGRESS NOTES
pressure (!) 108/55, pulse 100, temperature 97.1 °F (36.2 °C), temperature source Temporal, resp. rate 17, height 5' 4\" (1.626 m), weight 176 lb 8 oz (80.1 kg), last menstrual period 01/22/2012, SpO2 91 %, not currently breastfeeding. Intake/Output Summary (Last 24 hours) at 07/08/18 1059  Last data filed at 07/08/18 1000   Gross per 24 hour   Intake          1247.96 ml   Output             4735 ml   Net         -3487.04 ml        Vent Mode: PRVC/Vt Ordered: 420 mL/Rate Set: 14 bmp/Pressure Support: 5 cmH20/PEEP/CPAP: 10/FiO2 : 30 %/   Peak Inspiratory Pressure: 20.5 cmH2O  Mean Airway Pressure: 13.2 cmH20  I:E Ratio: 1:2.36  Rate Measured: 19.7 br/min  Minute Volume: 8.2 Liters           Physical Exam:    Constitutional: She appears well-developed and well-nourished. No distress. She is sedated and intubated. orogastric tube, central venous access via groin   HENT:   Head: Normocephalic and atraumatic. Right Ear: External ear normal.   Left Ear: External ear normal.   Eyes: No scleral icterus. Neck: Neck supple. Cardiovascular: Normal rate and regular rhythm. Exam reveals no gallop. Murmur heard. Pulmonary/Chest: She is intubated. No respiratory distress. She has no wheezes. She has no rales. She exhibits no tenderness. Abdominal: + BS, soft   Musculoskeletal: Normal range of motion. She exhibits no edema. Neurological: sedated and intubated but eyes are open    Skin: Skin is warm and dry. Psychiatric:   Not assessable   Vitals reviewed.     Recent Labs      07/06/18   0015  07/07/18   0900  07/08/18   0730   WBC  15.0*  8.8  9.9   RBC  2.67*  2.97*  2.90*   HGB  7.9*  9.0*  8.7*   HCT  24.3*  26.8*  26.6*   PLT  166  219  276   MCV  91.0  90.2  91.7   MCH  29.6  30.3  30.0   MCHC  32.5*  33.6  32.7*   RDW  12.4  12.4  12.5      Recent Labs      07/08/18   0000  07/08/18   0345  07/08/18   0416  07/08/18   0730   NA  137  142   --   140   K  3.7  3.5  3.2  3.1*   CL  94*  96*   --   95*   CO2 28  27   --   30*   BUN  10  10   --   10   CREATININE  0.6  0.6   --   0.7   CALCIUM  8.7  8.6   --   8.6   GLUCOSE  104  143*   --   247*      Recent Labs      07/07/18   0414  07/07/18   0605  07/08/18   0416   PHART  7.530*  7.490*  7.560*   ICT3RGD  35.0  38.0  38.0   PO2ART  102.0*  107.0*  72.0*   XUE3GFM  29.2*  29.0*  34.0*   [id number]  96.7  95.9  93.6   BEART  6.2*  5.3*  10.9*     Recent Labs      07/06/18   0015   07/08/18   0730   AST  27   --    --    ALT  19   --    --    ALKPHOS  170*   --    --    BILITOT  1.0   --    --    MG   --    < >  1.8   CALCIUM  7.4*   < >  8.6   PHOS   --    < >  5.6*   INR  1.30*   --    --     < > = values in this interval not displayed. Recent Labs      07/06/18   0222   BC  No Growth to date. Any change in status will be called. Radiograph:   EXAMINATION:  XR ABDOMEN (2 VIEWS)  7/8/2018 10:01 AM   HISTORY: Large NG tube output   COMPARISON: No comparison study. TECHNIQUE: 2 views, portable upright and supine position abdominal   radiographs/KUB   FINDINGS:    NG tube well-positioned with the tip in the distal stomach. A left-sided femoral catheter is observed. A Gregg catheter   appreciated urinary bladder. Bowel gas pattern is unremarkable with stool and gas throughout the   colon without dilated bowel loops or air-fluid levels to indicate   obstruction. There is no free air. There is no organomegaly or definite urinary tract calculi.       Impression   1. Unremarkable bowel gas pattern without bowel dilatation or evidence   of obstruction. 2. Line and support tubes well positioned     XR CHEST PORTABLE 7/7/2018 11:15 PM   HISTORY:   Respiratory failure     Single view. COMPARISONS:  7/7/2018   FINDINGS:   Endotracheal tube and NG tube again observed. Bilateral perihilar/lower lobe infiltrates and pleural effusions with   linear opacities in the right infrahilar region again observed   essentially unchanged.  Pulmonary edema and atelectatic The electronic translation of spoken language may permit erroneous, or at times, nonsensical words or phrases to be inadvertently transcribed; although I have reviewed the note for such errors, some may still exist.

## 2018-07-08 NOTE — PROGRESS NOTES
normal postoperative chest xray    ASSESSMENT: Pulm- improving after severe ARDS. cont diuresis. V much appreciate Dr Ishaan Mckeon input   Renal function -stable  GI- high NG output. Pt reports she has severe GI motility problems. Sees MD at [facility]. Give dulcolax.  May need GI consult      PLAN: Cont aggressive care    Electronically signed by Kings Womack MD on 7/8/18 at 9:04 AM

## 2018-07-08 NOTE — PROGRESS NOTES
[facility]  Progress Note                            Date:  7/8/2018  Patient: Raji Pay  Admission:  6/26/2018 11:35 AM  Admit DX: Abnormal result of other cardiovascular function study [R94.39]  Unstable angina ([address] [I20.0]  Age:  40 y.o., 1975     LOS: 12 days     Reason for evaluation:   coronary artery disease, CABG and ARDS      SUBJECTIVE:    The patient was seen and examined. Notes and labs reviewed. There were not complications over night. Patient's cardiac review of systems: positive for respiratory distress requiring ventilation.     Intubate/sedated        OBJECTIVE:    Telemetry: Sinus    Pt is lying prone per pulmonology     pantoprazole  40 mg Intravenous Daily    And    sodium chloride (PF)  10 mL Intravenous Daily    bumetanide  2 mg Intravenous Q12H    insulin lispro  0-12 Units Subcutaneous Q6H    AKWA TEARS   Both Eyes Q4H    chlorhexidine  15 mL Mouth/Throat BID    sodium chloride  15 mL/kg Intravenous Once    guaiFENesin  600 mg Oral BID    lisinopril  5 mg Oral Daily    enoxaparin  40 mg Subcutaneous Q24H    cefepime  1 g Intravenous Q8H    vancomycin  1,250 mg Intravenous Q12H    vancomycin (VANCOCIN) intermittent dosing (placeholder)   Other RX Placeholder    atorvastatin  40 mg Oral Nightly    metoprolol succinate  25 mg Oral Daily    polyethylene glycol  17 g Oral BID    metoclopramide  10 mg Intravenous 4x Daily AC & HS    calcium carbonate  1,000 mg Oral BID    sodium chloride flush  10 mL Intravenous 2 times per day    docusate sodium  100 mg Oral BID    aspirin  81 mg Oral Daily    clopidogrel  75 mg Oral Daily    amitriptyline  100 mg Oral Nightly    gabapentin  300 mg Oral TID        propofol 55 mcg/kg/min (07/08/18 0730)    dextrose 5 % and 0.45 % NaCl      dextrose 5 % and 0.45 % NaCl      dextrose             BP (!) 108/55   Pulse 100   Temp 97.1 °F (36.2 °C) (Temporal)   Resp 17   Ht 5' 4\" (1.626 m)   Wt 176 lb 8 oz (80.1 kg)   LMP 2012   SpO2 91%   Breastfeeding? No   BMI 30.30 kg/m²     Intake/Output Summary (Last 24 hours) at 18 1050  Last data filed at 18 1000   Gross per 24 hour   Intake          1247.96 ml   Output             4735 ml   Net         -3487.04 ml           Labs:   CBC:   Recent Labs      18   0900  18   0730   WBC  8.8  9.9   HGB  9.0*  8.7*   HCT  26.8*  26.6*   PLT  219  276     BMP:   Recent Labs      18   0345  18   0416  18   0730   NA  142   --   140   K  3.5  3.2  3.1*   CO2  27   --   30*   BUN  10   --   10   CREATININE  0.6   --   0.7   LABGLOM  >60   --   >60   GLUCOSE  143*   --   247*     BNP: No results for input(s): BNP in the last 72 hours. PT/INR:   Recent Labs      18   0015   PROTIME  16.1*   INR  1.30*     APTT:No results for input(s): APTT in the last 72 hours. CARDIAC ENZYMES:No results for input(s): CKTOTAL, CKMB, CKMBINDEX, TROPONINI in the last 72 hours. FASTING LIPID PANEL:  Lab Results   Component Value Date    HDL 41 2018    LDLCALC 119 2018    TRIG 96 2018     LIVER PROFILE:  Recent Labs      18   0015   AST  27   ALT  19   LABALBU  2.5*       NURSE:  Bhaskar Ortiz MD     Rudi Akhtar : 1975, Female, 40 y.o. History:  Postop CABG with ARDS and respiratory failure. Nursing note and diagnostics above reviewed and evaluated    [Allergies/Contraindications:  Ciprofloxacin and Levofloxacin]     Todays status: intubated and sedated. Physical Examination    Respiratory -  Coarse rales and rhonchi. Cardiovascular   Distant heart tones  Abdominal -  Soft. Bowel sounds present. Nontender. Extremities -  Positive edema.               Assessment/Plan       ARDS with volume overload continues to diurese  Sinus tachycardia persists but multiple reasons for this    No cardiac changes

## 2018-07-09 ENCOUNTER — APPOINTMENT (OUTPATIENT)
Dept: GENERAL RADIOLOGY | Age: 43
DRG: 233 | End: 2018-07-09
Attending: INTERNAL MEDICINE
Payer: MEDICAID

## 2018-07-09 LAB
ANION GAP SERPL CALCULATED.3IONS-SCNC: 15 MMOL/L (ref 7–19)
ANION GAP SERPL CALCULATED.3IONS-SCNC: 15 MMOL/L (ref 7–19)
ANION GAP SERPL CALCULATED.3IONS-SCNC: 17 MMOL/L (ref 7–19)
ANION GAP SERPL CALCULATED.3IONS-SCNC: 18 MMOL/L (ref 7–19)
ANION GAP SERPL CALCULATED.3IONS-SCNC: 18 MMOL/L (ref 7–19)
ANION GAP SERPL CALCULATED.3IONS-SCNC: 19 MMOL/L (ref 7–19)
BASE EXCESS ARTERIAL: 6.9 MMOL/L (ref -2–2)
BASE EXCESS ARTERIAL: 7.7 MMOL/L (ref -2–2)
BUN BLDV-MCNC: 12 MG/DL (ref 6–20)
BUN BLDV-MCNC: 13 MG/DL (ref 6–20)
BUN BLDV-MCNC: 14 MG/DL (ref 6–20)
CALCIUM SERPL-MCNC: 8.6 MG/DL (ref 8.6–10)
CALCIUM SERPL-MCNC: 8.7 MG/DL (ref 8.6–10)
CALCIUM SERPL-MCNC: 8.8 MG/DL (ref 8.6–10)
CALCIUM SERPL-MCNC: 8.8 MG/DL (ref 8.6–10)
CALCIUM SERPL-MCNC: 8.9 MG/DL (ref 8.6–10)
CALCIUM SERPL-MCNC: 8.9 MG/DL (ref 8.6–10)
CARBOXYHEMOGLOBIN ARTERIAL: 1.9 % (ref 0–5)
CARBOXYHEMOGLOBIN ARTERIAL: 1.9 % (ref 0–5)
CHLORIDE BLD-SCNC: 95 MMOL/L (ref 98–111)
CHLORIDE BLD-SCNC: 96 MMOL/L (ref 98–111)
CHLORIDE BLD-SCNC: 98 MMOL/L (ref 98–111)
CO2: 24 MMOL/L (ref 22–29)
CO2: 26 MMOL/L (ref 22–29)
CREAT SERPL-MCNC: 0.6 MG/DL (ref 0.5–0.9)
CREAT SERPL-MCNC: 0.7 MG/DL (ref 0.5–0.9)
CULTURE, RESPIRATORY: NORMAL
GFR NON-AFRICAN AMERICAN: >60
GLUCOSE BLD-MCNC: 150 MG/DL (ref 70–99)
GLUCOSE BLD-MCNC: 158 MG/DL (ref 74–109)
GLUCOSE BLD-MCNC: 166 MG/DL (ref 70–99)
GLUCOSE BLD-MCNC: 170 MG/DL (ref 74–109)
GLUCOSE BLD-MCNC: 219 MG/DL (ref 70–99)
GLUCOSE BLD-MCNC: 220 MG/DL (ref 70–99)
GLUCOSE BLD-MCNC: 240 MG/DL (ref 74–109)
GLUCOSE BLD-MCNC: 244 MG/DL (ref 74–109)
GLUCOSE BLD-MCNC: 245 MG/DL (ref 70–99)
GLUCOSE BLD-MCNC: 246 MG/DL (ref 74–109)
GLUCOSE BLD-MCNC: 273 MG/DL (ref 70–99)
GLUCOSE BLD-MCNC: 280 MG/DL (ref 74–109)
GRAM STAIN RESULT: NORMAL
HCO3 ARTERIAL: 30.2 MMOL/L (ref 22–26)
HCO3 ARTERIAL: 30.9 MMOL/L (ref 22–26)
HCT VFR BLD CALC: 27.8 % (ref 37–47)
HEMOGLOBIN, ART, EXTENDED: 10.6 G/DL (ref 12–16)
HEMOGLOBIN, ART, EXTENDED: 9.7 G/DL (ref 12–16)
HEMOGLOBIN: 9 G/DL (ref 12–16)
MAGNESIUM: 1.9 MG/DL (ref 1.6–2.6)
MAGNESIUM: 2 MG/DL (ref 1.6–2.6)
MAGNESIUM: 2.1 MG/DL (ref 1.6–2.6)
MCH RBC QN AUTO: 29.4 PG (ref 27–31)
MCHC RBC AUTO-ENTMCNC: 32.4 G/DL (ref 33–37)
MCV RBC AUTO: 90.8 FL (ref 81–99)
METHEMOGLOBIN ARTERIAL: 1.3 %
METHEMOGLOBIN ARTERIAL: 1.3 %
O2 CONTENT ARTERIAL: 13.1 ML/DL
O2 CONTENT ARTERIAL: 14.3 ML/DL
O2 SAT, ARTERIAL: 95.3 %
O2 SAT, ARTERIAL: 95.3 %
O2 THERAPY: ABNORMAL
O2 THERAPY: ABNORMAL
PCO2 ARTERIAL: 37 MMHG (ref 35–45)
PCO2 ARTERIAL: 37 MMHG (ref 35–45)
PDW BLD-RTO: 12.6 % (ref 11.5–14.5)
PERFORMED ON: ABNORMAL
PH ARTERIAL: 7.52 (ref 7.35–7.45)
PH ARTERIAL: 7.53 (ref 7.35–7.45)
PHOSPHORUS: 3.1 MG/DL (ref 2.5–4.5)
PHOSPHORUS: 3.2 MG/DL (ref 2.5–4.5)
PHOSPHORUS: 3.3 MG/DL (ref 2.5–4.5)
PHOSPHORUS: 3.3 MG/DL (ref 2.5–4.5)
PHOSPHORUS: 3.6 MG/DL (ref 2.5–4.5)
PHOSPHORUS: 3.6 MG/DL (ref 2.5–4.5)
PLATELET # BLD: 349 K/UL (ref 130–400)
PMV BLD AUTO: 10 FL (ref 9.4–12.3)
PO2 ARTERIAL: 88 MMHG (ref 80–100)
PO2 ARTERIAL: 89 MMHG (ref 80–100)
POTASSIUM SERPL-SCNC: 3.3 MMOL/L (ref 3.5–5)
POTASSIUM SERPL-SCNC: 3.4 MMOL/L (ref 3.5–5)
POTASSIUM SERPL-SCNC: 3.6 MMOL/L (ref 3.5–5)
POTASSIUM SERPL-SCNC: 3.6 MMOL/L (ref 3.5–5)
POTASSIUM SERPL-SCNC: 3.9 MMOL/L (ref 3.5–5)
POTASSIUM SERPL-SCNC: 3.9 MMOL/L (ref 3.5–5)
POTASSIUM, WHOLE BLOOD: 3.2
POTASSIUM, WHOLE BLOOD: 3.3
RBC # BLD: 3.06 M/UL (ref 4.2–5.4)
SODIUM BLD-SCNC: 137 MMOL/L (ref 136–145)
SODIUM BLD-SCNC: 137 MMOL/L (ref 136–145)
SODIUM BLD-SCNC: 138 MMOL/L (ref 136–145)
SODIUM BLD-SCNC: 139 MMOL/L (ref 136–145)
WBC # BLD: 12.9 K/UL (ref 4.8–10.8)

## 2018-07-09 PROCEDURE — 71045 X-RAY EXAM CHEST 1 VIEW: CPT

## 2018-07-09 PROCEDURE — 84132 ASSAY OF SERUM POTASSIUM: CPT

## 2018-07-09 PROCEDURE — 84100 ASSAY OF PHOSPHORUS: CPT

## 2018-07-09 PROCEDURE — 6360000002 HC RX W HCPCS: Performed by: THORACIC SURGERY (CARDIOTHORACIC VASCULAR SURGERY)

## 2018-07-09 PROCEDURE — 85027 COMPLETE CBC AUTOMATED: CPT

## 2018-07-09 PROCEDURE — 6360000002 HC RX W HCPCS: Performed by: INTERNAL MEDICINE

## 2018-07-09 PROCEDURE — G8997 SWALLOW GOAL STATUS: HCPCS

## 2018-07-09 PROCEDURE — 6370000000 HC RX 637 (ALT 250 FOR IP): Performed by: NURSE PRACTITIONER

## 2018-07-09 PROCEDURE — 2580000003 HC RX 258: Performed by: INTERNAL MEDICINE

## 2018-07-09 PROCEDURE — 2580000003 HC RX 258: Performed by: THORACIC SURGERY (CARDIOTHORACIC VASCULAR SURGERY)

## 2018-07-09 PROCEDURE — 94640 AIRWAY INHALATION TREATMENT: CPT

## 2018-07-09 PROCEDURE — G8996 SWALLOW CURRENT STATUS: HCPCS

## 2018-07-09 PROCEDURE — 6360000002 HC RX W HCPCS: Performed by: HOSPITALIST

## 2018-07-09 PROCEDURE — 2000000000 HC ICU R&B

## 2018-07-09 PROCEDURE — 80048 BASIC METABOLIC PNL TOTAL CA: CPT

## 2018-07-09 PROCEDURE — 94660 CPAP INITIATION&MGMT: CPT

## 2018-07-09 PROCEDURE — 82948 REAGENT STRIP/BLOOD GLUCOSE: CPT

## 2018-07-09 PROCEDURE — 6360000002 HC RX W HCPCS: Performed by: NURSE PRACTITIONER

## 2018-07-09 PROCEDURE — C9113 INJ PANTOPRAZOLE SODIUM, VIA: HCPCS | Performed by: INTERNAL MEDICINE

## 2018-07-09 PROCEDURE — 2700000000 HC OXYGEN THERAPY PER DAY

## 2018-07-09 PROCEDURE — 36592 COLLECT BLOOD FROM PICC: CPT

## 2018-07-09 PROCEDURE — 2580000003 HC RX 258: Performed by: HOSPITALIST

## 2018-07-09 PROCEDURE — 83735 ASSAY OF MAGNESIUM: CPT

## 2018-07-09 PROCEDURE — 36600 WITHDRAWAL OF ARTERIAL BLOOD: CPT

## 2018-07-09 PROCEDURE — 99231 SBSQ HOSP IP/OBS SF/LOW 25: CPT | Performed by: INTERNAL MEDICINE

## 2018-07-09 PROCEDURE — 2500000003 HC RX 250 WO HCPCS: Performed by: THORACIC SURGERY (CARDIOTHORACIC VASCULAR SURGERY)

## 2018-07-09 PROCEDURE — 82803 BLOOD GASES ANY COMBINATION: CPT

## 2018-07-09 PROCEDURE — 94003 VENT MGMT INPAT SUBQ DAY: CPT

## 2018-07-09 PROCEDURE — 99024 POSTOP FOLLOW-UP VISIT: CPT | Performed by: THORACIC SURGERY (CARDIOTHORACIC VASCULAR SURGERY)

## 2018-07-09 PROCEDURE — 99233 SBSQ HOSP IP/OBS HIGH 50: CPT | Performed by: INTERNAL MEDICINE

## 2018-07-09 PROCEDURE — 92610 EVALUATE SWALLOWING FUNCTION: CPT

## 2018-07-09 PROCEDURE — 6370000000 HC RX 637 (ALT 250 FOR IP): Performed by: INTERNAL MEDICINE

## 2018-07-09 RX ORDER — BUMETANIDE 0.25 MG/ML
1 INJECTION, SOLUTION INTRAMUSCULAR; INTRAVENOUS EVERY 12 HOURS
Status: DISCONTINUED | OUTPATIENT
Start: 2018-07-09 | End: 2018-07-10

## 2018-07-09 RX ADMIN — BUMETANIDE 1 MG: 0.25 INJECTION INTRAMUSCULAR; INTRAVENOUS at 15:50

## 2018-07-09 RX ADMIN — IPRATROPIUM BROMIDE AND ALBUTEROL SULFATE 1 AMPULE: .5; 3 SOLUTION RESPIRATORY (INHALATION) at 14:59

## 2018-07-09 RX ADMIN — METOCLOPRAMIDE 10 MG: 5 INJECTION, SOLUTION INTRAMUSCULAR; INTRAVENOUS at 11:25

## 2018-07-09 RX ADMIN — ENOXAPARIN SODIUM 40 MG: 40 INJECTION SUBCUTANEOUS at 11:24

## 2018-07-09 RX ADMIN — METOCLOPRAMIDE 10 MG: 5 INJECTION, SOLUTION INTRAMUSCULAR; INTRAVENOUS at 20:01

## 2018-07-09 RX ADMIN — IPRATROPIUM BROMIDE AND ALBUTEROL SULFATE 1 AMPULE: .5; 3 SOLUTION RESPIRATORY (INHALATION) at 11:09

## 2018-07-09 RX ADMIN — BUMETANIDE 2 MG: 0.25 INJECTION INTRAMUSCULAR; INTRAVENOUS at 02:15

## 2018-07-09 RX ADMIN — INSULIN LISPRO 6 UNITS: 100 INJECTION, SOLUTION INTRAVENOUS; SUBCUTANEOUS at 22:04

## 2018-07-09 RX ADMIN — MINERAL OIL AND PETROLATUM: 150; 830 OINTMENT OPHTHALMIC at 02:16

## 2018-07-09 RX ADMIN — VANCOMYCIN HYDROCHLORIDE 1250 MG: 10 INJECTION, POWDER, LYOPHILIZED, FOR SOLUTION INTRAVENOUS at 06:00

## 2018-07-09 RX ADMIN — PROPOFOL 50 MCG/KG/MIN: 10 INJECTION, EMULSION INTRAVENOUS at 02:15

## 2018-07-09 RX ADMIN — Medication 1 G: at 00:11

## 2018-07-09 RX ADMIN — MINERAL OIL AND PETROLATUM: 150; 830 OINTMENT OPHTHALMIC at 05:43

## 2018-07-09 RX ADMIN — POTASSIUM CHLORIDE 10 MEQ: 400 INJECTION, SOLUTION INTRAVENOUS at 20:01

## 2018-07-09 RX ADMIN — LORAZEPAM 2 MG: 2 INJECTION INTRAMUSCULAR; INTRAVENOUS at 14:49

## 2018-07-09 RX ADMIN — METOCLOPRAMIDE 10 MG: 5 INJECTION, SOLUTION INTRAMUSCULAR; INTRAVENOUS at 06:00

## 2018-07-09 RX ADMIN — INSULIN LISPRO 2 UNITS: 100 INJECTION, SOLUTION INTRAVENOUS; SUBCUTANEOUS at 05:47

## 2018-07-09 RX ADMIN — PANTOPRAZOLE SODIUM 40 MG: 40 INJECTION, POWDER, FOR SOLUTION INTRAVENOUS at 09:16

## 2018-07-09 RX ADMIN — POTASSIUM CHLORIDE 10 MEQ: 400 INJECTION, SOLUTION INTRAVENOUS at 03:45

## 2018-07-09 RX ADMIN — VANCOMYCIN HYDROCHLORIDE 1250 MG: 10 INJECTION, POWDER, LYOPHILIZED, FOR SOLUTION INTRAVENOUS at 17:56

## 2018-07-09 RX ADMIN — Medication 1 G: at 15:50

## 2018-07-09 RX ADMIN — INSULIN LISPRO 4 UNITS: 100 INJECTION, SOLUTION INTRAVENOUS; SUBCUTANEOUS at 13:41

## 2018-07-09 RX ADMIN — METOCLOPRAMIDE 10 MG: 5 INJECTION, SOLUTION INTRAMUSCULAR; INTRAVENOUS at 17:56

## 2018-07-09 RX ADMIN — Medication 10 ML: at 02:15

## 2018-07-09 RX ADMIN — INSULIN LISPRO 2 UNITS: 100 INJECTION, SOLUTION INTRAVENOUS; SUBCUTANEOUS at 02:19

## 2018-07-09 RX ADMIN — LORAZEPAM 2 MG: 2 INJECTION INTRAMUSCULAR; INTRAVENOUS at 20:01

## 2018-07-09 RX ADMIN — CHLORHEXIDINE GLUCONATE 15 ML: 1.2 RINSE ORAL at 09:17

## 2018-07-09 RX ADMIN — INSULIN LISPRO 4 UNITS: 100 INJECTION, SOLUTION INTRAVENOUS; SUBCUTANEOUS at 09:38

## 2018-07-09 RX ADMIN — PROPOFOL 50 MCG/KG/MIN: 10 INJECTION, EMULSION INTRAVENOUS at 06:20

## 2018-07-09 RX ADMIN — Medication 10 ML: at 09:16

## 2018-07-09 RX ADMIN — INSULIN LISPRO 4 UNITS: 100 INJECTION, SOLUTION INTRAVENOUS; SUBCUTANEOUS at 17:56

## 2018-07-09 RX ADMIN — Medication 10 ML: at 20:01

## 2018-07-09 RX ADMIN — IPRATROPIUM BROMIDE AND ALBUTEROL SULFATE 1 AMPULE: .5; 3 SOLUTION RESPIRATORY (INHALATION) at 20:37

## 2018-07-09 RX ADMIN — Medication 1 G: at 09:16

## 2018-07-09 ASSESSMENT — PAIN SCALES - GENERAL
PAINLEVEL_OUTOF10: 0

## 2018-07-09 ASSESSMENT — PULMONARY FUNCTION TESTS
PIF_VALUE: 17.4
PIF_VALUE: 22.4
PIF_VALUE: 45.2
PIF_VALUE: 21.7
PIF_VALUE: 21.8
PIF_VALUE: 10
PIF_VALUE: 10.7

## 2018-07-09 ASSESSMENT — PAIN SCALES - WONG BAKER
WONGBAKER_NUMERICALRESPONSE: 0

## 2018-07-09 NOTE — PROGRESS NOTES
GLUCOSE 158 07/09/2018       CHEST XRAY: ARDS has resolved    ASSESSMENT: ARDS resolved. Hemodynamically stable.      PLAN: Stop Propofol in anticipation of extubating today    Electronically signed by Mary Domingo MD on 7/9/18 at 6:45 AM

## 2018-07-09 NOTE — PROGRESS NOTES
Pulmonary and Critical Care Progress Note [facility]    Patient: John Isabel    1975    MR# [id number]    Acct# [de-identified]   07/09/18   7:23 AM  Referring Provider: SEE Traore MD    Chief Complaint: Mechanically ventilated    Interval history: Patient has propofol turned off and is waking up and following most commands. Her mother is at bedside. We will proceed with SBT in hopes of extubating her this morning. No other aggravating or alleviating factors or associated symptoms. pantoprazole 40 mg Intravenous Daily   And      sodium chloride (PF) 10 mL Intravenous Daily   insulin lispro 0-12 Units Subcutaneous Q4H   bumetanide 2 mg Intravenous Q12H   AKWA TEARS  Both Eyes Q4H   chlorhexidine 15 mL Mouth/Throat BID   sodium chloride 15 mL/kg Intravenous Once   guaiFENesin 600 mg Oral BID   lisinopril 5 mg Oral Daily   enoxaparin 40 mg Subcutaneous Q24H   cefepime 1 g Intravenous Q8H   vancomycin 1,250 mg Intravenous Q12H   vancomycin (VANCOCIN) intermittent dosing (placeholder)  Other RX Placeholder   atorvastatin 40 mg Oral Nightly   metoprolol succinate 25 mg Oral Daily   polyethylene glycol 17 g Oral BID   metoclopramide 10 mg Intravenous 4x Daily AC & HS   calcium carbonate 1,000 mg Oral BID   sodium chloride flush 10 mL Intravenous 2 times per day   docusate sodium 100 mg Oral BID   aspirin 81 mg Oral Daily   clopidogrel 75 mg Oral Daily   amitriptyline 100 mg Oral Nightly   gabapentin 300 mg Oral TID      propofol 50 mcg/kg/min (07/09/18 0620)    dextrose 5 % and 0.45 % NaCl      dextrose 5 % and 0.45 % NaCl      dextrose       Review of Systems:     Cannot obtain due to mechanical ventilation    Physical Exam:  Blood pressure (!) 115/55, pulse 102, temperature 98 °F (36.7 °C), temperature source Temporal, resp. rate 19, height 5' 4\" (1.626 m), weight 171 lb 6 oz (77.7 kg), last menstrual period 01/22/2012, SpO2 97 %, not currently breastfeeding.     Intake/Output Summary (Last 24 hours) at 07/09/18 0723  Last data filed at 07/09/18 0600   Gross per 24 hour   Intake          1230.74 ml   Output             3905 ml   Net         -2674.26 ml        Vent Mode: PRVC/Vt Ordered: 420 mL/Rate Set: 14 bmp/Pressure Support: 5 cmH20/PEEP/CPAP: 10/FiO2 : 30 %/   Peak Inspiratory Pressure: 21.8 cmH2O  Mean Airway Pressure: 12.3 cmH20  I:E Ratio: 1:3.83  Rate Measured: 18.4 br/min  Minute Volume: 7.5 Liters           Physical Exam:  Constitutional: She appears well-developed and well-nourished. No distress. She is  intubated. orogastric tube, central venous access via groin   HENT: Oral secretions noted. Head: Normocephalic and atraumatic. Right Ear: External ear normal.   Left Ear: External ear normal.   Eyes: No scleral icterus. Neck: Neck supple. Cardiovascular: Normal rate and regular rhythm. Exam reveals no gallop. Murmur heard. Pulmonary/Chest: She is intubated. No respiratory distress. She has no wheezes. She has no rales. She exhibits no tenderness. Abdominal: + BS, soft   Musculoskeletal: Normal range of motion. She exhibits no edema. Neurological: Drowsy but awakens easily and follows most commands. Skin: Skin is warm and dry. Psychiatric: Calm. Vitals reviewed.     Recent Labs      07/07/18   0900  07/08/18   0730   WBC  8.8  9.9   RBC  2.97*  2.90*   HGB  9.0*  8.7*   HCT  26.8*  26.6*   PLT  219  276   MCV  90.2  91.7   MCH  30.3  30.0   MCHC  33.6  32.7*   RDW  12.4  12.5      Recent Labs      07/08/18 2010 07/09/18   0200  07/09/18   0431  07/09/18   0606   NA  139  137   --   139   K  3.4*  3.3*  3.3  3.9   CL  96*  96*   --   98   CO2  27  26   --   26   BUN  12  12   --   12   CREATININE  0.6  0.7   --   0.7   CALCIUM  8.7  8.6   --   8.8   GLUCOSE  183*  170*   --   158*      Recent Labs      07/07/18   0605  07/08/18   0416  07/09/18   0431   PHART  7.490*  7.560*  7.530*   IPF2EXU  38.0  38.0  37.0   PO2ART  107.0*  72.0*  89.0   [id number]  29.0*

## 2018-07-09 NOTE — PROGRESS NOTES
Speech Language Pathology  Facility/Department: [facility] ICU   BEDSIDE SWALLOW EVALUATION    NAME: Gaye Hines  : 1975  MRN: [id number]    ADMISSION DATE: 2018  ADMITTING DIAGNOSIS: has Insomnia; Constipation; Abdominal distention; Gastroesophageal reflux disease without esophagitis; Abnormal laboratory test; Type 1 diabetes mellitus with neurological manifestations ([address] Anxiety and depression; DJD (degenerative joint disease); Tobacco abuse; Drug allergy; Unstable angina ([address] Abnormal nuclear stress test; Unstable angina pectoris ([address] Type 1 diabetes mellitus with complication ([address] Coronary artery disease involving native coronary artery of native heart without angina pectoris; Hyponatremia; Normocytic anemia; Respiratory insufficiency; ARDS (adult respiratory distress syndrome) ([address] Pneumonia; and Increased anion gap metabolic acidosis on her problem list.    Date of Eval: 2018  Evaluating Therapist: José Sood    Current Diet level:  Current Diet : NPO  Current Liquid Diet : NPO    Reason for Referral  Gaye Hines was referred for a bedside swallow evaluation to assess the efficiency of her swallow function, identify signs and symptoms of aspiration and make recommendations regarding safe dietary consistencies, effective compensatory strategies, and safe eating environment. Impression  Assessed patient's swallowing function S/P extubation this date. Patient exhibits decreased oral prep of more solid consistencies (ice), fast oral transit and suspected swallow delay with thinner liquid consistencies (masticated ice chip trials), and sluggish, moderate-severely decreased laryngeal elevation for swallow airway protection.  Despite exhibiting sluggish, moderate-severely decreased laryngeal elevation, no outward S/S penetration/aspiration was noted with any puree thick H2O trial or puree consistency trial. Patient did exhibit S/S penetration/aspiration with ice chip trials with presented by SLP, primarily measured 1-2 seconds in length and no oral cavity residue was observed post swallows. Did not assess any additional PO consistency secondary to noted lethargy as time and trials progressed. Indicators of Pharyngeal Phase Dysfunction  Pharyngeal Phase: Exceptions  Indicators of Pharyngeal Phase Dysfunction  Delayed Swallow: Ice chips (Suspect secondary to oral transit times.)  Decreased Laryngeal Elevation: All (Laryngeal elevation was considered to be sluggish and moderate-severely decreased for swallow airway protection.)  Cough - Immediate: Ice chips (Inconsistent immediate coughs were noted with ice chip trials.)  Pharyngeal: Despite exhibiting sluggish, moderate-severely decreased laryngeal elevation, no outward S/S penetration/aspiration was noted with any puree thick H2O trial or puree consistency trial. As previously mentioned, patient exhibited S/S penetration/aspiration with ice chip trials with inconsistent, immediate coughs observed. As previously mentioned, did not assess swallow function with any additional consistency secondary to noted lethargy as time and trials progressed. At this time, recommend NPO status. If patient receives mouth care prior to intake, okay for ice chips FOR COMFORT. G-Code  SLP G-Codes  Functional Limitations: Swallowing  Swallow Current Status (): At least 80 percent but less than 100 percent impaired, limited or restricted  Swallow Goal Status ():  At least 40 percent but less than 60 percent impaired, limited or restricted    Electronically signed by KIKO Whitlock on 7/9/2018 at 2:10 PM

## 2018-07-09 NOTE — PROGRESS NOTES
Hospitalist Progress Note  7/9/2018 8:45 AM  Subjective:   Admit Date: 6/26/2018  PCP: Jina Vera    Chief Complaint: diabetes mellitus    Subjective: Patient seen and examined. Intubated and sedated. Patient seen on CPAP trial and appears to be tolerating. Following commands per nursing and intermittently responding appropriately still with some confusion. Cumulative Hospital History:   6-26: Admitted with chest discomfort for cardiac catheterization. Showed severe multivessel disease. 6-27: Cardiothoracic surgery consulted. 6-28: CTS plans CABG on 7-2. Hospitalist service consulted for medical management. 6-29: Insomnia, Ambien ordered strictly PRN only. Patient cheating on diet, will not adjust Lantus dose as she states she is willing to comply with diet now. 6-30: Lantus adjusted due to hyperglycemia yesterday, will revert changes due to dietary noncompliance and risk of hypoglycemia. 7-1: Constipated, refuses change in pharmacotherapy. 7-2: CABG, postoperative pain. 7-3: Poor pain control. Increase Xanax frequency. Change Lantus and bolus insulin regimen. 7-4: Patient clinically improved. Glucose with better control. Sodium trending down. D/c IVF. Urine studies sent. Likely transfer out of ICU today. 7-5: Patient requiring increased O2. Receiving diuresis per CTS. CXR with effusions/opacities ? ARDS. Sugars uncontrolled  7-6: Patient started on diuresis yesterday for increased O2 requirements, Sats remained stable throughout the day and patient appeared to be comfortable, but had a significant drop overnight and respiratory distress. ARDS seen on CXR. Patient intubated, sedated, paralyzed, and pronated. [facility] contacted for transport and ECMO, however, by the time of evaluation, patient was stabilizing and the decision was made not to transport. New infiltrate also identified on CXR and patient started on empiric abx Vanc/Cefepime. Pulm colnsulted.   7-7: Patient remains intubated sedated. On insulin gtt. Vent settings weaning down. Following commands with sedation weaned. 7-8: Intubated sedated. No nutrition day 3. Increased output from NG tube. Unable to get meds through tube. Hypoxia when repositioned. Remains on propofol gtt. Insulin gtt switched to subq yesterday. 7-9: Pt on CPAP trial, appears to be tolerating. ? Extubation today. Blood sugar better controlled currently but will likely need to adjust once nutrition restarted.       ROS: Unable to obtain    Diet NPO Effective Now    Intake/Output Summary (Last 24 hours) at 07/09/18 0845  Last data filed at 07/09/18 0700   Gross per 24 hour   Intake          1134.38 ml   Output             3255 ml   Net         -2120.62 ml     Medications:   propofol Stopped (07/09/18 0700)    dextrose 5 % and 0.45 % NaCl      dextrose 5 % and 0.45 % NaCl      dextrose       Current Facility-Administered Medications   Medication Dose Route Frequency Provider Last Rate Last Dose    pantoprazole (PROTONIX) injection 40 mg  40 mg Intravenous Daily Viktor Damon MD   40 mg at 07/08/18 0825    And    sodium chloride (PF) 0.9 % injection 10 mL  10 mL Intravenous Daily Viktor Damon MD   10 mL at 07/08/18 4778    bisacodyl (DULCOLAX) suppository 10 mg  10 mg Rectal Daily PRN Young Chowdhury MD   10 mg at 07/08/18 1238    insulin lispro (HUMALOG) injection vial 0-12 Units  0-12 Units Subcutaneous Q4H Viktor Damon MD   2 Units at 07/09/18 0547    ipratropium-albuterol (DUONEB) nebulizer solution 1 ampule  1 ampule Inhalation Q4H PRN NAVDEEP Montiel        bumetanide (BUMEX) injection 2 mg  2 mg Intravenous Q12H Shabnam Chase MD   2 mg at 07/09/18 0215    morphine (PF) injection 2 mg  2 mg Intravenous Q1H PRN Grady Hardy MD   2 mg at 07/08/18 0707    morphine (PF) injection 1 mg  1 mg Intravenous Q1H PRN Grady Hardy MD   1 mg at 07/08/18 1258    AKWA TEARS (LACRILUBE) 2-15-83 % opthalmic ointment Both Eyes Q4H Moncho Prince MD        chlorhexidine (PERIDEX) 0.12 % solution 15 mL  15 mL Mouth/Throat BID Moncho Prince MD   15 mL at 07/08/18 2014    LORazepam (ATIVAN) injection 2 mg  2 mg Intravenous Q1H PRN Moncho Prince MD   2 mg at 07/08/18 1046    propofol 1000 MG/100ML injection  10 mcg/kg/min Intravenous Titrated Moncho Prince MD   Stopped at 07/09/18 0700    dextrose 50 % solution 12.5 g  12.5 g Intravenous PRN Fabiola Rios MD        magnesium sulfate 1 g in dextrose 5% 100 mL IVPB  1 g Intravenous PRN Fabiola Rios MD   Stopped at 07/07/18 1429    sodium phosphate 10 mmol in dextrose 5 % 250 mL IVPB  10 mmol Intravenous PRN Fabiola Rios MD        Or    sodium phosphate 15 mmol in dextrose 5 % 250 mL IVPB  15 mmol Intravenous PRN Fabiola Rios MD   Stopped at 07/07/18 1500    Or    sodium phosphate 20 mmol in dextrose 5 % 500 mL IVPB  20 mmol Intravenous PRN Fabiola Rios MD        dextrose 5 % and 0.45 % sodium chloride infusion   Intravenous Continuous PRN Fabiola Rios MD        dextrose 5 % and 0.45 % sodium chloride infusion   Intravenous Continuous PRN Fabiola Rios MD        0.9 % sodium chloride bolus  15 mL/kg Intravenous Once Fabiola Rios MD        guaiFENesin [facility]) extended release tablet 600 mg  600 mg Oral BID NAVDEEP Curtis   600 mg at 07/05/18 4951    lisinopril (PRINIVIL;ZESTRIL) tablet 5 mg  5 mg Oral Daily NAVDEEP Curtis   5 mg at 07/05/18 4432    enoxaparin (LOVENOX) injection 40 mg  40 mg Subcutaneous Q24H Erik Alamo MD   40 mg at 07/08/18 1055    cefepime (MAXIPIME) 1 g in sodium chloride (PF) 10 mL IV syringe  1 g Intravenous Q8H Fabiola Rios MD   1 g at 07/09/18 0011    vancomycin (VANCOCIN) 1,250 mg in dextrose 5 % 250 mL IVPB  1,250 mg Intravenous Q12H Fabiola Rios .7 mL/hr at 07/09/18 0600 1,250 mg at 07/09/18 0600    vancomycin (VANCOCIN) intermittent dosing (placeholder)   Other RX Placeholder Fabiola Rios,

## 2018-07-10 ENCOUNTER — APPOINTMENT (OUTPATIENT)
Dept: GENERAL RADIOLOGY | Age: 43
DRG: 233 | End: 2018-07-10
Attending: INTERNAL MEDICINE
Payer: MEDICAID

## 2018-07-10 LAB
ANION GAP SERPL CALCULATED.3IONS-SCNC: 15 MMOL/L (ref 7–19)
ANION GAP SERPL CALCULATED.3IONS-SCNC: 18 MMOL/L (ref 7–19)
BUN BLDV-MCNC: 13 MG/DL (ref 6–20)
BUN BLDV-MCNC: 14 MG/DL (ref 6–20)
CALCIUM SERPL-MCNC: 8.9 MG/DL (ref 8.6–10)
CALCIUM SERPL-MCNC: 9.1 MG/DL (ref 8.6–10)
CHLORIDE BLD-SCNC: 101 MMOL/L (ref 98–111)
CHLORIDE BLD-SCNC: 96 MMOL/L (ref 98–111)
CO2: 23 MMOL/L (ref 22–29)
CO2: 25 MMOL/L (ref 22–29)
CREAT SERPL-MCNC: 0.6 MG/DL (ref 0.5–0.9)
CREAT SERPL-MCNC: 0.6 MG/DL (ref 0.5–0.9)
GFR NON-AFRICAN AMERICAN: >60
GFR NON-AFRICAN AMERICAN: >60
GLUCOSE BLD-MCNC: 200 MG/DL (ref 74–109)
GLUCOSE BLD-MCNC: 236 MG/DL (ref 74–109)
GLUCOSE BLD-MCNC: 257 MG/DL (ref 70–99)
GLUCOSE BLD-MCNC: 260 MG/DL (ref 70–99)
GLUCOSE BLD-MCNC: 294 MG/DL (ref 70–99)
GLUCOSE BLD-MCNC: 494 MG/DL (ref 70–99)
HCT VFR BLD CALC: 26.6 % (ref 37–47)
HEMOGLOBIN: 8.6 G/DL (ref 12–16)
MAGNESIUM: 1.9 MG/DL (ref 1.6–2.6)
MAGNESIUM: 2.2 MG/DL (ref 1.6–2.6)
MCH RBC QN AUTO: 29.5 PG (ref 27–31)
MCHC RBC AUTO-ENTMCNC: 32.3 G/DL (ref 33–37)
MCV RBC AUTO: 91.1 FL (ref 81–99)
PDW BLD-RTO: 12.5 % (ref 11.5–14.5)
PERFORMED ON: ABNORMAL
PHOSPHORUS: 2.5 MG/DL (ref 2.5–4.5)
PHOSPHORUS: 3 MG/DL (ref 2.5–4.5)
PLATELET # BLD: 345 K/UL (ref 130–400)
PMV BLD AUTO: 9.7 FL (ref 9.4–12.3)
POTASSIUM SERPL-SCNC: 3.5 MMOL/L (ref 3.5–5)
POTASSIUM SERPL-SCNC: 3.6 MMOL/L (ref 3.5–5)
RBC # BLD: 2.92 M/UL (ref 4.2–5.4)
SODIUM BLD-SCNC: 137 MMOL/L (ref 136–145)
SODIUM BLD-SCNC: 141 MMOL/L (ref 136–145)
WBC # BLD: 11.4 K/UL (ref 4.8–10.8)

## 2018-07-10 PROCEDURE — 2580000003 HC RX 258: Performed by: THORACIC SURGERY (CARDIOTHORACIC VASCULAR SURGERY)

## 2018-07-10 PROCEDURE — 2000000000 HC ICU R&B

## 2018-07-10 PROCEDURE — 83735 ASSAY OF MAGNESIUM: CPT

## 2018-07-10 PROCEDURE — 6360000002 HC RX W HCPCS: Performed by: NURSE PRACTITIONER

## 2018-07-10 PROCEDURE — 2580000003 HC RX 258: Performed by: HOSPITALIST

## 2018-07-10 PROCEDURE — 6370000000 HC RX 637 (ALT 250 FOR IP): Performed by: THORACIC SURGERY (CARDIOTHORACIC VASCULAR SURGERY)

## 2018-07-10 PROCEDURE — 2500000003 HC RX 250 WO HCPCS: Performed by: THORACIC SURGERY (CARDIOTHORACIC VASCULAR SURGERY)

## 2018-07-10 PROCEDURE — 6370000000 HC RX 637 (ALT 250 FOR IP): Performed by: NURSE PRACTITIONER

## 2018-07-10 PROCEDURE — 6360000002 HC RX W HCPCS: Performed by: HOSPITALIST

## 2018-07-10 PROCEDURE — 36592 COLLECT BLOOD FROM PICC: CPT

## 2018-07-10 PROCEDURE — 82948 REAGENT STRIP/BLOOD GLUCOSE: CPT

## 2018-07-10 PROCEDURE — 84100 ASSAY OF PHOSPHORUS: CPT

## 2018-07-10 PROCEDURE — 2580000003 HC RX 258: Performed by: INTERNAL MEDICINE

## 2018-07-10 PROCEDURE — 2700000000 HC OXYGEN THERAPY PER DAY

## 2018-07-10 PROCEDURE — 92526 ORAL FUNCTION THERAPY: CPT

## 2018-07-10 PROCEDURE — 94640 AIRWAY INHALATION TREATMENT: CPT

## 2018-07-10 PROCEDURE — C9113 INJ PANTOPRAZOLE SODIUM, VIA: HCPCS | Performed by: INTERNAL MEDICINE

## 2018-07-10 PROCEDURE — 99024 POSTOP FOLLOW-UP VISIT: CPT | Performed by: THORACIC SURGERY (CARDIOTHORACIC VASCULAR SURGERY)

## 2018-07-10 PROCEDURE — 80048 BASIC METABOLIC PNL TOTAL CA: CPT

## 2018-07-10 PROCEDURE — 6370000000 HC RX 637 (ALT 250 FOR IP): Performed by: INTERNAL MEDICINE

## 2018-07-10 PROCEDURE — 99024 POSTOP FOLLOW-UP VISIT: CPT | Performed by: INTERNAL MEDICINE

## 2018-07-10 PROCEDURE — 99233 SBSQ HOSP IP/OBS HIGH 50: CPT | Performed by: INTERNAL MEDICINE

## 2018-07-10 PROCEDURE — 6360000002 HC RX W HCPCS: Performed by: INTERNAL MEDICINE

## 2018-07-10 PROCEDURE — 85027 COMPLETE CBC AUTOMATED: CPT

## 2018-07-10 PROCEDURE — 6370000000 HC RX 637 (ALT 250 FOR IP): Performed by: FAMILY MEDICINE

## 2018-07-10 RX ORDER — SKIN CLEANSER COMB NO.42
CLEANSER (ML) TOPICAL PRN
Status: DISCONTINUED | OUTPATIENT
Start: 2018-07-10 | End: 2018-07-13 | Stop reason: HOSPADM

## 2018-07-10 RX ORDER — INSULIN GLARGINE 100 [IU]/ML
10 INJECTION, SOLUTION SUBCUTANEOUS DAILY
Status: DISCONTINUED | OUTPATIENT
Start: 2018-07-10 | End: 2018-07-10

## 2018-07-10 RX ORDER — INSULIN GLARGINE 100 [IU]/ML
20 INJECTION, SOLUTION SUBCUTANEOUS DAILY
Status: DISCONTINUED | OUTPATIENT
Start: 2018-07-11 | End: 2018-07-11

## 2018-07-10 RX ADMIN — PANTOPRAZOLE SODIUM 40 MG: 40 INJECTION, POWDER, FOR SOLUTION INTRAVENOUS at 08:05

## 2018-07-10 RX ADMIN — INSULIN LISPRO 6 UNITS: 100 INJECTION, SOLUTION INTRAVENOUS; SUBCUTANEOUS at 20:25

## 2018-07-10 RX ADMIN — ZOLPIDEM TARTRATE 5 MG: 5 TABLET ORAL at 20:26

## 2018-07-10 RX ADMIN — INSULIN LISPRO 6 UNITS: 100 INJECTION, SOLUTION INTRAVENOUS; SUBCUTANEOUS at 12:04

## 2018-07-10 RX ADMIN — CLOPIDOGREL BISULFATE 75 MG: 75 TABLET ORAL at 08:06

## 2018-07-10 RX ADMIN — ENOXAPARIN SODIUM 40 MG: 40 INJECTION SUBCUTANEOUS at 12:04

## 2018-07-10 RX ADMIN — METOPROLOL SUCCINATE 25 MG: 25 TABLET, EXTENDED RELEASE ORAL at 08:06

## 2018-07-10 RX ADMIN — Medication 1 G: at 08:05

## 2018-07-10 RX ADMIN — Medication 10 ML: at 20:26

## 2018-07-10 RX ADMIN — INSULIN LISPRO 4 UNITS: 100 INJECTION, SOLUTION INTRAVENOUS; SUBCUTANEOUS at 02:59

## 2018-07-10 RX ADMIN — AMITRIPTYLINE HYDROCHLORIDE 100 MG: 50 TABLET, FILM COATED ORAL at 18:11

## 2018-07-10 RX ADMIN — Medication 10 ML: at 03:08

## 2018-07-10 RX ADMIN — GABAPENTIN 300 MG: 300 CAPSULE ORAL at 20:26

## 2018-07-10 RX ADMIN — CALCIUM CARBONATE 1000 MG: 500 TABLET, CHEWABLE ORAL at 08:05

## 2018-07-10 RX ADMIN — GABAPENTIN 300 MG: 300 CAPSULE ORAL at 14:10

## 2018-07-10 RX ADMIN — ASPIRIN 81 MG: 81 TABLET, COATED ORAL at 08:06

## 2018-07-10 RX ADMIN — IPRATROPIUM BROMIDE AND ALBUTEROL SULFATE 1 AMPULE: .5; 3 SOLUTION RESPIRATORY (INHALATION) at 07:14

## 2018-07-10 RX ADMIN — Medication 1 G: at 00:40

## 2018-07-10 RX ADMIN — VANCOMYCIN HYDROCHLORIDE 1250 MG: 10 INJECTION, POWDER, LYOPHILIZED, FOR SOLUTION INTRAVENOUS at 05:28

## 2018-07-10 RX ADMIN — GABAPENTIN 300 MG: 300 CAPSULE ORAL at 08:06

## 2018-07-10 RX ADMIN — Medication 10 ML: at 08:10

## 2018-07-10 RX ADMIN — METOCLOPRAMIDE 10 MG: 5 INJECTION, SOLUTION INTRAMUSCULAR; INTRAVENOUS at 16:41

## 2018-07-10 RX ADMIN — GUAIFENESIN 600 MG: 600 TABLET, EXTENDED RELEASE ORAL at 20:26

## 2018-07-10 RX ADMIN — ATORVASTATIN CALCIUM 40 MG: 40 TABLET, FILM COATED ORAL at 20:26

## 2018-07-10 RX ADMIN — METOCLOPRAMIDE 10 MG: 5 INJECTION, SOLUTION INTRAMUSCULAR; INTRAVENOUS at 06:01

## 2018-07-10 RX ADMIN — BUMETANIDE 1 MG: 0.25 INJECTION INTRAMUSCULAR; INTRAVENOUS at 03:10

## 2018-07-10 RX ADMIN — POLYETHYLENE GLYCOL 3350 17 G: 17 POWDER, FOR SOLUTION ORAL at 08:04

## 2018-07-10 RX ADMIN — METOCLOPRAMIDE 10 MG: 5 INJECTION, SOLUTION INTRAMUSCULAR; INTRAVENOUS at 20:26

## 2018-07-10 RX ADMIN — OXYCODONE HYDROCHLORIDE 5 MG: 5 TABLET ORAL at 20:26

## 2018-07-10 RX ADMIN — INSULIN LISPRO 6 UNITS: 100 INJECTION, SOLUTION INTRAVENOUS; SUBCUTANEOUS at 16:15

## 2018-07-10 RX ADMIN — MUPIROCIN: 20 OINTMENT TOPICAL at 20:25

## 2018-07-10 RX ADMIN — INSULIN LISPRO 6 UNITS: 100 INJECTION, SOLUTION INTRAVENOUS; SUBCUTANEOUS at 09:20

## 2018-07-10 RX ADMIN — GUAIFENESIN 600 MG: 600 TABLET, EXTENDED RELEASE ORAL at 08:06

## 2018-07-10 RX ADMIN — DOCUSATE SODIUM 100 MG: 100 CAPSULE, LIQUID FILLED ORAL at 08:06

## 2018-07-10 RX ADMIN — INSULIN GLARGINE 10 UNITS: 100 INJECTION, SOLUTION SUBCUTANEOUS at 09:21

## 2018-07-10 RX ADMIN — METOCLOPRAMIDE 10 MG: 5 INJECTION, SOLUTION INTRAMUSCULAR; INTRAVENOUS at 12:04

## 2018-07-10 RX ADMIN — Medication 1 G: at 16:41

## 2018-07-10 RX ADMIN — LISINOPRIL 5 MG: 5 TABLET ORAL at 08:06

## 2018-07-10 RX ADMIN — DOCUSATE SODIUM 100 MG: 100 CAPSULE, LIQUID FILLED ORAL at 20:26

## 2018-07-10 ASSESSMENT — PAIN SCALES - GENERAL
PAINLEVEL_OUTOF10: 0
PAINLEVEL_OUTOF10: 0
PAINLEVEL_OUTOF10: 4
PAINLEVEL_OUTOF10: 0

## 2018-07-10 ASSESSMENT — PAIN SCALES - WONG BAKER
WONGBAKER_NUMERICALRESPONSE: 0

## 2018-07-10 NOTE — PROGRESS NOTES
10 mL  10 mL Intravenous 2 times per day Leeann Herrera MD   10 mL at 07/09/18 2001    sodium chloride flush 0.9 % injection 10 mL  10 mL Intravenous PRN Leeann Herrera MD   10 mL at 07/10/18 0308    potassium chloride 20 mEq/50 mL IVPB (Central Line)  10 mEq Intravenous PRN Leeann Herrera MD   Stopped at 07/09/18 2206    acetaminophen (TYLENOL) tablet 650 mg  650 mg Oral Q4H PRN Leeann Herrera MD        oxyCODONE (ROXICODONE) immediate release tablet 5 mg  5 mg Oral Q4H PRN Leeann Herrera MD        Or   Annetta Mccain oxyCODONE (ROXICODONE) immediate release tablet 10 mg  10 mg Oral Q4H PRN Leeann Herrera MD   10 mg at 07/05/18 1646    zolpidem (AMBIEN) tablet 5 mg  5 mg Oral Nightly PRN Leeann Herrera MD        docusate sodium (COLACE) capsule 100 mg  100 mg Oral BID Leeann Herrera MD   100 mg at 07/05/18 5405    magnesium hydroxide (MILK OF MAGNESIA) 400 MG/5ML suspension 30 mL  30 mL Oral Daily PRN Leeann Herrera MD        [facility]ansetron [facility]) injection 4 mg  4 mg Intravenous Q8H PRN Leeann Herrera MD   4 mg at 07/02/18 2208    aspirin EC tablet 81 mg  81 mg Oral Daily Leeann Herrera MD   81 mg at 07/05/18 2050    clopidogrel (PLAVIX) tablet 75 mg  75 mg Oral Daily Leeann Herrera MD   75 mg at 07/05/18 3861    amitriptyline (ELAVIL) tablet 100 mg  100 mg Oral Nightly Sunday Braun, DO   100 mg at 07/05/18 1856    gabapentin (NEURONTIN) capsule 300 mg  300 mg Oral TID Thersa Chihuahua, DO   300 mg at 07/05/18 1305        Labs:     Recent Labs      07/08/18   0730  07/09/18   0730  07/10/18   0215   WBC  9.9  12.9*  11.4*   RBC  2.90*  3.06*  2.92*   HGB  8.7*  9.0*  8.6*   HCT  26.6*  27.8*  26.6*   MCV  91.7  90.8  91.1   MCH  30.0  29.4  29.5   MCHC  32.7*  32.4*  32.3*   PLT  276  349  345     Recent Labs      07/09/18   2155  07/10/18   0215  07/10/18   0550   NA  138  137  141   K  3.9  3.6  3.5   ANIONGAP  18  18  15   CL  96*  96*  101   CO2  24  23  25   BUN  14  13  14 CREATININE  0.7  0.6  0.6   GLUCOSE  280*  200*  236*   CALCIUM  8.9  8.9  9.1     Recent Labs      07/09/18   2155  07/10/18   0215  07/10/18   0550   MG  2.1  2.2  1.9   PHOS  3.1  3.0  2.5     No results for input(s): AST, ALT, ALB, BILITOT, ALKPHOS, ALB in the last 72 hours. ABGs:  Recent Labs      07/08/18   0416  07/09/18   0431  07/09/18   0814   PHART  7.560*  7.530*  7.520*   UOO3GYE  38.0  37.0  37.0   PO2ART  72.0*  89.0  88.0   QDB5IJG  34.0*  30.9*  30.2*   BEART  10.9*  7.7*  6.9*   HGBAE  10.1*  10.6*  9.7*   [id number]  93.6  95.3  95.3   CARBOXHGBART  2.1  1.9  1.9   02THERAPY  Unknown  Unknown  Unknown     HgBA1c: Invalid input(s): HGBA1C  FLP:    Lab Results   Component Value Date    TRIG 96 06/30/2018    HDL 41 06/30/2018    LDLCALC 119 06/30/2018     TSH:  No results found for: TSH  Troponin T: No results for input(s): TROPONINI in the last 72 hours. INR:   No results for input(s): INR in the last 72 hours. Objective:   Vitals: /68   Pulse 100   Temp 97.8 °F (36.6 °C) (Temporal)   Resp 19   Ht 5' 4\" (1.626 m)   Wt 165 lb 7 oz (75 kg)   LMP 01/22/2012   SpO2 93%   Breastfeeding? No   BMI 28.40 kg/m²   24HR INTAKE/OUTPUT:      Intake/Output Summary (Last 24 hours) at 07/10/18 0750  Last data filed at 07/10/18 0600   Gross per 24 hour   Intake              647 ml   Output             1985 ml   Net            -1338 ml     General appearance:Sitting in chair without any signs of distress, naswers appropriately  HEENT: atraumatic, sclera anicteric  Lungs: Diminished BL, improved from yesterday  Heart: RRR, No murmurs appreciated  Extremities: No edema in BL UE. Compression stockings over BLLE  Neurologic: Much more alert, oriented X 3  Skin: no rashes, nodules.     Assessment and Plan:   Principal Problem:  # ARDS, Resolved  - Transfer out of ICU.  - Extubated 07/09  - Pulm consult, appreciate recs  - Continue to monitor  - Diureses per CTS, on bumex  - Evaluated by Edie Griffiths for transport and ?ECMO, but patient was stabilizing by time of evaluation and decision made not to transfer 07/05. #  Coronary artery disease involving native coronary artery of native heart without angina pectoris  - s/p CABG on 7/2  - Transferred out of ICU 07/04, transferred back 07/05 for ARDS. - Insulin gtt restarted on 07/05, switched to subq 07/07  - Continue medical management per cards. #  Type 1 diabetes mellitus with neurological manifestations [address]  - Insulin gtt restarted 07/05  - transitioned to subq 07/07. Will add back lantus 10 units and continuing sliding scale today. Patient required 20 units total yesterday. - monitor lytes and replete as needed    # Anion Gap metabolic acidosis, resolved  - Gap closed 07/06  - Will continue to monitor daily BMP      #  Hyponatremia, likely due to elevated glucose, resolved  - Continue with insulin gtt as above  - Monitor BMP    #  Normocytic anemia  - Acute, likely multifactorial in the setting of hemodilution and blood loss postop  - H&H stable at this time,   -Continue to monitor    #Hospital acquired pneumonia  - New infiltrate seen on CXR  - Broad spectrum abx started 07/05 Vanc, cefepime  - Cultures redrawn, will follow, NGTD  - Continue abx at this time, consider discontinuing after 7 days. Insomnia    Constipation    Gastroesophageal reflux disease without esophagitis    Anxiety and depression    Unstable angina (HCC)    Abnormal nuclear stress test    Unstable angina pectoris ([facility]    Type 1 diabetes mellitus with complication (HCC)    Disposition:  Patient extubated yesterday. Tolerating nasal cannula. Will transfer out of ICU today. Will add back lantus 10 units and continue SS qACHS. Continue braod spectrum abx as above. *ADDENDUM: Glucose continued to rise with lantus 10 units throughout the day. Increased to 20 units for tomorrow morning*        Advance Directive: Full Code    DVT prophylaxis: lovenox    Discharge planning:  To

## 2018-07-10 NOTE — PROGRESS NOTES
Pulmonary and Critical Care Progress Note [facility]    Patient: John Isabel    1975    MR# [id number]    Acct# [de-identified]   07/10/18   8:07 AM  Referring Provider: SEE Traore MD    Chief Complaint: respiratory failure    Interval history: Patient was extubated yesterday without issue. She is up in the chair and breathing comfortably. NC is on but prongs are on her chin. Sat is 92%. She is smiling and grateful to move to the floor today. No family present. No other aggravating or alleviating factors or associated symptoms. insulin glargine 10 Units Subcutaneous Daily   insulin lispro 0-12 Units Subcutaneous TID WC   insulin lispro 0-6 Units Subcutaneous Nightly   pantoprazole 40 mg Intravenous Daily   And      sodium chloride (PF) 10 mL Intravenous Daily   guaiFENesin 600 mg Oral BID   lisinopril 5 mg Oral Daily   enoxaparin 40 mg Subcutaneous Q24H   cefepime 1 g Intravenous Q8H   atorvastatin 40 mg Oral Nightly   metoprolol succinate 25 mg Oral Daily   polyethylene glycol 17 g Oral BID   metoclopramide 10 mg Intravenous 4x Daily AC & HS   calcium carbonate 1,000 mg Oral BID   sodium chloride flush 10 mL Intravenous 2 times per day   docusate sodium 100 mg Oral BID   aspirin 81 mg Oral Daily   clopidogrel 75 mg Oral Daily   amitriptyline 100 mg Oral Nightly   gabapentin 300 mg Oral TID      dextrose 5 % and 0.45 % NaCl      dextrose 5 % and 0.45 % NaCl      dextrose       Review of Systems:     Denies SOA. Positive for congestion although minimal. Post-op pain and weakness. No other complaints    Physical Exam:  Blood pressure (!) 111/59, pulse 100, temperature 97.8 °F (36.6 °C), temperature source Temporal, resp. rate 19, height 5' 4\" (1.626 m), weight 165 lb 7 oz (75 kg), last menstrual period 01/22/2012, SpO2 93 %, not currently breastfeeding.     Intake/Output Summary (Last 24 hours) at 07/10/18 0807  Last data filed at 07/10/18 0600   Gross per 24 hour   Intake extubation  · Moving to the floor today   · Duonebs as needed  · Continue abx coverage for 7 day course  · Diuresis per surgery  · Stress ulcer/DVT prophylaxis  · Encourage mobilization and incentive use  · Signing off. Please call back if needed further    Electronically signed by Brooke Cortes on 7/10/2018 at 8:07 AM     Physician's substantive portion:  Subjective: much better, weaned to room air. No chest pain or increasing shortness of breath  Objective: ill appearing , normal excursion, no accessory muscle use or paradox  Assessment/recommendation: ok to floor, mobilize, complete abx   I have seen and examined patient personally, performing a face-to-face diagnostic evaluation with plan of care reviewed and developed with APRN. I have addended and/or modified the above history of present illness, physical examination, and assessment and plan to reflect my findings and impressions. Essential elements of the care plan were discussed with APRN above. Agree with findings and assessment/plan as documented above.     Electronically signed by Precious Arambula MD on 7/10/18 at 9:27 AM

## 2018-07-10 NOTE — PROGRESS NOTES
[facility]  Progress Note                            Date:  7/10/2018  Patient: Andreea Gandara  Admission:  6/26/2018 11:35 AM  Admit DX: Abnormal result of other cardiovascular function study [R94.39]  Unstable angina ([address] [I20.0]  Age:  40 y.o., 1975     LOS: 14 days     Reason for evaluation:   coronary artery disease and CABG      SUBJECTIVE:    The patient was seen and examined. Notes and labs reviewed. There were no complications over night. Patient's cardiac review of systems: negative. The patient is generally feeling better. Up in chair visiting with family. OBJECTIVE:    Telemetry: sinus rhythm  /67   Pulse 101   Temp 97.4 °F (36.3 °C) (Temporal)   Resp 22   Ht 5' 4\" (1.626 m)   Wt 165 lb 7 oz (75 kg)   LMP 01/22/2012   SpO2 96%   Breastfeeding? No   BMI 28.40 kg/m²     Intake/Output Summary (Last 24 hours) at 07/10/18 1824  Last data filed at 07/10/18 1600   Gross per 24 hour   Intake             1060 ml   Output             1835 ml   Net             -775 ml       Labs:   CBC:   Recent Labs      07/09/18   0730  07/10/18   0215   WBC  12.9*  11.4*   HGB  9.0*  8.6*   HCT  27.8*  26.6*   PLT  349  345     BMP: Recent Labs      07/10/18   0215  07/10/18   0550   NA  137  141   K  3.6  3.5   CO2  23  25   BUN  13  14   CREATININE  0.6  0.6   LABGLOM  >60  >60   GLUCOSE  200*  236*     BNP: No results for input(s): BNP in the last 72 hours. PT/INR: No results for input(s): PROTIME, INR in the last 72 hours. APTT:No results for input(s): APTT in the last 72 hours. CARDIAC ENZYMES:No results for input(s): CKTOTAL, CKMB, CKMBINDEX, TROPONINI in the last 72 hours. FASTING LIPID PANEL:  Lab Results   Component Value Date    HDL 41 06/30/2018    LDLCALC 119 06/30/2018    TRIG 96 06/30/2018     LIVER PROFILE:No results for input(s): AST, ALT, LABALBU in the last 72 hours.         PFSH:  No change    ROSS:  No change      Physical Examination:  BP normal sinus rhythm, unchanged from previous tracings. ECHO: not obtained. Stress Test: reviewed. Cardiac Angiography: reviewed. ASSESSMENT:    Patient Active Problem List    Diagnosis Date Noted    ARDS (adult respiratory distress syndrome) ([facility] 07/06/2018    Pneumonia 07/06/2018    Increased anion gap metabolic acidosis 27/27/6681    Respiratory insufficiency 07/05/2018    Hyponatremia 07/04/2018    Normocytic anemia 07/04/2018    Coronary artery disease involving native coronary artery of native heart without angina pectoris 06/28/2018     6/15/2018  Echo  Normal LVFX  6/15/2018  lexiscan Positive for anterior lateral myocardial ischemia, EF 65%,   6/28/2018  Cath  Severe TVD, normal LVFX  7/2/2018  CABG x 2 LIMA-LAD, VG-OM3 Vi Cantu)        Abnormal nuclear stress test     Unstable angina pectoris (HCC)     Type 1 diabetes mellitus with complication ([facility]     Unstable angina ([facility] 06/26/2018    Abnormal laboratory test 06/04/2018     Elevated Troponin      Type 1 diabetes mellitus with neurological manifestations ([facility] 06/04/2018     Autonomic neuropathy        Anxiety and depression 06/04/2018    DJD (degenerative joint disease) 06/04/2018     S/P right knee surgery x 2        Tobacco abuse 06/04/2018     Reportedly quit 6/18      Drug allergy 06/04/2018     Cipro      Constipation 07/21/2014    Abdominal distention 07/21/2014    Gastroesophageal reflux disease without esophagitis 07/21/2014    Insomnia 06/19/2012     40year-old female type I diabetic with recent CABG and postoperative ARDS, improved significantly    PLAN:    1. Continue present medications  2. Per CTS and pulmonary  3. Transferred to PCU soon      Please see orders. Discussed with patient and family and nursing.     Lenox Sicard, MD     [facility]

## 2018-07-10 NOTE — PROGRESS NOTES
during ice chip trials and regular solid consistency trials presented by SLP.)  Decreased Anterior to Posterior Transit: Reg solid  Suspected Premature Bolus Loss: Puree;Reg solid; Thin - cup (Oral transit of puree consistency trials and regular solid consistency trials-presented by SLP-varied from 1-4 seconds in length. Patient exhibited fast oral transit of thin H2O trials presented via cup by SLP.)  Lingual/Palatal Residue: Reg solid (Moderate oral cavity residue was noted post swallows. Residue cleared with added gravy texture and additional dry swallows.)  Oral Phase - Comment: No puree consistency residue was noted post swallows. Oral transit of nectar thick H2O trials, presented via cup by SLP, primarily measured 1-2 seconds in length. Indicators of Pharyngeal Phase Dysfunction  Pharyngeal Phase: Exceptions  Indicators of Pharyngeal Phase Dysfunction  Delayed Swallow: Puree;Reg Solid; Thin - cup (Suspect secondary to oral transit times.)  Decreased Laryngeal Elevation: All (Laryngeal elevation was considered to be moderately decreased and inconsistently sluggish for swallow airway protection.)  Throat Clearing - Delayed: Reg Solid (Mild delayed throat clears were noted with regular solid consistency trials.)  Pharyngeal: No outward S/S penetration/aspiration was noted with any ice chip trial, puree consistency trial, or nectar thick H2O trial. Despite exhibiting fast oral transit and suspected swallow delay, no outward S/S penetration/aspiration was observed with any thin H2O trial. As previously mentioned, just mild delayed throat clears were noted with regular solid consistency trials. At this time, would recommend mechanical soft consistency. Nectar thick liquids. Meds whole in pudding or applesauce. TOTAL FEED.      Electronically signed by KIKO Mcdonnell on 7/10/2018 at 9:54 AM

## 2018-07-10 NOTE — PROGRESS NOTES
Subjective :      Sybil Hinojosa is a 40 y.o. female referral for tape to face, chest, abdomen. Patient has a Other: abrasion wounds which is located on the right and left cheeks and right breast.     Objective:        Wound:  Right facial cheek bone 3cm x 4cm x <0.1cm  Left facial cheek bone 1.5cm x 1.5cm x <0.1cm  Right breast abrasion 4.5cm x 2cm x <0.1cm    Wound Description:  See above  This was originally measured on 7/10/18  Measurements shown are from today's visit:7/10/18      Assessment :      Other: abrasion areas to right and left cheeks and right breast.       Plan :      Plan for wound:  Dress per physician order:Perfect serve to Dr. Beckett Esperance recommendation re:cetaphil cleanser daily and prn to cleanse and Bactroban ointment twice daily to wounds.     Pressure Ulcer Prevention Protocol reviewed and updated          Adarsh Rangel RN 7/10/2018

## 2018-07-10 NOTE — PROGRESS NOTES
POST OP CARDIOTHORACIC SURGERY PROGRESS NOTE    Post op day 8    SUBJECTIVE:  Extubated yesterday. Has been up walking. Feeling well    /68   Pulse 100   Temp 97.8 °F (36.6 °C) (Temporal)   Resp 19   Ht 5' 4\" (1.626 m)   Wt 165 lb 7 oz (75 kg)   LMP 2012   SpO2 93%   Breastfeeding? No   BMI 28.40 kg/m²   Average, Min, and Max for last 24 hours Vitals:  TEMPERATURE:  Temp  Av.4 °F (36.3 °C)  Min: 96.9 °F (36.1 °C)  Max: 98 °F (36.7 °C)  RESPIRATIONS RANGE: Resp  Av.7  Min: 12  Max: 26  PULSE RANGE: Pulse  Av.5  Min: 93  Max: 105  BLOOD PRESSURE RANGE:  Systolic (07UXY), JWF:943 , Min:97 , SUV:419   ; Diastolic (04DVB), LLL:26, Min:44, Max:68    PULSE OXIMETRY RANGE: SpO2  Av.7 %  Min: 90 %  Max: 98 %    I/O last 3 completed shifts:   In: 647 [I.V.:147; IV Piggyback:500]  Out: 1985 [Urine:1885; Emesis/NG output:100]    CHEST: lungs clear    CARDIOVASCULAR: regular rhythm, no murmurs    INCISION: no drainage, skin edges intact    DRAINS:     LABS:  CBC with Differential:    Lab Results   Component Value Date    WBC 11.4 07/10/2018    RBC 2.92 07/10/2018    HGB 8.6 07/10/2018    HCT 26.6 07/10/2018    HCT 44.8 2012     07/10/2018     2012    MCV 91.1 07/10/2018    MCH 29.5 07/10/2018    MCHC 32.3 07/10/2018    RDW 12.5 07/10/2018    LYMPHOPCT 35.0 2018    MONOPCT 4.0 2018    EOSPCT 2.3 2012    BASOPCT 0.0 2018    MONOSABS 0.50 2018    LYMPHSABS 4.4 2018    EOSABS 0.25 2018    BASOSABS 0.00 2018     BMP:    Lab Results   Component Value Date     07/10/2018     2012    K 3.5 07/10/2018    K 4.1 2018    K 4.1 2012     07/10/2018     2012    CO2 25 07/10/2018    BUN 14 07/10/2018    LABALBU 2.5 2018    LABALBU 4.7 2012    CREATININE 0.6 07/10/2018    CREATININE 0.8 2012    CALCIUM 9.1 07/10/2018    LABGLOM >60 07/10/2018    GLUCOSE 236 07/10/2018

## 2018-07-11 ENCOUNTER — APPOINTMENT (OUTPATIENT)
Dept: GENERAL RADIOLOGY | Age: 43
DRG: 233 | End: 2018-07-11
Attending: INTERNAL MEDICINE
Payer: MEDICAID

## 2018-07-11 LAB
ACETONE BLOOD: ABNORMAL
ALBUMIN SERPL-MCNC: 2.6 G/DL (ref 3.5–5.2)
ALP BLD-CCNC: 118 U/L (ref 35–104)
ALT SERPL-CCNC: 23 U/L (ref 5–33)
ANION GAP SERPL CALCULATED.3IONS-SCNC: 22 MMOL/L (ref 7–19)
AST SERPL-CCNC: 17 U/L (ref 5–32)
BILIRUB SERPL-MCNC: 0.3 MG/DL (ref 0.2–1.2)
BLOOD CULTURE, ROUTINE: NORMAL
BUN BLDV-MCNC: 21 MG/DL (ref 6–20)
CALCIUM SERPL-MCNC: 8.6 MG/DL (ref 8.6–10)
CHLORIDE BLD-SCNC: 91 MMOL/L (ref 98–111)
CO2: 19 MMOL/L (ref 22–29)
CREAT SERPL-MCNC: 0.7 MG/DL (ref 0.5–0.9)
GFR NON-AFRICAN AMERICAN: >60
GLUCOSE BLD-MCNC: 124 MG/DL (ref 70–99)
GLUCOSE BLD-MCNC: 231 MG/DL (ref 70–99)
GLUCOSE BLD-MCNC: 304 MG/DL (ref 70–99)
GLUCOSE BLD-MCNC: 338 MG/DL (ref 70–99)
GLUCOSE BLD-MCNC: 415 MG/DL (ref 70–99)
GLUCOSE BLD-MCNC: 431 MG/DL (ref 74–109)
HCT VFR BLD CALC: 27.2 % (ref 37–47)
HEMOGLOBIN: 8.5 G/DL (ref 12–16)
LACTIC ACID: 1.5 MMOL/L (ref 0.5–1.9)
MCH RBC QN AUTO: 28.9 PG (ref 27–31)
MCHC RBC AUTO-ENTMCNC: 31.3 G/DL (ref 33–37)
MCV RBC AUTO: 92.5 FL (ref 81–99)
PDW BLD-RTO: 12.8 % (ref 11.5–14.5)
PERFORMED ON: ABNORMAL
PLATELET # BLD: 389 K/UL (ref 130–400)
PMV BLD AUTO: 10.1 FL (ref 9.4–12.3)
POTASSIUM SERPL-SCNC: 4 MMOL/L (ref 3.5–5)
RBC # BLD: 2.94 M/UL (ref 4.2–5.4)
SODIUM BLD-SCNC: 132 MMOL/L (ref 136–145)
TOTAL PROTEIN: 6 G/DL (ref 6.6–8.7)
WBC # BLD: 13.7 K/UL (ref 4.8–10.8)

## 2018-07-11 PROCEDURE — 94664 DEMO&/EVAL PT USE INHALER: CPT

## 2018-07-11 PROCEDURE — 80053 COMPREHEN METABOLIC PANEL: CPT

## 2018-07-11 PROCEDURE — 82948 REAGENT STRIP/BLOOD GLUCOSE: CPT

## 2018-07-11 PROCEDURE — 2140000000 HC CCU INTERMEDIATE R&B

## 2018-07-11 PROCEDURE — 99024 POSTOP FOLLOW-UP VISIT: CPT | Performed by: THORACIC SURGERY (CARDIOTHORACIC VASCULAR SURGERY)

## 2018-07-11 PROCEDURE — 6370000000 HC RX 637 (ALT 250 FOR IP): Performed by: FAMILY MEDICINE

## 2018-07-11 PROCEDURE — 6370000000 HC RX 637 (ALT 250 FOR IP): Performed by: THORACIC SURGERY (CARDIOTHORACIC VASCULAR SURGERY)

## 2018-07-11 PROCEDURE — 71046 X-RAY EXAM CHEST 2 VIEWS: CPT

## 2018-07-11 PROCEDURE — 82009 KETONE BODYS QUAL: CPT

## 2018-07-11 PROCEDURE — 6370000000 HC RX 637 (ALT 250 FOR IP): Performed by: INTERNAL MEDICINE

## 2018-07-11 PROCEDURE — 6360000002 HC RX W HCPCS: Performed by: NURSE PRACTITIONER

## 2018-07-11 PROCEDURE — C9113 INJ PANTOPRAZOLE SODIUM, VIA: HCPCS | Performed by: INTERNAL MEDICINE

## 2018-07-11 PROCEDURE — 6370000000 HC RX 637 (ALT 250 FOR IP): Performed by: NURSE PRACTITIONER

## 2018-07-11 PROCEDURE — 2580000003 HC RX 258: Performed by: HOSPITALIST

## 2018-07-11 PROCEDURE — 99024 POSTOP FOLLOW-UP VISIT: CPT | Performed by: INTERNAL MEDICINE

## 2018-07-11 PROCEDURE — 6360000002 HC RX W HCPCS: Performed by: HOSPITALIST

## 2018-07-11 PROCEDURE — 85027 COMPLETE CBC AUTOMATED: CPT

## 2018-07-11 PROCEDURE — 92526 ORAL FUNCTION THERAPY: CPT

## 2018-07-11 PROCEDURE — 36415 COLL VENOUS BLD VENIPUNCTURE: CPT

## 2018-07-11 PROCEDURE — 6360000002 HC RX W HCPCS: Performed by: INTERNAL MEDICINE

## 2018-07-11 PROCEDURE — 2700000000 HC OXYGEN THERAPY PER DAY

## 2018-07-11 PROCEDURE — 2580000003 HC RX 258: Performed by: THORACIC SURGERY (CARDIOTHORACIC VASCULAR SURGERY)

## 2018-07-11 PROCEDURE — 83605 ASSAY OF LACTIC ACID: CPT

## 2018-07-11 PROCEDURE — 99233 SBSQ HOSP IP/OBS HIGH 50: CPT | Performed by: FAMILY MEDICINE

## 2018-07-11 PROCEDURE — 51798 US URINE CAPACITY MEASURE: CPT

## 2018-07-11 RX ORDER — ALPRAZOLAM 1 MG/1
1 TABLET ORAL 4 TIMES DAILY PRN
Status: DISCONTINUED | OUTPATIENT
Start: 2018-07-11 | End: 2018-07-13 | Stop reason: HOSPADM

## 2018-07-11 RX ORDER — INSULIN GLARGINE 100 [IU]/ML
20 INJECTION, SOLUTION SUBCUTANEOUS ONCE
Status: COMPLETED | OUTPATIENT
Start: 2018-07-11 | End: 2018-07-11

## 2018-07-11 RX ORDER — INSULIN GLARGINE 100 [IU]/ML
40 INJECTION, SOLUTION SUBCUTANEOUS DAILY
Status: DISCONTINUED | OUTPATIENT
Start: 2018-07-12 | End: 2018-07-13 | Stop reason: HOSPADM

## 2018-07-11 RX ADMIN — INSULIN GLARGINE 20 UNITS: 100 INJECTION, SOLUTION SUBCUTANEOUS at 07:59

## 2018-07-11 RX ADMIN — OXYCODONE HYDROCHLORIDE 5 MG: 5 TABLET ORAL at 07:55

## 2018-07-11 RX ADMIN — OXYCODONE HYDROCHLORIDE 10 MG: 5 TABLET ORAL at 20:34

## 2018-07-11 RX ADMIN — Medication 10 ML: at 21:00

## 2018-07-11 RX ADMIN — INSULIN LISPRO 8 UNITS: 100 INJECTION, SOLUTION INTRAVENOUS; SUBCUTANEOUS at 07:58

## 2018-07-11 RX ADMIN — ATORVASTATIN CALCIUM 40 MG: 40 TABLET, FILM COATED ORAL at 20:35

## 2018-07-11 RX ADMIN — INSULIN GLARGINE 20 UNITS: 100 INJECTION, SOLUTION SUBCUTANEOUS at 11:16

## 2018-07-11 RX ADMIN — INSULIN LISPRO 4 UNITS: 100 INJECTION, SOLUTION INTRAVENOUS; SUBCUTANEOUS at 18:19

## 2018-07-11 RX ADMIN — INSULIN LISPRO 12 UNITS: 100 INJECTION, SOLUTION INTRAVENOUS; SUBCUTANEOUS at 04:13

## 2018-07-11 RX ADMIN — METOCLOPRAMIDE 10 MG: 5 INJECTION, SOLUTION INTRAMUSCULAR; INTRAVENOUS at 11:16

## 2018-07-11 RX ADMIN — CLOPIDOGREL BISULFATE 75 MG: 75 TABLET ORAL at 07:57

## 2018-07-11 RX ADMIN — MUPIROCIN: 20 OINTMENT TOPICAL at 21:00

## 2018-07-11 RX ADMIN — GUAIFENESIN 600 MG: 600 TABLET, EXTENDED RELEASE ORAL at 20:35

## 2018-07-11 RX ADMIN — ALPRAZOLAM 1 MG: 1 TABLET ORAL at 20:35

## 2018-07-11 RX ADMIN — GUAIFENESIN 600 MG: 600 TABLET, EXTENDED RELEASE ORAL at 07:56

## 2018-07-11 RX ADMIN — INSULIN LISPRO 8 UNITS: 100 INJECTION, SOLUTION INTRAVENOUS; SUBCUTANEOUS at 11:17

## 2018-07-11 RX ADMIN — LISINOPRIL 5 MG: 5 TABLET ORAL at 07:55

## 2018-07-11 RX ADMIN — Medication 1 G: at 15:51

## 2018-07-11 RX ADMIN — METOCLOPRAMIDE 10 MG: 5 INJECTION, SOLUTION INTRAMUSCULAR; INTRAVENOUS at 16:39

## 2018-07-11 RX ADMIN — METOCLOPRAMIDE 10 MG: 5 INJECTION, SOLUTION INTRAMUSCULAR; INTRAVENOUS at 20:34

## 2018-07-11 RX ADMIN — ALPRAZOLAM 1 MG: 1 TABLET ORAL at 16:03

## 2018-07-11 RX ADMIN — ENOXAPARIN SODIUM 40 MG: 40 INJECTION SUBCUTANEOUS at 11:16

## 2018-07-11 RX ADMIN — DOCUSATE SODIUM 100 MG: 100 CAPSULE, LIQUID FILLED ORAL at 07:56

## 2018-07-11 RX ADMIN — DOCUSATE SODIUM 100 MG: 100 CAPSULE, LIQUID FILLED ORAL at 20:34

## 2018-07-11 RX ADMIN — GABAPENTIN 300 MG: 300 CAPSULE ORAL at 07:56

## 2018-07-11 RX ADMIN — GABAPENTIN 300 MG: 300 CAPSULE ORAL at 13:41

## 2018-07-11 RX ADMIN — METOCLOPRAMIDE 10 MG: 5 INJECTION, SOLUTION INTRAMUSCULAR; INTRAVENOUS at 07:56

## 2018-07-11 RX ADMIN — Medication 1 G: at 07:55

## 2018-07-11 RX ADMIN — ASPIRIN 81 MG: 81 TABLET, COATED ORAL at 07:55

## 2018-07-11 RX ADMIN — GABAPENTIN 300 MG: 300 CAPSULE ORAL at 20:34

## 2018-07-11 RX ADMIN — METOPROLOL SUCCINATE 25 MG: 25 TABLET, EXTENDED RELEASE ORAL at 07:56

## 2018-07-11 RX ADMIN — CALCIUM CARBONATE 1000 MG: 500 TABLET, CHEWABLE ORAL at 20:35

## 2018-07-11 RX ADMIN — PANTOPRAZOLE SODIUM 40 MG: 40 INJECTION, POWDER, FOR SOLUTION INTRAVENOUS at 07:56

## 2018-07-11 RX ADMIN — MUPIROCIN: 20 OINTMENT TOPICAL at 08:15

## 2018-07-11 RX ADMIN — ZOLPIDEM TARTRATE 5 MG: 5 TABLET ORAL at 20:34

## 2018-07-11 RX ADMIN — Medication 1 G: at 00:40

## 2018-07-11 RX ADMIN — AMITRIPTYLINE HYDROCHLORIDE 100 MG: 50 TABLET, FILM COATED ORAL at 20:58

## 2018-07-11 ASSESSMENT — PAIN SCALES - GENERAL
PAINLEVEL_OUTOF10: 0
PAINLEVEL_OUTOF10: 8
PAINLEVEL_OUTOF10: 0
PAINLEVEL_OUTOF10: 7

## 2018-07-11 NOTE — PROGRESS NOTES
Patient remains intubated sedated. On insulin gtt. Vent settings weaning down. Following commands with sedation weaned. 7-8: Intubated sedated. No nutrition day 3. Increased output from NG tube. Unable to get meds through tube. Hypoxia when repositioned. Remains on propofol gtt. Insulin gtt switched to subq yesterday. 7-9: Pt on CPAP trial, appears to be tolerating. ? Extubation today. Blood sugar better controlled currently but will likely need to adjust once nutrition restarted. 7-10: Patient extubated yesterday. Tolerating nasal cannula. Will transfer out of ICU today. Will add back lantus 10 units and continue SS qACHS. 7-11: Continued hyperglycemia despite poor appetite, positive serum ketones with blood sugars 250s to nearly 500. Increase Lantus to 40 units daily and encouraged to eat multiple small meals. ROS: 14 point review of systems is negative except as specifically addressed above. DIET CARB CONTROL; Low Sodium (2 GM); Dysphagia II Mechanically Altered;  Nectar Thick; Daily Fluid Restriction: 2000 ml; Low Fiber    Intake/Output Summary (Last 24 hours) at 07/11/18 0840  Last data filed at 07/11/18 0400   Gross per 24 hour   Intake             1450 ml   Output             1485 ml   Net              -35 ml     Medications:   dextrose 5 % and 0.45 % NaCl      dextrose 5 % and 0.45 % NaCl      dextrose       Current Facility-Administered Medications   Medication Dose Route Frequency Provider Last Rate Last Dose    [START ON 7/12/2018] insulin glargine (LANTUS) injection vial 40 Units  40 Units Subcutaneous Daily Sunday Braun, DO        insulin glargine (LANTUS) injection vial 20 Units  20 Units Subcutaneous Once [place], DO        insulin lispro (HUMALOG) injection vial 0-12 Units  0-12 Units Subcutaneous TID  Lizabeth Post MD   8 Units at 07/11/18 0758    insulin lispro (HUMALOG) injection vial 0-6 Units  0-6 Units Subcutaneous Nightly Lizabeth Post MD   6 Units at 07/10/18 2025    gentle cleanser (CETAPHIL) liquid   Topical PRN Betzaida Ojeda MD        pirocin [facility]) 2 % ointment   Topical BID Betzaida Ojeda MD        pantoprazole (PROTONIX) injection 40 mg  40 mg Intravenous Daily Betzaida Ojeda MD   40 mg at 07/11/18 0756    And    sodium chloride (PF) 0.9 % injection 10 mL  10 mL Intravenous Daily Betzaida Ojeda MD   10 mL at 07/10/18 0810    bisacodyl (DULCOLAX) suppository 10 mg  10 mg Rectal Daily PRN Sandy Lai MD   10 mg at 07/08/18 1238    ipratropium-albuterol (DUONEB) nebulizer solution 1 ampule  1 ampule Inhalation Q4H PRN NAVDEEP Cheema   1 ampule at 07/10/18 0714    dextrose 50 % solution 12.5 g  12.5 g Intravenous PRN Linda Quiroga MD        dextrose 5 % and 0.45 % sodium chloride infusion   Intravenous Continuous PRN Linda Quiroga MD        dextrose 5 % and 0.45 % sodium chloride infusion   Intravenous Continuous PRN Linda Quiroga MD        guaiFENesin [facility]) extended release tablet 600 mg  600 mg Oral BID Nathaly Money, APRN   600 mg at 07/11/18 0756    lisinopril (PRINIVIL;ZESTRIL) tablet 5 mg  5 mg Oral Daily Nathaly Money, APRN   5 mg at 07/11/18 0755    enoxaparin (LOVENOX) injection 40 mg  40 mg Subcutaneous Q24H Betzaida Ojeda MD   40 mg at 07/10/18 1204    cefepime (MAXIPIME) 1 g in sodium chloride (PF) 10 mL IV syringe  1 g Intravenous Q8H Linda Quiroga MD   1 g at 07/11/18 0755    atorvastatin (LIPITOR) tablet 40 mg  40 mg Oral Nightly Seng Still MD   40 mg at 07/10/18 2026    metoprolol succinate (TOPROL XL) extended release tablet 25 mg  25 mg Oral Daily Seng Still MD   25 mg at 07/11/18 0756    polyethylene glycol (GLYCOLAX) packet 17 g  17 g Oral BID Nathaly Money, APRN   17 g at 07/10/18 0804    metoclopramide (REGLAN) injection 10 mg  10 mg Intravenous 4x Daily AC & HS Nathaly Money, APRN   10 mg at 07/11/18 0756    calcium carbonate (TUMS) chewable tablet 1,000 normal.  Neck without masses, lympadenopathy, bruit, thyroid normal  Lungs: no increased work of breathing, \"clear to auscultation bilaterally\" without rales, rhonchi or wheezes  Heart: regular rate and rhythm, S1, S2 normal, no murmur, click, rub or gallop  Abdomen: soft, non-tender; bowel sounds normal; no masses,  no organomegaly and obese  Extremities: extremities normal, atraumatic, no cyanosis or edema  Neurologic: no focal neurologic deficits, normal sensation, alert and oriented, affect and mood appropriate. Skin: shallow ulcerations bilateral cheeks    Assessment and Plan:   Principal Problem:    Coronary artery disease involving native coronary artery of native heart without angina pectoris  Active Problems:    Insomnia    Constipation    Gastroesophageal reflux disease without esophagitis    Type 1 diabetes mellitus with neurological manifestations (HCC)    Anxiety and depression    Unstable angina (HCC)    Abnormal nuclear stress test    Unstable angina pectoris (HCC)    Type 1 diabetes mellitus with complication (HCC)    Hyponatremia    Normocytic anemia    Respiratory insufficiency    ARDS (adult respiratory distress syndrome) (HCC)    Pneumonia    Increased anion gap metabolic acidosis  Resolved Problems:    * No resolved hospital problems. *    Increase Lantus to 40 units daily. Additional 20 units today. Advance Directive: Full Code    DVT prophylaxis: enoxaparin    Discharge planning:  To home, to PCU today      DO Ivett Smith Hospitalist

## 2018-07-11 NOTE — PROGRESS NOTES
Interventions: Patient/Family education;Diet tolerance monitoring    Compensatory Swallowing Strategies  Compensatory Swallowing Strategies: Upright as possible for all oral intake;Small bites/sips;Eat/Feed slowly; Alternate solids and liquids; External pacing;Remain upright for 30-45 minutes after meals    Treatment/Goals  Timeframe for Short-term Goals: 1x/day for 2 days  Goal 1: Patient will tolerate regular solid consistency and thin liquids with min S/S penetration/aspiration during PO intake. Goal 2: Patient staff will follow swallow safety recommendations to decrease risk of penetration/aspiration during PO intake. General  Chart Reviewed: Yes  Behavior/Cognition: Alert; Cooperative;Pleasant mood  O2 Device: Nasal cannula  Communication Observation:  (SLP ranked functional intelligibility of speech for unfamiliar listeners at 100% in verbalizations.)  Follows Directions: Simple  Dentition: Adequate  Patient Positioning: Upright in chair  Consistencies Administered: Reg solid; Thin - straw    Observed patient's swallowing function with the following observations noted:    Oral Phase Dysfunction  Oral Phase: Exceptions  Oral Phase Dysfunction  Impaired Mastication: Reg Solid (Patient exhibited slow oral prep of regular solid consistency trials presented independently.)  Decreased Anterior to Posterior Transit: Reg solid  Lingual/Palatal Residue: Reg solid (Min-moderate oral cavity residue was noted post swallows. Residue cleared with additional dry swallows.)  Oral Phase - Comment: Oral transit of regular solid consistency trials primarily measured 1-2 seconds in length. Oral transit of thin H2O trials, presented independently via straw, primarily measured 1 second in length. Indicators of Pharyngeal Phase Dysfunction  Pharyngeal Phase: Exceptions  Indicators of Pharyngeal Phase Dysfunction  Delayed Swallow:  Thin - cup  Decreased Laryngeal Elevation: All (Laryngeal elevation was considered to be

## 2018-07-11 NOTE — PROGRESS NOTES
Pt transferred to room 725 by RN and transportation services. Belongings sent with patient and patient's mother. Pt is ST on monitor, ; Telemetry verified with ARGENIS Puckett at bedside.

## 2018-07-11 NOTE — PROGRESS NOTES
change    ROSS:  No change      Physical Examination:  /69   Pulse 94   Temp 97.8 °F (36.6 °C) (Temporal)   Resp 14   Ht 5' 4\" (1.626 m)   Wt 166 lb 8 oz (75.5 kg)   LMP 01/22/2012   SpO2 96%   Breastfeeding? No   BMI 28.58 kg/m²     CONSTITUTIONAL:  Awake, alert, cooperative, no apparent distress, and appears stated age. HEENT: Normal jugular venous pulsations, no carotid bruits. Head is atraumatic, normocephalic. Eyes and oral mucosa are normal.  LUNGS: Respiratory effort for the work of breathing is normal.  On auscultation: decreased breath sounds  CARDIOVASCULAR:  Normal apical impulse, regular rate and rhythm, normal S1 and S2, no S3 or S4, and no murmur or rub is noted. ABDOMEN: Soft, nontender, nondistended. Bowel sounds are present. No masses or tenderness. SKIN: Warm and dry. EXTREMITIES: no lower extremity edema. No cyanosis or clubbing. NEUROLOGY:  Motor movement grossly intact. Focal signs are not identified.         Current Inpatient Medications:   [START ON 7/12/2018] insulin glargine  40 Units Subcutaneous Daily    insulin lispro  0-12 Units Subcutaneous TID WC    insulin lispro  0-6 Units Subcutaneous Nightly    mupirocin   Topical BID    pantoprazole  40 mg Intravenous Daily    And    sodium chloride (PF)  10 mL Intravenous Daily    guaiFENesin  600 mg Oral BID    lisinopril  5 mg Oral Daily    enoxaparin  40 mg Subcutaneous Q24H    cefepime  1 g Intravenous Q8H    atorvastatin  40 mg Oral Nightly    metoprolol succinate  25 mg Oral Daily    polyethylene glycol  17 g Oral BID    metoclopramide  10 mg Intravenous 4x Daily AC & HS    calcium carbonate  1,000 mg Oral BID    sodium chloride flush  10 mL Intravenous 2 times per day    docusate sodium  100 mg Oral BID    aspirin  81 mg Oral Daily    clopidogrel  75 mg Oral Daily    amitriptyline  100 mg Oral Nightly    gabapentin  300 mg Oral TID       IV Infusions (if any):   dextrose 5 % and 0.45 % NaCl     

## 2018-07-12 LAB
EKG P AXIS: 18 DEGREES
EKG P-R INTERVAL: 84 MS
EKG Q-T INTERVAL: 380 MS
EKG QRS DURATION: 76 MS
EKG QTC CALCULATION (BAZETT): 446 MS
EKG T AXIS: 48 DEGREES
GLUCOSE BLD-MCNC: 191 MG/DL (ref 70–99)
GLUCOSE BLD-MCNC: 236 MG/DL (ref 70–99)
GLUCOSE BLD-MCNC: 295 MG/DL (ref 70–99)
GLUCOSE BLD-MCNC: 314 MG/DL (ref 70–99)
HCT VFR BLD CALC: 30.7 % (ref 37–47)
HEMOGLOBIN: 10.2 G/DL (ref 12–16)
MCH RBC QN AUTO: 29.6 PG (ref 27–31)
MCHC RBC AUTO-ENTMCNC: 33.2 G/DL (ref 33–37)
MCV RBC AUTO: 89 FL (ref 81–99)
PDW BLD-RTO: 12.7 % (ref 11.5–14.5)
PERFORMED ON: ABNORMAL
PLATELET # BLD: 413 K/UL (ref 130–400)
PMV BLD AUTO: 10.3 FL (ref 9.4–12.3)
RBC # BLD: 3.45 M/UL (ref 4.2–5.4)
WBC # BLD: 12.4 K/UL (ref 4.8–10.8)

## 2018-07-12 PROCEDURE — 6370000000 HC RX 637 (ALT 250 FOR IP): Performed by: FAMILY MEDICINE

## 2018-07-12 PROCEDURE — 99024 POSTOP FOLLOW-UP VISIT: CPT | Performed by: INTERNAL MEDICINE

## 2018-07-12 PROCEDURE — 6370000000 HC RX 637 (ALT 250 FOR IP): Performed by: INTERNAL MEDICINE

## 2018-07-12 PROCEDURE — 6370000000 HC RX 637 (ALT 250 FOR IP): Performed by: NURSE PRACTITIONER

## 2018-07-12 PROCEDURE — 85027 COMPLETE CBC AUTOMATED: CPT

## 2018-07-12 PROCEDURE — 2140000000 HC CCU INTERMEDIATE R&B

## 2018-07-12 PROCEDURE — C9113 INJ PANTOPRAZOLE SODIUM, VIA: HCPCS | Performed by: INTERNAL MEDICINE

## 2018-07-12 PROCEDURE — 2700000000 HC OXYGEN THERAPY PER DAY

## 2018-07-12 PROCEDURE — 6360000002 HC RX W HCPCS: Performed by: HOSPITALIST

## 2018-07-12 PROCEDURE — 6360000002 HC RX W HCPCS: Performed by: NURSE PRACTITIONER

## 2018-07-12 PROCEDURE — 36415 COLL VENOUS BLD VENIPUNCTURE: CPT

## 2018-07-12 PROCEDURE — 93005 ELECTROCARDIOGRAM TRACING: CPT

## 2018-07-12 PROCEDURE — 99232 SBSQ HOSP IP/OBS MODERATE 35: CPT | Performed by: FAMILY MEDICINE

## 2018-07-12 PROCEDURE — 2580000003 HC RX 258: Performed by: HOSPITALIST

## 2018-07-12 PROCEDURE — 97116 GAIT TRAINING THERAPY: CPT

## 2018-07-12 PROCEDURE — 2580000003 HC RX 258: Performed by: THORACIC SURGERY (CARDIOTHORACIC VASCULAR SURGERY)

## 2018-07-12 PROCEDURE — 2580000003 HC RX 258: Performed by: INTERNAL MEDICINE

## 2018-07-12 PROCEDURE — 82948 REAGENT STRIP/BLOOD GLUCOSE: CPT

## 2018-07-12 PROCEDURE — 6370000000 HC RX 637 (ALT 250 FOR IP): Performed by: THORACIC SURGERY (CARDIOTHORACIC VASCULAR SURGERY)

## 2018-07-12 PROCEDURE — 6360000002 HC RX W HCPCS: Performed by: INTERNAL MEDICINE

## 2018-07-12 PROCEDURE — 92526 ORAL FUNCTION THERAPY: CPT

## 2018-07-12 PROCEDURE — 97110 THERAPEUTIC EXERCISES: CPT

## 2018-07-12 RX ORDER — PANTOPRAZOLE SODIUM 40 MG/1
40 TABLET, DELAYED RELEASE ORAL
Status: DISCONTINUED | OUTPATIENT
Start: 2018-07-13 | End: 2018-07-13 | Stop reason: HOSPADM

## 2018-07-12 RX ADMIN — ATORVASTATIN CALCIUM 40 MG: 40 TABLET, FILM COATED ORAL at 20:04

## 2018-07-12 RX ADMIN — POLYETHYLENE GLYCOL 3350 17 G: 17 POWDER, FOR SOLUTION ORAL at 09:32

## 2018-07-12 RX ADMIN — GABAPENTIN 300 MG: 300 CAPSULE ORAL at 14:34

## 2018-07-12 RX ADMIN — ENOXAPARIN SODIUM 40 MG: 40 INJECTION SUBCUTANEOUS at 11:46

## 2018-07-12 RX ADMIN — LISINOPRIL 5 MG: 5 TABLET ORAL at 09:21

## 2018-07-12 RX ADMIN — Medication 10 ML: at 09:31

## 2018-07-12 RX ADMIN — GUAIFENESIN 600 MG: 600 TABLET, EXTENDED RELEASE ORAL at 20:04

## 2018-07-12 RX ADMIN — METOPROLOL SUCCINATE 25 MG: 25 TABLET, EXTENDED RELEASE ORAL at 09:21

## 2018-07-12 RX ADMIN — Medication 10 ML: at 09:34

## 2018-07-12 RX ADMIN — PANTOPRAZOLE SODIUM 40 MG: 40 INJECTION, POWDER, FOR SOLUTION INTRAVENOUS at 09:33

## 2018-07-12 RX ADMIN — OXYCODONE HYDROCHLORIDE 10 MG: 5 TABLET ORAL at 05:52

## 2018-07-12 RX ADMIN — METOCLOPRAMIDE 10 MG: 5 INJECTION, SOLUTION INTRAMUSCULAR; INTRAVENOUS at 20:04

## 2018-07-12 RX ADMIN — OXYCODONE HYDROCHLORIDE 10 MG: 5 TABLET ORAL at 14:34

## 2018-07-12 RX ADMIN — MUPIROCIN: 20 OINTMENT TOPICAL at 20:03

## 2018-07-12 RX ADMIN — Medication 10 ML: at 20:05

## 2018-07-12 RX ADMIN — GABAPENTIN 300 MG: 300 CAPSULE ORAL at 20:04

## 2018-07-12 RX ADMIN — Medication 1 G: at 02:08

## 2018-07-12 RX ADMIN — CLOPIDOGREL BISULFATE 75 MG: 75 TABLET ORAL at 09:21

## 2018-07-12 RX ADMIN — ASPIRIN 81 MG: 81 TABLET, COATED ORAL at 09:21

## 2018-07-12 RX ADMIN — GABAPENTIN 300 MG: 300 CAPSULE ORAL at 09:21

## 2018-07-12 RX ADMIN — ALPRAZOLAM 1 MG: 1 TABLET ORAL at 20:03

## 2018-07-12 RX ADMIN — AMITRIPTYLINE HYDROCHLORIDE 100 MG: 50 TABLET, FILM COATED ORAL at 18:37

## 2018-07-12 RX ADMIN — POLYETHYLENE GLYCOL 3350 17 G: 17 POWDER, FOR SOLUTION ORAL at 20:03

## 2018-07-12 RX ADMIN — CALCIUM CARBONATE 1000 MG: 500 TABLET, CHEWABLE ORAL at 20:04

## 2018-07-12 RX ADMIN — MUPIROCIN: 20 OINTMENT TOPICAL at 10:43

## 2018-07-12 RX ADMIN — INSULIN LISPRO 4 UNITS: 100 INJECTION, SOLUTION INTRAVENOUS; SUBCUTANEOUS at 18:37

## 2018-07-12 RX ADMIN — INSULIN LISPRO 6 UNITS: 100 INJECTION, SOLUTION INTRAVENOUS; SUBCUTANEOUS at 11:46

## 2018-07-12 RX ADMIN — OXYCODONE HYDROCHLORIDE 5 MG: 5 TABLET ORAL at 20:04

## 2018-07-12 RX ADMIN — DOCUSATE SODIUM 100 MG: 100 CAPSULE, LIQUID FILLED ORAL at 20:03

## 2018-07-12 RX ADMIN — GUAIFENESIN 600 MG: 600 TABLET, EXTENDED RELEASE ORAL at 09:21

## 2018-07-12 RX ADMIN — INSULIN GLARGINE 40 UNITS: 100 INJECTION, SOLUTION SUBCUTANEOUS at 09:25

## 2018-07-12 RX ADMIN — DOCUSATE SODIUM 100 MG: 100 CAPSULE, LIQUID FILLED ORAL at 09:21

## 2018-07-12 RX ADMIN — OXYCODONE HYDROCHLORIDE 10 MG: 5 TABLET ORAL at 10:38

## 2018-07-12 RX ADMIN — INSULIN LISPRO 2 UNITS: 100 INJECTION, SOLUTION INTRAVENOUS; SUBCUTANEOUS at 09:28

## 2018-07-12 RX ADMIN — INSULIN LISPRO 4 UNITS: 100 INJECTION, SOLUTION INTRAVENOUS; SUBCUTANEOUS at 20:43

## 2018-07-12 RX ADMIN — CALCIUM CARBONATE 1000 MG: 500 TABLET, CHEWABLE ORAL at 09:20

## 2018-07-12 ASSESSMENT — PAIN SCALES - GENERAL
PAINLEVEL_OUTOF10: 0
PAINLEVEL_OUTOF10: 10
PAINLEVEL_OUTOF10: 7
PAINLEVEL_OUTOF10: 0
PAINLEVEL_OUTOF10: 0
PAINLEVEL_OUTOF10: 6
PAINLEVEL_OUTOF10: 0
PAINLEVEL_OUTOF10: 0
PAINLEVEL_OUTOF10: 4
PAINLEVEL_OUTOF10: 8
PAINLEVEL_OUTOF10: 0

## 2018-07-12 NOTE — CARE COORDINATION
SW met with Pt at bedside. Pt and SW discussed Pt's discharge plan. Pt reported she was going home and has a young daughter. Pt stated her brother is in the home some as well. Pt's brother can help as needed. Pt stated she does not use home o2, walker, or cane. Pt stated she would like home health services. Pt stated she has transportation from hospital. Pt stated she would like to have a BSC. Pt would like it delivered to home if possible. Pt would like it ordered to \[facility]\""/[facility] in [place]. SW submitted the order for the [facility] to [facility].      [address] [place] [address] Avenue: [phone]  F: [phone]    Electronically signed by Pranay Montano on 7/12/2018 at 12:16 PM

## 2018-07-12 NOTE — CARE COORDINATION
LISA followed up with the referral to [facility] by phone. However, they did not receive the fax. SW confirmed the fax. The correct Fax number is [phone]. HLS advised someone would have to pick it up. LISA submitted the referral again to the correct fax.      [address] [place] [address] Avenue: [phone]  F: [phone]    Electronically signed by Merlynn Fabry on 7/12/2018 at 1:27 PM

## 2018-07-12 NOTE — PROGRESS NOTES
[facility]  Progress Note                            Date:  7/12/2018  Patient: Akash Fajardo  Admission:  6/26/2018 11:35 AM  Admit DX: Abnormal result of other cardiovascular function study [R94.39]  Unstable angina ([address] [I20.0]  Age:  40 y.o., 1975     LOS: 16 days     Reason for evaluation:   coronary artery disease and CABG      SUBJECTIVE:    The patient was seen and examined. Notes and labs reviewed. There were not complications over night. Patient's cardiac review of systems: negative for irregular heart beat. The patient is  gradually improving. I am feeling better. OBJECTIVE:    Telemetry: Sinus         insulin glargine  40 Units Subcutaneous Daily    insulin lispro  0-12 Units Subcutaneous TID WC    insulin lispro  0-6 Units Subcutaneous Nightly    mupirocin   Topical BID    pantoprazole  40 mg Intravenous Daily    And    sodium chloride (PF)  10 mL Intravenous Daily    guaiFENesin  600 mg Oral BID    lisinopril  5 mg Oral Daily    enoxaparin  40 mg Subcutaneous Q24H    atorvastatin  40 mg Oral Nightly    metoprolol succinate  25 mg Oral Daily    polyethylene glycol  17 g Oral BID    metoclopramide  10 mg Intravenous 4x Daily AC & HS    calcium carbonate  1,000 mg Oral BID    sodium chloride flush  10 mL Intravenous 2 times per day    docusate sodium  100 mg Oral BID    aspirin  81 mg Oral Daily    clopidogrel  75 mg Oral Daily    amitriptyline  100 mg Oral Nightly    gabapentin  300 mg Oral TID        dextrose 5 % and 0.45 % NaCl      dextrose 5 % and 0.45 % NaCl      dextrose             /72   Pulse 103   Temp 97 °F (36.1 °C) (Temporal)   Resp 18   Ht 5' 4\" (1.626 m)   Wt 182 lb 6.4 oz (82.7 kg)   LMP 01/22/2012   SpO2 97%   Breastfeeding?  No   BMI 31.31 kg/m²     Intake/Output Summary (Last 24 hours) at 07/12/18 1323  Last data filed at 07/12/18 0854   Gross per 24 hour   Intake              620 ml   Output 716 ml   Net              -96 ml           Labs:   CBC:   Recent Labs      18   0157  18   0158   WBC  13.7*  12.4*   HGB  8.5*  10.2*   HCT  27.2*  30.7*   PLT  389  413*     BMP: Recent Labs      07/10/18   0550  18   0157   NA  141  132*   K  3.5  4.0   CO2  25  19*   BUN  14  21*   CREATININE  0.6  0.7   LABGLOM  >60  >60   GLUCOSE  236*  431*     BNP: No results for input(s): BNP in the last 72 hours. PT/INR: No results for input(s): PROTIME, INR in the last 72 hours. APTT:No results for input(s): APTT in the last 72 hours. CARDIAC ENZYMES:No results for input(s): CKTOTAL, CKMB, CKMBINDEX, TROPONINI in the last 72 hours. FASTING LIPID PANEL:  Lab Results   Component Value Date    HDL 41 2018    LDLCALC 119 2018    TRIG 96 2018     LIVER PROFILE:  Recent Labs      18   015   AST  17   ALT  23   LABALBU  2.6*       NURSE:  Debbie Live RN     [facility] : 1975, Female, 40 y.o. History:  Status post CABG with postoperative ARDS    Nursing note and diagnostics above reviewed and evaluated    [Allergies/Contraindications:  Ciprofloxacin and Levofloxacin]     Todays status: walking carias without problem    Physical Examination    Respiratory -  Decreased breath sounds. Cardiovascular   Regular rhythm  Abdominal -  Soft. Bowel sounds present. Nontender. Extremities -  Pulses intact. Assessment/Plan     as per CTS. Continue to increase activity, therapy etc.    Discussed with patient and nursing      CReynaldo Kimbrough MD  [facility]

## 2018-07-12 NOTE — PROGRESS NOTES
Hospitalist Progress Note  7/12/2018 1:42 PM  Subjective:   Admit Date: 6/26/2018  PCP: Luci Juarez    Chief Complaint: Hyperglycemia    Subjective: Patient complained of generalized pain this morning, but it is well-controlled this afternoon. Denies any new complaints. Blood sugars better controlled on current Lantus dose. History is otherwise unchanged. Cumulative Hospital History:   6-26: Admitted with chest discomfort for cardiac catheterization. Showed severe multivessel disease. 6-27: Cardiothoracic surgery consulted. 6-28: CTS plans CABG on 7-2. Hospitalist service consulted for medical management. 6-29: Insomnia, Ambien ordered strictly PRN only. Patient cheating on diet, will not adjust Lantus dose as she states she is willing to comply with diet now. 6-30: Lantus adjusted due to hyperglycemia yesterday, will revert changes due to dietary noncompliance and risk of hypoglycemia. 7-1: Constipated, refuses change in pharmacotherapy. 7-2: CABG, postoperative pain. 7-3: Poor pain control. Increase Xanax frequency. Change Lantus and bolus insulin regimen. 7-4: Patient clinically improved. Glucose with better control. Sodium trending down. D/c IVF. Urine studies sent. Likely transfer out of ICU today. 7-5: Patient requiring increased O2. Receiving diuresis per CTS. CXR with effusions/opacities ? ARDS. Sugars uncontrolled  7-6: Patient started on diuresis yesterday for increased O2 requirements, Sats remained stable throughout the day and patient appeared to be comfortable, but had a significant drop overnight and respiratory distress. ARDS seen on CXR. Patient intubated, sedated, paralyzed, and pronated. [place] contacted for transport and ECMO, however, by the time of evaluation, patient was stabilizing and the decision was made not to transport. New infiltrate also identified on CXR and patient started on empiric abx Vanc/Cefepime. Pulm colnsulted.   7-7: Patient remains intubated Percy Sequeira MD   6 Units at 07/12/18 1146    insulin lispro (HUMALOG) injection vial 0-6 Units  0-6 Units Subcutaneous Nightly Jose Rojas MD   6 Units at 07/10/18 2025    gentle cleanser (CETAPHIL) liquid   Topical PRN Jose Rojas MD        mupirocin [facility]) 2 % ointment   Topical BID oJse Rojas MD        pantoprazole (PROTONIX) injection 40 mg  40 mg Intravenous Daily Jose Rojas MD   40 mg at 07/12/18 5996    And    sodium chloride (PF) 0.9 % injection 10 mL  10 mL Intravenous Daily Jose Rojas MD   10 mL at 07/12/18 0934    bisacodyl (DULCOLAX) suppository 10 mg  10 mg Rectal Daily PRN Dom Jacobo MD   10 mg at 07/08/18 1238    ipratropium-albuterol (DUONEB) nebulizer solution 1 ampule  1 ampule Inhalation Q4H PRN NAVDEEP Tariq   1 ampule at 07/10/18 0714    dextrose 50 % solution 12.5 g  12.5 g Intravenous PRN Gaby Brown MD        dextrose 5 % and 0.45 % sodium chloride infusion   Intravenous Continuous PRN Gaby Brown MD        dextrose 5 % and 0.45 % sodium chloride infusion   Intravenous Continuous PRN Gaby Brown MD        [facility]) extended release tablet 600 mg  600 mg Oral BID Donejoyce Coronel APRN   600 mg at 07/12/18 1582    lisinopril (PRINIVIL;ZESTRIL) tablet 5 mg  5 mg Oral Daily Rolanlda Berna APRN   5 mg at 07/12/18 0278    enoxaparin (LOVENOX) injection 40 mg  40 mg Subcutaneous Q24H Jose Rojas MD   40 mg at 07/12/18 1146    atorvastatin (LIPITOR) tablet 40 mg  40 mg Oral Nightly Gregg Marlow MD   40 mg at 07/11/18 2035    metoprolol succinate (TOPROL XL) extended release tablet 25 mg  25 mg Oral Daily Gregg Marlow MD   25 mg at 07/12/18 0921    polyethylene glycol (GLYCOLAX) packet 17 g  17 g Oral BID Surendra Coronel APRN   17 g at 07/12/18 0932    metoclopramide (REGLAN) injection 10 mg  10 mg Intravenous 4x Daily AC & HS NAVDEEP Crowe   10 mg at 07/11/18 2034    calcium carbonate

## 2018-07-12 NOTE — PROGRESS NOTES
Wound Care  Follow up with patient today. She is sitting on the side of bed, mother at bedside, has just finished breakfast. Stated not eating so well, did speak with the dietician yesterday and had requested Glucerna as she is a diabetic. Noted bilateral cheeks with abrasion, light brown, noted areas are shiny with ointment. Patient stated she is cleansing with Cetaphil cleanser and applying antibiotic ointment. Discussed keeping areas clean and moist, rather than having areas dry and scab. She did voice concern about scarring. Discussed plastic surgeon if needed for further recommendations re:scarring and prevention. Mid line chest incision appears approximated and dry. Right breast with scabbed abrasion, patient is cleansing and antibiotic ointment to this area. Discussed with Lorenza Nails.

## 2018-07-13 ENCOUNTER — APPOINTMENT (OUTPATIENT)
Dept: GENERAL RADIOLOGY | Age: 43
DRG: 233 | End: 2018-07-13
Attending: INTERNAL MEDICINE
Payer: MEDICAID

## 2018-07-13 VITALS
TEMPERATURE: 97.8 F | HEIGHT: 64 IN | BODY MASS INDEX: 31.82 KG/M2 | DIASTOLIC BLOOD PRESSURE: 76 MMHG | RESPIRATION RATE: 18 BRPM | OXYGEN SATURATION: 94 % | SYSTOLIC BLOOD PRESSURE: 112 MMHG | WEIGHT: 186.4 LBS | HEART RATE: 97 BPM

## 2018-07-13 LAB
ALBUMIN SERPL-MCNC: 2.6 G/DL (ref 3.5–5.2)
ALP BLD-CCNC: 132 U/L (ref 35–104)
ALT SERPL-CCNC: 19 U/L (ref 5–33)
ANION GAP SERPL CALCULATED.3IONS-SCNC: 13 MMOL/L (ref 7–19)
AST SERPL-CCNC: 11 U/L (ref 5–32)
BILIRUB SERPL-MCNC: <0.2 MG/DL (ref 0.2–1.2)
BUN BLDV-MCNC: 15 MG/DL (ref 6–20)
CALCIUM SERPL-MCNC: 8.8 MG/DL (ref 8.6–10)
CHLORIDE BLD-SCNC: 90 MMOL/L (ref 98–111)
CO2: 27 MMOL/L (ref 22–29)
CREAT SERPL-MCNC: 0.7 MG/DL (ref 0.5–0.9)
GFR NON-AFRICAN AMERICAN: >60
GLUCOSE BLD-MCNC: 290 MG/DL (ref 74–109)
GLUCOSE BLD-MCNC: 355 MG/DL (ref 70–99)
HCT VFR BLD CALC: 27 % (ref 37–47)
HEMOGLOBIN: 8.7 G/DL (ref 12–16)
MCH RBC QN AUTO: 29.4 PG (ref 27–31)
MCHC RBC AUTO-ENTMCNC: 32.2 G/DL (ref 33–37)
MCV RBC AUTO: 91.2 FL (ref 81–99)
PDW BLD-RTO: 12.6 % (ref 11.5–14.5)
PERFORMED ON: ABNORMAL
PLATELET # BLD: 437 K/UL (ref 130–400)
PMV BLD AUTO: 10.5 FL (ref 9.4–12.3)
POTASSIUM REFLEX MAGNESIUM: 3.6 MMOL/L (ref 3.5–5)
RBC # BLD: 2.96 M/UL (ref 4.2–5.4)
SODIUM BLD-SCNC: 130 MMOL/L (ref 136–145)
TOTAL PROTEIN: 5.8 G/DL (ref 6.6–8.7)
WBC # BLD: 11 K/UL (ref 4.8–10.8)

## 2018-07-13 PROCEDURE — 71046 X-RAY EXAM CHEST 2 VIEWS: CPT

## 2018-07-13 PROCEDURE — 92526 ORAL FUNCTION THERAPY: CPT

## 2018-07-13 PROCEDURE — 80053 COMPREHEN METABOLIC PANEL: CPT

## 2018-07-13 PROCEDURE — 6370000000 HC RX 637 (ALT 250 FOR IP): Performed by: NURSE PRACTITIONER

## 2018-07-13 PROCEDURE — 99231 SBSQ HOSP IP/OBS SF/LOW 25: CPT | Performed by: FAMILY MEDICINE

## 2018-07-13 PROCEDURE — 6370000000 HC RX 637 (ALT 250 FOR IP): Performed by: INTERNAL MEDICINE

## 2018-07-13 PROCEDURE — 82948 REAGENT STRIP/BLOOD GLUCOSE: CPT

## 2018-07-13 PROCEDURE — 6360000002 HC RX W HCPCS: Performed by: NURSE PRACTITIONER

## 2018-07-13 PROCEDURE — 36415 COLL VENOUS BLD VENIPUNCTURE: CPT

## 2018-07-13 PROCEDURE — 6370000000 HC RX 637 (ALT 250 FOR IP): Performed by: THORACIC SURGERY (CARDIOTHORACIC VASCULAR SURGERY)

## 2018-07-13 PROCEDURE — 85027 COMPLETE CBC AUTOMATED: CPT

## 2018-07-13 PROCEDURE — 6370000000 HC RX 637 (ALT 250 FOR IP): Performed by: FAMILY MEDICINE

## 2018-07-13 RX ORDER — FAMOTIDINE 20 MG/1
20 TABLET, FILM COATED ORAL 2 TIMES DAILY
Qty: 60 TABLET | Refills: 1 | Status: SHIPPED | OUTPATIENT
Start: 2018-07-13 | End: 2019-07-10

## 2018-07-13 RX ORDER — SKIN CLEANSER COMB NO.42
CLEANSER (ML) TOPICAL
Qty: 237 ML | Refills: 1 | Status: SHIPPED | OUTPATIENT
Start: 2018-07-13 | End: 2019-07-10

## 2018-07-13 RX ORDER — CLOPIDOGREL BISULFATE 75 MG/1
75 TABLET ORAL DAILY
Qty: 30 TABLET | Refills: 1 | Status: SHIPPED | OUTPATIENT
Start: 2018-07-13 | End: 2018-11-28 | Stop reason: ALTCHOICE

## 2018-07-13 RX ORDER — METOPROLOL SUCCINATE 25 MG/1
25 TABLET, EXTENDED RELEASE ORAL DAILY
Qty: 30 TABLET | Refills: 1 | Status: SHIPPED | OUTPATIENT
Start: 2018-07-13 | End: 2018-09-26 | Stop reason: DRUGHIGH

## 2018-07-13 RX ORDER — GUAIFENESIN 600 MG/1
600 TABLET, EXTENDED RELEASE ORAL 2 TIMES DAILY
Qty: 60 TABLET | Refills: 1 | Status: SHIPPED | OUTPATIENT
Start: 2018-07-13 | End: 2018-10-29 | Stop reason: ALTCHOICE

## 2018-07-13 RX ORDER — ATORVASTATIN CALCIUM 40 MG/1
40 TABLET, FILM COATED ORAL NIGHTLY
Qty: 30 TABLET | Refills: 1 | Status: SHIPPED | OUTPATIENT
Start: 2018-07-13 | End: 2018-09-26 | Stop reason: SDUPTHER

## 2018-07-13 RX ORDER — LISINOPRIL 5 MG/1
5 TABLET ORAL DAILY
Qty: 30 TABLET | Refills: 1 | Status: SHIPPED | OUTPATIENT
Start: 2018-07-13 | End: 2018-09-26 | Stop reason: SDUPTHER

## 2018-07-13 RX ORDER — OXYCODONE HYDROCHLORIDE 5 MG/1
5 TABLET ORAL EVERY 6 HOURS PRN
Qty: 50 TABLET | Refills: 0
Start: 2018-07-13 | End: 2018-07-27

## 2018-07-13 RX ORDER — PANTOPRAZOLE SODIUM 40 MG/1
40 TABLET, DELAYED RELEASE ORAL
Qty: 30 TABLET | Refills: 1 | Status: SHIPPED | OUTPATIENT
Start: 2018-07-14 | End: 2018-07-13 | Stop reason: HOSPADM

## 2018-07-13 RX ORDER — ASPIRIN 81 MG/1
81 TABLET ORAL DAILY
Qty: 30 TABLET | Refills: 3 | COMMUNITY
Start: 2018-07-13 | End: 2019-12-02

## 2018-07-13 RX ADMIN — LISINOPRIL 5 MG: 5 TABLET ORAL at 08:03

## 2018-07-13 RX ADMIN — ALPRAZOLAM 1 MG: 1 TABLET ORAL at 05:42

## 2018-07-13 RX ADMIN — PANTOPRAZOLE SODIUM 40 MG: 40 TABLET, DELAYED RELEASE ORAL at 05:00

## 2018-07-13 RX ADMIN — METOPROLOL SUCCINATE 25 MG: 25 TABLET, EXTENDED RELEASE ORAL at 08:03

## 2018-07-13 RX ADMIN — METOCLOPRAMIDE 10 MG: 5 INJECTION, SOLUTION INTRAMUSCULAR; INTRAVENOUS at 05:00

## 2018-07-13 RX ADMIN — CALCIUM CARBONATE 1000 MG: 500 TABLET, CHEWABLE ORAL at 08:03

## 2018-07-13 RX ADMIN — ASPIRIN 81 MG: 81 TABLET, COATED ORAL at 08:03

## 2018-07-13 RX ADMIN — INSULIN LISPRO 8 UNITS: 100 INJECTION, SOLUTION INTRAVENOUS; SUBCUTANEOUS at 08:05

## 2018-07-13 RX ADMIN — GUAIFENESIN 600 MG: 600 TABLET, EXTENDED RELEASE ORAL at 08:03

## 2018-07-13 RX ADMIN — INSULIN GLARGINE 40 UNITS: 100 INJECTION, SOLUTION SUBCUTANEOUS at 08:05

## 2018-07-13 RX ADMIN — GABAPENTIN 300 MG: 300 CAPSULE ORAL at 08:03

## 2018-07-13 RX ADMIN — DOCUSATE SODIUM 100 MG: 100 CAPSULE, LIQUID FILLED ORAL at 08:03

## 2018-07-13 RX ADMIN — CLOPIDOGREL BISULFATE 75 MG: 75 TABLET ORAL at 08:03

## 2018-07-13 RX ADMIN — OXYCODONE HYDROCHLORIDE 5 MG: 5 TABLET ORAL at 05:42

## 2018-07-13 RX ADMIN — OXYCODONE HYDROCHLORIDE 10 MG: 5 TABLET ORAL at 09:56

## 2018-07-13 ASSESSMENT — PAIN SCALES - GENERAL
PAINLEVEL_OUTOF10: 7
PAINLEVEL_OUTOF10: 5
PAINLEVEL_OUTOF10: 10

## 2018-07-13 NOTE — DISCHARGE SUMMARY
underwent elective cardiac catheterization by Dr. Georgi Ballard. This demonstrated a fairly normal-appearing left ventricle with ejection fraction of 70%. The left main artery was unremarkable. The LAD had severely diseased and calcified in the proximal third. There was severe diffuse disease throughout the entire vessel. It was essentially \"riddled\" with atherosclerosis. There was long proximal 60% to 70% stenosis. The diagonal branch was nearly obliterated with atherosclerosis and is not graftable. The mid LAD had an 80% discrete stenosis with disease all the way to the apex. The left circumflex system was a dominant system. There were very tiny first and second obtuse marginal branches that are not graftable. There was a third obtuse marginal branch, which was the best vessel in the circumflex system. It had a 99% mid stenosis with distal disease as well. The distal circumflex system had very tiny PLM and PDA branches. They were too small for bypass grafting. The RCA had a 99% mid subtotal occlusion, but it was small, nondominant and not graftable. CT surgery was consulted and she was seen and evaluated by Dr. Kevin Beltre. She was felt to be a surgical candidate. The procedure was explained and discussed in detail, including the risks and benefits, with the patient and her family. She understood and was agreeable to proceed. She was taken to the OR on 07/02/2018, underwent 2-vessel PACABG, placing the LIMA-LAD and SVG-3rd OM. She tolerated the operation without complication and was taken to the ICU for recovery. During the postoperative course, she was extubated the evening of surgery without difficulty. She was sitting up in the chair, hemodynamically stable on POD 1. Labs and CXR were stable. She remained in the ICU for close monitoring of respiratory status, due to high risk of ARDS. Remained hemodynamically stable, maintaining oxygenation on NC. CXR stable.  She was felt stable for transfer to PCU times daily. [employer] gentle cleanser (CETAPHIL) liquid  Wash areas on face as directed     guaiFENesin (MUCINEX) 600 MG extended release tablet  Take 1 tablet by mouth 2 times daily     insulin aspart (NOVOLOG) 100 UNIT/ML injection pen  Inject into the skin daily Sliding scale     insulin glargine (BASAGLAR KWIKPEN) 100 UNIT/ML injection pen  Inject 46 Units into the skin daily     linaclotide (LINZESS) 290 MCG CAPS capsule  Take 1 capsule by mouth every morning (before breakfast). lisinopril (PRINIVIL;ZESTRIL) 5 MG tablet  Take 1 tablet by mouth daily     metoprolol succinate (TOPROL XL) 25 MG extended release tablet  Take 1 tablet by mouth daily     mupirocin (BACTROBAN) 2 % ointment  Apply topically 3 times daily. oxyCODONE (ROXICODONE) 5 MG immediate release tablet  Take 1 tablet by mouth every 6 hours as needed for Pain for up to 14 days. .     Polyethylene Glycol 3350 (MIRALAX PO)  Take by mouth     promethazine (PHENERGAN) 25 MG tablet  Take 25 mg by mouth as needed for Nausea

## 2018-07-13 NOTE — PROGRESS NOTES
gtt.  Vent settings weaning down. Following commands with sedation weaned. 7-8: Intubated sedated. No nutrition day 3. Increased output from NG tube. Unable to get meds through tube. Hypoxia when repositioned. Remains on propofol gtt. Insulin gtt switched to subq yesterday. 7-9: Pt on CPAP trial, appears to be tolerating. ? Extubation today. Blood sugar better controlled currently but will likely need to adjust once nutrition restarted. 7-10: Patient extubated yesterday. Tolerating nasal cannula. Will transfer out of ICU today. Will add back lantus 10 units and continue SS qACHS. 7-11: Continued hyperglycemia despite poor appetite, positive serum ketones with blood sugars 250s to nearly 500. Increase Lantus to 40 units daily and encouraged to eat multiple small meals. 7-12: Blood sugars 100s to 200s on 40 units Lantus. Increased pain this morning but doing well now. Plan is for discharge tomorrow. 7-13: Discharged to home. Educated on activity, reinforced dietary compliance. ROS: 14 point review of systems is negative except as specifically addressed above. DIET CARB CONTROL; Carb Control: 3 carb choices (45 gms)/meal; Low Sodium (2 GM);  Daily Fluid Restriction: 2000 ml; Low Fiber  Dietary Nutrition Supplements: Diabetic Oral Supplement    Intake/Output Summary (Last 24 hours) at 07/13/18 0929  Last data filed at 07/13/18 0512   Gross per 24 hour   Intake              470 ml   Output              300 ml   Net              170 ml     Medications:   dextrose 5 % and 0.45 % NaCl      dextrose 5 % and 0.45 % NaCl      dextrose       Current Facility-Administered Medications   Medication Dose Route Frequency Provider Last Rate Last Dose    pantoprazole (PROTONIX) tablet 40 mg  40 mg Oral QAM AC Sunday Braun, DO   40 mg at 07/13/18 0500    insulin glargine (LANTUS) injection vial 40 Units  40 Units Subcutaneous Daily Dalton Braun, DO   40 Units at 07/13/18 0805    ALPRAZolam (XANAX) tablet 1 mg  1 0803    glucose (GLUTOSE) 40 % oral gel 15 g  15 g Oral PRN Shorty Aponte MD        dextrose 50 % solution 12.5 g  12.5 g Intravenous PRN Anabela Washington MD        glucagon (rDNA) injection 1 mg  1 mg Intramuscular PRN Shorty Aponte MD        dextrose 5 % solution  100 mL/hr Intravenous PRN Shorty Aponte MD        sodium chloride flush 0.9 % injection 10 mL  10 mL Intravenous 2 times per day Juli Parada MD   10 mL at 07/12/18 2005    sodium chloride flush 0.9 % injection 10 mL  10 mL Intravenous PRN Juli Parada MD   10 mL at 07/10/18 0308    potassium chloride 20 mEq/50 mL IVPB (Central Line)  10 mEq Intravenous PRN Juli Parada MD   Stopped at 07/09/18 2206    acetaminophen (TYLENOL) tablet 650 mg  650 mg Oral Q4H PRN Juli Parada MD        oxyCODONE (ROXICODONE) immediate release tablet 5 mg  5 mg Oral Q4H PRN Juli Parada MD   5 mg at 07/13/18 0542    Or    oxyCODONE (ROXICODONE) immediate release tablet 10 mg  10 mg Oral Q4H PRN Juli Parada MD   10 mg at 07/12/18 1434    zolpidem (AMBIEN) tablet 5 mg  5 mg Oral Nightly PRN Juli Parada MD   5 mg at 07/11/18 2034    docusate sodium (COLACE) capsule 100 mg  100 mg Oral BID Juli Parada MD   100 mg at 07/13/18 0803    magnesium hydroxide (MILK OF MAGNESIA) 400 MG/5ML suspension 30 mL  30 mL Oral Daily PRN Juli Parada MD        ondansetron [facility]) injection 4 mg  4 mg Intravenous Q8H PRN Juli Parada MD   4 mg at 07/02/18 2208    aspirin EC tablet 81 mg  81 mg Oral Daily Juli Parada MD   81 mg at 07/13/18 0803    clopidogrel (PLAVIX) tablet 75 mg  75 mg Oral Daily Juli Parada MD   75 mg at 07/13/18 0803    amitriptyline (ELAVIL) tablet 100 mg  100 mg Oral Nightly Sunday Braun, DO   100 mg at 07/12/18 1837    gabapentin (NEURONTIN) capsule 300 mg  300 mg Oral TID Ivy Fail, DO   300 mg at 07/13/18 1213        Labs:     Recent Labs      07/11/18   0157  07/12/18   0151 07/13/18 0346   WBC  13.7*  12.4*  11.0*   RBC  2.94*  3.45*  2.96*   HGB  8.5*  10.2*  8.7*   HCT  27.2*  30.7*  27.0*   MCV  92.5  89.0  91.2   MCH  28.9  29.6  29.4   MCHC  31.3*  33.2  32.2*   PLT  389  413*  437*     Recent Labs      07/11/18 0157 07/13/18 0346   NA  132*  130*   K  4.0  3.6   ANIONGAP  22*  13   CL  91*  90*   CO2  19*  27   BUN  21*  15   CREATININE  0.7  0.7   GLUCOSE  431*  290*   CALCIUM  8.6  8.8     No results for input(s): MG, PHOS in the last 72 hours. Recent Labs      07/11/18 0157 07/13/18 0346   AST  17  11   ALT  23  19   BILITOT  0.3  <0.2   ALKPHOS  118*  132*     ABGs:  No results for input(s): PHART, FVR1TMR, PO2ART, KSL8NBS, BEART, HGBAE, K7DYZDGI, CARBOXHGBART, 02THERAPY in the last 72 hours. HgBA1c: Invalid input(s): HGBA1C  FLP:    Lab Results   Component Value Date    TRIG 96 06/30/2018    HDL 41 06/30/2018    LDLCALC 119 06/30/2018     TSH:  No results found for: TSH  Troponin T: No results for input(s): TROPONINI in the last 72 hours. INR: No results for input(s): INR in the last 72 hours. Objective:   Vitals: /76   Pulse 97   Temp 97.8 °F (36.6 °C) (Temporal)   Resp 18   Ht 5' 4\" (1.626 m)   Wt 186 lb 6.4 oz (84.6 kg)   LMP 01/22/2012   SpO2 94%   Breastfeeding?  No   BMI 32.00 kg/m²   24HR INTAKE/OUTPUT:      Intake/Output Summary (Last 24 hours) at 07/13/18 0929  Last data filed at 07/13/18 9800   Gross per 24 hour   Intake              470 ml   Output              300 ml   Net              170 ml     General appearance: alert and cooperative with exam  HEENT: atraumatic, eyes with clear conjunctiva and normal lids, pupils and irises normal, external ears and nose are normal, lips normal.  Neck without masses, lympadenopathy, bruit, thyroid normal  Lungs: no increased work of breathing, \"clear to auscultation bilaterally\" without rales, rhonchi or wheezes  Heart: regular rate and rhythm, S1, S2 normal, no murmur, click, rub or

## 2018-07-13 NOTE — CARE COORDINATION
Wallace Halsted at [facility] notified of patient discharge today. DC Summary and DC med list faxed.   Electronically signed by Sita Cabello RN on 7/13/18 at 11:33 AM

## 2018-07-13 NOTE — PROGRESS NOTES
POST OP CARDIOTHORACIC SURGERY PROGRESS NOTE    Post op day 11    SUBJECTIVE:  Up in bathroom brushing teeth. No complaints. Ready to go home. BP 99/68   Pulse 90   Temp 97.6 °F (36.4 °C) (Temporal)   Resp 16   Ht 5' 4\" (1.626 m)   Wt 186 lb 6.4 oz (84.6 kg)   LMP 2012   SpO2 94%   Breastfeeding? No   BMI 32.00 kg/m²   Average, Min, and Max for last 24 hours Vitals:  TEMPERATURE:  Temp  Av.9 °F (36.1 °C)  Min: 96.3 °F (35.7 °C)  Max: 97.6 °F (36.4 °C)  RESPIRATIONS RANGE: Resp  Avg: 15.7  Min: 14  Max: 18  PULSE RANGE: Pulse  Av.6  Min: 90  Max: 107  BLOOD PRESSURE RANGE:  Systolic (51ZXX), VLI:127 , Min:93 , RHI:337   ; Diastolic (28BAS), KLM:46, Min:60, Max:72    PULSE OXIMETRY RANGE: SpO2  Av.3 %  Min: 92 %  Max: 97 %    I/O last 3 completed shifts: In: 0 [P.O.:590]  Out: 300 [Urine:300]    CHEST: Clear bilateral breath sounds without wheezes or rhonchi. CARDIOVASCULAR: Normal HT with RRR. No murmurs, gallops or rubs. INCISION: Sternal incision - open to air. Edges approximated. No drainage or erythema. Right EVH incision - open to air. Edges approximated. No drainage or erythema.       LABS:  CBC:   Lab Results   Component Value Date    WBC 11.0 2018    RBC 2.96 2018    HGB 8.7 2018    HCT 27.0 2018    MCV 91.2 2018    MCH 29.4 2018    MCHC 32.2 2018    RDW 12.6 2018     2018    MPV 10.5 2018     CMP:    Lab Results   Component Value Date     2018    K 3.6 2018    CL 90 2018    CO2 27 2018    BUN 15 2018    CREATININE 0.7 2018    LABGLOM >60 2018    GLUCOSE 290 2018    PROT 5.8 2018    LABALBU 2.6 2018    CALCIUM 8.8 2018    BILITOT <0.2 2018    ALKPHOS 132 2018    AST 11 2018    ALT 19 2018       CHEST XRAY: Normal postoperative changes with small left pleural effusion    ASSESSMENT: 1.         Severe Multivessel Coronary Artery Disease - S/P 2V PACABG on 07/02/2018  2.         Type 1 Juvenile DM  3.         PMHx Chronic Constipation 2' to Slow Transit  4.         GERD  5.         Postoperative Hypoxic Respiratory Failure 2' to ARDS 2' to Long-term Cigarette Smoking Requiring Re-intubation on 07/06/2018, Extubated on 07/09/2018  6. Postoperative Anemia 2' to Acute Blood Loss, Expected  7.  Mild Hyponatremia    PLAN:     1. D/C Home with Home Health      Electronically signed by NAVDEEP Hays on 7/13/18 at 7:14 AM

## 2018-07-17 DIAGNOSIS — G89.18 POSTOPERATIVE PAIN: ICD-10-CM

## 2018-07-17 RX ORDER — OXYCODONE HYDROCHLORIDE 5 MG/1
5 TABLET ORAL EVERY 6 HOURS PRN
Qty: 22 TABLET | Refills: 0 | Status: CANCELLED | OUTPATIENT
Start: 2018-07-20 | End: 2018-07-27

## 2018-07-18 ENCOUNTER — APPOINTMENT (OUTPATIENT)
Dept: CT IMAGING | Age: 43
End: 2018-07-18
Payer: MEDICAID

## 2018-07-18 ENCOUNTER — HOSPITAL ENCOUNTER (EMERGENCY)
Age: 43
Discharge: HOME | End: 2018-07-18
Attending: EMERGENCY MEDICINE
Payer: MEDICAID

## 2018-07-18 VITALS
SYSTOLIC BLOOD PRESSURE: 122 MMHG | WEIGHT: 180 LBS | BODY MASS INDEX: 30.73 KG/M2 | HEART RATE: 87 BPM | TEMPERATURE: 98.2 F | OXYGEN SATURATION: 94 % | HEIGHT: 64 IN | RESPIRATION RATE: 18 BRPM | DIASTOLIC BLOOD PRESSURE: 77 MMHG

## 2018-07-18 DIAGNOSIS — G89.18 POST-OP PAIN: Primary | ICD-10-CM

## 2018-07-18 LAB
ALBUMIN SERPL-MCNC: 3 G/DL (ref 3.5–5.2)
ALP BLD-CCNC: 138 U/L (ref 35–104)
ALT SERPL-CCNC: 12 U/L (ref 5–33)
ANION GAP SERPL CALCULATED.3IONS-SCNC: 14 MMOL/L (ref 7–19)
AST SERPL-CCNC: 12 U/L (ref 5–32)
BASOPHILS ABSOLUTE: 0.1 K/UL (ref 0–0.2)
BASOPHILS RELATIVE PERCENT: 0.8 % (ref 0–1)
BILIRUB SERPL-MCNC: <0.2 MG/DL (ref 0.2–1.2)
BUN BLDV-MCNC: 5 MG/DL (ref 6–20)
CALCIUM SERPL-MCNC: 9.1 MG/DL (ref 8.6–10)
CHLORIDE BLD-SCNC: 98 MMOL/L (ref 98–111)
CO2: 26 MMOL/L (ref 22–29)
CREAT SERPL-MCNC: 0.5 MG/DL (ref 0.5–0.9)
EOSINOPHILS ABSOLUTE: 0.6 K/UL (ref 0–0.6)
EOSINOPHILS RELATIVE PERCENT: 7.3 % (ref 0–5)
GFR NON-AFRICAN AMERICAN: >60
GLUCOSE BLD-MCNC: 120 MG/DL (ref 74–109)
HCT VFR BLD CALC: 31.8 % (ref 37–47)
HEMOGLOBIN: 10.2 G/DL (ref 12–16)
LYMPHOCYTES ABSOLUTE: 2 K/UL (ref 1.1–4.5)
LYMPHOCYTES RELATIVE PERCENT: 23.3 % (ref 20–40)
MCH RBC QN AUTO: 29.2 PG (ref 27–31)
MCHC RBC AUTO-ENTMCNC: 32.1 G/DL (ref 33–37)
MCV RBC AUTO: 91.1 FL (ref 81–99)
MONOCYTES ABSOLUTE: 0.8 K/UL (ref 0–0.9)
MONOCYTES RELATIVE PERCENT: 9.4 % (ref 0–10)
NEUTROPHILS ABSOLUTE: 4.9 K/UL (ref 1.5–7.5)
NEUTROPHILS RELATIVE PERCENT: 58.8 % (ref 50–65)
PDW BLD-RTO: 13.3 % (ref 11.5–14.5)
PLATELET # BLD: 569 K/UL (ref 130–400)
PMV BLD AUTO: 10 FL (ref 9.4–12.3)
POTASSIUM SERPL-SCNC: 3.7 MMOL/L (ref 3.5–5)
PRO-BNP: 419 PG/ML (ref 0–450)
RBC # BLD: 3.49 M/UL (ref 4.2–5.4)
SODIUM BLD-SCNC: 138 MMOL/L (ref 136–145)
TOTAL PROTEIN: 6.2 G/DL (ref 6.6–8.7)
WBC # BLD: 8.4 K/UL (ref 4.8–10.8)

## 2018-07-18 PROCEDURE — 80053 COMPREHEN METABOLIC PANEL: CPT

## 2018-07-18 PROCEDURE — 93005 ELECTROCARDIOGRAM TRACING: CPT

## 2018-07-18 PROCEDURE — 96374 THER/PROPH/DIAG INJ IV PUSH: CPT

## 2018-07-18 PROCEDURE — 6360000004 HC RX CONTRAST MEDICATION: Performed by: EMERGENCY MEDICINE

## 2018-07-18 PROCEDURE — 99284 EMERGENCY DEPT VISIT MOD MDM: CPT

## 2018-07-18 PROCEDURE — 36415 COLL VENOUS BLD VENIPUNCTURE: CPT

## 2018-07-18 PROCEDURE — 99284 EMERGENCY DEPT VISIT MOD MDM: CPT | Performed by: EMERGENCY MEDICINE

## 2018-07-18 PROCEDURE — 83880 ASSAY OF NATRIURETIC PEPTIDE: CPT

## 2018-07-18 PROCEDURE — 85025 COMPLETE CBC W/AUTO DIFF WBC: CPT

## 2018-07-18 PROCEDURE — 74177 CT ABD & PELVIS W/CONTRAST: CPT

## 2018-07-18 PROCEDURE — 6370000000 HC RX 637 (ALT 250 FOR IP): Performed by: EMERGENCY MEDICINE

## 2018-07-18 PROCEDURE — 6360000002 HC RX W HCPCS: Performed by: EMERGENCY MEDICINE

## 2018-07-18 PROCEDURE — 71260 CT THORAX DX C+: CPT

## 2018-07-18 RX ORDER — KETOROLAC TROMETHAMINE 30 MG/ML
30 INJECTION, SOLUTION INTRAMUSCULAR; INTRAVENOUS ONCE
Status: COMPLETED | OUTPATIENT
Start: 2018-07-18 | End: 2018-07-18

## 2018-07-18 RX ORDER — HYDROCODONE BITARTRATE AND ACETAMINOPHEN 5; 325 MG/1; MG/1
2 TABLET ORAL ONCE
Status: COMPLETED | OUTPATIENT
Start: 2018-07-18 | End: 2018-07-18

## 2018-07-18 RX ADMIN — HYDROCODONE BITARTRATE AND ACETAMINOPHEN 2 TABLET: 5; 325 TABLET ORAL at 19:48

## 2018-07-18 RX ADMIN — IOPAMIDOL 95 ML: 755 INJECTION, SOLUTION INTRAVENOUS at 19:10

## 2018-07-18 RX ADMIN — KETOROLAC TROMETHAMINE 30 MG: 30 INJECTION, SOLUTION INTRAMUSCULAR at 18:06

## 2018-07-18 ASSESSMENT — ENCOUNTER SYMPTOMS
COUGH: 0
BACK PAIN: 1
SHORTNESS OF BREATH: 1
NAUSEA: 0
ABDOMINAL PAIN: 1
VOMITING: 0

## 2018-07-18 ASSESSMENT — PAIN SCALES - GENERAL: PAINLEVEL_OUTOF10: 6

## 2018-07-18 NOTE — ED PROVIDER NOTES
tablet by mouth daily, Disp-30 tablet, R-1Normal      famotidine (PEPCID) 20 MG tablet Take 1 tablet by mouth 2 times daily, Disp-60 tablet, R-1Normal      Alpha-D-Galactosidase (BEANO PO) Take by mouthHistorical Med      Polyethylene Glycol 3350 (MIRALAX PO) Take by mouthHistorical Med      insulin aspart (NOVOLOG) 100 UNIT/ML injection pen Inject into the skin daily Sliding scaleHistorical Med      gabapentin (NEURONTIN) 100 MG capsule Take 300 mg by mouth 3 times daily. Chidi Thomas Historical Med      promethazine (PHENERGAN) 25 MG tablet Take 25 mg by mouth as needed for NauseaHistorical Med      linaclotide (LINZESS) 290 MCG CAPS capsule Take 1 capsule by mouth every morning (before breakfast). , Disp-30 capsule, R-5Normal      amitriptyline (ELAVIL) 100 MG tablet Take 100 mg by mouth nightly. Historical Med      alprazolam (XANAX) 1 MG tablet Take 1 mg by mouth 4 times daily.   Historical Med             ALLERGIES     Ciprofloxacin and Levofloxacin    FAMILY HISTORY       Family History   Problem Relation Age of Onset    Cancer Maternal Grandmother     Cancer Paternal Grandfather     Colon Polyps Mother     Colon Cancer Neg Hx     Esophageal Cancer Neg Hx     Liver Cancer Neg Hx     Stomach Cancer Neg Hx           SOCIAL HISTORY       Social History     Social History    Marital status: Single     Spouse name: N/A    Number of children: N/A    Years of education: N/A     Social History Main Topics    Smoking status: Former Smoker     Types: Cigarettes     Quit date: 5/30/2018    Smokeless tobacco: Never Used    Alcohol use Yes      Comment: social    Drug use: No    Sexual activity: Yes     Other Topics Concern    None     Social History Narrative    None       SCREENINGS             PHYSICAL EXAM    (up to 7 for level 4, 8 or more for level 5)     ED Triage Vitals [07/18/18 1602]   BP Temp Temp src Pulse Resp SpO2 Height Weight   126/85 98.2 °F (36.8 °C) -- 90 18 94 % 5' 4\" (1.626 m) 180 lb (81.6 kg) Physical Exam   Constitutional: She is oriented to person, place, and time. She appears well-developed and well-nourished. No distress. HENT:   Mouth/Throat: Oropharynx is clear and moist.   Neck: Normal range of motion. Neck supple. Cardiovascular: Normal rate, regular rhythm and normal heart sounds. Pulmonary/Chest: Effort normal and breath sounds normal. She exhibits tenderness (Tmod entire chest wall). Abdominal: Soft. There is tenderness (Tmod RUQ). Musculoskeletal: She exhibits no edema. Neurological: She is alert and oriented to person, place, and time. No cranial nerve deficit. Skin: Skin is warm and dry. She is not diaphoretic. Psychiatric:   Flat affect   Nursing note and vitals reviewed. DIAGNOSTIC RESULTS     EKG: All EKG's are interpreted by the Emergency Department Physician who either signs or Co-signs this chart in the absence of a cardiologist.    EKG: sinus, VR 95, short UT, nonspecific T changes anterolatearlly, ? Prolonged QT    RADIOLOGY:   Non-plain film images such as CT, Ultrasound and MRI are read by the radiologist. Plain radiographic images are visualized and preliminarily interpreted by the emergency physician with the below findings:        Interpretation per the Radiologist below, if available at the time of this note:    CT ABDOMEN PELVIS W IV CONTRAST Additional Contrast? None   Final Result   1. No evidence of acute abdominopelvic process. 2.  Findings in the lower chest are described in a separate dictation. Signed by Dr Rosalio Schaefer on 7/18/2018 7:22 PM      CT Chest W Contrast   Final Result   1. Findings consistent with pulmonary edema and pleural effusions. 2.  Recent sternotomy changes without evidence of mediastinal abscess. 3.  Small pericardial effusion is likely postoperative.    Signed by Dr Rosalio Schaefer on 7/18/2018 7:21 PM            ED BEDSIDE ULTRASOUND:   Performed by ED Physician - none    LABS:  Labs Reviewed   CBC WITH AUTO

## 2018-07-19 LAB
EKG P AXIS: 49 DEGREES
EKG P-R INTERVAL: 90 MS
EKG Q-T INTERVAL: 340 MS
EKG QRS DURATION: 74 MS
EKG QTC CALCULATION (BAZETT): 399 MS
EKG T AXIS: 50 DEGREES

## 2018-08-07 ENCOUNTER — OFFICE VISIT (OUTPATIENT)
Dept: CARDIOTHORACIC SURGERY | Age: 43
End: 2018-08-07

## 2018-08-07 ENCOUNTER — HOSPITAL ENCOUNTER (OUTPATIENT)
Dept: GENERAL RADIOLOGY | Age: 43
Discharge: HOME | End: 2018-08-07
Payer: MEDICAID

## 2018-08-07 ENCOUNTER — TELEPHONE (OUTPATIENT)
Dept: CARDIOTHORACIC SURGERY | Age: 43
End: 2018-08-07

## 2018-08-07 VITALS
HEIGHT: 64 IN | HEART RATE: 94 BPM | SYSTOLIC BLOOD PRESSURE: 114 MMHG | BODY MASS INDEX: 29.88 KG/M2 | WEIGHT: 175 LBS | OXYGEN SATURATION: 99 % | DIASTOLIC BLOOD PRESSURE: 73 MMHG

## 2018-08-07 DIAGNOSIS — Z95.1 POSTSURGICAL AORTOCORONARY BYPASS STATUS: ICD-10-CM

## 2018-08-07 DIAGNOSIS — I25.10 ATHEROSCLEROSIS OF NATIVE CORONARY ARTERY OF NATIVE HEART WITHOUT ANGINA PECTORIS: Primary | ICD-10-CM

## 2018-08-07 DIAGNOSIS — J90 PLEURAL EFFUSION: Primary | ICD-10-CM

## 2018-08-07 DIAGNOSIS — J90 PLEURAL EFFUSION: ICD-10-CM

## 2018-08-07 PROCEDURE — 99024 POSTOP FOLLOW-UP VISIT: CPT | Performed by: THORACIC SURGERY (CARDIOTHORACIC VASCULAR SURGERY)

## 2018-08-07 PROCEDURE — 71046 X-RAY EXAM CHEST 2 VIEWS: CPT

## 2018-08-09 NOTE — PROGRESS NOTES
[address]  [address] [facility], [address], [facility], [place] [address]  Phone: [phone]  Fax: [phone]      Office Visit:  18    Neto Winchester - : 1975    Reason For Visit:  Ebonie Stern is a 40 y.o. female who is here for Post-Op Check      History of Present Illness:      I had the pleasure of seeing Neto Winchester in the office today. As you know, she is a 40 y.o. female cigarette smoker who presented with an episode of dehydration. While visiting [address] [place] [address]. As she was resuscitated a cardiac enzyme marker demonstrated slight elevation and therefore cardiac catheterization was recommended. Her catheterization demonstrated severe multivessel disease. I took the patient the operating room on 18  , where I performed a \"salvage\" bypass grafting. Placing 2 bypass grafts to the only graftable vessels that she had. She did well during the 2nd postoperative days, transferred to regular room. On the 3rd postoperative day, she developed progressive hypoxia with a chest x-ray that demonstrated progressive Adult resiratory distress syndrome. Her hypoxia worsened and she had be transferred emergently to the intensive care unit. She developed a rapid respiratory failure and had to be intubated emergently. Development of profound hypoxia that was not responsive to active medical treatment prompted a call to the ECMO team at  [facility]. Upon their arrival, the patient stabilized somewhat and over the next 6 hours, she  responded to medical treatment well enough that ECMO was not indicated. After 4 days on the ventilator Her Adult resiratory distress syndrome resolved well enough that she was successfully extubated. The remainder of hospital course was unremarkable and she was  discharged home 11 days after operation. .   She return to the emergency department week later with complaints of shortness of breath.   A 1 mg by mouth 4 times daily. No current facility-administered medications for this visit. She is to stop the Plavix after 60 days. Review of Systems:No longer requires oxygen   General: Denies any fever or chills. Energy level and endurance are returning to                              normal.    Respiratory: Denies any cough or hoarseness. Denies any SOB or EDWARD. Cardiac: Denies any chest pain or pressure. Denies any palpitations. Denies any                             presyncope or syncope. Denies any orthopnea or PND. Physical Examination: Appears comfortable, no distress at this time   Vital signs: Blood pressure 114/73, pulse 94, height 5' 4\" (1.626 m), weight 175 lb (79.4 kg), last menstrual period 01/22/2012, SpO2 99 %, not currently breastfeeding. Lungs: Both lungs are clear with equal breath sounds. I do not hear a pleural rub   Cardiac: Demonstrates a regular rate and rhythm without murmurs, rubs, or                              gallops. Chest: The sternal incision has healed well, and the sternum appears to be                           stable. Lower Extremities: The right leg incision has healed well. Assessment: At this time, Ms. Mana Grigsby is progressing very well following her multivessel bypass grafting. Plan: I told the patient I would like to see the chest x-ray taken yesterday but she does not want to bring a copy to me, but rather obtain a chest x-ray today. At this facility for my review   She may resume driving and may lift up to 15 pounds over the next 8 weeks, then resume normal activity. She is also advised to begin the cardiac rehab program as soon as possible. Once I have seen her chest x-ray today. I will call her with results. Depending on the size of the  effusion. She may or may not require thoracentesis        Addendum: Chest x-ray demonstrates a small left pleural effusion that is insignificant.   This is much improved from a

## 2018-08-22 ENCOUNTER — OFFICE VISIT (OUTPATIENT)
Dept: CARDIOLOGY | Age: 43
End: 2018-08-22
Payer: MEDICAID

## 2018-08-22 VITALS
HEART RATE: 100 BPM | HEIGHT: 64 IN | SYSTOLIC BLOOD PRESSURE: 122 MMHG | BODY MASS INDEX: 30.39 KG/M2 | WEIGHT: 178 LBS | DIASTOLIC BLOOD PRESSURE: 82 MMHG

## 2018-08-22 DIAGNOSIS — R55 SYNCOPE, UNSPECIFIED SYNCOPE TYPE: ICD-10-CM

## 2018-08-22 DIAGNOSIS — I10 ESSENTIAL HYPERTENSION: ICD-10-CM

## 2018-08-22 DIAGNOSIS — R00.0 TACHYCARDIA: ICD-10-CM

## 2018-08-22 DIAGNOSIS — Z95.1 HISTORY OF CORONARY ARTERY BYPASS GRAFT X 2: ICD-10-CM

## 2018-08-22 DIAGNOSIS — E78.2 MIXED HYPERLIPIDEMIA: ICD-10-CM

## 2018-08-22 DIAGNOSIS — I25.10 CAD IN NATIVE ARTERY: Primary | ICD-10-CM

## 2018-08-22 PROCEDURE — 0296T PR EXT ECG > 48HR TO 21 DAY RCRD W/CONECT INTL RCRD: CPT | Performed by: NURSE PRACTITIONER

## 2018-08-22 PROCEDURE — 99212 OFFICE O/P EST SF 10 MIN: CPT | Performed by: NURSE PRACTITIONER

## 2018-08-22 RX ORDER — NITROGLYCERIN 0.4 MG/1
0.4 TABLET SUBLINGUAL EVERY 5 MIN PRN
Qty: 25 TABLET | Refills: 3 | Status: SHIPPED | OUTPATIENT
Start: 2018-08-22 | End: 2022-01-05 | Stop reason: SDUPTHER

## 2018-08-22 NOTE — PROGRESS NOTES
[facility], [place]. [address] [facility] [facility]  [phone] office  [phone] fax      OFFICE VISIT:  2018    Kvng Johnston - : 1975    Reason For Visit:  Kamini Pagan is a 40 y.o. female who is here for Follow-up [facility] follow up after CABG doing much better.); Coronary Artery Disease (s/p salvage CABG x 2 by Dr. Feliz Prasad); Hypertension; and Hyperlipidemia    The patient presents today for cardiology hospital follow up after salvage CABG x 2 by Dr. Feliz Prasad on 18. The patient's hospital recovery was complicated by ARDS. She had a repeat CXR which was reviewed by Dr. Feliz Prasad at post op follow up which showed resolution of right pleural effusion and small left pleural effusion. The patient had home health but is now ready to proceed with cardiac rehab at [facility].  She is no longer smoking. Her PCP manages cholesterol. She denies symptoms to suggest myocardial ischemia. The patient reports \"I passed out last week because my blood pressure dropped I think. \" Reports blood sugar was normal a that time. BP is well controlled on current regimen. The patient's PCP monitors cholesterol. Subjective  Cecile denies exertional chest pain, shortness of breath, orthopnea, paroxysmal nocturnal dyspnea, presyncope, sustained arrythmia, edema and fatigue. The patient denies numbness or weakness to suggest cerebrovascular accident or transient ischemic attack. + syncopal episode.     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 (HCC) E10.49    Anxiety and depression F41.9, F32.9    DJD (degenerative joint disease) M19.90    Tobacco abuse Z72.0    Drug allergy Z88.9    Unstable angina (HCC) I20.0    Abnormal nuclear stress test R94.39    Unstable angina pectoris (HCC) I20.0    Type 1 diabetes mellitus with complication (HCC) W52.5    CAD in native artery I25.10    Hyponatremia E87.1    Normocytic anemia D64.9    Respiratory insufficiency R06.89    ARDS (adult respiratory distress syndrome) (HCC) J80    Pneumonia J18.9    Increased anion gap metabolic acidosis U45.9    Postoperative pain G89.18    Tachycardia R00.0    Syncope R55    Mixed hyperlipidemia E78.2    Essential hypertension I10    History of coronary artery bypass graft x 2 Z95.1     Past Medical History:   Diagnosis Date    Arthritis     [facility] disorder [facility]     CAD (coronary artery disease)     Cancer ([facility]     Cervical CA    Depression     Diabetes mellitus ([facility] 20 years    Dysplasia of cervix     Endometriosis     Essential hypertension 8/22/2018    GERD (gastroesophageal reflux disease)     History of cone biopsy of cervix     Mixed hyperlipidemia 8/22/2018    Pneumonia 7/6/2018    Snapping hip syndrome      Past Surgical History:   Procedure Laterality Date    CHOLECYSTECTOMY      COLONOSCOPY      COLPOSCOPY  2000    ENDOSCOPY, COLON, DIAGNOSTIC      HYSTERECTOMY      Left ovary still there.     KNEE SURGERY Right     Lateral Release    KNEE SURGERY Right     Meniscus Repair    LAPAROSCOPY      HI CABG, ARTERY-VEIN, TWO N/A 7/2/2018    CORONARY ARTERY BYPASS GRAFT X2 WITH LEFT INTERNAL MAMMARY ARTERY WITH ENDOSCOPIC VEIN HARVESTING WITH PERFUSION TRANSESOPHAGEAL ECHOCARDIOGRAM performed by [facility]ta [facility] OR     Family History   Problem Relation Age of Onset    Cancer Maternal Grandmother     Cancer Paternal Grandfather     Colon Polyps Mother     Colon Cancer Neg Hx     Esophageal Cancer Neg Hx     Liver Cancer Neg Hx     Stomach Cancer Neg Hx      Social History   Substance Use Levofloxacin    Review of Systems  Constitutional  no appetite change, or unexpected weight change. No fever, chills or diaphoresis. No significant change in activity level or new onset of fatigue. HEENT  no significant rhinorrhea or epistaxis. No tinnitus or significant hearing loss. Eyes  no sudden vision change or amaurosis. No corneal arcus, xantholasma, subconjunctival hemorrhage or discharge. Respiratory  no significant wheezing, stridor, apnea or cough. No dyspnea on exertion or shortness of air. Cardiovascular  no exertional chest pain to suggest myocardial ischemia. No orthopnea or PND. No sensation of sustained arrythmia. No occurrence of slow heart rate. No palpitations. No claudication. No leg edema. Gastrointestinal  no abdominal swelling or pain. No blood in stool. No severe constipation, diarrhea, nausea, or vomiting. Genitourinary  no dysuria, frequency, or urgency. No flank pain or hematuria. Musculoskeletal  no back pain or myalgia. No problems with gait. Extremities - no clubbing, cyanosis or edema. Skin  no color change or rash. No pallor. Mid-sternotomy and RLE incisions well healed. Neurologic  no speech difficulty, facial asymmetry or lateralizing weakness. No seizures or presyncope. No significant dizziness. + syncopal episode. Hematologic  no easy bruising or excessive bleeding. Psychiatric  no severe anxiety or insomnia. No confusion. All other review of systems are negative. Objective  Vital Signs - /82   Pulse 100   Ht 5' 4\" (1.626 m)   Wt 178 lb (80.7 kg)   LMP 01/22/2012   BMI 30.55 kg/m²   General - Cecile is alert, cooperative, and pleasant. Well groomed. No acute distress. Body habitus - Body mass index is 30.55 kg/m². HEENT  Head is normocephalic. No circumoral cyanosis. Dentition is normal.  EYES -   Lids normal without ptosis. No discharge, edema or subconjunctival hemorrhage.    Neck - Symmetrical without apparent mass or lymphadenopathy. Respiratory - Normal respiratory effort without use of accessory muscles. Ausculatation reveals vesicular breath sounds without crackles, wheezes, rub or rhonchi. Cardiovascular  No jugular venous distention. Auscultation reveals regular rate and rhythm. No audible clicks, gallop or rub. No murmur. No lower extremity varicosities. No carotid bruits. Abdominal -  No visible distention, mass or pulsations. Extremities - No clubbing or cyanosis. No statis dermatitis or ulcers. No edema. Musculoskeletal -   No Osler's nodes. No kyphosis or scoliosis. Gait is even and regular without limp or shuffle. Ambulates without assistance. Skin -  Warm and dry; no rash or pallor. Mid-sternotomy and RLE incisions well healed. Neurological - No focal neurological deficits. Thought processes coherent. No apparent tremor. Oriented to person, place and time. Psychiatric -  Appropriate affect and mood. Assessment:     Diagnosis Orders   1. CAD in native artery     2. Tachycardia  AK EXT ECG > 48HR TO 21 DAY RCRD W/CONECT INTL RCRD (Zio Recording)   3. Syncope, unspecified syncope type  AK EXT ECG > 48HR TO 21 DAY RCRD W/CONECT INTL RCRD (Zio Recording)   4. History of coronary artery bypass graft x 2      7/2/18 CABG x 2    5. Essential hypertension     6. Mixed hyperlipidemia       Stable CV status without symptoms of overt heart failure, arrhythmia or angina. Due to heart rate of 100 today and hx of syncopal episode - 2 week Zio cardiac monitor applied. BP well controlled. DM and lipids followed by PCP tolerating statin. Patient has stopped smoking. Refer to [facility] cardiac rehab program.  Patient will stop Plavix 60 days post op. Patient is compliant with medication regimen.     BP Readings from Last 3 Encounters:   08/22/18 122/82   08/07/18 114/73   07/18/18 122/77    Pulse Readings from Last 3 Encounters:   08/22/18 100   08/07/18 94 138 136 - 145 mmol/L    Potassium 3.7 3.5 - 5.0 mmol/L    Chloride 98 98 - 111 mmol/L    CO2 26 22 - 29 mmol/L    Anion Gap 14 7 - 19 mmol/L    Glucose 120 (H) 74 - 109 mg/dL    BUN 5 (L) 6 - 20 mg/dL    CREATININE 0.5 0.5 - 0.9 mg/dL    GFR Non-African American >60 >60    Calcium 9.1 8.6 - 10.0 mg/dL    Total Protein 6.2 (L) 6.6 - 8.7 g/dL    Alb 3.0 (L) 3.5 - 5.2 g/dL    Total Bilirubin <0.2 0.2 - 1.2 mg/dL    Alkaline Phosphatase 138 (H) 35 - 104 U/L    ALT 12 5 - 33 U/L    AST 12 5 - 32 U/L   PROBNP, N-TERMINAL    Collection Time: 07/18/18  7:05 PM   Result Value Ref Range    Pro- 0 - 450 pg/mL     Plan  Previous cardiac history and records reviewed. Continue current medications as prescribed. 2 week Zio cardiac monitor applied. Continue to follow up with primary care provider for non cardiac medical problems. Call the office with any problems, questions or concerns at [phone]. Follow up as scheduled with your cardiologist - 1 month Dr. Charo Smith. The following educational material has been included in this after visit summary for your review: heart health.     Additional instructions: The cardiac monitor will stay on for 2 weeks. Use the symptom marker as instructed. Return as instructed. You may stop Plavix 60 days after surgery. Continue aspirin. A referral will be made to [facility] for cardiac rehab. Coronary artery disease risk factors you can control: Smoking, high blood pressure, high cholesterol, diabetes, being overweight, lack of exercise and stress. Continue heart healthy diet. Take medications as directed. Exercise as tolerated. Strive for 15 minutes of exercise most days of the week. If asked to keep a blood pressure log, do so for 2 weeks. Call the office to report readings at [phone]. Blood pressure goal is 140/90 or less. If you are a diabetic, the goal is 130/80 or less.   If you are taking cholesterol lowering medications, it is recommended that lab work be checked annually. Always keep a current medication list. Bring your medications to every office visit.      How to take:  NITROGLYCERIN (Nitrostat) 0.4 mg tablets, sublingual.  Nitroglycerin is in a group of drugs called nitrates. Nitroglycerin dilates (widens) blood vessels, making it easier for blood to flow through them and easier for the heart to pump. Dosing Guidelines for Nitroglycerin Tablets  · At the start of an angina (chest pain) attack, place one tablet under the tongue or between the cheek and gum. Do not swallow or chew the tablet; let it dissolve on its own. If necessary, a second and third tablet may be used, with five minutes between using each tablet. If you use a third tablet and your chest pain continues, it is time to seek immediate medical attention. Call 911 immediately and have someone drive you to the emergency room. You may be having a heart attack or other serious heart problem. · To prevent angina from exercise or stress, use 1 tablet 5 to 10 minutes before the activity.      NAVDEEP Delacruz

## 2018-08-22 NOTE — PATIENT INSTRUCTIONS
Continue current medications as prescribed. Continue to follow up with primary care provider for non cardiac medical problems. Call the office with any problems, questions or concerns at [phone]. Follow up as scheduled with your cardiologist - 2 months Dr. Ana Laura Moore. The following educational material has been included in this after visit summary for your review: heart health.     Additional instructions: The cardiac monitor will stay on for 2 weeks. Use the symptom marker as instructed. Return as instructed. You may stop Plavix 60 days after surgery. Continue aspirin. A referral will be made to [facility] for cardiac rehab. Coronary artery disease risk factors you can control: Smoking, high blood pressure, high cholesterol, diabetes, being overweight, lack of exercise and stress. Continue heart healthy diet. Take medications as directed. Exercise as tolerated. Strive for 15 minutes of exercise most days of the week. If asked to keep a blood pressure log, do so for 2 weeks. Call the office to report readings at [phone]. Blood pressure goal is 140/90 or less. If you are a diabetic, the goal is 130/80 or less. If you are taking cholesterol lowering medications, it is recommended that lab work be checked annually. Always keep a current medication list. Bring your medications to every office visit.      How to take:  NITROGLYCERIN (Nitrostat) 0.4 mg tablets, sublingual.  Nitroglycerin is in a group of drugs called nitrates. Nitroglycerin dilates (widens) blood vessels, making it easier for blood to flow through them and easier for the heart to pump. Dosing Guidelines for Nitroglycerin Tablets  · At the start of an angina (chest pain) attack, place one tablet under the tongue or between the cheek and gum. Do not swallow or chew the tablet; let it dissolve on its own. If necessary, a second and third tablet may be used, with five minutes between using each tablet.   If you include:    · Chest pain or pressure, or a strange feeling in the chest.     · Sweating.     · Shortness of breath.     · Pain, pressure, or a strange feeling in the back, neck, jaw, or upper belly or in one or both shoulders or arms.     · Lightheadedness or sudden weakness.     · A fast or irregular heartbeat.    After you call 911, the  may tell you to chew 1 adult-strength or 2 to 4 low-dose aspirin. Wait for an ambulance. Do not try to drive yourself.   Watch closely for changes in your health, and be sure to contact your doctor if you have any problems. Where can you learn more? Go to https://Tresorit.Xipin. org and sign in to your [employer]. account. Enter V061 in the atHomestars box to learn more about \"A Healthy Heart: Care Instructions. \"     If you do not have an account, please click on the \"Sign Up Now\" link. Current as of: December 6, 2017  Content Version: 11.7  © 5306-2627 [employer]. Care instructions adapted under license by [facility] [address] If you have questions about a medical condition or this instruction, always ask your healthcare professional. [place] [address] any warranty or liability for your use of this information.

## 2018-09-25 ENCOUNTER — TELEPHONE (OUTPATIENT)
Dept: CARDIOLOGY | Age: 43
End: 2018-09-25

## 2018-09-26 DIAGNOSIS — E78.5 HYPERLIPIDEMIA, UNSPECIFIED HYPERLIPIDEMIA TYPE: Primary | ICD-10-CM

## 2018-09-26 RX ORDER — ATORVASTATIN CALCIUM 40 MG/1
40 TABLET, FILM COATED ORAL NIGHTLY
Qty: 30 TABLET | Refills: 5 | Status: SHIPPED | OUTPATIENT
Start: 2018-09-26 | End: 2018-10-29 | Stop reason: ALTCHOICE

## 2018-09-26 RX ORDER — METOPROLOL SUCCINATE 50 MG/1
50 TABLET, EXTENDED RELEASE ORAL DAILY
Qty: 30 TABLET | Refills: 5 | Status: SHIPPED | OUTPATIENT
Start: 2018-09-26 | End: 2018-10-29 | Stop reason: DRUGHIGH

## 2018-09-26 RX ORDER — LISINOPRIL 5 MG/1
5 TABLET ORAL DAILY
Qty: 30 TABLET | Refills: 5 | Status: SHIPPED | OUTPATIENT
Start: 2018-09-26 | End: 2018-10-29 | Stop reason: DRUGHIGH

## 2018-10-23 DIAGNOSIS — E78.5 HYPERLIPIDEMIA, UNSPECIFIED HYPERLIPIDEMIA TYPE: Primary | ICD-10-CM

## 2018-10-29 ENCOUNTER — OFFICE VISIT (OUTPATIENT)
Dept: CARDIOLOGY | Age: 43
End: 2018-10-29
Payer: MEDICAID

## 2018-10-29 ENCOUNTER — TELEPHONE (OUTPATIENT)
Dept: CARDIOLOGY | Age: 43
End: 2018-10-29

## 2018-10-29 VITALS
HEIGHT: 64 IN | WEIGHT: 185.2 LBS | DIASTOLIC BLOOD PRESSURE: 92 MMHG | HEART RATE: 104 BPM | SYSTOLIC BLOOD PRESSURE: 142 MMHG | BODY MASS INDEX: 31.62 KG/M2 | OXYGEN SATURATION: 98 %

## 2018-10-29 DIAGNOSIS — I25.10 CAD IN NATIVE ARTERY: ICD-10-CM

## 2018-10-29 DIAGNOSIS — R60.0 EDEMA OF BOTH FEET: Primary | ICD-10-CM

## 2018-10-29 DIAGNOSIS — I10 ESSENTIAL HYPERTENSION: ICD-10-CM

## 2018-10-29 PROCEDURE — 99213 OFFICE O/P EST LOW 20 MIN: CPT | Performed by: INTERNAL MEDICINE

## 2018-10-29 RX ORDER — METOPROLOL SUCCINATE 50 MG/1
50 TABLET, EXTENDED RELEASE ORAL DAILY
Qty: 90 TABLET | Refills: 3 | Status: SHIPPED | OUTPATIENT
Start: 2018-10-29 | End: 2018-11-28 | Stop reason: SDUPTHER

## 2018-10-29 RX ORDER — METOPROLOL SUCCINATE 25 MG/1
25 TABLET, EXTENDED RELEASE ORAL DAILY
Qty: 90 TABLET | Refills: 3 | Status: SHIPPED | OUTPATIENT
Start: 2018-10-29 | End: 2021-06-04

## 2018-10-29 RX ORDER — LISINOPRIL 10 MG/1
10 TABLET ORAL DAILY
Qty: 90 TABLET | Refills: 3 | Status: SHIPPED | OUTPATIENT
Start: 2018-10-29 | End: 2018-11-28 | Stop reason: ALTCHOICE

## 2018-11-14 ENCOUNTER — HOSPITAL ENCOUNTER (OUTPATIENT)
Dept: NON INVASIVE DIAGNOSTICS | Age: 43
Discharge: HOME | End: 2018-11-14
Payer: MEDICAID

## 2018-11-14 DIAGNOSIS — R60.0 EDEMA OF BOTH FEET: ICD-10-CM

## 2018-11-14 DIAGNOSIS — I25.10 CAD IN NATIVE ARTERY: ICD-10-CM

## 2018-11-14 DIAGNOSIS — I10 ESSENTIAL HYPERTENSION: ICD-10-CM

## 2018-11-14 LAB
LV EF: 63 %
LVEF MODALITY: NORMAL

## 2018-11-14 PROCEDURE — 93306 TTE W/DOPPLER COMPLETE: CPT

## 2018-11-28 ENCOUNTER — OFFICE VISIT (OUTPATIENT)
Dept: CARDIOLOGY | Age: 43
End: 2018-11-28
Payer: MEDICAID

## 2018-11-28 VITALS
HEIGHT: 64 IN | HEART RATE: 84 BPM | DIASTOLIC BLOOD PRESSURE: 72 MMHG | WEIGHT: 192 LBS | BODY MASS INDEX: 32.78 KG/M2 | SYSTOLIC BLOOD PRESSURE: 126 MMHG

## 2018-11-28 DIAGNOSIS — R40.0 DAYTIME SLEEPINESS: Primary | ICD-10-CM

## 2018-11-28 DIAGNOSIS — R06.83 SNORING: ICD-10-CM

## 2018-11-28 DIAGNOSIS — I10 ESSENTIAL HYPERTENSION: ICD-10-CM

## 2018-11-28 PROCEDURE — 99213 OFFICE O/P EST LOW 20 MIN: CPT | Performed by: NURSE PRACTITIONER

## 2018-11-28 RX ORDER — LOSARTAN POTASSIUM 25 MG/1
25 TABLET ORAL DAILY
Qty: 30 TABLET | Refills: 3 | Status: SHIPPED | OUTPATIENT
Start: 2018-11-28 | End: 2019-04-08 | Stop reason: SDUPTHER

## 2018-11-28 RX ORDER — METOPROLOL SUCCINATE 50 MG/1
50 TABLET, EXTENDED RELEASE ORAL DAILY
Qty: 90 TABLET | Refills: 3 | Status: SHIPPED | OUTPATIENT
Start: 2018-11-28 | End: 2019-12-02 | Stop reason: DRUGHIGH

## 2018-11-28 NOTE — PATIENT INSTRUCTIONS
a safe exercise program with the advice of your doctor. If you smoke, quit . Treat your high blood pressure and/or diabetes. Treat high cholesterol or triglycerides. Ask your doctor about taking a low-dose aspirin every day. In certain patients, taking rosuvastatin (Crestor) may be another option. Talk to your doctor. Hypertension  (High Blood Pressure)  Most people with high blood pressure (hypertension) have no symptoms. High blood pressure can be a dangerous problem. Hypertension is dangerous because you may have it and not know it. High blood pressure may mean that your heart needs to work harder to pump blood. Your blood pressure is measured with 2 numbers. The first number is when your heart flexes (contracts), and the second number is when your heart relaxes. The higher the numbers are, the more you are at risk for problems. Write down your blood pressure today. The best blood pressure for adults is 120/80 (mmHg) or lower. It is likely that your blood pressure was recorded at least 2 times today. It is important for you to give these numbers to your doctor. If you do not have a doctor, try to get follow-up care at a hospital or community clinic. You may need to start high blood pressure medicine. You may also need to adjust your medicines as told by your doctor. Even mild high blood pressure increases long-term health risks. One high reading does not mean you have hypertension. · Your blood pressure should be taken when you are relaxed. It is also important to sit for about 10 minutes before being tested. · Things that can increase your blood pressure are:  Injury, Illness, Stress, Caffeine, and some medicines (like decongestants). · High blood pressure does not usually need emergency treatment. HOME CARE  · Lifestyle and medicine changes may be needed, including:   · Weight loss. · Exercise. · Limit the use of salt. · Stop smoking.    · If using decongestants or birth control of lipids or triglycerides can cause:   Fat deposits in the skin or tendons ( xanthomas )   Pain, enlargement, or swelling of organs such as the liver, spleen, or pancreas ( pancreatitis )   Obstruction of blood vessels in heart and brain   Hyperlipidemia can increase your risk of atherosclerosis . This is a dangerous hardening of the arteries. It can end up blocking blood flow. In some cases, this may result in:   Angina   Heart attack   Stroke   Other serious complications   Blood Vessel with Atherosclerosis       [address] [place] [address].   Diagnosis   This condition is diagnosed with blood tests. These tests measure the levels of lipids in the blood. The National Cholesterol Education Program advises that you have your lipids checked at least once every five years, starting at age 21. Also, the American Academy of Pediatrics recommends lipid screening for children at risk (eg, a family history of hyperlipidemia). Testing may consist of a fasting blood test for:   Total cholesterol   LDL (bad cholesterol)   HDL (good cholesterol)   Triglycerides   Your doctor may recommend more frequent or earlier testing if you have:   Family history of hyperlipidemia   Risk factor or disease that may cause hyperlipidemia   Complication that may result from hyperlipidemia   Treatment   Treatment is not only aimed at correcting your cholesterol levels, but also at lowering your overall risk for heart disease and strokes. Diet Changes   Eat a diet low in total fat, saturated fat, and cholesterol . Reduce or eliminate the amount of alcohol you drink. Eat more high-fiber foods. Lifestyle Changes   If you are overweight, lose weight . If you smoke, quit . Exercise regularly . Talk to you doctor before starting an exercise program. Ralph Gifford may already have hardening of the arteries or heart disease. These conditions increase your risk of having a heart attack while exercising.    Make sure other medical conditions such

## 2018-11-29 NOTE — PROGRESS NOTES
daily Patient to take a 50 mg daily and a 25 mg daily and to take a total of 75 mg daily. , Disp: 90 tablet, Rfl: 3    losartan (COZAAR) 25 MG tablet, Take 1 tablet by mouth daily, Disp: 30 tablet, Rfl: 3    metoprolol succinate (TOPROL XL) 25 MG extended release tablet, Take 1 tablet by mouth daily, Disp: 90 tablet, Rfl: 3    nitroGLYCERIN (NITROSTAT) 0.4 MG SL tablet, Place 1 tablet under the tongue every 5 minutes as needed for Chest pain, Disp: 25 tablet, Rfl: 3    aspirin 81 MG EC tablet, Take 1 tablet by mouth daily, Disp: 30 tablet, Rfl: 3    insulin glargine (BASAGLAR KWIKPEN) 100 UNIT/ML injection pen, Inject 46 Units into the skin daily, Disp: 5 pen, Rfl: 3    gentle cleanser (CETAPHIL) liquid, Wash areas on face as directed, Disp: 237 mL, Rfl: 1    famotidine (PEPCID) 20 MG tablet, Take 1 tablet by mouth 2 times daily, Disp: 60 tablet, Rfl: 1    Alpha-D-Galactosidase (BEANO PO), Take by mouth, Disp: , Rfl:     Polyethylene Glycol 3350 (MIRALAX PO), Take by mouth, Disp: , Rfl:     insulin aspart (NOVOLOG) 100 UNIT/ML injection pen, Inject into the skin daily Sliding scale, Disp: , Rfl:     gabapentin (NEURONTIN) 100 MG capsule, Take 300 mg by mouth 3 times daily. ., Disp: , Rfl:     promethazine (PHENERGAN) 25 MG tablet, Take 25 mg by mouth as needed for Nausea, Disp: , Rfl:     linaclotide (LINZESS) 290 MCG CAPS capsule, Take 1 capsule by mouth every morning (before breakfast). , Disp: 30 capsule, Rfl: 5    amitriptyline (ELAVIL) 100 MG tablet, Take 100 mg by mouth nightly., Disp: , Rfl:     alprazolam (XANAX) 1 MG tablet, Take 1 mg by mouth 3 times daily as needed. ., Disp: , Rfl:     PE:  Vitals:    11/28/18 1052   BP: 126/72   Pulse: 84       Estimated body mass index is 32.96 kg/m² as calculated from the following:    Height as of this encounter: 5' 4\" (1.626 m). Weight as of this encounter: 192 lb (87.1 kg). Constitutional: She is oriented to person, place, and time.  She appears

## 2018-12-18 ENCOUNTER — TELEPHONE (OUTPATIENT)
Dept: CARDIOLOGY | Age: 43
End: 2018-12-18

## 2019-01-09 ENCOUNTER — OFFICE VISIT (OUTPATIENT)
Dept: CARDIOLOGY | Age: 44
End: 2019-01-09
Payer: MEDICAID

## 2019-01-09 VITALS
HEART RATE: 78 BPM | WEIGHT: 195 LBS | DIASTOLIC BLOOD PRESSURE: 64 MMHG | BODY MASS INDEX: 33.29 KG/M2 | SYSTOLIC BLOOD PRESSURE: 124 MMHG | HEIGHT: 64 IN

## 2019-01-09 DIAGNOSIS — E78.2 MIXED HYPERLIPIDEMIA: ICD-10-CM

## 2019-01-09 DIAGNOSIS — R06.83 SNORING: ICD-10-CM

## 2019-01-09 DIAGNOSIS — I25.10 CORONARY ARTERY DISEASE INVOLVING NATIVE CORONARY ARTERY OF NATIVE HEART WITHOUT ANGINA PECTORIS: Primary | ICD-10-CM

## 2019-01-09 DIAGNOSIS — I10 ESSENTIAL HYPERTENSION: ICD-10-CM

## 2019-01-09 DIAGNOSIS — R40.0 DAYTIME SLEEPINESS: ICD-10-CM

## 2019-01-09 PROCEDURE — 99213 OFFICE O/P EST LOW 20 MIN: CPT | Performed by: NURSE PRACTITIONER

## 2019-01-09 RX ORDER — ROSUVASTATIN CALCIUM 40 MG/1
40 TABLET, COATED ORAL EVERY EVENING
COMMUNITY
End: 2019-01-09 | Stop reason: SINTOL

## 2019-01-09 RX ORDER — ATORVASTATIN CALCIUM 40 MG/1
40 TABLET, FILM COATED ORAL DAILY
Qty: 30 TABLET | Refills: 5 | Status: SHIPPED | OUTPATIENT
Start: 2019-01-09 | End: 2019-12-02 | Stop reason: SINTOL

## 2019-01-16 ENCOUNTER — HOSPITAL ENCOUNTER (OUTPATIENT)
Dept: SLEEP CENTER | Age: 44
Discharge: HOME | End: 2019-01-18
Payer: MEDICAID

## 2019-01-16 PROCEDURE — 95810 POLYSOM 6/> YRS 4/> PARAM: CPT | Performed by: PSYCHIATRY & NEUROLOGY

## 2019-01-16 PROCEDURE — 95810 POLYSOM 6/> YRS 4/> PARAM: CPT

## 2019-01-30 ENCOUNTER — TELEPHONE (OUTPATIENT)
Dept: CARDIOLOGY | Age: 44
End: 2019-01-30

## 2019-02-10 DIAGNOSIS — G47.33 OBSTRUCTIVE SLEEP APNEA: Primary | ICD-10-CM

## 2019-04-08 RX ORDER — LOSARTAN POTASSIUM 25 MG/1
TABLET ORAL
Qty: 30 TABLET | Refills: 3 | Status: SHIPPED | OUTPATIENT
Start: 2019-04-08 | End: 2019-08-20 | Stop reason: SDUPTHER

## 2019-06-10 ENCOUNTER — HOSPITAL ENCOUNTER (OUTPATIENT)
Dept: WOMENS IMAGING | Age: 44
Discharge: HOME | End: 2019-06-10
Payer: MEDICAID

## 2019-06-10 DIAGNOSIS — Z12.31 ENCOUNTER FOR SCREENING MAMMOGRAM FOR BREAST CANCER: ICD-10-CM

## 2019-06-10 LAB — HEREDITARY CANCER TEST-MYRIAD: NORMAL

## 2019-06-10 PROCEDURE — 36415 COLL VENOUS BLD VENIPUNCTURE: CPT

## 2019-06-10 PROCEDURE — 77063 BREAST TOMOSYNTHESIS BI: CPT

## 2019-06-24 ENCOUNTER — TELEPHONE (OUTPATIENT)
Dept: OTHER | Age: 44
End: 2019-06-24

## 2019-07-10 ENCOUNTER — OFFICE VISIT (OUTPATIENT)
Dept: CARDIOLOGY | Age: 44
End: 2019-07-10

## 2019-07-10 VITALS
HEIGHT: 64 IN | RESPIRATION RATE: 18 BRPM | SYSTOLIC BLOOD PRESSURE: 132 MMHG | DIASTOLIC BLOOD PRESSURE: 80 MMHG | HEART RATE: 96 BPM | WEIGHT: 196 LBS | BODY MASS INDEX: 33.46 KG/M2

## 2019-07-10 DIAGNOSIS — I10 HYPERTENSION, UNSPECIFIED TYPE: Primary | ICD-10-CM

## 2019-07-10 PROCEDURE — 93000 ELECTROCARDIOGRAM COMPLETE: CPT | Performed by: INTERNAL MEDICINE

## 2019-07-10 PROCEDURE — 99213 OFFICE O/P EST LOW 20 MIN: CPT | Performed by: INTERNAL MEDICINE

## 2019-07-10 RX ORDER — VITAMIN B COMPLEX
1 CAPSULE ORAL DAILY
Status: ON HOLD | COMMUNITY
End: 2021-06-22

## 2019-07-10 RX ORDER — CLONIDINE HYDROCHLORIDE 0.1 MG/1
TABLET ORAL
Refills: 3 | COMMUNITY
Start: 2019-06-12

## 2019-07-10 NOTE — PROGRESS NOTES
41-year-old diabetic with history of dyslipidemia, hypertension, and coronary disease returns for routine follow-up. With an obvious defective anginal warning system she was discovered to have significant coronary disease and underwent bypass grafting in August 2018. Postoperatively she developed ARDS from which she recovered. At present she relates some dyspnea on exertion but has had no symptoms of overt left ventricular dysfunction. On exam she carries 196 pounds on a 5 foot 4 inch frame. Pressure is 132/80 a pulse of 96. EOMs full, sclerae and conjunctiva normal.  She has no elevation of central venous pressure at 45° and carotid upstrokes are normal.  Thyroid gland normal to palpation. Sternotomy scar is healed with a little svetlana-ncisional edema more prominent on the left side. S1 and S2 normal without murmurs or gallops. Obese abdomen without bruits. 1+ pretibial edema. Alert and oriented ×3 with cognition absolutely normal as reflected by conversation. EKG reveals a sinus mechanism with a short [place] interval and borderline low voltage in the limb leads along with some nonspecific ST-T wave changes. Assessment/plan;  1. Coronary disease - I reviewed her cath film from last summer demonstrating rather diffuse atherosclerotic involvement typical of insulin-dependent diabetics. This sort of diffuse disease is often a harbinger of problems to come. It is incredibly important that every effort as secondary prevention be vigorously pursued. I talked to her for an additional 10 minutes or so about working up in exercise routine to 45 minutes a day 6 days a week. Concomitantly she needs to lose that extra 40+ pounds she now carries. 2.  Hypertension - marginal control today which would be improved by at least 8 points with regular exercise  3.   Dyslipidemia - monitored and managed by Dr. Luisa Mejia

## 2019-07-10 NOTE — PATIENT INSTRUCTIONS
Over 130 starts to get risky for heart disease. High-density lipoprotein (HDL) cholesterol Also known as good cholesterol, this type of cholesterol actually carries cholesterol away from your arteries and may, therefore, help lower your risk of having a heart attack. You want this level to be high (ideally greater than 60). It is a risk to have a level less than 40. You can raise this good cholesterol by eating olive oil, canola oil, avocados, or nuts. Exercise raises this level, too. Fat   Fat is calorie dense and packs a lot of calories into a small amount of food. Even though fats should be limited due to their high calorie content, not all fats are bad. In fact, some fats are quite healthful. Fat can be broken down into four main types.    The good-for-you fats are:   Monounsaturated fat found in oils such as olive and canola, avocados, and nuts and natural nut butters; can decrease cholesterol levels, while keeping levels of HDL cholesterol high   Polyunsaturated fat found in oils such as safflower, sunflower, soybean, corn, and sesame; can decrease total cholesterol and LDL cholesterol   Omega-3 fatty acids particularly those found in fatty fish (such as salmon, trout, tuna, mackerel, herring, and sardines); can decrease risk of arrhythmias, decrease triglyceride levels, and slightly lower blood pressure   The fats that you want to limit are:   Saturated fat found in animal products, many fast foods, and a few vegetables; increases total blood cholesterol, including LDL levels   Animal fats that are saturated include: butter, lard, whole-milk dairy products, meat fat, and poultry skin   Vegetable fats that are saturated include: hydrogenated shortening, palm oil, coconut oil, cocoa butter   Hydrogenated or trans fat found in margarine and vegetable shortening, most shelf stable snack foods, and fried foods; increases LDL and decreases HDL   It is generally recommended that you limit your total fat for the fat free, and trans fat freeAlso scan the Nutrition Facts Label, which lists saturated fat, trans fat, and cholesterol amounts. For products low in sodium, look for sodium free, very low sodium, low sodium, no added salt, and unsalted   Skip the salt when cooking or at the table; if food needs more flavor, get creative and try out different herbs and spices. Garlic and onion also add substantial flavor to foods. Trim any visible fat off meat and poultry before cooking, and drain the fat off after thurston. Use cooking methods that require little or no added fat, such as grilling, boiling, baking, poaching, broiling, roasting, steaming, stir-frying, and sauting. Avoid fast food and convenience food. They tend to be high in saturated and trans fat and have a lot of added salt. Talk to a registered dietitian for individualized diet advice.

## 2019-08-20 RX ORDER — LOSARTAN POTASSIUM 25 MG/1
TABLET ORAL
Qty: 90 TABLET | Refills: 3 | Status: SHIPPED | OUTPATIENT
Start: 2019-08-20 | End: 2020-09-02

## 2019-12-02 ENCOUNTER — OFFICE VISIT (OUTPATIENT)
Dept: CARDIOLOGY | Age: 44
End: 2019-12-02
Payer: COMMERCIAL

## 2019-12-02 VITALS
DIASTOLIC BLOOD PRESSURE: 72 MMHG | HEIGHT: 64 IN | SYSTOLIC BLOOD PRESSURE: 114 MMHG | WEIGHT: 205 LBS | HEART RATE: 88 BPM | BODY MASS INDEX: 35 KG/M2

## 2019-12-02 DIAGNOSIS — I10 HYPERTENSION, UNSPECIFIED TYPE: Primary | ICD-10-CM

## 2019-12-02 DIAGNOSIS — E78.2 MIXED HYPERLIPIDEMIA: ICD-10-CM

## 2019-12-02 PROCEDURE — 93000 ELECTROCARDIOGRAM COMPLETE: CPT | Performed by: INTERNAL MEDICINE

## 2019-12-02 PROCEDURE — 99213 OFFICE O/P EST LOW 20 MIN: CPT | Performed by: INTERNAL MEDICINE

## 2019-12-02 RX ORDER — ROSUVASTATIN CALCIUM 5 MG/1
5 TABLET, COATED ORAL NIGHTLY
Qty: 30 TABLET | Refills: 3 | Status: SHIPPED | OUTPATIENT
Start: 2019-12-02 | End: 2020-04-10

## 2019-12-02 RX ORDER — ASPIRIN 325 MG
325 TABLET ORAL DAILY
COMMUNITY

## 2019-12-02 RX ORDER — AMITRIPTYLINE HYDROCHLORIDE 100 MG/1
100 TABLET, FILM COATED ORAL NIGHTLY
COMMUNITY

## 2019-12-02 RX ORDER — GABAPENTIN 100 MG/1
300 CAPSULE ORAL NIGHTLY
COMMUNITY
End: 2022-01-05

## 2019-12-02 RX ORDER — METOPROLOL SUCCINATE 50 MG/1
50 TABLET, EXTENDED RELEASE ORAL DAILY
COMMUNITY
End: 2020-01-08

## 2019-12-06 ENCOUNTER — TELEPHONE (OUTPATIENT)
Dept: CARDIOLOGY | Age: 44
End: 2019-12-06

## 2020-01-08 RX ORDER — METOPROLOL SUCCINATE 50 MG/1
TABLET, EXTENDED RELEASE ORAL
Qty: 45 TABLET | Refills: 5 | Status: SHIPPED | OUTPATIENT
Start: 2020-01-08 | End: 2020-10-12

## 2020-04-10 RX ORDER — ROSUVASTATIN CALCIUM 5 MG/1
5 TABLET, COATED ORAL NIGHTLY
Qty: 30 TABLET | Refills: 0 | Status: SHIPPED | OUTPATIENT
Start: 2020-04-10 | End: 2020-12-04

## 2020-06-01 RX ORDER — ROSUVASTATIN CALCIUM 5 MG/1
5 TABLET, COATED ORAL NIGHTLY
Qty: 30 TABLET | Refills: 0 | OUTPATIENT
Start: 2020-06-01

## 2020-06-02 ENCOUNTER — OFFICE VISIT (OUTPATIENT)
Dept: CARDIOLOGY | Age: 45
End: 2020-06-02
Payer: MEDICAID

## 2020-06-02 VITALS
BODY MASS INDEX: 34.15 KG/M2 | DIASTOLIC BLOOD PRESSURE: 66 MMHG | HEART RATE: 92 BPM | WEIGHT: 200 LBS | HEIGHT: 64 IN | SYSTOLIC BLOOD PRESSURE: 122 MMHG

## 2020-06-02 PROCEDURE — 99214 OFFICE O/P EST MOD 30 MIN: CPT | Performed by: CLINICAL NURSE SPECIALIST

## 2020-06-02 RX ORDER — MELOXICAM 15 MG/1
15 TABLET ORAL PRN
COMMUNITY

## 2020-06-02 RX ORDER — INSULIN GLARGINE 100 [IU]/ML
44 INJECTION, SOLUTION SUBCUTANEOUS EVERY MORNING
COMMUNITY

## 2020-06-02 RX ORDER — IBUPROFEN 800 MG/1
800 TABLET ORAL
COMMUNITY
End: 2020-06-02

## 2020-06-02 NOTE — PROGRESS NOTES
[address] [place], [address] [address]  Phone: [phone]  Fax: [phone]    OFFICE VISIT:  2020    Manjit Razo - : 1975    Reason For Visit:  Jazzmine Hughes is a 38 y.o. female who is here for 6 Month Follow-Up (patient has edema); Hypertension; and Coronary Artery Disease  Long-term diabetic with coronary disease. Had CABG x2 in 2018  She returns today for follow-up. 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  She has taken FLMA due to [place] crisis and high risk. She states she has not been as active with not working. She has gained some weight. Has been working on getting her blood sugars controlled. Has follow-up soon with her primary care    Subjective  Cecile denies exertional chest pain, shortness of breath, orthopnea, paroxysmal nocturnal dyspnea, syncope, presyncope, arrhythmia,  Has generalized edema and fatigue. The patient denies numbness or weakness to suggest cerebrovascular accident or transient ischemic attack. Belle Haddad is PCP and follows labs including lipids- last HgbA1c 7.1  Has upcoming appointment with labs soon  .   Manjit Razo has the following history as recorded in [facility]:    Patient Active Problem List    Diagnosis Date Noted    Tachycardia 2018    Syncope 2018    Mixed hyperlipidemia 2018    Essential hypertension 2018    History of coronary artery bypass graft x 2 2018    Postoperative pain     ARDS (adult respiratory distress syndrome) ([facility] 2018    Pneumonia 2018    Increased anion gap metabolic acidosis     Respiratory insufficiency 2018    Hyponatremia 2018    Normocytic anemia 2018    CAD in native artery 2018    Abnormal nuclear stress test     Unstable angina pectoris (HCC)     Type 1 diabetes mellitus with complication ([address]     Unstable angina ([address] 2018    Abnormal laboratory test Medications   Medication Sig Dispense Refill    meloxicam (MOBIC) 15 MG tablet 15 mg      insulin glargine (LANTUS SOLOSTAR) 100 UNIT/ML injection pen Lantus Solostar U-100 Insulin 100 unit/mL (3 mL) subcutaneous pen      rosuvastatin (CRESTOR) 5 MG tablet Take 1 tablet by mouth nightly 30 tablet 0    metoprolol succinate (TOPROL XL) 50 MG extended release tablet TAKE 1 TABLET BY MOUTH ONCE DAILY WITH  A  25MG  TABLET  FOR  A  TOTAL  OF  75MG  A  DAY 45 tablet 5    aspirin 325 MG tablet Take 325 mg by mouth daily      gabapentin (NEURONTIN) 100 MG capsule Take 100 mg by mouth nightly.  amitriptyline (ELAVIL) 100 MG tablet Take 100 mg by mouth nightly      losartan (COZAAR) 25 MG tablet TAKE 1 TABLET BY MOUTH ONCE DAILY 90 tablet 3    cloNIDine (CATAPRES) 0.1 MG tablet TAKE 1 TABLET BY MOUTH TWICE DAILY  3    b complex vitamins capsule Take 1 capsule by mouth daily      Magnesium 400 MG CAPS Take 3 times per week       metoprolol succinate (TOPROL XL) 25 MG extended release tablet Take 1 tablet by mouth daily 90 tablet 3    nitroGLYCERIN (NITROSTAT) 0.4 MG SL tablet Place 1 tablet under the tongue every 5 minutes as needed for Chest pain 25 tablet 3    Alpha-D-Galactosidase (BEANO PO) Take 1 tablet by mouth 4 times daily       insulin aspart (NOVOLOG) 100 UNIT/ML injection pen Inject into the skin daily Sliding scale      promethazine (PHENERGAN) 25 MG tablet Take 25 mg by mouth as needed for Nausea      linaclotide (LINZESS) 290 MCG CAPS capsule Take 1 capsule by mouth every morning (before breakfast). 30 capsule 5    alprazolam (XANAX) 1 MG tablet Take 1 mg by mouth 3 times daily as needed. Loy Peterson ibuprofen (ADVIL;MOTRIN) 800 MG tablet 800 mg      Omeprazole (PRILOSEC PO) 20 mg       No current facility-administered medications for this visit.       Allergies: Latex; Ciprofloxacin; and Levofloxacin    Review of Systems  Constitutional - no significant activity change, appetite change, or unexpected weight change. No fever, chills or diaphoresis. No fatigue. HEENT - no significant rhinorrhea or epistaxis. No tinnitus or significant hearing loss. Eyes - no sudden vision change or amaurosis. Respiratory - no significant wheezing, stridor, apnea or cough. No dyspnea on exertion or shortness of breath. Cardiovascular - no exertional chest pain, orthopnea or PND. No sensation of arrhythmia or slow heart rate. No claudication or leg edema. Gastrointestinal - no abdominal swelling or pain. No blood in stool. No severe constipation, diarrhea, nausea, or vomiting. Genitourinary - no difficulty urinating, dysuria, frequency, or urgency. No flank pain or hematuria. Musculoskeletal - no back pain, gait disturbance, + myalgia. Skin - no color change or rash. No pallor. No new surgical incision. Neurologic - no speech difficulty, facial asymmetry or lateralizing weakness. No seizures, presyncope, syncope, or significant dizziness. Hematologic - no easy bruising or excessive bleeding. Psychiatric - no severe anxiety or insomnia. No confusion. All other review of systems are negative. Objective  Vital Signs - /66   Pulse 92   Ht 5' 4\" (1.626 m)   Wt 200 lb (90.7 kg)   LMP 02/08/2012   BMI 34.33 kg/m²   General - Cecile is alert, cooperative, and pleasant. Well groomed. No acute distress. Body habitus is overweight. HEENT - The head is normocephalic. No circumoral cyanosis. Dentition is normal.   EYES -  No Xanthelasma, no arcus senilis, no conjunctival hemorrhages or discharge. Neck - Supple, without increased jugular venous pressures. No carotid bruits. No mass. Respiratory - Lungs are clear bilaterally. No wheezes or rales. Normal effort without use of accessory muscles. Cardiovascular - Heart has regular rhythm and rate. No murmurs, rubs or gallops. + pedal pulses and no varicosities. Abdominal -  Soft, nontender, nondistended.   Bowel sounds are intact. Extremities - No clubbing, cyanosis, or  edema. Musculoskeletal -  No clubbing . No Osler's nodes. Gait normal .  No kyphosis or scoliosis. Skin -  no statis ulcers or dermatitis. Neurological - No focal signs are identified. Oriented to person, place and time. Psychiatric -  Appropriate affect and mood. Assessment:     Diagnosis Orders   1. CAD in native artery     2. Essential hypertension     3. Mixed hyperlipidemia       Data:  BP Readings from Last 3 Encounters:   06/02/20 122/66   12/02/19 114/72   07/10/19 132/80    Pulse Readings from Last 3 Encounters:   06/02/20 92   12/02/19 88   07/10/19 96        Wt Readings from Last 3 Encounters:   06/02/20 200 lb (90.7 kg)   12/02/19 205 lb (93 kg)   07/10/19 196 lb (88.9 kg)   Blood pressure and heart rate controlled. Medical manage includes beta-blocker, ARB and statin-on low-dose Crestor and still having some myalgias. We discussed Repatha. PCP was going to add. Would recommend getting next set of labs and see how she is doing  On aspirin  States taking medications as prescribed  Stable cardiovascular status. No evidence of overt heart failure, angina or dysrhythmia. Plan  Continue to control diabetes   Continue to control cholesterol with LDL goal less than 70   Would recommend getting on the Repatha if not controlled  follow up in 6months with Dr. Sherrie Leo  Call with any questions or concerns  Follow up with Stas Zhu for non cardiac problems  Report any new problems  Cardiovascular Fitness-Exercise as tolerated. Strive for 30 minutes of exercise most days of the week. Cardiac / Healthy Diet  Continue current medications as directed  Continue plan of treatment  It is always recommended that you bring your medications bottles with you to each visit - this is for your safety!        NAVDEEP Rincon    EMR dragon/transcription disclaimer: Much of this encounter note is electronic transcription/translation of spoken language to printed tach. Electronic translation of spoken language may be erroneous, or at times, nonsensical words or phrases may be inadvertently transcribed.  Although, I have reviewed the note for such errors, some may still exist.

## 2020-09-02 RX ORDER — LOSARTAN POTASSIUM 25 MG/1
TABLET ORAL
Qty: 90 TABLET | Refills: 3 | Status: SHIPPED | OUTPATIENT
Start: 2020-09-02 | End: 2021-06-04 | Stop reason: ALTCHOICE

## 2020-10-12 RX ORDER — METOPROLOL SUCCINATE 50 MG/1
TABLET, EXTENDED RELEASE ORAL
Qty: 45 TABLET | Refills: 5 | Status: SHIPPED | OUTPATIENT
Start: 2020-10-12 | End: 2021-07-26

## 2020-12-04 ENCOUNTER — OFFICE VISIT (OUTPATIENT)
Dept: CARDIOLOGY | Age: 45
End: 2020-12-04
Payer: MEDICAID

## 2020-12-04 VITALS
BODY MASS INDEX: 33.46 KG/M2 | SYSTOLIC BLOOD PRESSURE: 124 MMHG | WEIGHT: 196 LBS | HEIGHT: 64 IN | DIASTOLIC BLOOD PRESSURE: 80 MMHG | HEART RATE: 91 BPM

## 2020-12-04 PROCEDURE — 93000 ELECTROCARDIOGRAM COMPLETE: CPT | Performed by: INTERNAL MEDICINE

## 2020-12-04 PROCEDURE — 99213 OFFICE O/P EST LOW 20 MIN: CPT | Performed by: INTERNAL MEDICINE

## 2020-12-04 RX ORDER — ROSUVASTATIN CALCIUM 5 MG/1
TABLET, COATED ORAL
Qty: 180 TABLET | Refills: 3 | Status: SHIPPED | OUTPATIENT
Start: 2020-12-04 | End: 2021-06-04 | Stop reason: SINTOL

## 2020-12-04 NOTE — PROGRESS NOTES
C4-year-old lady with a history of juvenile onset diabetes mellitus, combined hyperlipidemia, hypertension, and coronary disease returns for follow-up. Underwent bypass grafting x2 after July 2018 cath revealed normal LV function and critical diffuse coronary disease. At that time she had a LIMA placed to the LAD and a saphenous vein to the third obtuse marginal.  At the time of her visit a year ago she had stopped her statin therapy because of myalgias. We placed her on 5 mg of Efrain Tony a statin with follow-up check in September revealing an LDL of 117, HDL 38, and triglycerides 147. Clinically she denies any change in exercise tolerance or unusual shortness of breath but is sedentary. She never had any chest discomfort prior to her bypass grafting. She has been very careful in her response to the pandemic. On exam today she carries 196 pounds on a 5 foot 4 inch frame. Pressure is 124/80 with a pulse of 91. Very pleasant endomorphic middle-aged lady. EOMs full, sclerae and conjunctiva normal. PERRLA. Mask in place. Trachea midline with no neck masses. Assessment of internal jugular veins reveals no elevation of central venous pressure at 45 degrees. Carotid pulses normal without delay or bruit. Thyroid normal to palpation. Healed midline sternotomy scar. Chest exam reveals normal respiratory effort, no abnormal breath sounds and normal expiratory phase. No skin lesions seen. PMI normal. S1, S2 normal without murmur or madeleine or click. Mild abdominal obesity. Normal bowel sounds without palpable mass or bruit. No clubbing or acrocyanosis. No significant lower extremity edema or signs of venous insufficiency. General motor strength appears to be within normal limits. Normal range of motion with normal gait. Alert, oriented x 3, memory and cognition normal as reflected by history and conversation.   EKG reveals sinus rhythm with a short DC interval, markedly low voltage in the limb leads, and nonspecific ST-T wave abnormalities. Assessment/plan:  1. Dyslipidemia -LDL of 117 is unacceptable. We will try increasing the Crestor to 5 mg alternating with 10 mg nightly with a repeat liver and lipid profile in 2 months. If not tolerated, will pursue Repatha. 2.  Hypertension -well controlled  3. Pandemic response -appropriate by history  4. Lifestyle -discussed the need for regular exercise a great length, counting all the benefits that provided. Medical records reviewed prior to today's clinic visit including visually reviewing recent diagnostic studies such as ECHOs, labs, and angiograms as well as reading previous encounter notes. More than 15 minutes spent face-to-face with patient in evaluating, and carefully explaining problems and the planned approach and the reasons behind the decisions.

## 2020-12-04 NOTE — PATIENT INSTRUCTIONS
Increase Crestor to 5 mg and 10 mg every other day.   Have lipids drawn in 2 months with Dr. Jas Mahajan

## 2021-02-25 ENCOUNTER — TELEPHONE (OUTPATIENT)
Dept: CARDIOLOGY CLINIC | Age: 46
End: 2021-02-25

## 2021-02-25 NOTE — TELEPHONE ENCOUNTER
Patient left message wanting to make Dr. Mari Rodas aware that her PCP Dr. Qasim Stuart stopped her atorvastatin 40 mg due to liver enzymes being extremely elevated. He instructed her to remain off of her statin until 4/26/21 and recheck liver enzymes and possible liver u/s. She wanted a call back to discuss.

## 2021-04-16 ENCOUNTER — HOSPITAL ENCOUNTER (OUTPATIENT)
Dept: WOMENS IMAGING | Age: 46
Discharge: HOME | End: 2021-04-16
Payer: COMMERCIAL

## 2021-04-16 DIAGNOSIS — Z12.31 ENCOUNTER FOR SCREENING MAMMOGRAM FOR MALIGNANT NEOPLASM OF BREAST: ICD-10-CM

## 2021-04-16 PROCEDURE — 77063 BREAST TOMOSYNTHESIS BI: CPT

## 2021-04-27 ENCOUNTER — TELEPHONE (OUTPATIENT)
Dept: CARDIOLOGY CLINIC | Age: 46
End: 2021-04-27

## 2021-04-28 NOTE — TELEPHONE ENCOUNTER
Discussed with Dr. Champ Jimenez, requested the recent copy of lipid and liver lab work. Dr. Champ Jimenez stated that he would like to see the all of the lab work before ordering [address] [place] [address] for the Pt.

## 2021-05-05 RX ORDER — EVOLOCUMAB 140 MG/ML
1 INJECTION, SOLUTION SUBCUTANEOUS
Qty: 2 PEN | Refills: 11 | Status: SHIPPED | OUTPATIENT
Start: 2021-05-05 | End: 2021-06-04 | Stop reason: SDUPTHER

## 2021-05-18 ENCOUNTER — TELEPHONE (OUTPATIENT)
Dept: CARDIOLOGY CLINIC | Age: 46
End: 2021-05-18

## 2021-05-18 NOTE — TELEPHONE ENCOUNTER
We have no labs on patient since 2019 and nothing in care everywhere recent. We may have to get Dr. Shafer Falling office to prescribe this for her since we do not manage her labs.

## 2021-05-19 NOTE — TELEPHONE ENCOUNTER
Will call patient PCP on Monday and confirm them taking care of [facility] since they handle all bloodwork.

## 2021-06-04 ENCOUNTER — OFFICE VISIT (OUTPATIENT)
Dept: CARDIOLOGY CLINIC | Age: 46
End: 2021-06-04
Payer: MEDICAID

## 2021-06-04 VITALS
SYSTOLIC BLOOD PRESSURE: 124 MMHG | DIASTOLIC BLOOD PRESSURE: 78 MMHG | WEIGHT: 199 LBS | HEART RATE: 82 BPM | BODY MASS INDEX: 33.97 KG/M2 | HEIGHT: 64 IN

## 2021-06-04 DIAGNOSIS — I25.10 CAD IN NATIVE ARTERY: Primary | ICD-10-CM

## 2021-06-04 DIAGNOSIS — E10.8 TYPE 1 DIABETES MELLITUS WITH COMPLICATION (HCC): ICD-10-CM

## 2021-06-04 DIAGNOSIS — R07.89 OTHER CHEST PAIN: ICD-10-CM

## 2021-06-04 DIAGNOSIS — E78.2 MIXED HYPERLIPIDEMIA: ICD-10-CM

## 2021-06-04 DIAGNOSIS — I10 ESSENTIAL HYPERTENSION: ICD-10-CM

## 2021-06-04 PROCEDURE — 93000 ELECTROCARDIOGRAM COMPLETE: CPT | Performed by: CLINICAL NURSE SPECIALIST

## 2021-06-04 PROCEDURE — 99214 OFFICE O/P EST MOD 30 MIN: CPT | Performed by: CLINICAL NURSE SPECIALIST

## 2021-06-04 RX ORDER — EVOLOCUMAB 140 MG/ML
1 INJECTION, SOLUTION SUBCUTANEOUS
Qty: 2 PEN | Refills: 11 | Status: SHIPPED | OUTPATIENT
Start: 2021-06-04 | End: 2021-07-04

## 2021-06-04 RX ORDER — IRBESARTAN 75 MG/1
75 TABLET ORAL DAILY
Qty: 90 TABLET | Refills: 3 | Status: SHIPPED | OUTPATIENT
Start: 2021-06-04 | End: 2022-06-28

## 2021-06-04 NOTE — PROGRESS NOTES
[address] [facility] Cardiology  [facility] [address]  [address]  Phone: [phone]  Fax: [phone]    OFFICE VISIT:  2021    Astrid Tam - : 1975    Reason For Visit:  Nick Tapia is a 41 y.o. female who is here for 6 Month Follow-Up (patient has chest pain and edema), Coronary Artery Disease, and Hypertension  Long-term diabetic with coronary disease. Had CABG x2 in 2018  2D echo in November that year showed normal LV size and function with EF 60 to 65%. Mild MR  She returns today for follow-up. 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.  She has been unable to take statins due to myalgias. Working on [employer] as an alternative    She is here in follow up   She started having sharp pain under her left breast  Will last about a min. She has taken nitro once. No other symptoms with occasional palpitations with it. No radiation  Occurring 4-5 times a day. She has some right chest wall pain with arm movement-she does feel this is musculoskeletal    She is taking a leave of absence. She is a nurse at [employer].  She states since she has been off work her sugars have been much better controlled however she would like to see an endocrinologist.  She would like to try getting a continuous glucose monitor      Subjective  Cecile denies exertional chest pain, shortness of breath, orthopnea, paroxysmal nocturnal dyspnea, syncope, presyncope, arrhythmia, edema and fatigue. The patient denies numbness or weakness to suggest cerebrovascular accident or transient ischemic attack. Yoly Corbett is PCP and follows labs including lipids and glucose  Last HgbA1c was 9 but is getting rechecked soon and hopes to be better. Ilsa Tam has the following history as recorded in [employer]:    Patient Active Problem List    Diagnosis Date Noted    Tachycardia 2018    Syncope 2018    Mixed hyperlipidemia 2018    Essential hypertension 08/22/2018    History of coronary artery bypass graft x 2 08/22/2018    Postoperative pain     ARDS (adult respiratory distress syndrome) ([address] 07/06/2018    Pneumonia 07/06/2018    Increased anion gap metabolic acidosis 65/71/3477    Respiratory insufficiency 07/05/2018    Hyponatremia 07/04/2018    Normocytic anemia 07/04/2018    CAD in native artery 06/28/2018    Abnormal nuclear stress test     Unstable angina pectoris (HCC)     Type 1 diabetes mellitus with complication (HCC)     Unstable angina ([address] 06/26/2018    Abnormal laboratory test 06/04/2018    Type 1 diabetes mellitus with neurological manifestations ([address] 06/04/2018    Anxiety and depression 06/04/2018    DJD (degenerative joint disease) 06/04/2018    Tobacco abuse 06/04/2018    Drug allergy 06/04/2018    Constipation 07/21/2014    Abdominal distention 07/21/2014    Gastroesophageal reflux disease without esophagitis 07/21/2014    Insomnia 06/19/2012     Past Medical History:   Diagnosis Date    Arthritis     Bipolar disorder ([address]     CAD (coronary artery disease)     Cancer ([address]     Cervical CA    Depression     Diabetes mellitus ([address] 20 years    Dysplasia of cervix     Endometriosis     Essential hypertension 8/22/2018    GERD (gastroesophageal reflux disease)     History of cone biopsy of cervix     Mixed hyperlipidemia 8/22/2018    Pneumonia 7/6/2018    Snapping hip syndrome      Past Surgical History:   Procedure Laterality Date    CHOLECYSTECTOMY      COLONOSCOPY      COLPOSCOPY  2000    ENDOSCOPY, COLON, DIAGNOSTIC      HYSTERECTOMY  2012    Left ovary still there.     KNEE SURGERY Right     Lateral Release    KNEE SURGERY Right     Meniscus Repair    LAPAROSCOPY      OVARY REMOVAL Right 2012    MA CABG, ARTERY-VEIN, TWO N/A 7/2/2018    CORONARY ARTERY BYPASS GRAFT X2 WITH LEFT INTERNAL MAMMARY ARTERY WITH ENDOSCOPIC VEIN HARVESTING WITH PERFUSION TRANSESOPHAGEAL ECHOCARDIOGRAM performed by  linaclotide (LINZESS) 290 MCG CAPS capsule Take 1 capsule by mouth every morning (before breakfast). (Patient not taking: Reported on 12/4/2020) 30 capsule 5     No current facility-administered medications for this visit. Allergies: Latex, Ciprofloxacin, and Levofloxacin    Review of Systems  Constitutional  no significant activity change, appetite change, or unexpected weight change. No fever, chills or diaphoresis. No fatigue. HEENT  no significant rhinorrhea or epistaxis. No tinnitus or significant hearing loss. Eyes  no sudden vision change or amaurosis. Respiratory  no significant wheezing, stridor, apnea or cough. No dyspnea on exertion or shortness of breath. Cardiovascular + chest pain,no  orthopnea or PND. No sensation of arrhythmia or slow heart rate. No claudication + mild  leg edema. Gastrointestinal  no abdominal swelling or pain. No blood in stool. No severe constipation, diarrhea, nausea, or vomiting. Genitourinary  no difficulty urinating, dysuria, frequency, or urgency. No flank pain or hematuria. Musculoskeletal  no back pain, gait disturbance, or myalgia. Skin  no color change or rash. No pallor. No new surgical incision. Neurologic  no speech difficulty, facial asymmetry or lateralizing weakness. No seizures, presyncope, syncope, or significant dizziness. Hematologic  no easy bruising or excessive bleeding. Psychiatric  no severe anxiety or insomnia. + problem with memory. All other review of systems are negative. Objective  Vital Signs - /78   Pulse 82   Ht 5' 4\" (1.626 m)   Wt 199 lb (90.3 kg)   LMP 02/08/2012   BMI 34.16 kg/m²   General - Cecile is alert, cooperative, and pleasant. Well groomed. No acute distress. Body habitus is overweight. HEENT  The head is normocephalic. No circumoral cyanosis. Dentition is normal.   EYES -  No Xanthelasma, no arcus senilis, no conjunctival hemorrhages or discharge.    Neck - Supple, without increased jugular venous pressures. No carotid bruits. No mass. Respiratory - Lungs are clear bilaterally. No wheezes or rales. Normal effort without use of accessory muscles. Cardiovascular  Heart has regular rhythm and rate. No murmurs, rubs or gallops. + pedal pulses and no varicosities. Abdominal -  Soft, nontender, nondistended. Bowel sounds are intact. Extremities - No clubbing, cyanosis, or  edema. Musculoskeletal -  No clubbing . No Osler's nodes. Gait normal .  No kyphosis or scoliosis. Skin -  no statis ulcers or dermatitis. Neurological - No focal signs are identified. Oriented to person, place and time. Psychiatric -  Appropriate affect and mood. Assessment:     Diagnosis Orders   1. CAD in native artery  NM MYOCARDIAL SPECT REST EXERCISE OR RX   2. Essential hypertension  EKG 12 lead   3. Mixed hyperlipidemia  NM MYOCARDIAL SPECT REST EXERCISE OR RX   4. Other chest pain     5. Type 1 diabetes mellitus with complication [facility])  External Referral To Endocrinology     Data:  BP Readings from Last 3 Encounters:   06/04/21 124/78   12/04/20 124/80   06/02/20 122/66    Pulse Readings from Last 3 Encounters:   06/04/21 82   12/04/20 91   06/02/20 92        Wt Readings from Last 3 Encounters:   06/04/21 199 lb (90.3 kg)   12/04/20 196 lb (88.9 kg)   06/02/20 200 lb (90.7 kg)   EKG today shows normal sinus rhythm with a rate of 83. Short NH with low voltage. No change from previous EKG    Somewhat atypical chest pain however patient had very atypical symptoms prior to her MI and bypass. We will get her set up for nuclear stress test evaluate for any myocardial ischemia as it has been 3 years since her surgery and has had some chest pain    Cholesterol from April shows total cholesterol 217, triglycerides 183, HDL 48 and -intolerant to statins.   Will retry her prior authorization to get her started on Repatha as she is definitely at high risk with diabetes with previous bypass surgery. Patient is type I diabetic. Patient would like to see endocrinologist to get continuous glucose monitoring. She has been having trouble locating getting her sugars better controlled    Insurance has not been filling her losartan. On preference list for her insurance looks like Avapro is covered. We will switch her over to that    If stress test is okay we will see her back in 6 months  30 minutes were spent preparing, reviewing and seeing patient. All questions answered    Plan  Will switch you to avapro 75mg for cost  Will work you on the Bevely Ashley  Will get you referred to endocrinologist  Shelby soon   Follow up in 6 mos  With Dr. Brandon Ribeiro if stress test is OK   Call with any questions or concerns  Follow up with Drema Homans for non cardiac problems  Report any new problems  Cardiovascular Fitness-Exercise as tolerated. Strive for 30 minutes of exercise most days of the week. Cardiac / Healthy Diet  Continue current medications as directed  Continue plan of treatment  It is always recommended that you bring your medications bottles with you to each visit - this is for your safety! NAVDEEP Crump/transcription disclaimer: Much of this encounter note is electronic transcription/translation of spoken language to printed tach. Electronic translation of spoken language may be erroneous, or at times, nonsensical words or phrases may be inadvertently transcribed.  Although, I have reviewed the note for such errors, some may still exist.

## 2021-06-09 ENCOUNTER — HOSPITAL ENCOUNTER (OUTPATIENT)
Dept: NUCLEAR MEDICINE | Age: 46
Discharge: HOME | End: 2021-06-11
Payer: MEDICAID

## 2021-06-09 DIAGNOSIS — E78.2 MIXED HYPERLIPIDEMIA: ICD-10-CM

## 2021-06-09 DIAGNOSIS — I25.10 CAD IN NATIVE ARTERY: ICD-10-CM

## 2021-06-09 PROCEDURE — 93017 CV STRESS TEST TRACING ONLY: CPT

## 2021-06-09 PROCEDURE — 3430000000 HC RX DIAGNOSTIC RADIOPHARMACEUTICAL: Performed by: CLINICAL NURSE SPECIALIST

## 2021-06-09 PROCEDURE — 6360000002 HC RX W HCPCS: Performed by: INTERNAL MEDICINE

## 2021-06-09 PROCEDURE — A9500 TC99M SESTAMIBI: HCPCS | Performed by: CLINICAL NURSE SPECIALIST

## 2021-06-09 RX ADMIN — TETRAKIS(2-METHOXYISOBUTYLISOCYANIDE)COPPER(I) TETRAFLUOROBORATE 10 MILLICURIE: 1 INJECTION, POWDER, LYOPHILIZED, FOR SOLUTION INTRAVENOUS at 09:50

## 2021-06-09 RX ADMIN — REGADENOSON 0.4 MG: 0.08 INJECTION, SOLUTION INTRAVENOUS at 13:18

## 2021-06-09 RX ADMIN — TETRAKIS(2-METHOXYISOBUTYLISOCYANIDE)COPPER(I) TETRAFLUOROBORATE 30 MILLICURIE: 1 INJECTION, POWDER, LYOPHILIZED, FOR SOLUTION INTRAVENOUS at 11:30

## 2021-06-10 ENCOUNTER — TELEPHONE (OUTPATIENT)
Dept: CARDIOLOGY CLINIC | Age: 46
End: 2021-06-10

## 2021-06-10 LAB
LV EF: 80 %
LVEF MODALITY: NORMAL

## 2021-06-10 NOTE — TELEPHONE ENCOUNTER
Patient left message wanting to know if the lexiscan tracer can affect her glucose. She stated that she didn't take insulin yesterday AM before test and BS was 97. When she got home last night it was still 97, she had 2 banana popsicles and went to bed. This AM it was 63 without any insulin. She called PCP but they didn't know.

## 2021-06-11 ENCOUNTER — TELEPHONE (OUTPATIENT)
Dept: CARDIOLOGY CLINIC | Age: 46
End: 2021-06-11

## 2021-06-11 DIAGNOSIS — Z01.818 PRE-OP TESTING: ICD-10-CM

## 2021-06-11 DIAGNOSIS — R07.89 OTHER CHEST PAIN: Primary | ICD-10-CM

## 2021-06-11 NOTE — TELEPHONE ENCOUNTER
1. Caller Name:  pt                                           Call Back Number: [phone] (home)         Patient approves a detailed voicemail message: N\A    Patient is on the MA Schedule Monday for  vaccine/injection.    SPECIFIC Action To Be Taken: Orders pending, please sign.     Pt has been contacted and she knows about this medication being sent in

## 2021-06-11 NOTE — TELEPHONE ENCOUNTER
Pt called stating after her lexiscan on 6-9-21, she started having body aches and now she is loosing her voice and feels very weak. Pt wanted to know if this is from the [address] [place] [address] or just allergies.

## 2021-06-11 NOTE — TELEPHONE ENCOUNTER
This is not related to the [facility] [place]. Follow-up with her primary care doctor.   Just an FYI her Kelsy scan was positive and Valorie Gaytan should be calling her to set up for heart catheterization

## 2021-06-11 NOTE — TELEPHONE ENCOUNTER
Patient returned call. Advised that body aches are not from stress test and to call PCP. She voiced understanding.

## 2021-06-15 ENCOUNTER — TELEPHONE (OUTPATIENT)
Dept: CARDIOLOGY CLINIC | Age: 46
End: 2021-06-15

## 2021-06-15 RX ORDER — ISOSORBIDE MONONITRATE 30 MG/1
30 TABLET, EXTENDED RELEASE ORAL DAILY
Qty: 90 TABLET | Refills: 3 | Status: SHIPPED | OUTPATIENT
Start: 2021-06-15

## 2021-06-15 NOTE — TELEPHONE ENCOUNTER
Patient called requesting that lab orders be faxed to [address] [place] [address]. She was going to have them at her PCP office but she has been unable to reach them. Orders faxed and confirmation scanned to media.

## 2021-06-18 ENCOUNTER — TELEPHONE (OUTPATIENT)
Dept: CARDIOLOGY CLINIC | Age: 46
End: 2021-06-18

## 2021-06-18 DIAGNOSIS — Z01.818 PRE-OP TESTING: ICD-10-CM

## 2021-06-18 RX ORDER — FUROSEMIDE 20 MG/1
20 TABLET ORAL DAILY PRN
Qty: 30 TABLET | Refills: 1 | Status: SHIPPED | OUTPATIENT
Start: 2021-06-18 | End: 2022-03-21

## 2021-06-18 NOTE — TELEPHONE ENCOUNTER
Patient notified ok to take lasix 20 mg QD PRN for swelling. She will discuss memory issues with her PCP. Med order placed.

## 2021-06-22 ENCOUNTER — HOSPITAL ENCOUNTER (OUTPATIENT)
Dept: CARDIAC CATH/INVASIVE PROCEDURES | Age: 46
Discharge: HOME | End: 2021-06-22
Attending: INTERNAL MEDICINE | Admitting: INTERNAL MEDICINE
Payer: MEDICAID

## 2021-06-22 VITALS
DIASTOLIC BLOOD PRESSURE: 94 MMHG | TEMPERATURE: 98 F | RESPIRATION RATE: 13 BRPM | SYSTOLIC BLOOD PRESSURE: 145 MMHG | OXYGEN SATURATION: 96 % | BODY MASS INDEX: 33.97 KG/M2 | WEIGHT: 199 LBS | HEART RATE: 76 BPM | HEIGHT: 64 IN

## 2021-06-22 PROCEDURE — C1894 INTRO/SHEATH, NON-LASER: HCPCS

## 2021-06-22 PROCEDURE — 2709999900 HC NON-CHARGEABLE SUPPLY

## 2021-06-22 PROCEDURE — 2580000003 HC RX 258: Performed by: INTERNAL MEDICINE

## 2021-06-22 PROCEDURE — 93459 L HRT ART/GRFT ANGIO: CPT

## 2021-06-22 PROCEDURE — C1760 CLOSURE DEV, VASC: HCPCS

## 2021-06-22 PROCEDURE — 6360000004 HC RX CONTRAST MEDICATION: Performed by: INTERNAL MEDICINE

## 2021-06-22 PROCEDURE — 6370000000 HC RX 637 (ALT 250 FOR IP): Performed by: INTERNAL MEDICINE

## 2021-06-22 PROCEDURE — 99152 MOD SED SAME PHYS/QHP 5/>YRS: CPT

## 2021-06-22 PROCEDURE — 6360000002 HC RX W HCPCS

## 2021-06-22 PROCEDURE — 2500000003 HC RX 250 WO HCPCS

## 2021-06-22 PROCEDURE — C1769 GUIDE WIRE: HCPCS

## 2021-06-22 RX ORDER — SODIUM CHLORIDE 9 MG/ML
INJECTION, SOLUTION INTRAVENOUS CONTINUOUS
Status: DISCONTINUED | OUTPATIENT
Start: 2021-06-22 | End: 2021-06-22 | Stop reason: HOSPADM

## 2021-06-22 RX ORDER — ONDANSETRON 2 MG/ML
4 INJECTION INTRAMUSCULAR; INTRAVENOUS EVERY 6 HOURS PRN
Status: DISCONTINUED | OUTPATIENT
Start: 2021-06-22 | End: 2021-06-22 | Stop reason: HOSPADM

## 2021-06-22 RX ORDER — ASPIRIN 325 MG
325 TABLET ORAL ONCE
Status: COMPLETED | OUTPATIENT
Start: 2021-06-22 | End: 2021-06-22

## 2021-06-22 RX ORDER — SODIUM CHLORIDE 9 MG/ML
25 INJECTION, SOLUTION INTRAVENOUS PRN
Status: DISCONTINUED | OUTPATIENT
Start: 2021-06-22 | End: 2021-06-22 | Stop reason: HOSPADM

## 2021-06-22 RX ORDER — ALPRAZOLAM 0.5 MG/1
0.25 TABLET ORAL ONCE
Status: COMPLETED | OUTPATIENT
Start: 2021-06-22 | End: 2021-06-22

## 2021-06-22 RX ORDER — SODIUM CHLORIDE 0.9 % (FLUSH) 0.9 %
5-40 SYRINGE (ML) INJECTION EVERY 12 HOURS SCHEDULED
Status: DISCONTINUED | OUTPATIENT
Start: 2021-06-22 | End: 2021-06-22 | Stop reason: HOSPADM

## 2021-06-22 RX ORDER — ACETAMINOPHEN 325 MG/1
650 TABLET ORAL EVERY 4 HOURS PRN
Status: DISCONTINUED | OUTPATIENT
Start: 2021-06-22 | End: 2021-06-22 | Stop reason: HOSPADM

## 2021-06-22 RX ORDER — SODIUM CHLORIDE 0.9 % (FLUSH) 0.9 %
5-40 SYRINGE (ML) INJECTION PRN
Status: DISCONTINUED | OUTPATIENT
Start: 2021-06-22 | End: 2021-06-22 | Stop reason: HOSPADM

## 2021-06-22 RX ORDER — NITROGLYCERIN 0.4 MG/1
0.4 TABLET SUBLINGUAL EVERY 5 MIN PRN
Status: DISCONTINUED | OUTPATIENT
Start: 2021-06-22 | End: 2021-06-22 | Stop reason: HOSPADM

## 2021-06-22 RX ADMIN — ASPIRIN 325 MG: 325 TABLET, FILM COATED ORAL at 11:01

## 2021-06-22 RX ADMIN — ALPRAZOLAM 0.25 MG: 0.5 TABLET ORAL at 11:02

## 2021-06-22 RX ADMIN — SODIUM CHLORIDE: 9 INJECTION, SOLUTION INTRAVENOUS at 11:31

## 2021-06-22 RX ADMIN — IOPAMIDOL 110 ML: 612 INJECTION, SOLUTION INTRAVENOUS at 15:18

## 2021-06-22 ASSESSMENT — ENCOUNTER SYMPTOMS
DIARRHEA: 0
COUGH: 0
WHEEZING: 0
SHORTNESS OF BREATH: 0
BACK PAIN: 0
BLOOD IN STOOL: 0
ABDOMINAL DISTENTION: 0
CONSTIPATION: 0
VOMITING: 0
EYE DISCHARGE: 0

## 2021-06-22 NOTE — H&P
Patient:  Car Luis                  1975  MRN: [id number]    PROBLEM LIST:    Patient Active Problem List    Diagnosis Date Noted    Tachycardia 08/22/2018     Priority: Low    Syncope 08/22/2018     Priority: Low    Mixed hyperlipidemia 08/22/2018     Priority: Low    Essential hypertension 08/22/2018     Priority: Low    History of coronary artery bypass graft x 2 08/22/2018     Priority: Low     Overview Note:     7/2/18 CABG x 2       Postoperative pain      Priority: Low    ARDS (adult respiratory distress syndrome) ([facility] 07/06/2018     Priority: Low    Pneumonia 07/06/2018     Priority: Low    Increased anion gap metabolic acidosis 33/54/8733     Priority: Low    Respiratory insufficiency 07/05/2018     Priority: Low    Hyponatremia 07/04/2018     Priority: Low    Normocytic anemia 07/04/2018     Priority: Low    CAD in native artery 06/28/2018     Priority: Low     Overview Note:     6/15/2018  Echo  Normal LVFX  6/15/2018  lexiscan Positive for anterior lateral myocardial ischemia, EF 65%,   6/28/2018  Cath  Severe TVD, normal LVFX  7/2/2018  CABG x 2 LIMA-LAD, VG-OM3 Claire Hermelindoata)        Abnormal nuclear stress test      Priority: Low    Unstable angina pectoris ([facility]      Priority: Low    Type 1 diabetes mellitus with complication ([facility]      Priority: Low    Unstable angina ([facility] 06/26/2018     Priority: Low    Abnormal laboratory test 06/04/2018     Priority: Low     Overview Note:     Elevated Troponin      Type 1 diabetes mellitus with neurological manifestations ([facility] 06/04/2018     Priority: Low     Overview Note:     Autonomic neuropathy        Anxiety and depression 06/04/2018     Priority: Low    DJD (degenerative joint disease) 06/04/2018     Priority: Low     Overview Note:     S/P right knee surgery x 2        Tobacco abuse 06/04/2018     Priority: Low     Overview Note:     Reportedly quit 6/18      Drug allergy 06/04/2018     Priority: Low     Overview Note: Cipro      Constipation 07/21/2014     Priority: Low    Abdominal distention 07/21/2014     Priority: Low    Gastroesophageal reflux disease without esophagitis 07/21/2014     Priority: Low    Insomnia 06/19/2012     Priority: Low       PRESENTATION: Ellen Noyola is a 41y.o. year old female who presents with recent onset of sharp left sided chest pain occurring 4-5 times in a day and she has utilized nitroglycerin once undergoing a Lexiscan study which shows possible lateral ischemia being referred for cardiac catheterization. Patient has a history of coronary artery disease with catheterization 6/26/2018 showing a dominant circumflex with significant mid and OM 3 disease with diffuse proximal to mid LAD stenosis undergoing two-vessel CABG 7/2018 with LIMA to LAD and SVG to OM 3, normal LV ejection fraction, insulin requiring diabetes mellitus and hypertension with statin intolerance. REVIEW OF SYSTEMS:  Review of Systems   Constitutional: Negative for activity change, fatigue and fever. HENT: Negative for ear pain, hearing loss and tinnitus. Eyes: Negative for discharge and visual disturbance. Respiratory: Negative for cough, shortness of breath and wheezing. Cardiovascular: Positive for chest pain. Negative for palpitations and leg swelling. Gastrointestinal: Negative for abdominal distention, blood in stool, constipation, diarrhea and vomiting. Endocrine: Negative for cold intolerance, heat intolerance, polydipsia and polyuria. Genitourinary: Negative for dysuria and hematuria. Musculoskeletal: Negative for arthralgias, back pain and myalgias. Skin: Negative for pallor and rash. Neurological: Negative for seizures, syncope, weakness and headaches. Psychiatric/Behavioral: Negative for behavioral problems and dysphoric mood.        Past Medical History:      Diagnosis Date    Arthritis     B[facility] disorder ([facility]     CAD (coronary artery disease)     Cancer ([facility]     Cervical CA    Depression     Diabetes mellitus ([facility] 20 years    Dysplasia of cervix     Endometriosis     Essential hypertension 8/22/2018    GERD (gastroesophageal reflux disease)     History of cone biopsy of cervix     Mixed hyperlipidemia 8/22/2018    Pneumonia 7/6/2018    Snapping hip syndrome        Past Surgical History:      Procedure Laterality Date    CHOLECYSTECTOMY      COLONOSCOPY      COLPOSCOPY  2000    ENDOSCOPY, COLON, DIAGNOSTIC      HYSTERECTOMY  2012    Left ovary still there.  KNEE SURGERY Right     Lateral Release    KNEE SURGERY Right     Meniscus Repair    LAPAROSCOPY      OVARY REMOVAL Right 2012    ID CABG, ARTERY-VEIN, TWO N/A 7/2/2018    CORONARY ARTERY BYPASS GRAFT X2 WITH LEFT INTERNAL MAMMARY ARTERY WITH ENDOSCOPIC VEIN HARVESTING WITH PERFUSION TRANSESOPHAGEAL ECHOCARDIOGRAM performed by Bárbara Johnston MD at [address] [facility] OR       Medications Prior to Admission:    Prior to Admission medications    Medication Sig Start Date End Date Taking?  Authorizing Provider   furosemide (LASIX) 20 MG tablet Take 1 tablet by mouth daily as needed (edema) 6/18/21   Nikolai Pulling, APRN   isosorbide mononitrate (IMDUR) 30 MG extended release tablet Take 1 tablet by mouth daily 6/15/21   Nikolai Pulling, APRN   irbesartan (AVAPRO) 75 MG tablet Take 1 tablet by mouth daily 6/4/21   Nikolai Pulling, APRN   Evolocumab (REPATHA SURECLICK) 753 MG/ML SOAJ Inject 1 mL into the skin every 14 days 6/4/21 7/4/21  Nikolai Pulling, APRN   metoprolol succinate (TOPROL XL) 50 MG extended release tablet TAKE 1 TABLET BY MOUTH ONCE DAILY WITH  A  25  MG  TABLET  FOR  A  TOTAL  OF  75  MG  A  DAY 10/12/20   Flor Major, NAVDEEP   meloxicam (MOBIC) 15 MG tablet 15 mg  Patient not taking: Reported on 6/4/2021    Historical Provider, MD   insulin glargine (LANTUS SOLOSTAR) 100 UNIT/ML injection pen 46 Units every morning     Historical Provider, MD   aspirin 325 MG tablet Take 325 mg by mouth daily    Historical Provider, MD   gabapentin (NEURONTIN) 100 MG capsule Take 100 mg by mouth nightly. Takes two nightly    Historical Provider, MD   amitriptyline (ELAVIL) 100 MG tablet Take 100 mg by mouth nightly    Historical Provider, MD   cloNIDine (CATAPRES) 0.1 MG tablet TAKE 1 TABLET BY MOUTH TWICE DAILY 6/12/19   Historical Provider, MD   b complex vitamins capsule Take 1 capsule by mouth daily    Historical Provider, MD   Magnesium 400 MG CAPS Take 3 times per week     Historical Provider, MD   nitroGLYCERIN (NITROSTAT) 0.4 MG SL tablet Place 1 tablet under the tongue every 5 minutes as needed for Chest pain 8/22/18   NAVDEEP Mosley   insulin aspart (NOVOLOG) 100 UNIT/ML injection pen Inject into the skin daily Sliding scale 11/11/14   Historical Provider, MD   promethazine (PHENERGAN) 25 MG tablet Take 25 mg by mouth as needed for Nausea    Historical Provider, MD   linaclotide (LINZESS) 290 MCG CAPS capsule Take 1 capsule by mouth every morning (before breakfast). Patient not taking: Reported on 12/4/2020 8/8/14   NAVDEEP Paredes   alprazolam [place]agantori Glory) 1 MG tablet Take 1 mg by mouth 3 times daily as needed. Yancy Guillaume     Historical Provider, MD       Allergies:  Latex, Ciprofloxacin, and Levofloxacin    Past Social History:  Social History     Socioeconomic History    Marital status: Single     Spouse name: Not on file    Number of children: Not on file    Years of education: Not on file    Highest education level: Not on file   Occupational History    Not on file   Tobacco Use    Smoking status: Former Smoker     Types: Cigarettes     Quit date: 5/30/2018     Years since quitting: 3.0    Smokeless tobacco: Never Used   Vaping Use    Vaping Use: Never used   Substance and Sexual Activity    Alcohol use: Not Currently    Drug use: No    Sexual activity: Yes   Other Topics Concern    Not on file   Social History Narrative    Not on file     Social Determinants of Health     Financial Resource Strain:    Ashley Woodward Difficulty of Paying Living Expenses:    Food Insecurity:     Worried About Running Out of Food in the Last Year:     [address] in the Last Year:    Transportation Needs:     Lack of Transportation (Medical):  Lack of Transportation (Non-Medical):    Physical Activity:     Days of Exercise per Week:     Minutes of Exercise per Session:    Stress:     Feeling of Stress :    Social Connections:     Frequency of Communication with Friends and Family:     Frequency of Social Gatherings with Friends and Family:     Attends [employer] Services:     Active Member of Clubs or Organizations:     Attends Club or Organization Meetings:     Marital Status:    Intimate Partner Violence:     Fear of Current or Ex-Partner:     Emotionally Abused:     Physically Abused:     Sexually Abused:        Family History:       Problem Relation Age of Onset    Cancer Maternal Grandmother     Cancer Paternal Grandfather     Colon Polyps Mother     Heart Attack Paternal Uncle     Colon Cancer Neg Hx     Esophageal Cancer Neg Hx     Liver Cancer Neg Hx     Stomach Cancer Neg Hx      Physical Exam:    Vitals: LMP 02/08/2012   24HR INTAKE/OUTPUT:  No intake or output data in the 24 hours ending 06/22/21 0837    Physical Exam  Constitutional:       General: She is not in acute distress. Appearance: She is not diaphoretic. HENT:      Mouth/Throat:      Pharynx: No oropharyngeal exudate. Eyes:      General: No scleral icterus. Right eye: No discharge. Left eye: No discharge. Neck:      Thyroid: No thyromegaly. Vascular: No JVD. Cardiovascular:      Rate and Rhythm: Normal rate and regular rhythm. No extrasystoles are present. Heart sounds: Normal heart sounds, S1 normal and S2 normal. No murmur heard. No systolic murmur is present. No diastolic murmur is present. No friction rub. No gallop. No S3 or S4 sounds.     Pulmonary:      Effort: Pulmonary effort is normal. No injected in divided doses, approximately 10 mCi and 30 mCi   respectively for rest and stress imaging. The patient was discharged   in stable condition. Results: Patient had symptoms of dyspnea, headache, abdominal cramping   during infusion that resolved in recovery. Baseline EKG showed normal   sinus rhythm with no ST/T changes. During stress there were no   significant EKG changes or rhythm changes. Baseline and peak blood   pressures were 116/88, and 119/56 respectively.  Baseline and peak   heart rates were 90 and  101 respectively. Lexiscan/Cardiolyte Nuclear Medicine Report   Date of Procedure: 6/10/2021   The patient was injected with 82.23 millicuries (mCi) of Technetium   (Tc99m).  After an appropriate level of stress the patient was   re-injected with 07.3 millicuries (mCi) of Technetium (Tc99m).  Repeat   gated images were then performed per standard protocol. Findings:   1.  Analysis of the the stress and rest images reveals small to   moderate area of mildly reduced uptake in the mid to apical lateral   distribution on stress images with reversibility. 2.  Analysis of the gated images reveals grossly normal left   ventricular function with a calculated ejection fraction of 80 %.         Impression   Impression:   There is possible small to moderate, mild mid to apical lateral   ischemia, with a calculated ejection fraction of 80 %. Suggest: Clinical correlation is advised with further evaluation only   if significant symptomatology despite medical management. Signed by Dr Michelle Kaba and Recommendations:     This is a 41y.o. year old female with past medical history of insulin requiring diabetes mellitus, hypertension, prior tobacco use stopped 2018, coronary artery disease with two-vessel CABG with LIMA to LAD and SVG to OM 3 7/2018, normal LV ejection fraction with recent onset of atypical sharp chest pains with repeat episodes with recent [facility] study showing possible lateral ischemia being referred for cardiac catheterization. Risks, benefits, alternatives of cardiac catheterization/PCI discussed with the patient and full informed consent obtained.   Acceptable Mallampati score  Consent for moderate conscious sedation  ASA 3            Electronically signed by González Moreno MD on 6/22/2021 at 8:37 AM

## 2021-07-12 ENCOUNTER — TELEPHONE (OUTPATIENT)
Dept: CARDIOLOGY CLINIC | Age: 46
End: 2021-07-12

## 2021-07-12 NOTE — TELEPHONE ENCOUNTER
Spoke with patient and advised that she would need to contact pcp and get their recommendations for memory pill. Advised that Roslyn Alvarez does not order those. She voiced understanding.

## 2021-07-12 NOTE — TELEPHONE ENCOUNTER
Belkis Calderon called to advise that she had discussed with Sho Carmona a prescription for memory pills and has since been advised that the request needs to be sent from this office to her PCP. Patient asked if that could be done please. Thank you.

## 2021-07-26 RX ORDER — METOPROLOL SUCCINATE 50 MG/1
TABLET, EXTENDED RELEASE ORAL
Qty: 86 TABLET | Refills: 0 | Status: SHIPPED | OUTPATIENT
Start: 2021-07-26 | End: 2021-10-14

## 2021-07-27 ENCOUNTER — OFFICE VISIT (OUTPATIENT)
Dept: CARDIOLOGY CLINIC | Age: 46
End: 2021-07-27
Payer: MEDICAID

## 2021-07-27 VITALS
WEIGHT: 200 LBS | OXYGEN SATURATION: 99 % | HEART RATE: 81 BPM | HEIGHT: 64 IN | SYSTOLIC BLOOD PRESSURE: 130 MMHG | DIASTOLIC BLOOD PRESSURE: 80 MMHG | BODY MASS INDEX: 34.15 KG/M2

## 2021-07-27 DIAGNOSIS — I10 ESSENTIAL HYPERTENSION: ICD-10-CM

## 2021-07-27 DIAGNOSIS — I25.10 CORONARY ARTERY DISEASE INVOLVING NATIVE CORONARY ARTERY OF NATIVE HEART WITHOUT ANGINA PECTORIS: Primary | ICD-10-CM

## 2021-07-27 PROCEDURE — 99213 OFFICE O/P EST LOW 20 MIN: CPT | Performed by: NURSE PRACTITIONER

## 2021-07-27 NOTE — PROGRESS NOTES
Dear Dr. Christiana Lares,    Thank you for allowing me to participate in the care of Ms. Lavinia Yadav. She presents today at the [address] [place] [address]. As you know, Ms. Lupis Day is a 41 y.o. female with history of hypertension, hyperlipidemia, diabetes, depression and CAD s/p CABG who presents with the chief complaint of follow-up post heart catheterization. She is a patient of Dr. Bindu Navarro. She had a Lexiscan which was positive and underwent a heart catheterization on 6/22/2021. This showed a normal LV systolic function, single-vessel branch disease involving the LAD and obtuse marginal branch of dominant circumflex, patent LIMA to distal LAD, and patent SVG to OM 3. Medical management was recommended. She denies any chest pain. She does have shortness of breath at times. She notes that she notices her heart beating in her back at times. She denies any dizziness or syncopal episodes. she is sleeping on 1 pillow at night. she is not waking gasping for air. she denies any swelling. she has been compliant with her medications. her BP has been controlled. PCP follows labs and lipids. She otherwise denies chest pain, PND, orthopnea, syncope or near syncope. She has no other complaints. Review of Systems   Constitutional:  Negative for fever, chills, diaphoresis, activity change, appetite change, fatigue and unexpected weight change. Eyes: Negative for photophobia, pain, redness and visual disturbance. Respiratory: Negative for apnea, cough, chest tightness, shortness of breath, wheezing and stridor. Cardiovascular: Negative for chest pain, palpitations and leg swelling. Gastrointestinal: Negative for abdominal distention. Genitourinary: Negative for dysuria, urgency and frequency. Musculoskeletal: Negative for myalgias, arthralgias and gait problem. Skin: Negative for color change, pallor, rash and wound.    Neurological: Negative for dizziness, tremors, speech difficulty, weakness and numbness. Hematological: Does not bruise/bleed easily. Psychiatric/Behavioral: Negative. Past Medical History:   Diagnosis Date    Arthritis     [facility] disorder [facility]     CAD (coronary artery disease)     Cancer ([facility]     Cervical CA    Depression     Diabetes mellitus ([facility] 20 years    Dysplasia of cervix     Endometriosis     Essential hypertension 8/22/2018    GERD (gastroesophageal reflux disease)     Histoplasmosis     History of cone biopsy of cervix     Mixed hyperlipidemia 8/22/2018    Pneumonia 7/6/2018    Snapping hip syndrome     Tuberculosis 2004       Past Surgical History:   Procedure Laterality Date    CHOLECYSTECTOMY      COLONOSCOPY      COLPOSCOPY  2000    ENDOSCOPY, COLON, DIAGNOSTIC      HYSTERECTOMY  2012    Left ovary still there.     KNEE SURGERY Right     Lateral Release    KNEE SURGERY Right     Meniscus Repair    LAPAROSCOPY      OVARY REMOVAL Right 2012    [place] CABG, ARTERY-VEIN, TWO N/A 7/2/2018    CORONARY ARTERY BYPASS GRAFT X2 WITH LEFT INTERNAL MAMMARY ARTERY WITH ENDOSCOPIC VEIN HARVESTING WITH PERFUSION TRANSESOPHAGEAL ECHOCARDIOGRAM performed by Wayne Doe MD at [facility] OR       Family History   Problem Relation Age of Onset    Cancer Maternal Grandmother     Cancer Paternal Grandfather     Colon Polyps Mother     Heart Attack Paternal Uncle     Colon Cancer Neg Hx     Esophageal Cancer Neg Hx     Liver Cancer Neg Hx     Stomach Cancer Neg Hx        Social History     Socioeconomic History    Marital status: Single     Spouse name: Not on file    Number of children: Not on file    Years of education: Not on file    Highest education level: Not on file   Occupational History    Not on file   Tobacco Use    Smoking status: Former Smoker     Types: Cigarettes     Quit date: 5/30/2018     Years since quitting: 3.1    Smokeless tobacco: Never Used   Vaping Use    Vaping Use: Never used   Substance and Sexual Activity    Alcohol use: Not Currently    Drug use: No    Sexual activity: Yes   Other Topics Concern    Not on file   Social History Narrative    Not on file     Social Determinants of Health     Financial Resource Strain:     Difficulty of Paying Living Expenses:    Food Insecurity:     Worried About Running Out of Food in the Last Year:     [address] in the Last Year:    Transportation Needs:     Lack of Transportation (Medical):  Lack of Transportation (Non-Medical):    Physical Activity:     Days of Exercise per Week:     Minutes of Exercise per Session:    Stress:     Feeling of Stress :    Social Connections:     Frequency of Communication with Friends and Family:     Frequency of Social Gatherings with Friends and Family:     Attends Oriental orthodox Services:     Active Member of Clubs or Organizations:     Attends Club or Organization Meetings:     Marital Status:    Intimate Partner Violence:     Fear of Current or Ex-Partner:     Emotionally Abused:     Physically Abused:     Sexually Abused:         Allergies   Allergen Reactions    Latex     Adhesive Tape     Ciprofloxacin     Levofloxacin Rash         Current Outpatient Medications:     metoprolol succinate (TOPROL XL) 50 MG extended release tablet, TAKE 1 TABLET BY MOUTH ONCE DAILY WITH  25  MG  TABLET  FOR  A  TOTAL  75  MG  DAILY, Disp: 86 tablet, Rfl: 0    furosemide (LASIX) 20 MG tablet, Take 1 tablet by mouth daily as needed (edema), Disp: 30 tablet, Rfl: 1    isosorbide mononitrate (IMDUR) 30 MG extended release tablet, Take 1 tablet by mouth daily, Disp: 90 tablet, Rfl: 3    irbesartan (AVAPRO) 75 MG tablet, Take 1 tablet by mouth daily, Disp: 90 tablet, Rfl: 3    meloxicam (MOBIC) 15 MG tablet, 15 mg , Disp: , Rfl:     insulin glargine (LANTUS SOLOSTAR) 100 UNIT/ML injection pen, 44 Units every morning , Disp: , Rfl:     aspirin 325 MG tablet, Take 325 mg by mouth daily, Disp: , Rfl:     gabapentin 0.5 07/18/2018    CREATININE 0.7 07/13/2018    CREATININE 0.7 07/11/2018    CREATININE 0.8 03/27/2012    HGB 10.2 07/18/2018    HGB 8.7 07/13/2018    HGB 10.2 07/12/2018    PROBNP 419 07/18/2018    PROBNP 568 07/07/2018     Cath-6/22/21  Single-vessel, branch disease involving LAD and obtuse marginal branch of   dominant circumflex. Patent LIMA to distal LAD. Patent SVG to OM 3. Normal LV systolic function. Recommendations      Medical management. Lexiscan-6/9/21     There is possible small to moderate, mild mid to apical lateral   ischemia, with a calculated ejection fraction of 80 %. Suggest: Clinical correlation is advised with further evaluation only   if significant symptomatology despite medical management. Assessment, Recommendations, & Plan:  41 y.o. female with CAD s/p CABG, HTN, Snoring, & Daytime sleepiness    CAD-controlled on ASA, Avapro, Toprol, & Imdur. Continue current regimen    HTN- Controlled on clonidine, Lasix, Avapro, Imdur, & Toprol. Continue current regimen      Disposition - RTC in 6 months with Dr. Rosendo Boyce or sooner if needed      Please do not hesitate to contact me for any questions or concerns.     Sincerely yours,    NAVDEEP Barajas

## 2021-07-27 NOTE — PATIENT INSTRUCTIONS
Coronary Artery Disease   (CAD; Coronary Atherosclerosis; Silent MI; Coronary Heart Disease; Ischemic Heart Disease; Atherosclerosis of the Coronary Arteries)     Definition   Coronary arteries bring oxygen rich blood to the heart muscle. Coronary artery disease (CAD) is blockage of these arteries. If the blockage is complete, areas of the heart muscle may be damaged. In severe case the heart muscle dies. This can lead to a heart attack, also known as a myocardial infarction (MI). Coronary artery disease is the most common form of heart disease. It is the leading cause of death worldwide. Causes include: Thickening of the walls of the arteries feeding the heart muscle   Accumulation of fatty plaques within the coronary arteries   Sudden spasm of a coronary artery   Narrowing of the coronary arteries   Inflammation within the coronary arteries   Development of a blood clot within the coronary arteries that blocks blood flow      Major risk factors include:   Sex: male (men have a greater risk of heart attack than women)   Age: 39 and older for men, 54 and older for women   Heredity: strong family history of heart disease   Obesity and being overweight   Smoking   High blood pressure   Sedentary lifestylePoor fitness can also increase your risk of CAD and premature death. High cholesterol (specifically, high LDL cholesterol, and low HDL cholesterol)   Diabetes   Metabolic syndrome (combination of high blood pressure, abdominal obesity, and insulin resistance)     Other risk factors may include:   Sleep Apnea  Stress   Excessive alcohol use   Depression   A diet that is high in saturated fat, trans fat, cholesterol, and/or caloriesDrinking sugary beverages on a regular basis may increase your risk of CAD. Symptoms   CAD may progress without any symptoms. Angina is chest pain that comes and goes. It often has a squeezing or pressure-like quality. It may radiate into the shoulder(s), arm(s), or jaw. Angina usually lasts for about 2-10 minutes. It is often relieved with rest. Angina can be triggered by:   Exercise or exertion   Emotional stress   Cold weather   A large meal   Chest pain may indicate more serious unstable angina or a heart attack if: It is unrelieved by rest or nitroglycerin   Severe angina   Angina that begins at rest (with no activity)   Angina that lasts more than 15 minutes   Accompanying symptoms may include:   Shortness of breath   Sweating   Nausea   Weakness   Immediate medical attention is needed for unstable angina. CAD in women may cause less classic chest pain. It is likely to start with shortness of breath and fatigue. Diagnosis   If you go to the emergency room with chest pain, some tests will be done right away. The tests will attempt to see if you are having angina or a heart attack. If you have a stable pattern of angina, other tests may be done to determine the severity of your disease. The doctor will ask about your symptoms and medical history. A physical exam will be done.    Tests may include:   Blood tests to look for certain substances in the blood called troponins which help the doctor determine if you are having a heart attack   Electrocardiogram (ECG, EKG) records the heart's activity by measuring electrical currents through the heart muscle, and can reveal evidence of past heart attacks, acute heart attacks, and heart rhythm problems   Echocardiogram uses high-frequency sound waves (ultrasound) to examine the size, shape, and motion of the heart, giving information about the structure and function of the heart   Exercise stress test records the heart's electrical activity during increased physical activity   Nuclear stress test the heart is observed while exercising and radioactive material highlights impaired blood flow to help locate problem areas   Coronary calcium scoring a type of x-ray called a CAT scan that uses a computer to look for the presence of calcium in the heart arteries   Coronary angiography x-rays taken after a dye is injected into the arteries to allows the doctor to look for abnormalities in the arteries   Treatment   Treatment may include:   Nitroglycerin   This medicine is usually given during an attack of angina. It can be given as a tablet that dissolves under the tongue or as a spray. Longer-lasting types can be used to prevent angina before an activity known to cause it. These may be given as pills or applied as patches or ointments. Blood-Thinning Medications   A small, daily dose of aspirin has been shown to decrease the risk of heart attack. Ask your doctor before taking aspirin daily. Warfarin (Coumadin)   Ticlopidine (Ticlid)   Clopidogrel (Plavix)   Beta-Blockers, Calcium-Channel Blockers, and ACE-Inhibitors   These may help prevent angina. In some cases, they may lower the risk of heart attack. Medications to Lower Cholesterol   Medicines, like statins, are often prescribed to people who have CAD. Statins (eg, atorvastatin [Lipitor]) lower cholesterol levels, which can help to prevent CAD events. Revascularization   Patients with severe blockages in their coronary arteries may benefit from procedures to immediately improve blood flow to the heart muscle:   Percutaneous coronary interventions (PCI)such as balloon angioplasty , in some cases, a wire mesh stent is placed to hold the artery open   Coronary artery bypass grafting (CABG) segments of vessels are taken from other areas of the body and are sewn into the heart arteries to reroute blood flow around blockages   Some studies have shown that CABG may be more effective than PCI. Lifestyle changes and intensive medicine may also be just as effective as PCI.    Options for Refractory Angina   For patients who are not candidates for revascularization procedures but have continued angina despite medicine, options include:   Enhanced external counterpulsation (EECP) large air bags are inflated around the legs in tune with the heart beat. The patient receives 5 one-hour treatments per week for seven weeks. This has been shown to reduce angina and may improve symptom-free exercise duration. Transmyocardial revascularization (TMR) surgical procedure done with laser to reduce chest pain. Researchers are also studying gene therapy as a possible treatment. Prevention   To reduce your risk of getting coronary artery disease:   Maintain a healthy weight. Eat a heart healthy diet that is low in saturated fat , red meat and processed meats, and rich in whole grain , fruits, and vegetables . Begin a safe exercise program with the advice of your doctor. If you smoke, quit . Treat your high blood pressure and/or diabetes. Treat high cholesterol or triglycerides. Ask your doctor about taking a low-dose aspirin every day. In certain patients, taking rosuvastatin (Crestor) may be another option. Talk to your doctor. Hypertension  (High Blood Pressure)  Most people with high blood pressure (hypertension) have no symptoms. High blood pressure can be a dangerous problem. Hypertension is dangerous because you may have it and not know it. High blood pressure may mean that your heart needs to work harder to pump blood. Your blood pressure is measured with 2 numbers. The first number is when your heart flexes (contracts), and the second number is when your heart relaxes. The higher the numbers are, the more you are at risk for problems. Write down your blood pressure today. The best blood pressure for adults is 120/80 (mmHg) or lower. It is likely that your blood pressure was recorded at least 2 times today. It is important for you to give these numbers to your doctor. If you do not have a doctor, try to get follow-up care at a hospital or community clinic. You may need to start high blood pressure medicine. You may also need to adjust your medicines as told by your doctor.  Even mild high blood pressure increases long-term health risks. One high reading does not mean you have hypertension. · Your blood pressure should be taken when you are relaxed. It is also important to sit for about 10 minutes before being tested. · Things that can increase your blood pressure are:  Injury, Illness, Stress, Caffeine, and some medicines (like decongestants). · High blood pressure does not usually need emergency treatment. HOME CARE  · Lifestyle and medicine changes may be needed, including:   · Weight loss. · Exercise. · Limit the use of salt. · Stop smoking. · If using decongestants or birth control pills, talk to your doctor. These medicines might make blood pressure higher. · Do not use street drugs. · Females should not drink more than 1 alcoholic drink per day. Males should not drink more than 2 alcoholic drinks per day. · Take your blood pressure medicine. You will need to take it every day. If you do not get treated, there are risks, including:   · Heart disease. · Stroke. · Kidney failure. · See your doctor as told. GET HELP RIGHT AWAY IF:  · You get a very bad headache. · You get blurred or changing vision. · You feel confused. · You feel weak, numb, or faint. · You get chest or belly (abdominal) pain. · You throw up (vomit). · You cannot breathe very well. If you have a blood pressure reading with a top number of 180 or higher, you need to see your doctor right away. This is especially true if you are having any of the problems listed above. Hyperlipidemia   (Dyslipidemia; High Triglycerides; Triglycerides, High)     Definition   Hyperlipidemia is a high level of fats in the blood. These fats, called lipids, include cholesterol and triglycerides. There are five types of hyperlipidemia. The type depends on which lipid in the blood is high.    Causes   Causes may include:   A family history of hyperlipidemia   A diet high in total fat, saturated fat, only aimed at correcting your cholesterol levels, but also at lowering your overall risk for heart disease and strokes. Diet Changes   Eat a diet low in total fat, saturated fat, and cholesterol . Reduce or eliminate the amount of alcohol you drink. Eat more high-fiber foods. Lifestyle Changes   If you are overweight, lose weight . If you smoke, quit . Exercise regularly . Talk to you doctor before starting an exercise program. Karmen Hightower may already have hardening of the arteries or heart disease. These conditions increase your risk of having a heart attack while exercising. Make sure other medical conditions such as high blood pressure and diabetes are being treated and controlled. Medications   There are a number of drugs available, such as statins , to treat this condition and help lower your risk for heart disease. Talk to your doctor. Statins have been shown to reduce mortality (death), heart attacks , and stroke. These medicines are best used as additions to diet and exercise and should not replace healthy lifestyle changes. Prevention   To help reduce your chance of getting hyperlipidemia, take the following steps:   Starting at age 21, get cholesterol tests. Eat a diet low in total fat, saturated fat, and cholesterol. If you smoke, quit. Drink alcohol in moderation (two drinks per day for men, one drink per day for women). If you are overweight, lose weight. Exercise regularly. Talk with your doctor first.   If you have diabetes , control your blood sugar. Talk to your doctor about medications you are taking. They may have side effects that cause hyperlipidemia.      Last Reviewed: September 2010 Sandor Osullivan DO   Updated: 11/9/2010

## 2021-10-14 RX ORDER — METOPROLOL SUCCINATE 50 MG/1
TABLET, EXTENDED RELEASE ORAL
Qty: 90 TABLET | Refills: 5 | Status: SHIPPED | OUTPATIENT
Start: 2021-10-14

## 2021-11-18 ENCOUNTER — TELEPHONE (OUTPATIENT)
Dept: CARDIOLOGY CLINIC | Age: 46
End: 2021-11-18

## 2021-11-18 NOTE — TELEPHONE ENCOUNTER
Called patient and left VM to reschedule 12/7 @ 2:45 appt due to Dr Otilia Alpers out, Patient can see APRN (Seen Judith Chun in past), call [phone] for patient to reschedule, BW 11/18

## 2021-12-01 ENCOUNTER — OFFICE VISIT (OUTPATIENT)
Dept: ENDOCRINOLOGY | Facility: CLINIC | Age: 46
End: 2021-12-01

## 2021-12-01 VITALS
BODY MASS INDEX: 33.29 KG/M2 | DIASTOLIC BLOOD PRESSURE: 76 MMHG | SYSTOLIC BLOOD PRESSURE: 132 MMHG | WEIGHT: 195 LBS | OXYGEN SATURATION: 99 % | HEART RATE: 91 BPM | HEIGHT: 64 IN

## 2021-12-01 DIAGNOSIS — E78.2 MIXED HYPERLIPIDEMIA: ICD-10-CM

## 2021-12-01 DIAGNOSIS — I10 PRIMARY HYPERTENSION: ICD-10-CM

## 2021-12-01 DIAGNOSIS — E10.65 TYPE 1 DIABETES MELLITUS WITH HYPERGLYCEMIA (HCC): Primary | ICD-10-CM

## 2021-12-01 PROCEDURE — 95251 CONT GLUC MNTR ANALYSIS I&R: CPT | Performed by: NURSE PRACTITIONER

## 2021-12-01 PROCEDURE — 99204 OFFICE O/P NEW MOD 45 MIN: CPT | Performed by: NURSE PRACTITIONER

## 2021-12-01 RX ORDER — PROCHLORPERAZINE 25 MG/1
SUPPOSITORY RECTAL
COMMUNITY
Start: 2021-10-05 | End: 2022-05-12 | Stop reason: SDUPTHER

## 2021-12-01 RX ORDER — FUROSEMIDE 20 MG/1
1 TABLET ORAL DAILY PRN
COMMUNITY
Start: 2021-06-18 | End: 2022-03-22 | Stop reason: SDUPTHER

## 2021-12-01 RX ORDER — GLUCAGON 3 MG/1
1 POWDER NASAL AS NEEDED
Qty: 2 EACH | Refills: 11 | Status: SHIPPED | OUTPATIENT
Start: 2021-12-01 | End: 2022-03-07

## 2021-12-01 RX ORDER — INSULIN GLARGINE 100 [IU]/ML
46 INJECTION, SOLUTION SUBCUTANEOUS DAILY
COMMUNITY
End: 2022-03-07

## 2021-12-01 RX ORDER — NITROGLYCERIN 0.4 MG/1
0.4 TABLET SUBLINGUAL
COMMUNITY

## 2021-12-01 RX ORDER — METOPROLOL SUCCINATE 50 MG/1
75 TABLET, EXTENDED RELEASE ORAL DAILY
COMMUNITY
Start: 2021-10-14 | End: 2023-01-16 | Stop reason: SDUPTHER

## 2021-12-01 RX ORDER — CLONIDINE HYDROCHLORIDE 0.1 MG/1
0.1 TABLET ORAL 2 TIMES DAILY
COMMUNITY
Start: 2021-11-18

## 2021-12-01 RX ORDER — PROCHLORPERAZINE 25 MG/1
SUPPOSITORY RECTAL
COMMUNITY
Start: 2021-11-22 | End: 2022-05-05 | Stop reason: SDUPTHER

## 2021-12-01 RX ORDER — GABAPENTIN 300 MG/1
600 CAPSULE ORAL NIGHTLY
COMMUNITY
End: 2022-10-12 | Stop reason: SDUPTHER

## 2021-12-01 RX ORDER — ISOSORBIDE MONONITRATE 30 MG/1
1 TABLET, EXTENDED RELEASE ORAL DAILY
COMMUNITY
Start: 2021-06-15

## 2021-12-01 RX ORDER — ASPIRIN 325 MG
325 TABLET ORAL NIGHTLY
COMMUNITY

## 2021-12-01 RX ORDER — AMITRIPTYLINE HYDROCHLORIDE 100 MG/1
100 TABLET, FILM COATED ORAL NIGHTLY
COMMUNITY
End: 2022-03-16 | Stop reason: SDUPTHER

## 2021-12-01 RX ORDER — HYDROCODONE BITARTRATE AND ACETAMINOPHEN 7.5; 325 MG/1; MG/1
7.5 TABLET ORAL 2 TIMES DAILY PRN
COMMUNITY
Start: 2021-11-01 | End: 2022-04-25

## 2021-12-01 RX ORDER — PROMETHAZINE HYDROCHLORIDE 25 MG/1
TABLET ORAL
COMMUNITY
Start: 2021-11-15 | End: 2022-07-12 | Stop reason: SDUPTHER

## 2021-12-01 RX ORDER — IRBESARTAN 75 MG/1
75 TABLET ORAL NIGHTLY
COMMUNITY
Start: 2021-09-16

## 2021-12-01 RX ORDER — EVOLOCUMAB 140 MG/ML
140 INJECTION, SOLUTION SUBCUTANEOUS
COMMUNITY
Start: 2021-11-24 | End: 2022-07-14 | Stop reason: SDUPTHER

## 2021-12-01 RX ORDER — ALPRAZOLAM 1 MG/1
1 TABLET ORAL 3 TIMES DAILY
COMMUNITY
End: 2022-07-28 | Stop reason: SDUPTHER

## 2021-12-01 NOTE — PROGRESS NOTES
"Chief Complaint  Diabetes    Subjective          Krupa Salomón De La Rosa presents to [facility] ENDOCRINOLOGY  History of Present Illness     In office visit       Referring provider Dr. Carlos MD     Chief Complaint   Patient presents with   • Diabetes       HPI          46-year-old female presents for consultation    Reason diabetes mellitus type 1        Duration--diagnosed at age 17     Context --presented with symptoms     Timing --constant     Quality  Not controlled       Lab Results   Component Value Date    HGBA1C 8.2 (H) 07/01/2018         Severity  high    Macrovascular complications  CAD, CABG X2 in July 2018     Microvascular complications ---neuropathy,  Mild DR, no renal disease     Current diabetes regimen     Insulin by injections         Current glucose monitoring       fingerstick     Checks 4 times     Dexcom G6     See below     Variable from       Patient  is a RN         Does not want pump at this time due to previous problems           Review of Systems - General ROS: negative        Objective   Vital Signs:   /76   Pulse 91   Ht 162.6 cm (64\")   Wt 88.5 kg (195 lb)   SpO2 99%   BMI 33.47 kg/m²     Physical Exam  Constitutional:       Appearance: Normal appearance.   Cardiovascular:      Rate and Rhythm: Regular rhythm.      Heart sounds: Normal heart sounds.   Pulmonary:      Breath sounds: Normal breath sounds.   Musculoskeletal:      Cervical back: Normal range of motion.   Neurological:      Mental Status: She is alert.        Result Review :   The following data was reviewed by: TORIE Blank on 12/01/2021:                Assessment and Plan    Diagnoses and all orders for this visit:    1. Type 1 diabetes mellitus with hyperglycemia (HCC) (Primary)    2. Primary hypertension    3. Mixed hyperlipidemia    Other orders  -     Glucagon (Baqsimi One Pack) 3 MG/DOSE powder; 1 each into the nostril(s) as directed by provider As Needed " (Hypoglycemia). Apply intranasal if hypoglycemia  Dispense: 2 each; Refill: 11               Glycemic Management:    Diabetes mellitus type 1     Dexcom G6     Downloaded and reviewed    Dated from Nov. 18 to Dec. 1, 2021     Average bg -- 173    Time in target 51 %     High 34 %     Very high 10 %     Low 4%     Very low 1%     Variable readings due to severe gastroparesis    She is not always able to eat and has vomiting and constipation     She has lows and highs     Most lows at night decrease basal     Taking lantus 40 units once daily decrease to 36 units once at night ---change to Toujeo      Taking Novolog 1 unit per 5 grams of CHO     For lower carb days try 1 unit per 10 grams of CHO        Plus sliding scale     2 per 50 above 150         [facility]            Microvascular Complications Monitoring       Last eye exam---- April 2021     Neuropathy --yes     Taking gabapentin       Lipid Management:       Hyperlipidemia     Taking  repatha         Blood Pressure Management:    Hypertension     Taking toprol xl 50 mg one daily     Taking lasix 20 mg one daily     Taking catapres 0.1 mg daily       Thyroid Health    No results found for: TSH      Bone Health       Weight Management:      Patient's Body mass index is 33.47 kg/m². indicating that she is obese (BMI >30). Obesity-related health conditions include the following: diabetes mellitus. Obesity is unchanged. BMI is is above average; no BMI management plan is appropriate. We discussed portion control..        Preventive Care:     Non smoker       Records from  received and reviewed from 2021  Thank you for this consultation           Follow Up   No follow-ups on file.  Patient was given instructions and counseling regarding her condition or for health maintenance advice. Please see specific information pulled into the AVS if appropriate.         This document has been electronically signed by TORIE Blank on December 1, 2021  08:50 CST.

## 2021-12-27 ENCOUNTER — TELEPHONE (OUTPATIENT)
Dept: ENDOCRINOLOGY | Facility: CLINIC | Age: 46
End: 2021-12-27

## 2022-01-05 ENCOUNTER — OFFICE VISIT (OUTPATIENT)
Dept: CARDIOLOGY CLINIC | Age: 47
End: 2022-01-05
Payer: MEDICAID

## 2022-01-05 VITALS
DIASTOLIC BLOOD PRESSURE: 78 MMHG | SYSTOLIC BLOOD PRESSURE: 118 MMHG | WEIGHT: 186 LBS | HEART RATE: 90 BPM | HEIGHT: 64 IN | BODY MASS INDEX: 31.76 KG/M2

## 2022-01-05 DIAGNOSIS — E78.2 MIXED HYPERLIPIDEMIA: ICD-10-CM

## 2022-01-05 DIAGNOSIS — I10 ESSENTIAL HYPERTENSION: Primary | ICD-10-CM

## 2022-01-05 DIAGNOSIS — E10.8 TYPE 1 DIABETES MELLITUS WITH COMPLICATION (HCC): ICD-10-CM

## 2022-01-05 DIAGNOSIS — I25.118 CORONARY ARTERY DISEASE OF NATIVE ARTERY OF NATIVE HEART WITH STABLE ANGINA PECTORIS (HCC): ICD-10-CM

## 2022-01-05 DIAGNOSIS — R07.89 OTHER CHEST PAIN: ICD-10-CM

## 2022-01-05 PROCEDURE — 93000 ELECTROCARDIOGRAM COMPLETE: CPT | Performed by: CLINICAL NURSE SPECIALIST

## 2022-01-05 PROCEDURE — 99214 OFFICE O/P EST MOD 30 MIN: CPT | Performed by: CLINICAL NURSE SPECIALIST

## 2022-01-05 RX ORDER — GABAPENTIN 300 MG/1
300 CAPSULE ORAL NIGHTLY
COMMUNITY
Start: 2021-12-27

## 2022-01-05 RX ORDER — EVOLOCUMAB 140 MG/ML
140 INJECTION, SOLUTION SUBCUTANEOUS
COMMUNITY
Start: 2021-11-24

## 2022-01-05 RX ORDER — NITROGLYCERIN 0.4 MG/1
0.4 TABLET SUBLINGUAL EVERY 5 MIN PRN
Qty: 25 TABLET | Refills: 3 | Status: SHIPPED | OUTPATIENT
Start: 2022-01-05

## 2022-01-05 NOTE — PATIENT INSTRUCTIONS
Refilled [employer] taking the Imdur in the AM- this works better when more active  If you continue to have the intermittent chest pain then will increase it to 60mg daily in the AM  Follow up in 4-6 weeks   Call with any questions or concerns  Follow up with Teri Gallagher for non cardiac problems  Report any new problems  Cardiovascular Fitness-Exercise as tolerated. Strive for 30 minutes of exercise most days of the week. Cardiac / Healthy Diet  Continue current medications as directed  Continue plan of treatment  It is always recommended that you bring your medications bottles with you to each visit - this is for your safety!

## 2022-01-05 NOTE — PROGRESS NOTES
[id number] [facility] Cardiology  [facility] [address] [address]  Phone: [phone]  Fax: [phone]    OFFICE VISIT:  2022    Kelsey Landers - : 1975    Reason For Visit:  Teena Roberson is a 40 y.o. female who is here for Follow-up (pt has had chest pain off and on all day today  took 3 Nitro), Coronary Artery Disease, and Hypertension  Long-term diabetic with coronary disease.  Had CABG x2 in 2018  Patient had ongoing pain with nuclear stress test in  that was suggestive of some mild myocardial ischemia. Underwent elective heart catheterization 2021  Single-vessel, branch disease involving LAD and obtuse marginal branch of   dominant circumflex. Patent LIMA to distal LAD. Patent SVG to OM 3. Normal LV systolic function    Patient has continue with ongoing medical management. She is been on able to tolerate statins but is on Repatha    Visit today due to recurrent chest pain. She states started about 2 weeks ago she started having upper back pain that was somewhat like she has had before. She had had 2 bowel movements 2 days in a row ( which is is unusual for her) so does not think it is gastrointestinal  She had pain in her back into her chest that felt like squeezing and some sharp shooting and stabbing  O2 was normal. No unusual stress or other symptoms   She ended up taking 3 nitro and a clonidine ad went to bed  Felt fine after nap    Had some chest pain today - sharp stab that lasted a min to 1.5 min. 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 She has drug testing tomorrow in hopes to start next week      Subjective  Cecile denies exertional chest pain, shortness of breath, orthopnea, paroxysmal nocturnal dyspnea, syncope, presyncope, arrhythmia, edema and fatigue.   The 2004     Past Surgical History:   Procedure Laterality Date    CHOLECYSTECTOMY      COLONOSCOPY      COLPOSCOPY  2000    CORONARY ARTERY BYPASS GRAFT      ENDOSCOPY, COLON, DIAGNOSTIC      HYSTERECTOMY  2012    Left ovary still there.  KNEE SURGERY Right     Lateral Release    KNEE SURGERY Right     Meniscus Repair    LAPAROSCOPY      OVARY REMOVAL Right 2012    DC CABG, ARTERY-VEIN, TWO N/A 7/2/2018    CORONARY ARTERY BYPASS GRAFT X2 WITH LEFT INTERNAL MAMMARY ARTERY WITH ENDOSCOPIC VEIN HARVESTING WITH PERFUSION TRANSESOPHAGEAL ECHOCARDIOGRAM performed by Jenny Hess MD at [facility] OR     Family History   Problem Relation Age of Onset    Cancer Maternal Grandmother     Cancer Paternal Grandfather     Colon Polyps Mother     Heart Attack Paternal Uncle     Colon Cancer Neg Hx     Esophageal Cancer Neg Hx     Liver Cancer Neg Hx     Stomach Cancer Neg Hx      Social History     Tobacco Use    Smoking status: Former Smoker     Types: Cigarettes     Quit date: 5/30/2018     Years since quitting: 3.6    Smokeless tobacco: Never Used   Substance Use Topics    Alcohol use: Not Currently      Current Outpatient Medications   Medication Sig Dispense Refill    gabapentin (NEURONTIN) 300 MG capsule Take 300 mg by mouth nightly.       Evolocumab (REPATHA) 140 MG/ML SOSY Inject 140 mg into the skin every 14 days      nitroGLYCERIN (NITROSTAT) 0.4 MG SL tablet Place 1 tablet under the tongue every 5 minutes as needed for Chest pain 25 tablet 3    metoprolol succinate (TOPROL XL) 50 MG extended release tablet TAKE 1 TABLET BY MOUTH ONCE DAILY ALONG  WITH  25  MG  TABLET (Patient taking differently: Takes 75 mg) 90 tablet 5    furosemide (LASIX) 20 MG tablet Take 1 tablet by mouth daily as needed (edema) 30 tablet 1    isosorbide mononitrate (IMDUR) 30 MG extended release tablet Take 1 tablet by mouth daily 90 tablet 3    irbesartan (AVAPRO) 75 MG tablet Take 1 tablet by mouth daily 90 tablet 3    insulin glargine (LANTUS SOLOSTAR) 100 UNIT/ML injection pen 44 Units every morning       aspirin 325 MG tablet Take 325 mg by mouth daily      amitriptyline (ELAVIL) 100 MG tablet Take 100 mg by mouth nightly      cloNIDine (CATAPRES) 0.1 MG tablet TAKE 1 TABLET BY MOUTH TWICE DAILY  3    insulin aspart (NOVOLOG) 100 UNIT/ML injection pen Inject into the skin daily Sliding scale      promethazine (PHENERGAN) 25 MG tablet Take 25 mg by mouth as needed for Nausea      alprazolam (XANAX) 1 MG tablet Take 1 mg by mouth 3 times daily as needed. Gita Doing meloxicam (MOBIC) 15 MG tablet 15 mg       Magnesium 400 MG CAPS Take 3 times per week  (Patient not taking: Reported on 1/5/2022)       No current facility-administered medications for this visit. Allergies: Latex, Adhesive tape, Ciprofloxacin, and Levofloxacin    Review of Systems  Constitutional  no significant activity change, appetite change, or unexpected weight change. No fever, chills or diaphoresis. No fatigue. HEENT  no significant rhinorrhea or epistaxis. No tinnitus or significant hearing loss. Eyes  no sudden vision change or amaurosis. Respiratory  no significant wheezing, stridor, apnea or cough. No dyspnea on exertion or shortness of breath. Cardiovascular + chest pain, no orthopnea or PND. No sensation of arrhythmia or slow heart rate. No claudication or leg edema. Gastrointestinal  no abdominal swelling or pain. No blood in stool. + severe constipation, no diarrhea, nausea, or vomiting. Genitourinary  no difficulty urinating, dysuria, frequency, or urgency. No flank pain or hematuria. Musculoskeletal  no back pain, gait disturbance, or myalgia. Skin  no color change or rash. No pallor. No new surgical incision. Neurologic  no speech difficulty, facial asymmetry or lateralizing weakness. No seizures, presyncope, syncope, or significant dizziness. Hematologic  no easy bruising or excessive bleeding. Psychiatric  no severe anxiety or insomnia. No confusion. All other review of systems are negative. Objective  Vital Signs - /78   Pulse 90   Ht 5' 4\" (1.626 m)   Wt 186 lb (84.4 kg)   LMP 02/08/2012   BMI 31.93 kg/m²   General - Cecile is alert, cooperative, and pleasant. Well groomed. No acute distress. Body habitus is overweight. HEENT  The head is normocephalic. No circumoral cyanosis. Dentition is normal.   EYES -  No Xanthelasma, no arcus senilis, no conjunctival hemorrhages or discharge. Neck - Supple, without increased jugular venous pressures. No carotid bruits. No mass. Respiratory - Lungs are clear bilaterally. No wheezes or rales. Normal effort without use of accessory muscles. Cardiovascular  Heart has regular rhythm and rate. No murmurs, rubs or gallops. + pedal pulses and no varicosities. Abdominal -  Soft, nontender, nondistended. Bowel sounds are intact. Extremities - No clubbing, cyanosis, or  edema. Musculoskeletal -  No clubbing . No Osler's nodes. Gait normal .  No kyphosis or scoliosis. Skin -  no statis ulcers or dermatitis. Neurological - No focal signs are identified. Oriented to person, place and time. Psychiatric -  Appropriate affect and mood. Assessment:     Diagnosis Orders   1. Essential hypertension  EKG 12 lead   2. Type 1 diabetes mellitus with complication (HCC)     3. Coronary artery disease of native artery of native heart with stable angina pectoris ([address]     4. Other chest pain     5. Mixed hyperlipidemia       Data:  BP Readings from Last 3 Encounters:   01/05/22 118/78   07/27/21 130/80   06/22/21 (!) 145/94    Pulse Readings from Last 3 Encounters:   01/05/22 90   07/27/21 81   06/22/21 76        Wt Readings from Last 3 Encounters:   01/05/22 186 lb (84.4 kg)   07/27/21 200 lb (90.7 kg)   06/22/21 199 lb (90.3 kg)     EKG today shows normal sinus rhythm with a rate of 90.   Low voltage in precordial leads short IN. Unchanged from previous EKG    Blood pressure and heart rate controlled. Medical manage includes beta-blocker, ARB and long-acting nitrate  Remains on 325 aspirin. Unable to tolerate statins but is on Repatha    Noncardiac chest pain probably as it is not with activity. Her nitroglycerin is old so we will get her some fresh. Of note she is taking her long-acting nitrate in the evening time. We will have her start taking it in the morning to get a better benefit when she is active. Additionally if continues to have symptoms we can increase it to 60 mg and see if that improves. Likely has small vessel disease. Heart catheterization this past June showed no significant occlusive disease    Long time type I diabetic- now had Dexcom and sugar is better controlled. Last hgbA1c around 8.1      Reviewed recent notes   Reviewed recent labs     States taking medications as prescribed  Stable cardiovascular status. No evidence of overt heart failure, angina or dysrhythmia. 30 minutes were spent preparing, reviewing and seeing patient. All questions answered    Plan    Refilled Nitro  Start taking the Imdur in the AM- this works better when more active  If you continue to have the intermittent chest pain then will increase it to 60mg daily in the AM  If patient's symptoms worsen or persist go to the closest emergency room  Follow up in 4-6 weeks   Call with any questions or concerns  Follow up with Rip Juárez for non cardiac problems  Report any new problems  Cardiovascular Fitness-Exercise as tolerated. Strive for 30 minutes of exercise most days of the week. Cardiac / Healthy Diet  Continue current medications as directed  Continue plan of treatment  It is always recommended that you bring your medications bottles with you to each visit - this is for your safety!        NAVDEEP Leslie dragon/transcription disclaimer: Much of this encounter note is electronic transcription/translation of spoken language to printed tach. Electronic translation of spoken language may be erroneous, or at times, nonsensical words or phrases may be inadvertently transcribed.  Although, I have reviewed the note for such errors, some may still exist.

## 2022-02-02 ENCOUNTER — TELEPHONE (OUTPATIENT)
Dept: CARDIOLOGY CLINIC | Age: 47
End: 2022-02-02

## 2022-02-02 NOTE — TELEPHONE ENCOUNTER
Called to see if he patient could come in today instead of Fri as the office will be closed.   Left a carlos

## 2022-02-02 NOTE — TELEPHONE ENCOUNTER
Spoke to the patients emergency contact and and they will call back Monday to reschedule the appt due to weather

## 2022-02-23 ENCOUNTER — TELEPHONE (OUTPATIENT)
Dept: CARDIOLOGY CLINIC | Age: 47
End: 2022-02-23

## 2022-03-03 ENCOUNTER — OFFICE VISIT (OUTPATIENT)
Dept: CARDIOLOGY CLINIC | Age: 47
End: 2022-03-03
Payer: MEDICAID

## 2022-03-03 VITALS
BODY MASS INDEX: 32.44 KG/M2 | HEART RATE: 88 BPM | DIASTOLIC BLOOD PRESSURE: 84 MMHG | HEIGHT: 64 IN | WEIGHT: 190 LBS | SYSTOLIC BLOOD PRESSURE: 122 MMHG

## 2022-03-03 DIAGNOSIS — R07.89 OTHER CHEST PAIN: ICD-10-CM

## 2022-03-03 DIAGNOSIS — I25.118 CORONARY ARTERY DISEASE OF NATIVE ARTERY OF NATIVE HEART WITH STABLE ANGINA PECTORIS (HCC): Primary | ICD-10-CM

## 2022-03-03 DIAGNOSIS — E10.8 TYPE 1 DIABETES MELLITUS WITH COMPLICATION (HCC): ICD-10-CM

## 2022-03-03 DIAGNOSIS — I10 ESSENTIAL HYPERTENSION: ICD-10-CM

## 2022-03-03 PROCEDURE — 99213 OFFICE O/P EST LOW 20 MIN: CPT | Performed by: CLINICAL NURSE SPECIALIST

## 2022-03-03 NOTE — PATIENT INSTRUCTIONS
Follow up in May With Dr. Palak Duong   Call with any questions or concerns  Follow up with NAVDEEP Fowler CNP for non cardiac problems and labs  Report any new problems  Cardiovascular Fitness-Exercise as tolerated. Strive for 30 minutes of exercise most days of the week. Cardiac / Healthy Diet  Continue current medications as directed  Continue plan of treatment  It is always recommended that you bring your medications bottles with you to each visit - this is for your safety!

## 2022-03-03 NOTE — PROGRESS NOTES
[facility] Cardiology  [facility] [address]  [address]  Phone: [phone]  Fax: [phone]    OFFICE VISIT:  3/3/2022    Malia Ramirez - : 1975    Reason For Visit:  Saeid Hong is a 40 y.o. female who is here for Follow-up (no cardiac symptoms), Hypertension, and Coronary Artery Disease  Long-term diabetic with coronary disease.  Had CABG x2 in 2018  Patient had ongoing pain with nuclear stress test in  that was suggestive of some mild myocardial ischemia. Underwent elective heart catheterization 2021  Single-vessel, branch disease involving LAD and obtuse marginal branch of   dominant circumflex.   Patent LIMA to distal LAD.   Patent SVG to OM 3.   Normal LV systolic function     Patient has continue with ongoing medical management. She is been on able to tolerate statins but is on Repatha    Patient was seen in January with upper back pain similar to what she had prior to her bypass  Patient had on nitroglycerin. Got her fresh nitro and changed her long-acting nitrate to morning  She returns today in follow-up. She is feeling much better. She is had no recurrent chest pain. 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 She states her biggest complaint is she is having some memory concerns. She did have recent URI and took antibiotics and steroids. Feeling better now    Subjective  Cecile denies exertional chest pain, shortness of breath, orthopnea, paroxysmal nocturnal dyspnea, syncope, presyncope, arrhythmia, edema and fatigue. The patient denies numbness or weakness to suggest cerebrovascular accident or transient ischemic attack. NAVDEEP Fowler CNP is PCP and follows labs.   Malia Ramirez has the following history as [facility] in [facility]:    Patient Active Problem List    Diagnosis Date Noted    Tachycardia 2018    Syncope 2018    Mixed hyperlipidemia 2018    Essential hypertension 2018    History of coronary artery bypass graft x 2 08/22/2018    Postoperative pain     ARDS (adult respiratory distress syndrome) ([address] 07/06/2018    Pneumonia 07/06/2018    Increased anion gap metabolic acidosis 83/66/1045    Respiratory insufficiency 07/05/2018    Hyponatremia 07/04/2018    Normocytic anemia 07/04/2018    CAD in native artery 06/28/2018    Abnormal nuclear stress test     Unstable angina pectoris (HCC)     Type 1 diabetes mellitus with complication (HCC)     Unstable angina ([address] 06/26/2018    Abnormal laboratory test 06/04/2018    Type 1 diabetes mellitus with neurological manifestations ([address] 06/04/2018    Anxiety and depression 06/04/2018    DJD (degenerative joint disease) 06/04/2018    Tobacco abuse 06/04/2018    Drug allergy 06/04/2018    Constipation 07/21/2014    Abdominal distention 07/21/2014    Gastroesophageal reflux disease without esophagitis 07/21/2014    Insomnia 06/19/2012     Past Medical History:   Diagnosis Date    Arthritis     Bipolar disorder ([address]     CAD (coronary artery disease)     Cancer ([address]     Cervical CA    Depression     Diabetes mellitus ([address] 20 years    Dysplasia of cervix     Endometriosis     Essential hypertension 8/22/2018    GERD (gastroesophageal reflux disease)     Histoplasmosis     History of cone biopsy of cervix     Mixed hyperlipidemia 8/22/2018    Pneumonia 7/6/2018    Snapping hip syndrome     Tuberculosis 2004     Past Surgical History:   Procedure Laterality Date    CHOLECYSTECTOMY      COLONOSCOPY      COLPOSCOPY  2000    CORONARY ARTERY BYPASS GRAFT      ENDOSCOPY, COLON, DIAGNOSTIC      HYSTERECTOMY  2012    Left ovary still there.     KNEE SURGERY Right     Lateral Release    KNEE SURGERY Right     Meniscus Repair    LAPAROSCOPY      OVARY REMOVAL Right 2012    [place] CABG, ARTERY-VEIN, TWO N/A 7/2/2018    CORONARY ARTERY BYPASS GRAFT X2 WITH LEFT INTERNAL MAMMARY ARTERY WITH ENDOSCOPIC VEIN HARVESTING WITH PERFUSION TRANSESOPHAGEAL ECHOCARDIOGRAM performed by Jair Mor[facility]o MD at [facility] History   Problem Relation Age of Onset    Cancer Maternal Grandmother     Cancer Paternal Grandfather     Colon Polyps Mother     Heart Attack Paternal Uncle     Colon Cancer Neg Hx     Esophageal Cancer Neg Hx     Liver Cancer Neg Hx     Stomach Cancer Neg Hx      Social History     Tobacco Use    Smoking status: Former Smoker     Types: Cigarettes     Quit date: 5/30/2018     Years since quitting: 3.7    Smokeless tobacco: Never Used   Substance Use Topics    Alcohol use: Not Currently      Current Outpatient Medications   Medication Sig Dispense Refill    gabapentin (NEURONTIN) 300 MG capsule Take 300 mg by mouth nightly.       Evolocumab (REPATHA) 140 MG/ML SOSY Inject 140 mg into the skin every 14 days      nitroGLYCERIN (NITROSTAT) 0.4 MG SL tablet Place 1 tablet under the tongue every 5 minutes as needed for Chest pain 25 tablet 3    metoprolol succinate (TOPROL XL) 50 MG extended release tablet TAKE 1 TABLET BY MOUTH ONCE DAILY ALONG  WITH  25  MG  TABLET (Patient taking differently: Takes 75 mg) 90 tablet 5    furosemide (LASIX) 20 MG tablet Take 1 tablet by mouth daily as needed (edema) 30 tablet 1    isosorbide mononitrate (IMDUR) 30 MG extended release tablet Take 1 tablet by mouth daily 90 tablet 3    irbesartan (AVAPRO) 75 MG tablet Take 1 tablet by mouth daily 90 tablet 3    meloxicam (MOBIC) 15 MG tablet 15 mg       insulin glargine (LANTUS SOLOSTAR) 100 UNIT/ML injection pen 44 Units every morning       aspirin 325 MG tablet Take 325 mg by mouth daily      amitriptyline (ELAVIL) 100 MG tablet Take 100 mg by mouth nightly      cloNIDine (CATAPRES) 0.1 MG tablet TAKE 1 TABLET BY MOUTH TWICE DAILY  3    Magnesium 400 MG CAPS Take 3 times per week       insulin aspart (NOVOLOG) 100 UNIT/ML injection pen Inject into the skin daily Sliding scale      promethazine (PHENERGAN) 25 MG jugular venous pressures. No carotid bruits. No mass. Respiratory - Lungs are clear bilaterally. No wheezes or rales. Normal effort without use of accessory muscles. Cardiovascular - Heart has regular rhythm and rate. No murmurs, rubs or gallops. + pedal pulses and no varicosities. Abdominal -  Soft, nontender, nondistended. Bowel sounds are intact. Extremities - No clubbing, cyanosis, or  edema. Musculoskeletal -  No clubbing . No Osler's nodes. Gait normal .  No kyphosis or scoliosis. Skin -  no statis ulcers or dermatitis. Neurological - No focal signs are identified. Oriented to person, place and time. Psychiatric -  Appropriate affect and mood. Assessment:     Diagnosis Orders   1. Coronary artery disease of native artery of native heart with stable angina pectoris ([facility] [address]     2. Essential hypertension     3. Type 1 diabetes mellitus with complication (HCC)     4. Other chest pain       Data:  BP Readings from Last 3 Encounters:   03/03/22 122/84   01/05/22 118/78   07/27/21 130/80    Pulse Readings from Last 3 Encounters:   03/03/22 88   01/05/22 90   07/27/21 81        Wt Readings from Last 3 Encounters:   03/03/22 190 lb (86.2 kg)   01/05/22 186 lb (84.4 kg)   07/27/21 200 lb (90.7 kg)   Improvement in activity tolerance with no recurrent angina. Medical manage includes beta-blocker, ARB and now taking her long-acting nitrate in the morning. Remains on aspirin and Repatha. We discussed signs and symptoms to report. As far as memory concerns may be just the overwhelming stress of starting new job at [employer]. She can further discuss if she thinks this is persistent with her primary care       States taking medications as prescribed  Stable cardiovascular status. No evidence of overt heart failure, angina or dysrhythmia. 20 minutes were spent preparing, reviewing and seeing patient.   All questions answered    Plan    Follow up in May With Dr. Jennifer Ambrose   Call with any questions or concerns  Follow up with NAVDEEP Fowler - CNP for non cardiac problems and labs  Report any new problems  Cardiovascular Fitness-Exercise as tolerated. Strive for 30 minutes of exercise most days of the week. Cardiac / Healthy Diet  Continue current medications as directed  Continue plan of treatment  It is always recommended that you bring your medications bottles with you to each visit - this is for your safety! NAVDEEP Ortiz    EMR dragon/transcription disclaimer: Much of this encounter note is electronic transcription/translation of spoken language to printed tach. Electronic translation of spoken language may be erroneous, or at times, nonsensical words or phrases may be inadvertently transcribed.  Although, I have reviewed the note for such errors, some may still exist.

## 2022-03-07 ENCOUNTER — OFFICE VISIT (OUTPATIENT)
Dept: ENDOCRINOLOGY | Facility: CLINIC | Age: 47
End: 2022-03-07

## 2022-03-07 VITALS
WEIGHT: 193.8 LBS | OXYGEN SATURATION: 100 % | SYSTOLIC BLOOD PRESSURE: 132 MMHG | BODY MASS INDEX: 33.09 KG/M2 | DIASTOLIC BLOOD PRESSURE: 76 MMHG | HEIGHT: 64 IN | HEART RATE: 92 BPM

## 2022-03-07 DIAGNOSIS — I10 PRIMARY HYPERTENSION: ICD-10-CM

## 2022-03-07 DIAGNOSIS — E10.42 DIABETIC POLYNEUROPATHY ASSOCIATED WITH TYPE 1 DIABETES MELLITUS: ICD-10-CM

## 2022-03-07 DIAGNOSIS — E78.2 MIXED HYPERLIPIDEMIA: ICD-10-CM

## 2022-03-07 DIAGNOSIS — E10.65 TYPE 1 DIABETES MELLITUS WITH HYPERGLYCEMIA: Primary | ICD-10-CM

## 2022-03-07 PROCEDURE — 99214 OFFICE O/P EST MOD 30 MIN: CPT | Performed by: NURSE PRACTITIONER

## 2022-03-07 PROCEDURE — 95251 CONT GLUC MNTR ANALYSIS I&R: CPT | Performed by: NURSE PRACTITIONER

## 2022-03-07 RX ORDER — INSULIN GLARGINE 300 U/ML
50 INJECTION, SOLUTION SUBCUTANEOUS DAILY
Qty: 5 PEN | Refills: 11 | Status: SHIPPED | OUTPATIENT
Start: 2022-03-07 | End: 2022-06-09

## 2022-03-07 RX ORDER — GLUCAGON INJECTION, SOLUTION 1 MG/.2ML
1 INJECTION, SOLUTION SUBCUTANEOUS AS NEEDED
Qty: 0.4 ML | Refills: 11 | Status: SHIPPED | OUTPATIENT
Start: 2022-03-07 | End: 2022-03-08

## 2022-03-07 NOTE — PROGRESS NOTES
"Chief Complaint  Diabetes    Subjective          Krupa Salomón De La Rosa presents to [facility] ENDOCRINOLOGY  History of Present Illness     In office visit    46-year-old female presents for follow up      Reason diabetes mellitus type 1        Duration--diagnosed at age 17      Context --presented with symptoms      Timing --constant      Quality  Not controlled               Lab Results   Component Value Date     HGBA1C 8.2 (H) 07/01/2018            Severity  high     Macrovascular complications  CAD, CABG X2 in July 2018      Microvascular complications ---neuropathy,  Mild DR, no renal disease      Current diabetes regimen      Insulin by injections            Current glucose monitoring         fingerstick      Checks 4 times      Dexcom G6      See below      Variable from         Patient  is a RN            Does not want pump at this time due to previous problems         Review of Systems - General ROS: negative        Objective   Vital Signs:   /76   Pulse 92   Ht 162.6 cm (64\")   Wt 87.9 kg (193 lb 12.8 oz)   SpO2 100%   BMI 33.27 kg/m²     Physical Exam  Constitutional:       Appearance: Normal appearance.   Cardiovascular:      Rate and Rhythm: Regular rhythm.      Heart sounds: Normal heart sounds.   Pulmonary:      Breath sounds: Normal breath sounds.   Musculoskeletal:      Cervical back: Normal range of motion.   Neurological:      Mental Status: She is alert.        Result Review :   The following data was reviewed by: TORIE Blank on 03/07/2022:                Assessment and Plan    Diagnoses and all orders for this visit:    1. Type 1 diabetes mellitus with hyperglycemia (HCC) (Primary)    2. Primary hypertension    3. Diabetic polyneuropathy associated with type 1 diabetes mellitus (HCC)    4. Mixed hyperlipidemia    Other orders  -     Glucagon (Gvoke PFS) 1 MG/0.2ML solution prefilled syringe; Inject 1 mg under the skin into the appropriate " area as directed As Needed (hypoglycemia) for up to 1 day.  Dispense: 0.4 mL; Refill: 11  -     Insulin Glargine, 2 Unit Dial, (Toujeo Max SoloStar) 300 UNIT/ML solution pen-injector injection; Inject 50 Units under the skin into the appropriate area as directed Daily.  Dispense: 5 pen; Refill: 11               Glycemic Management:     Diabetes mellitus type 1      Dexcom G6      Downloaded and reviewed     Dated from Feb. 22 to March 7, 2022    Average bg 202    Time in target 39%     High 33%     Very high 26%     Low 1 %     Very low less than 1 %       Called in GVOKE     Hypoglycemia overnight     Postprandial hyperglycemia with supper       Adjust decrease basal             Taking lantus 44 units--decrease to 40 units     I gave samples of Toujeo and she did not have hypoglycemia while on toujeo     Insurance would not cover     Back on Lantus and having lows         Taking Novolog 1 unit per 5 grams of CHO      For lower carb days or more active day  try 1 unit per 10 grams of CHO           Plus sliding scale      2 per 50 above 150                            Microvascular Complications Monitoring         Last eye exam---- April 2021      Neuropathy --yes      Taking gabapentin         Lipid Management:         Hyperlipidemia      Taking  repatha            Blood Pressure Management:     Hypertension      Taking toprol xl 50 mg one daily      Taking lasix 20 mg one daily      Taking catapres 0.1 mg daily         Thyroid Health     No results found for: TSH        Bone Health         Weight Management:          Patient's Body mass index is 33.27 kg/m². indicating that she is obese (BMI >30). Obesity-related health conditions include the following: diabetes mellitus. Obesity is unchanged. BMI is is above average; no BMI management plan is appropriate. We discussed portion control and increasing exercise..             Preventive Care:      Non smoker             Follow Up   Return in about 3 months (around  6/7/2022) for Recheck.  Patient was given instructions and counseling regarding her condition or for health maintenance advice. Please see specific information pulled into the AVS if appropriate.         This document has been electronically signed by TORIE Blank on March 7, 2022 16:15 CST.

## 2022-03-16 RX ORDER — AMITRIPTYLINE HYDROCHLORIDE 100 MG/1
100 TABLET, FILM COATED ORAL NIGHTLY
Qty: 90 TABLET | Refills: 3 | Status: SHIPPED | OUTPATIENT
Start: 2022-03-16 | End: 2023-03-07 | Stop reason: SDUPTHER

## 2022-03-21 RX ORDER — FUROSEMIDE 20 MG/1
TABLET ORAL
Qty: 30 TABLET | Refills: 2 | Status: SHIPPED | OUTPATIENT
Start: 2022-03-21

## 2022-03-22 RX ORDER — FUROSEMIDE 20 MG/1
20 TABLET ORAL DAILY PRN
Qty: 90 TABLET | Refills: 3 | Status: SHIPPED | OUTPATIENT
Start: 2022-03-22

## 2022-03-22 NOTE — TELEPHONE ENCOUNTER
Rx Refill Note  Requested Prescriptions     Pending Prescriptions Disp Refills   • furosemide (LASIX) 20 MG tablet 90 tablet 3     Sig: Take 1 tablet by mouth Daily As Needed (SWELLING).      Last office visit with prescribing clinician: Visit date not found      Next office visit with prescribing clinician: Visit date not found     }  Rachael Gray CMA  03/22/22, 13:03 CDT

## 2022-04-19 RX ORDER — ROSUVASTATIN CALCIUM 40 MG/1
40 TABLET, COATED ORAL
Qty: 90 TABLET | Refills: 3 | Status: SHIPPED | OUTPATIENT
Start: 2022-04-19 | End: 2022-04-25

## 2022-04-19 RX ORDER — ROSUVASTATIN CALCIUM 40 MG/1
40 TABLET, COATED ORAL
COMMUNITY
Start: 2022-01-19 | End: 2022-04-19 | Stop reason: SDUPTHER

## 2022-04-19 NOTE — TELEPHONE ENCOUNTER
Rx Refill Note  Requested Prescriptions     Pending Prescriptions Disp Refills   • rosuvastatin (CRESTOR) 40 MG tablet 90 tablet 3     Sig: Take 1 tablet by mouth every night at bedtime.      Last office visit with prescribing clinician: Visit date not found      Next office visit with prescribing clinician: Visit date not found   Karla Velarde MA  04/19/22, 13:23 CDT

## 2022-04-25 ENCOUNTER — OFFICE VISIT (OUTPATIENT)
Dept: FAMILY MEDICINE CLINIC | Facility: CLINIC | Age: 47
End: 2022-04-25

## 2022-04-25 VITALS
SYSTOLIC BLOOD PRESSURE: 144 MMHG | WEIGHT: 187.8 LBS | OXYGEN SATURATION: 97 % | HEIGHT: 64 IN | BODY MASS INDEX: 32.06 KG/M2 | DIASTOLIC BLOOD PRESSURE: 86 MMHG | HEART RATE: 86 BPM | TEMPERATURE: 97.3 F

## 2022-04-25 DIAGNOSIS — R41.3 SHORT-TERM MEMORY LOSS: Primary | ICD-10-CM

## 2022-04-25 PROCEDURE — 99213 OFFICE O/P EST LOW 20 MIN: CPT | Performed by: NURSE PRACTITIONER

## 2022-04-25 NOTE — PROGRESS NOTES
"Chief Complaint   Patient presents with   • Memory Loss     Short term memory issues        Subjective   Krupaanton De La Rosa is a 46 y.o. female who presents today for memory loss.    HPI   Had a day where she didn't remember things she did at her job a week ago. She has no short term memory. It is affecting her ability to do her job. Other people are noticing and bringing it to her attention.    Allergies   Allergen Reactions   • Adhesive Tape Other (See Comments)   • Ciprofibrate    • Latex Other (See Comments)     Burns skin severely    • Levofloxacin Rash         OBJECTIVE:  Vitals:    04/25/22 1353   BP: 144/86   BP Location: Right arm   Patient Position: Sitting   Cuff Size: Adult   Pulse: 86   Temp: 97.3 °F (36.3 °C)   TempSrc: Infrared   SpO2: 97%   Weight: 85.2 kg (187 lb 12.8 oz)   Height: 162.6 cm (64.02\")     Physical Exam  Vitals and nursing note reviewed.   Constitutional:       Appearance: Normal appearance.   HENT:      Head: Normocephalic and atraumatic.      Nose: Nose normal.      Mouth/Throat:      Mouth: Mucous membranes are moist.   Eyes:      Extraocular Movements: Extraocular movements intact.      Pupils: Pupils are equal, round, and reactive to light.   Cardiovascular:      Rate and Rhythm: Normal rate and regular rhythm.      Pulses: Normal pulses.      Heart sounds: Normal heart sounds.   Pulmonary:      Effort: Pulmonary effort is normal.      Breath sounds: Normal breath sounds.   Abdominal:      General: Abdomen is flat. Bowel sounds are normal.   Musculoskeletal:         General: Normal range of motion.      Cervical back: Normal range of motion and neck supple.   Skin:     General: Skin is warm.   Neurological:      Mental Status: She is alert.   Psychiatric:         Mood and Affect: Mood normal.         Behavior: Behavior normal.         Thought Content: Thought content normal.         Judgment: Judgment normal.         Patient's Body mass index is 32.22 kg/m². indicating that " she is obese (BMI >30). Obesity-related health conditions include the following: hypertension, coronary heart disease, diabetes mellitus and dyslipidemias. Obesity is unchanged. BMI is is above average; BMI management plan is completed. We discussed low calorie, low carb based diet program, portion control and increasing exercise..        ASSESSMENT/ PLAN:    Diagnoses and all orders for this visit:    1. Short-term memory loss (Primary)  -     Ambulatory Referral to Neurology          Management Plan:     An After Visit Summary was printed and given to the patient at discharge.    Follow-up: No follow-ups on file.    I spent 20 minutes caring for Krupa on this date of service. This time includes time spent by me in the following activities: preparing for the visit, reviewing tests, obtaining and/or reviewing a separately obtained history, performing a medically appropriate examination and/or evaluation, ordering medications, tests, or procedures and documenting information in the medical record     Silverio Reed, TORIE 4/25/2022 14:15 CDT  This note was electronically signed.

## 2022-05-03 ENCOUNTER — OFFICE VISIT (OUTPATIENT)
Dept: CARDIOLOGY CLINIC | Age: 47
End: 2022-05-03
Payer: MEDICAID

## 2022-05-03 VITALS
WEIGHT: 189 LBS | SYSTOLIC BLOOD PRESSURE: 130 MMHG | BODY MASS INDEX: 32.27 KG/M2 | HEART RATE: 80 BPM | DIASTOLIC BLOOD PRESSURE: 74 MMHG | HEIGHT: 64 IN

## 2022-05-03 DIAGNOSIS — I25.10 CORONARY ARTERY DISEASE INVOLVING NATIVE CORONARY ARTERY OF NATIVE HEART WITHOUT ANGINA PECTORIS: Primary | ICD-10-CM

## 2022-05-03 PROCEDURE — 99214 OFFICE O/P EST MOD 30 MIN: CPT | Performed by: INTERNAL MEDICINE

## 2022-05-03 RX ORDER — METOPROLOL SUCCINATE 25 MG/1
25 TABLET, EXTENDED RELEASE ORAL DAILY
COMMUNITY

## 2022-05-03 NOTE — PROGRESS NOTES
HISTORY  67-year-old juvenile onset diabetic with a history of combined hyperlipidemia, hypertension, and coronary disease returns for routine follow-up. With no anginal warning system she underwent evaluation and subsequently bypass grafting in July 2018 with a LIMA placed to the LAD and a vein to the third obtuse marginal.  She initially experienced difficulty controlling her lipids because of statin intolerance but subsequently was placed on Repatha with January 2022 values LDL 59, HDL 51, triglycerides 76. On return today she not surprisingly relates no chest discomfort suspicious for angina while denying any change in exercise tolerance [exercises 4 days a week] or symptoms of dysrhythmia. Her blood pressure has been controlled. She has been vaccinated and boosted for COVID-19. PHYSICAL EXAM  On exam she carries 189 pounds in a 5 foot 4 inch frame. Pressure is 130/74 pulse of 80. EOMs full, sclerae and conjunctiva normal. PERRLA. Mask in place. Trachea midline with no neck masses. Assessment of internal jugular veins reveals no elevation of central venous pressure at 45 degrees. Carotid pulses normal without delay or bruit. Thyroid normal to palpation. Healed midline sternotomy scar. Chest exam reveals normal respiratory effort, no abnormal breath sounds and normal expiratory phase. No skin lesions seen. PMI normal. S1, S2 normal without murmur or madeleine or click. Normal bowel sounds without palpable mass or bruit. No clubbing or acrocyanosis. No significant lower extremity edema or signs of venous insufficiency. General motor strength appears to be within normal limits. Normal range of motion with normal gait. Alert, oriented x 3, memory and cognition normal as reflected by history and conversation. ASSESSMENT/PLAN:   1. Coronary disease -4 years post bypass grafting. Known defective anginal warning system. Good risk factor modification.   Continue metoprolol, aspirin, Imdur, and nitroglycerin as needed. 2.  Dyslipidemia -well-controlled. Continue Repatha. 3.  Hypertension -good control. Continue metoprolol, Lasix, Avapro, clonidine, and Imdur. 4.  Pandemic response -appropriate/vaccinated/boosted.

## 2022-05-05 RX ORDER — PROCHLORPERAZINE 25 MG/1
SUPPOSITORY RECTAL
Qty: 1 EACH | Refills: 5 | Status: SHIPPED | OUTPATIENT
Start: 2022-05-05 | End: 2022-09-06 | Stop reason: SDUPTHER

## 2022-05-05 NOTE — TELEPHONE ENCOUNTER
Rx Refill Note  Requested Prescriptions     Pending Prescriptions Disp Refills   • Continuous Blood Gluc Sensor (Dexcom G6 Sensor)        Last office visit with prescribing clinician: 4/25/2022      Next office visit with prescribing clinician: Visit date not found     Gypsy Jones MA  05/05/22, 10:24 CDT

## 2022-05-12 RX ORDER — PROCHLORPERAZINE 25 MG/1
1 SUPPOSITORY RECTAL 4 TIMES DAILY
Qty: 1 EACH | Refills: 10 | Status: SHIPPED | OUTPATIENT
Start: 2022-05-12 | End: 2022-08-19

## 2022-05-12 NOTE — TELEPHONE ENCOUNTER
Caller: Krupa De La Rosa    Relationship: Self    Requested Prescriptions:   Needs New Dexcom Transmitter Script.    Pharmacy where request should be sent: [facility] [address] [place] [address].[facility] [address] [facility] [phone] [facility] [phone] FX     Maxi Turner, PCT   05/12/22 13:06 CDT

## 2022-05-12 NOTE — TELEPHONE ENCOUNTER
Rx Refill Note  Requested Prescriptions     Pending Prescriptions Disp Refills   • Continuous Blood Gluc Transmit (Dexcom G6 Transmitter) misc        Last office visit with prescribing clinician: 4/25/2022      Next office visit with prescribing clinician: Visit date not found            Karla Velarde MA  05/12/22, 14:32 CDT

## 2022-05-16 DIAGNOSIS — I25.708 CORONARY ARTERY DISEASE OF BYPASS GRAFT OF NATIVE HEART WITH STABLE ANGINA PECTORIS: Primary | ICD-10-CM

## 2022-05-17 ENCOUNTER — TELEPHONE (OUTPATIENT)
Dept: FAMILY MEDICINE CLINIC | Facility: CLINIC | Age: 47
End: 2022-05-17

## 2022-05-18 DIAGNOSIS — E78.2 MIXED HYPERLIPIDEMIA: Primary | ICD-10-CM

## 2022-05-18 RX ORDER — ALIROCUMAB 150 MG/ML
150 INJECTION, SOLUTION SUBCUTANEOUS
Qty: 2.24 ML | Refills: 6 | Status: SHIPPED | OUTPATIENT
Start: 2022-05-18 | End: 2022-07-14

## 2022-05-19 ENCOUNTER — TELEPHONE (OUTPATIENT)
Dept: FAMILY MEDICINE CLINIC | Facility: CLINIC | Age: 47
End: 2022-05-19

## 2022-06-09 ENCOUNTER — TELEMEDICINE (OUTPATIENT)
Dept: ENDOCRINOLOGY | Facility: CLINIC | Age: 47
End: 2022-06-09

## 2022-06-09 DIAGNOSIS — I10 PRIMARY HYPERTENSION: ICD-10-CM

## 2022-06-09 DIAGNOSIS — E78.2 MIXED HYPERLIPIDEMIA: ICD-10-CM

## 2022-06-09 DIAGNOSIS — E10.65 TYPE 1 DIABETES MELLITUS WITH HYPERGLYCEMIA: Primary | ICD-10-CM

## 2022-06-09 PROCEDURE — 95251 CONT GLUC MNTR ANALYSIS I&R: CPT | Performed by: NURSE PRACTITIONER

## 2022-06-09 PROCEDURE — 99214 OFFICE O/P EST MOD 30 MIN: CPT | Performed by: NURSE PRACTITIONER

## 2022-06-09 RX ORDER — INSULIN ASPART 100 [IU]/ML
INJECTION, SOLUTION INTRAVENOUS; SUBCUTANEOUS
Qty: 5 PEN | Refills: 11 | Status: SHIPPED | OUTPATIENT
Start: 2022-06-09

## 2022-06-09 RX ORDER — INSULIN GLARGINE 300 U/ML
50 INJECTION, SOLUTION SUBCUTANEOUS DAILY
Qty: 5 PEN | Refills: 11 | Status: SHIPPED | OUTPATIENT
Start: 2022-06-09

## 2022-06-09 NOTE — PROGRESS NOTES
Chief Complaint  Diabetes    Subjective          Krupa Salomón De La Rosa presents to [facility]  History of Present Illness    You have chosen to receive care through a telehealth visit.  Do you consent to use a video/audio connection for your medical care today? Yes            TELEHEALTH VIDEO VISIT     This a video visit due to [facility] current guidelines for social distancing due to the COVID 19 pandemic      46-year-old female presents for follow up      Reason diabetes mellitus type 1        Duration--diagnosed at age 17      Context --presented with symptoms      Timing --constant      Quality  Not controlled           Severity  high     Macrovascular complications  CAD, CABG X2 in July 2018      Microvascular complications ---neuropathy,  Mild DR, no renal disease      Current diabetes regimen      Insulin by injections            Current glucose monitoring         fingerstick      Checks 4 times      Dexcom G6      See below      Variable from         Patient  is a RN         Review of Systems - General ROS: negative             Objective   Vital Signs:   There were no vitals taken for this visit.    Physical Exam  Neurological:      General: No focal deficit present.      Mental Status: She is alert.   Psychiatric:         Mood and Affect: Mood normal.         Thought Content: Thought content normal.         Judgment: Judgment normal.        Result Review :   The following data was reviewed by: TORIE Blank on 06/09/2022:                Assessment and Plan    Diagnoses and all orders for this visit:    1. Type 1 diabetes mellitus with hyperglycemia (HCC) (Primary)    2. Primary hypertension    3. Mixed hyperlipidemia    Other orders  -     Insulin Glargine, 2 Unit Dial, (Toujeo Max SoloStar) 300 UNIT/ML solution pen-injector injection; Inject 50 Units under the skin into the appropriate area as directed Daily.  Dispense: 5 pen; Refill: 11  -     insulin  aspart (NovoLOG FlexPen) 100 UNIT/ML solution pen-injector sc pen; Up to 20 units tid before meals  Dispense: 5 pen; Refill: 11         Glycemic Management:     Diabetes mellitus type 1      Dexcom G6        Downloaded and reviewed     Dated from May 27 to June 9, 2022     Average bg 218     Time in target 37%     High 27%     Very high 33 %     Low 2%     Very less than 1 %       She has variable readings depending on  Her active days she has lows --            taking 30 units on work day s    Taking 40 units when not at work             Need Toujeo and she did not have hypoglycemia while on toujeo      Insurance would not cover      Back on Lantus and having lows         Taking Novolog 1 unit per 5 grams of CHO      For lower carb days or more active day  try 1 unit per 10 grams of CHO           Plus sliding scale      2 per 50 above 150                              Microvascular Complications Monitoring         Last eye exam---- April 2021      Neuropathy --yes      Taking gabapentin         Lipid Management:         Hyperlipidemia      Taking  repatha            Blood Pressure Management:     Hypertension      Taking toprol xl 50 mg one daily      Taking lasix 20 mg one daily      Taking catapres 0.1 mg daily         Thyroid Health     No results found for: TSH        Bone Health         Weight Management:                  Preventive Care:      Non smoker            Follow Up   No follow-ups on file.  Patient was given instructions and counseling regarding her condition or for health maintenance advice. Please see specific information pulled into the AVS if appropriate.         This document has been electronically signed by TORIE Blank on June 9, 2022 14:49 CDT.

## 2022-06-28 RX ORDER — IRBESARTAN 75 MG/1
TABLET ORAL
Qty: 90 TABLET | Refills: 1 | Status: SHIPPED | OUTPATIENT
Start: 2022-06-28

## 2022-07-12 RX ORDER — PROMETHAZINE HYDROCHLORIDE 25 MG/1
25 TABLET ORAL EVERY 6 HOURS PRN
Qty: 20 TABLET | Refills: 0 | Status: SHIPPED | OUTPATIENT
Start: 2022-07-12 | End: 2023-02-28 | Stop reason: SDUPTHER

## 2022-07-12 NOTE — TELEPHONE ENCOUNTER
Rx Refill Note  Requested Prescriptions     Pending Prescriptions Disp Refills   • promethazine (PHENERGAN) 25 MG tablet 20 tablet 0     Sig: Take 1 tablet by mouth Every 6 (Six) Hours As Needed for Nausea or Vomiting.      Last office visit with prescribing clinician: 4/25/2022      Next office visit with prescribing clinician: Visit date not found     Gypsy Jones MA  07/12/22, 07:59 CDT

## 2022-07-14 ENCOUNTER — LAB (OUTPATIENT)
Dept: LAB | Facility: HOSPITAL | Age: 47
End: 2022-07-14

## 2022-07-14 ENCOUNTER — OFFICE VISIT (OUTPATIENT)
Dept: NEUROLOGY | Facility: CLINIC | Age: 47
End: 2022-07-14

## 2022-07-14 VITALS
SYSTOLIC BLOOD PRESSURE: 132 MMHG | DIASTOLIC BLOOD PRESSURE: 80 MMHG | WEIGHT: 185.2 LBS | OXYGEN SATURATION: 98 % | HEART RATE: 83 BPM | BODY MASS INDEX: 31.62 KG/M2 | HEIGHT: 64 IN

## 2022-07-14 DIAGNOSIS — R41.3 MEMORY LOSS: Primary | ICD-10-CM

## 2022-07-14 DIAGNOSIS — R41.3 MEMORY LOSS: ICD-10-CM

## 2022-07-14 LAB
T4 SERPL-MCNC: 6.3 MCG/DL (ref 4.5–11.7)
TSH SERPL DL<=0.05 MIU/L-ACNC: 1.01 UIU/ML (ref 0.27–4.2)
VIT B12 BLD-MCNC: 635 PG/ML (ref 211–946)

## 2022-07-14 PROCEDURE — 99204 OFFICE O/P NEW MOD 45 MIN: CPT | Performed by: PSYCHIATRY & NEUROLOGY

## 2022-07-14 PROCEDURE — 36415 COLL VENOUS BLD VENIPUNCTURE: CPT

## 2022-07-14 PROCEDURE — 84443 ASSAY THYROID STIM HORMONE: CPT

## 2022-07-14 PROCEDURE — 82607 VITAMIN B-12: CPT

## 2022-07-14 PROCEDURE — 84436 ASSAY OF TOTAL THYROXINE: CPT

## 2022-07-14 RX ORDER — METOPROLOL SUCCINATE 25 MG/1
25 TABLET, EXTENDED RELEASE ORAL DAILY
COMMUNITY
End: 2023-01-16 | Stop reason: SDUPTHER

## 2022-07-14 RX ORDER — MELOXICAM 15 MG/1
15 TABLET ORAL AS NEEDED
COMMUNITY
End: 2023-03-30
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
## 2022-07-18 ENCOUNTER — OFFICE VISIT (OUTPATIENT)
Dept: FAMILY MEDICINE CLINIC | Facility: CLINIC | Age: 47
End: 2022-07-18

## 2022-07-18 VITALS
DIASTOLIC BLOOD PRESSURE: 85 MMHG | TEMPERATURE: 98.6 F | OXYGEN SATURATION: 99 % | BODY MASS INDEX: 30.9 KG/M2 | WEIGHT: 181 LBS | SYSTOLIC BLOOD PRESSURE: 120 MMHG | HEIGHT: 64 IN | HEART RATE: 113 BPM

## 2022-07-18 DIAGNOSIS — E10.65 TYPE 1 DIABETES MELLITUS WITH HYPERGLYCEMIA: ICD-10-CM

## 2022-07-18 DIAGNOSIS — Z11.59 ENCOUNTER FOR HEPATITIS C SCREENING TEST FOR LOW RISK PATIENT: ICD-10-CM

## 2022-07-18 DIAGNOSIS — R07.89 CHEST TIGHTNESS: Primary | ICD-10-CM

## 2022-07-18 DIAGNOSIS — E78.2 MIXED HYPERLIPIDEMIA: ICD-10-CM

## 2022-07-18 DIAGNOSIS — Z79.899 ENCOUNTER FOR LONG-TERM (CURRENT) USE OF OTHER MEDICATIONS: ICD-10-CM

## 2022-07-18 PROCEDURE — 99214 OFFICE O/P EST MOD 30 MIN: CPT | Performed by: NURSE PRACTITIONER

## 2022-07-18 PROCEDURE — 93000 ELECTROCARDIOGRAM COMPLETE: CPT | Performed by: NURSE PRACTITIONER

## 2022-07-18 NOTE — PROGRESS NOTES
"Chief Complaint   Patient presents with   • Fatigue     Patient is here for weakness, clamy x1 week    • Neck Pain   • Nausea        Subjective   Krupa Salomón De La Rosa is a 46 y.o. female who presents today for fatigue, neck pain, nausea.    HPI   States she has been having hot flashes, getting clammy, jittery, and like her chest is closing in. She has tested twice for covid and has tested negative. She is nauseated. She is not eating but is taking zofran and phenergan. Admits to headache. Denies cough, sneezing, runny nose, and sore throat. States she feels \"tingly\". History of open heart surgery.     Allergies   Allergen Reactions   • Adhesive Tape Other (See Comments)   • Ciprofibrate    • Latex Other (See Comments)     Burns skin severely    • Levofloxacin Rash         OBJECTIVE:  Vitals:    07/18/22 0849   BP: 120/85   BP Location: Left arm   Patient Position: Sitting   Cuff Size: Large Adult   Pulse: 113   Temp: 98.6 °F (37 °C)   SpO2: 99%   Weight: 82.1 kg (181 lb)   Height: 162.6 cm (64\")     Physical Exam  Vitals and nursing note reviewed.   Constitutional:       Appearance: Normal appearance.   HENT:      Mouth/Throat:      Mouth: Mucous membranes are moist.   Cardiovascular:      Rate and Rhythm: Regular rhythm. Tachycardia present.      Pulses: Normal pulses.      Heart sounds: Normal heart sounds.   Pulmonary:      Effort: Pulmonary effort is normal.      Breath sounds: Normal breath sounds.   Skin:     General: Skin is warm and dry.   Neurological:      General: No focal deficit present.      Mental Status: She is alert and oriented to person, place, and time.   Psychiatric:         Mood and Affect: Mood normal.         Behavior: Behavior normal.         BMI is >= 30 and <35. (Class 1 Obesity). The following options were offered after discussion;: weight loss educational material (shared in after visit summary), exercise counseling/recommendations and nutrition " counseling/recommendations        ASSESSMENT/ PLAN:    Diagnoses and all orders for this visit:    1. Chest tightness (Primary)  -     ECG 12 Lead  -     CBC w AUTO Differential  -     Comprehensive metabolic panel    2. Encounter for long-term (current) use of other medications  -     CBC w AUTO Differential  -     Comprehensive metabolic panel    3. Mixed hyperlipidemia  -     Lipid Panel With LDL / HDL Ratio    4. Type 1 diabetes mellitus with hyperglycemia (HCC)  -     Hemoglobin A1c          Management Plan:     An After Visit Summary was printed and given to the patient at discharge.    Follow-up: Return if symptoms worsen or fail to improve.    I spent 35 minutes caring for Krupa on this date of service. This time includes time spent by me in the following activities: preparing for the visit, reviewing tests, obtaining and/or reviewing a separately obtained history, performing a medically appropriate examination and/or evaluation, ordering medications, tests, or procedures and documenting information in the medical record     TORIE Torres 7/18/2022 09:26 CDT  This note was electronically signed.

## 2022-07-18 NOTE — PROGRESS NOTES
Procedure     ECG 12 Lead    Date/Time: 7/18/2022 9:27 AM  Performed by: Silverio Reed APRN  Authorized by: Silverio Reed APRN   Previous ECG: no previous ECG available  Rhythm: sinus rhythm  Rate: tachycardic  Conduction: conduction normal  ST Segments: ST segments normal  T Waves: T waves normal  QRS axis: normal  Other: no other findings    Clinical impression: normal ECG

## 2022-07-19 LAB
ALBUMIN SERPL-MCNC: 4.5 G/DL (ref 3.8–4.8)
ALBUMIN/GLOB SERPL: 1.7 {RATIO} (ref 1.2–2.2)
ALP SERPL-CCNC: 124 IU/L (ref 44–121)
ALT SERPL-CCNC: 13 IU/L (ref 0–32)
AST SERPL-CCNC: 17 IU/L (ref 0–40)
BASOPHILS # BLD AUTO: 0.1 X10E3/UL (ref 0–0.2)
BASOPHILS NFR BLD AUTO: 1 %
BILIRUB SERPL-MCNC: 0.4 MG/DL (ref 0–1.2)
BUN SERPL-MCNC: 7 MG/DL (ref 6–24)
BUN/CREAT SERPL: 7 (ref 9–23)
CALCIUM SERPL-MCNC: 10.1 MG/DL (ref 8.7–10.2)
CHLORIDE SERPL-SCNC: 105 MMOL/L (ref 96–106)
CHOLEST SERPL-MCNC: 99 MG/DL (ref 100–199)
CO2 SERPL-SCNC: 22 MMOL/L (ref 20–29)
CREAT SERPL-MCNC: 0.95 MG/DL (ref 0.57–1)
EGFRCR SERPLBLD CKD-EPI 2021: 75 ML/MIN/1.73
EOSINOPHIL # BLD AUTO: 0.2 X10E3/UL (ref 0–0.4)
EOSINOPHIL NFR BLD AUTO: 3 %
ERYTHROCYTE [DISTWIDTH] IN BLOOD BY AUTOMATED COUNT: 12.8 % (ref 11.7–15.4)
GLOBULIN SER CALC-MCNC: 2.6 G/DL (ref 1.5–4.5)
GLUCOSE SERPL-MCNC: 111 MG/DL (ref 65–99)
HBA1C MFR BLD: 8.9 % (ref 4.8–5.6)
HCT VFR BLD AUTO: 51.4 % (ref 34–46.6)
HCV AB S/CO SERPL IA: 0.1 S/CO RATIO (ref 0–0.9)
HDLC SERPL-MCNC: 47 MG/DL
HGB BLD-MCNC: 17.6 G/DL (ref 11.1–15.9)
IMM GRANULOCYTES # BLD AUTO: 0 X10E3/UL (ref 0–0.1)
IMM GRANULOCYTES NFR BLD AUTO: 0 %
LDLC SERPL CALC-MCNC: 36 MG/DL (ref 0–99)
LDLC/HDLC SERPL: 0.8 RATIO (ref 0–3.2)
LYMPHOCYTES # BLD AUTO: 2.2 X10E3/UL (ref 0.7–3.1)
LYMPHOCYTES NFR BLD AUTO: 30 %
MCH RBC QN AUTO: 30.3 PG (ref 26.6–33)
MCHC RBC AUTO-ENTMCNC: 34.2 G/DL (ref 31.5–35.7)
MCV RBC AUTO: 89 FL (ref 79–97)
MICROALBUMIN UR-MCNC: 73 UG/ML
MONOCYTES # BLD AUTO: 0.6 X10E3/UL (ref 0.1–0.9)
MONOCYTES NFR BLD AUTO: 7 %
NEUTROPHILS # BLD AUTO: 4.4 X10E3/UL (ref 1.4–7)
NEUTROPHILS NFR BLD AUTO: 59 %
PLATELET # BLD AUTO: 218 X10E3/UL (ref 150–450)
POTASSIUM SERPL-SCNC: 4.6 MMOL/L (ref 3.5–5.2)
PROT SERPL-MCNC: 7.1 G/DL (ref 6–8.5)
RBC # BLD AUTO: 5.81 X10E6/UL (ref 3.77–5.28)
SODIUM SERPL-SCNC: 142 MMOL/L (ref 134–144)
TRIGL SERPL-MCNC: 78 MG/DL (ref 0–149)
TROPONIN T SERPL HS-MCNC: 7 NG/L (ref 0–14)
VLDLC SERPL CALC-MCNC: 16 MG/DL (ref 5–40)
WBC # BLD AUTO: 7.5 X10E3/UL (ref 3.4–10.8)

## 2022-07-22 ENCOUNTER — OFFICE VISIT (OUTPATIENT)
Dept: FAMILY MEDICINE CLINIC | Facility: CLINIC | Age: 47
End: 2022-07-22

## 2022-07-22 VITALS
HEIGHT: 64 IN | BODY MASS INDEX: 31.18 KG/M2 | HEART RATE: 90 BPM | DIASTOLIC BLOOD PRESSURE: 72 MMHG | OXYGEN SATURATION: 97 % | TEMPERATURE: 97.8 F | SYSTOLIC BLOOD PRESSURE: 112 MMHG | WEIGHT: 182.6 LBS

## 2022-07-22 DIAGNOSIS — R13.10 DYSPHAGIA, UNSPECIFIED TYPE: ICD-10-CM

## 2022-07-22 DIAGNOSIS — Z80.0 FAMILY HISTORY OF ESOPHAGEAL CANCER: ICD-10-CM

## 2022-07-22 DIAGNOSIS — R11.0 CHRONIC NAUSEA: ICD-10-CM

## 2022-07-22 DIAGNOSIS — K20.90 ESOPHAGITIS: Primary | ICD-10-CM

## 2022-07-22 PROCEDURE — 99213 OFFICE O/P EST LOW 20 MIN: CPT | Performed by: NURSE PRACTITIONER

## 2022-07-22 RX ORDER — OMEPRAZOLE 40 MG/1
40 CAPSULE, DELAYED RELEASE ORAL DAILY
Qty: 30 CAPSULE | Refills: 0 | Status: SHIPPED | OUTPATIENT
Start: 2022-07-22

## 2022-07-22 RX ORDER — SUCRALFATE 1 G/1
1 TABLET ORAL
Qty: 120 TABLET | Refills: 0 | Status: SHIPPED | OUTPATIENT
Start: 2022-07-22 | End: 2022-08-08

## 2022-07-22 NOTE — PROGRESS NOTES
"Chief Complaint  Nausea, Rapid Heart Rate (Noticed while walking down stairs, out of breath), and Headache (Comes and goes )    Subjective        Krupa Salomón De La Rosa presents to [facility]  History of Present Illness  Severe nausea; dizziness; mid sternal chest pain; body aches.  She has been short of air. She has taken multiple Covid tests, all negative.  She does have a significant heart history, also with a history of ARDS.  She was seen here on Monday and EKG obtained (normal except a bit tachycardic).  She has also had hot flashes.  These symptoms have been present x 12 days.  She was able to work last week but has called in this week.  She does have a remote history of esophagitis and gastritis confirmed by EGD.    Objective   Vital Signs:  /72 (BP Location: Left arm, Patient Position: Sitting, Cuff Size: Large Adult)   Pulse 90   Temp 97.8 °F (36.6 °C)   Ht 162.6 cm (64\") Comment: pt reported  Wt 82.8 kg (182 lb 9.6 oz)   SpO2 97%   BMI 31.34 kg/m²   Estimated body mass index is 31.34 kg/m² as calculated from the following:    Height as of this encounter: 162.6 cm (64\").    Weight as of this encounter: 82.8 kg (182 lb 9.6 oz).          Physical Exam  Vitals and nursing note reviewed.   Constitutional:       General: She is not in acute distress.     Appearance: Normal appearance. She is obese. She is not ill-appearing.   HENT:      Head: Normocephalic and atraumatic.   Cardiovascular:      Rate and Rhythm: Regular rhythm. Tachycardia present.      Heart sounds: Normal heart sounds.   Pulmonary:      Effort: Pulmonary effort is normal.      Breath sounds: Normal breath sounds.   Abdominal:      General: Bowel sounds are normal.      Palpations: Abdomen is soft.   Skin:     General: Skin is warm and dry.   Neurological:      Mental Status: She is alert and oriented to person, place, and time.   Psychiatric:         Thought Content: Thought content normal.      "   Result Review :                Assessment and Plan   Diagnoses and all orders for this visit:    1. Esophagitis (Primary)  -     sucralfate (Carafate) 1 g tablet; Take 1 tablet by mouth 4 (Four) Times a Day Before Meals & at Bedtime.  Dispense: 120 tablet; Refill: 0  -     omeprazole (priLOSEC) 40 MG capsule; Take 1 capsule by mouth Daily.  Dispense: 30 capsule; Refill: 0  -     Ambulatory Referral to Gastroenterology    2. Family history of esophageal cancer  -     Ambulatory Referral to Gastroenterology    3. Chronic nausea  -     Ambulatory Referral to Gastroenterology    4. Dysphagia, unspecified type  -     Ambulatory Referral to Gastroenterology    Will treat as though we are dealing with an esophagitis.  Patient was seen by cardiology recently and cleared from a cardiac standpoint.         Follow Up   Return if symptoms worsen or fail to improve.  Patient was given instructions and counseling regarding her condition or for health maintenance advice. Please see specific information pulled into the AVS if appropriate.     TORIE Ortiz  This note is electronically signed.

## 2022-07-26 ENCOUNTER — TELEPHONE (OUTPATIENT)
Dept: FAMILY MEDICINE CLINIC | Facility: CLINIC | Age: 47
End: 2022-07-26

## 2022-07-26 NOTE — TELEPHONE ENCOUNTER
Caller: Krupa De La Rosa    Relationship: Self    Best call back number:     [phone]      Who are you requesting to speak with     CLINICAL STAFF      Do you know the name of the person who called:    KRUPA    What was the call regarding:     MEDICATION CARFATE IS ON BACK ORDER WITH [facility]. WILL TOLD THAT IT SHOULD BE BACK IN STOCK 1-2 DAYS AS OF FRIDAY. PATIENT HAS NOT HEARD FROM PHARMACY.     I ENCOURAGED PATIENT TO ALSO GIVE PHARMACY A  CALL.

## 2022-07-27 ENCOUNTER — DOCUMENTATION (OUTPATIENT)
Dept: ENDOCRINOLOGY | Facility: CLINIC | Age: 47
End: 2022-07-27

## 2022-07-28 DIAGNOSIS — F41.9 ANXIETY: Primary | ICD-10-CM

## 2022-07-28 NOTE — TELEPHONE ENCOUNTER
Pharmacy needs new rx     Rx Refill Note  Requested Prescriptions     Pending Prescriptions Disp Refills   • ALPRAZolam (XANAX) 1 MG tablet       Sig: Take 1 tablet by mouth 3 (Three) Times a Day.      Last office visit with prescribing clinician: 7/18/2022      Next office visit with prescribing clinician: none scheduled    LRX:ella provider   UDS:none on file  Contract:none on file       Jumana Lino MA  07/28/22, 16:47 CDT

## 2022-07-29 ENCOUNTER — TELEPHONE (OUTPATIENT)
Dept: NEUROLOGY | Facility: CLINIC | Age: 47
End: 2022-07-29

## 2022-07-29 ENCOUNTER — OFFICE VISIT (OUTPATIENT)
Dept: CARDIOLOGY CLINIC | Age: 47
End: 2022-07-29
Payer: MEDICAID

## 2022-07-29 ENCOUNTER — HOSPITAL ENCOUNTER (OUTPATIENT)
Dept: MRI IMAGING | Facility: HOSPITAL | Age: 47
Discharge: HOME | End: 2022-07-29
Admitting: PSYCHIATRY & NEUROLOGY

## 2022-07-29 VITALS
WEIGHT: 184 LBS | BODY MASS INDEX: 31.41 KG/M2 | OXYGEN SATURATION: 100 % | DIASTOLIC BLOOD PRESSURE: 68 MMHG | HEIGHT: 64 IN | HEART RATE: 84 BPM | SYSTOLIC BLOOD PRESSURE: 112 MMHG

## 2022-07-29 DIAGNOSIS — I25.10 CORONARY ARTERY DISEASE INVOLVING NATIVE CORONARY ARTERY OF NATIVE HEART WITHOUT ANGINA PECTORIS: ICD-10-CM

## 2022-07-29 DIAGNOSIS — R41.3 MEMORY LOSS: ICD-10-CM

## 2022-07-29 DIAGNOSIS — R00.0 TACHYCARDIA: Primary | ICD-10-CM

## 2022-07-29 DIAGNOSIS — I10 ESSENTIAL HYPERTENSION: ICD-10-CM

## 2022-07-29 DIAGNOSIS — E78.5 DYSLIPIDEMIA: ICD-10-CM

## 2022-07-29 PROCEDURE — 70551 MRI BRAIN STEM W/O DYE: CPT

## 2022-07-29 PROCEDURE — 93000 ELECTROCARDIOGRAM COMPLETE: CPT | Performed by: NURSE PRACTITIONER

## 2022-07-29 PROCEDURE — 99214 OFFICE O/P EST MOD 30 MIN: CPT | Performed by: NURSE PRACTITIONER

## 2022-07-29 RX ORDER — ALPRAZOLAM 1 MG/1
1 TABLET ORAL 3 TIMES DAILY
Qty: 90 TABLET | Refills: 2 | Status: SHIPPED | OUTPATIENT
Start: 2022-07-29 | End: 2022-11-29

## 2022-07-29 ASSESSMENT — ENCOUNTER SYMPTOMS
SORE THROAT: 0
WHEEZING: 0
COUGH: 0
CHEST TIGHTNESS: 0
SHORTNESS OF BREATH: 0

## 2022-07-29 NOTE — PROGRESS NOTES
[facility]   Established Patient Office Visit  [address] [facility] [id number] [phone]        OFFICE VISIT:  2022    Lionel Lopez - : 1975    Reason For Visit:  Spenser Rivas is a 40 y.o. female who is here for Tachycardia and Fatigue    1. Tachycardia    2. Coronary artery disease involving native coronary artery of native heart without angina pectoris    3. Essential hypertension    4. Dyslipidemia      Patient with a history of dyslipidemia, hypertension, and coronary artery disease. Patient in 2018 CABG with LIMA placed to the LAD and a vein to the third obtuse marginal.  She does have a history of statin intolerance and was placed on Repatha. She is a patient of Dr. Rachel Cao. Patient contacted our office requesting to be seen for complaints of tachycardia and fatigue. Patient states few weeks ago she was having some complaints of tachycardia and fatigue. She had been exposed on several occasions at work which is the longterm to COVID positive individuals. She complained of shortness of breath, fatigue, diaphoresis, tachycardia and O2 sat 90. She tested negative twice. However, some data have shown that there is a 15% of the population that will have COVID but not test positive. Question whether or not she was. She states this last week her symptoms have improved. She did rest and do comfort measures during her 14 days of feeling bad. She states her tachycardia is back down to normal.  And still is a little bit tired.       Subjective    Lionel Lopez is a 40 y.o. 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 ([facility] [address] [facility] 2018    Anxiety and depression 2018    DJD (degenerative joint disease) 2018    Tobacco abuse 2018    Drug allergy 06/04/2018    Constipation 07/21/2014    Abdominal distention 07/21/2014    Gastroesophageal reflux disease without esophagitis 07/21/2014    Insomnia 06/19/2012    Tachycardia 08/22/2018    Syncope 08/22/2018    Mixed hyperlipidemia 08/22/2018    Essential hypertension 08/22/2018    History of coronary artery bypass graft x 2 08/22/2018    Postoperative pain     ARDS (adult respiratory distress syndrome) ([facility] [address] 07/06/2018    Pneumonia 07/06/2018    Increased anion gap metabolic acidosis 72/14/0180    Respiratory insufficiency 07/05/2018    Hyponatremia 07/04/2018    Normocytic anemia 07/04/2018    CAD in native artery 06/28/2018    Abnormal nuclear stress test     Unstable angina pectoris (HCC)     Type 1 diabetes mellitus with complication (HCC)      Current Outpatient Medications   Medication Sig Dispense Refill    irbesartan (AVAPRO) 75 MG tablet Take 1 tablet by mouth once daily 90 tablet 1    metoprolol succinate (TOPROL XL) 25 MG extended release tablet Take 25 mg by mouth daily      furosemide (LASIX) 20 MG tablet TAKE 1 TABLET BY MOUTH ONCE DAILY AS NEEDED 30 tablet 2    gabapentin (NEURONTIN) 300 MG capsule Take 300 mg by mouth nightly.       Evolocumab (REPATHA) 140 MG/ML SOSY Inject 140 mg into the skin every 14 days      nitroGLYCERIN (NITROSTAT) 0.4 MG SL tablet Place 1 tablet under the tongue every 5 minutes as needed for Chest pain 25 tablet 3    metoprolol succinate (TOPROL XL) 50 MG extended release tablet TAKE 1 TABLET BY MOUTH ONCE DAILY ALONG  WITH  25  MG  TABLET (Patient taking differently: Takes 75 mg) 90 tablet 5    isosorbide mononitrate (IMDUR) 30 MG extended release tablet Take 1 tablet by mouth daily 90 tablet 3    meloxicam (MOBIC) 15 MG tablet 15 mg as needed for Pain       insulin glargine (LANTUS SOLOSTAR) 100 UNIT/ML injection pen 44 Units every morning       aspirin 325 MG tablet Take 325 mg by mouth daily      amitriptyline (ELAVIL) 100 MG tablet Take 100 mg by mouth nightly cloNIDine (CATAPRES) 0.1 MG tablet TAKE 1 TABLET BY MOUTH TWICE DAILY  3    Magnesium 400 MG CAPS Take 3 times per week       insulin aspart (NOVOLOG) 100 UNIT/ML injection pen Inject into the skin daily Sliding scale      promethazine (PHENERGAN) 25 MG tablet Take 25 mg by mouth as needed for Nausea      ALPRAZolam (XANAX) 1 MG tablet Take 1 mg by mouth 3 times daily as needed. .       No current facility-administered medications for this visit. Allergies: Latex, Adhesive tape, Ciprofloxacin, and Levofloxacin  Past Medical History:   Diagnosis Date    Arthritis     Bipolar disorder ([facility] [address]     CAD (coronary artery disease)     Cancer ([facility] [address]     Cervical CA    Depression     Diabetes mellitus ([facility] [address] 20 years    Dysplasia of cervix     Endometriosis     Essential hypertension 8/22/2018    GERD (gastroesophageal reflux disease)     Histoplasmosis     History of cone biopsy of cervix     Mixed hyperlipidemia 8/22/2018    Pneumonia 7/6/2018    Snapping hip syndrome     Tuberculosis 2004     Past Surgical History:   Procedure Laterality Date    CHOLECYSTECTOMY      COLONOSCOPY      COLPOSCOPY  2000    CORONARY ARTERY BYPASS GRAFT      ENDOSCOPY, COLON, DIAGNOSTIC      HYSTERECTOMY (CERVIX STATUS UNKNOWN)  2012    Left ovary still there.     KNEE SURGERY Right     Lateral Release    KNEE SURGERY Right     Meniscus Repair    LAPAROSCOPY      OVARY REMOVAL Right 2012    DC CABG, ARTERY-VEIN, TWO N/A 7/2/2018    CORONARY ARTERY BYPASS GRAFT X2 WITH LEFT INTERNAL MAMMARY ARTERY WITH ENDOSCOPIC VEIN HARVESTING WITH PERFUSION TRANSESOPHAGEAL ECHOCARDIOGRAM performed by Lindalou Mohs, MD at [facility] OR     Family History   Problem Relation Age of Onset    Cancer Maternal Grandmother     Cancer Paternal Grandfather     Colon Polyps Mother     Heart Attack Paternal Uncle     Colon Cancer Neg Hx     Esophageal Cancer Neg Hx     Liver Cancer Neg Hx     Stomach Cancer Neg Hx      Social History     Tobacco Use    Smoking status: Former     Types: Cigarettes     Quit date: 2018     Years since quittin.1    Smokeless tobacco: Never   Substance Use Topics    Alcohol use: Not Currently          Review of Systems:    Review of Systems   Constitutional:  Positive for fatigue. Negative for activity change, chills, diaphoresis and fever. HENT:  Negative for congestion and sore throat. Respiratory:  Negative for cough, chest tightness, shortness of breath and wheezing. Cardiovascular:  Positive for palpitations. Negative for chest pain and leg swelling. Neurological:  Negative for dizziness, syncope, light-headedness and headaches. Psychiatric/Behavioral:  Negative for confusion. The patient is not nervous/anxious. Objective:    /68   Pulse 84   Ht 5' 4\" (1.626 m)   Wt 184 lb (83.5 kg)   LMP 2012   SpO2 100%   BMI 31.58 kg/m²     GENERAL - well developed and well nourished, conversant  HEENT -  PERRLA, Hearing appears normal, gentleman lids are normal.  External inspection of ears and nose appear normal.  NECK - no thyromegaly, no JVD, trachea is in the midline  CARDIOVASCULAR - PMI is in the mid line clavicular position, Normal S1 and S2 with no systolic murmur. No S3 or S4    PULMONARY - no respiratory distress. No wheezes or rales. Lungs are clear to ausculation, normal respiratory effort. ABDOMEN  - soft, non tender, no rebound  MUSCULOSKELETAL  - range of motion of the upper and lower extermites appears normal and equal and is without pain   EXTREMITIES - no significant edema   NEUROLOGIC - gait and station are normal  SKIN - turgor is normal, can warm and dry.   PSYCHIATRIC - normal mood and affect, alert and orientated x 3,      ASSESSMENT:    ALL THE CARDIOLOGY PROBLEMS ARE LISTED ABOVE; HOWEVER, THE FOLLOWING SPECIFIC CARDIAC PROBLEMS / CONDITIONS WERE ADDRESSED AND TREATED DURING THE OFFICE VISIT TODAY: MEDICAL DECISION MAKING             Cardiac Specific Problem / Diagnosis  Discussion and Data Reviewed Diagnostic Procedures Ordered Management Options Selected           1. Tachycardia  Improving   Review and summation of old records:    Pulse rate in the office today 84 bpm.  EKG showing sinus rhythm with a heart rate of 84 bpm.  O2 sat 100%. Patient is a registered nurse. Did discuss if pulse rate would go higher at any point she could add an extra 25 mg of her Toprol-XL. She verbalized understanding agreed with plan. Yes Continue current medications:     Yes           2. CAD  Stable   No chest pain. Patient is on Imdur, Toprol, aspirin and Repatha. No Continue current medications:    Yes           3. Hypertension Well Controlled Blood pressure in the office today 112/68. O2 sat 100%. Patient is on Avapro 75 mg daily. Clonidine 0.1 mg twice daily. No Continue current medications:       Yes           4. Dyslipidemia Well Controlled Patient with statin intolerance on Repatha. No Continue current medications:       Yes     Orders Placed This Encounter   Procedures    EKG 12 lead     Order Specific Question:   Reason for Exam?     Answer:   Irregular heart rate     No orders of the defined types were placed in this encounter. Discussed with patient. Return for Keep appt kathy Washington . I greatly appreciate the opportunity to meet Miles Patel and your confidence in allowing me to participate in her cardiovascular care. NAVDEEP Porter NP  7/29/2022 10:46 AM CDT                    This dictation was generated by voice recognition computer software. Although all attempts are made to edit dictation for accuracy, there may be errors in the transcription that are not intended.

## 2022-07-29 NOTE — TELEPHONE ENCOUNTER
----- Message from Olga Wallis MD sent at 7/29/2022  1:05 PM CDT -----  Let her know that her MRI was normal except for an incidental finding of some fluid in one of her sinuses (ethmoid) on the right, which is not concerning.

## 2022-08-08 ENCOUNTER — OFFICE VISIT (OUTPATIENT)
Dept: FAMILY MEDICINE CLINIC | Facility: CLINIC | Age: 47
End: 2022-08-08

## 2022-08-08 VITALS
BODY MASS INDEX: 30.83 KG/M2 | OXYGEN SATURATION: 99 % | HEIGHT: 64 IN | SYSTOLIC BLOOD PRESSURE: 110 MMHG | HEART RATE: 78 BPM | DIASTOLIC BLOOD PRESSURE: 73 MMHG | TEMPERATURE: 97.5 F | WEIGHT: 180.6 LBS

## 2022-08-08 DIAGNOSIS — F33.2 SEVERE EPISODE OF RECURRENT MAJOR DEPRESSIVE DISORDER, WITHOUT PSYCHOTIC FEATURES: ICD-10-CM

## 2022-08-08 DIAGNOSIS — E66.09 CLASS 1 OBESITY DUE TO EXCESS CALORIES WITH SERIOUS COMORBIDITY AND BODY MASS INDEX (BMI) OF 31.0 TO 31.9 IN ADULT: ICD-10-CM

## 2022-08-08 DIAGNOSIS — U07.1 COVID: Primary | ICD-10-CM

## 2022-08-08 PROBLEM — E66.811 CLASS 1 OBESITY DUE TO EXCESS CALORIES WITH SERIOUS COMORBIDITY AND BODY MASS INDEX (BMI) OF 31.0 TO 31.9 IN ADULT: Status: ACTIVE | Noted: 2022-08-08

## 2022-08-08 PROCEDURE — 99214 OFFICE O/P EST MOD 30 MIN: CPT | Performed by: NURSE PRACTITIONER

## 2022-08-08 RX ORDER — AZITHROMYCIN 250 MG/1
TABLET, FILM COATED ORAL
Qty: 6 TABLET | Refills: 0 | Status: SHIPPED | OUTPATIENT
Start: 2022-08-08 | End: 2022-11-03

## 2022-08-08 RX ORDER — METHYLPREDNISOLONE 4 MG/1
TABLET ORAL
Qty: 1 EACH | Refills: 0 | Status: SHIPPED | OUTPATIENT
Start: 2022-08-08 | End: 2022-11-03

## 2022-08-08 RX ORDER — DESVENLAFAXINE SUCCINATE 50 MG/1
50 TABLET, EXTENDED RELEASE ORAL DAILY
Qty: 30 TABLET | Refills: 5 | Status: SHIPPED | OUTPATIENT
Start: 2022-08-08 | End: 2023-02-02

## 2022-08-08 NOTE — PROGRESS NOTES
"Chief Complaint   Patient presents with   • Fatigue   • Shortness of Breath   • Depression        Subjective   Krupa Salomón De La Rosa is a 46 y.o. female who presents today for fatigue, shortness of breath, and depression.    HPI   She is under a lot of stress at home. She has been diagnosed with Covid even though she has tested negative with home Covid tests.   Her daughter is moving out because she is tired of her mom always being sick.   She states she has been treated for bipolar before but took herself off her medications at the time.   She states she should just quit her job.   She is tearful the entire visit.  She does not have suicidal ideations.  Allergies   Allergen Reactions   • Adhesive Tape Other (See Comments)   • Ciprofibrate    • Latex Other (See Comments)     Burns skin severely    • Levofloxacin Rash         OBJECTIVE:  Vitals:    08/08/22 0946   BP: 110/73   BP Location: Right arm   Patient Position: Sitting   Cuff Size: Adult   Pulse: 78   Temp: 97.5 °F (36.4 °C)   SpO2: 99%   Weight: 81.9 kg (180 lb 9.6 oz)   Height: 162.6 cm (64\")     Physical Exam  Vitals and nursing note reviewed.   Constitutional:       Appearance: Normal appearance.   Cardiovascular:      Rate and Rhythm: Normal rate and regular rhythm.      Pulses: Normal pulses.      Heart sounds: Normal heart sounds.   Pulmonary:      Effort: Pulmonary effort is normal.      Breath sounds: Normal breath sounds.   Neurological:      Mental Status: She is alert.   Psychiatric:         Mood and Affect: Mood is anxious and depressed. Affect is tearful.         Judgment: Judgment is inappropriate.         BMI is >= 30 and <35. (Class 1 Obesity). The following options were offered after discussion;: weight loss educational material (shared in after visit summary), exercise counseling/recommendations and nutrition counseling/recommendations        ASSESSMENT/ PLAN:    Diagnoses and all orders for this visit:    1. COVID (Primary)  -     " methylPREDNISolone (MEDROL) 4 MG dose pack; Take as directed on package instructions.  Dispense: 1 each; Refill: 0  -     azithromycin (Zithromax Z-John) 250 MG tablet; Take 2 tablets by mouth on day 1, then 1 tablet daily on days 2-5  Dispense: 6 tablet; Refill: 0    2. Severe episode of recurrent major depressive disorder, without psychotic features (HCC)  -     desvenlafaxine (Pristiq) 50 MG 24 hr tablet; Take 1 tablet by mouth Daily.  Dispense: 30 tablet; Refill: 5    3. 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       Management Plan:     An After Visit Summary was printed and given to the patient at discharge.    Follow-up: Return in about 1 month (around 9/8/2022).    I spent 35 minutes caring for Krupa on this date of service. This time includes time spent by me in the following activities: preparing for the visit, obtaining and/or reviewing a separately obtained history, performing a medically appropriate examination and/or evaluation, counseling and educating the patient/family/caregiver and documenting information in the medical record     TORIE Torres 8/8/2022 13:36 CDT  This note was electronically signed.

## 2022-08-30 DIAGNOSIS — I25.708 CORONARY ARTERY DISEASE OF BYPASS GRAFT OF NATIVE HEART WITH STABLE ANGINA PECTORIS: ICD-10-CM

## 2022-08-30 NOTE — TELEPHONE ENCOUNTER
Rx Refill Note  Requested Prescriptions     Pending Prescriptions Disp Refills   • Evolocumab (REPATHA) solution prefilled syringe injection 2 each 6     Sig: Inject 1 mL under the skin into the appropriate area as directed Every 14 (Fourteen) Days.      Last office visit with prescribing clinician: 8/8/2022      Next office visit with prescribing clinician: Visit date not found     Gypsy Jones MA  08/30/22, 08:50 CDT

## 2022-09-06 RX ORDER — PROCHLORPERAZINE 25 MG/1
SUPPOSITORY RECTAL
Qty: 1 EACH | Refills: 5 | Status: SHIPPED | OUTPATIENT
Start: 2022-09-06

## 2022-09-06 NOTE — TELEPHONE ENCOUNTER
Rx Refill Note  Requested Prescriptions     Pending Prescriptions Disp Refills   • Continuous Blood Gluc Sensor (Dexcom G6 Sensor) 1 each 5     Sig: Inject  under the skin into the appropriate area as directed Every 10 (Ten) Days.      Last office visit with prescribing clinician: 8/8/2022      Next office visit with prescribing clinician: Visit date not found     Gypsy Jones MA  09/06/22, 08:31 CDT

## 2022-09-14 ENCOUNTER — TELEPHONE (OUTPATIENT)
Dept: FAMILY MEDICINE CLINIC | Facility: CLINIC | Age: 47
End: 2022-09-14

## 2022-09-15 DIAGNOSIS — U07.1 COVID: Primary | ICD-10-CM

## 2022-09-15 RX ORDER — DEXTROMETHORPHAN HYDROBROMIDE AND PROMETHAZINE HYDROCHLORIDE 15; 6.25 MG/5ML; MG/5ML
5 SYRUP ORAL 4 TIMES DAILY PRN
Qty: 180 ML | Refills: 0 | Status: SHIPPED | OUTPATIENT
Start: 2022-09-15 | End: 2022-11-03

## 2022-09-15 RX ORDER — METHYLPREDNISOLONE 4 MG/1
TABLET ORAL
Qty: 1 EACH | Refills: 0 | Status: SHIPPED | OUTPATIENT
Start: 2022-09-15 | End: 2022-11-03

## 2022-09-21 ENCOUNTER — TELEMEDICINE (OUTPATIENT)
Dept: ENDOCRINOLOGY | Facility: CLINIC | Age: 47
End: 2022-09-21

## 2022-09-21 DIAGNOSIS — E78.2 MIXED HYPERLIPIDEMIA: ICD-10-CM

## 2022-09-21 DIAGNOSIS — E10.42 DIABETIC POLYNEUROPATHY ASSOCIATED WITH TYPE 1 DIABETES MELLITUS: ICD-10-CM

## 2022-09-21 DIAGNOSIS — E10.65 TYPE 1 DIABETES MELLITUS WITH HYPERGLYCEMIA: Primary | ICD-10-CM

## 2022-09-21 DIAGNOSIS — I10 PRIMARY HYPERTENSION: ICD-10-CM

## 2022-09-21 PROCEDURE — 99214 OFFICE O/P EST MOD 30 MIN: CPT | Performed by: NURSE PRACTITIONER

## 2022-09-21 NOTE — PROGRESS NOTES
Chief Complaint  No chief complaint on file.    Subjective          Krupa Salomón De La Rosa presents to [facility]  History of Present Illness      You have chosen to receive care through a telehealth visit.  Do you consent to use a video/audio connection for your medical care today? Yes            TELEHEALTH VIDEO VISIT     This a video visit due to [facility] current guidelines for social distancing due to the COVID 19 pandemic      Mode of Visit: Video  Location of patient: home  You have chosen to receive care through a telehealth visit.  Does the patient consent to use a video/audio connection for your medical care today? Yes  The visit included audio and video interaction. No technical issues occurred during this visit.       46-year-old female presents for follow up      Reason diabetes mellitus type 1        Duration--diagnosed at age 17      Context --presented with symptoms      Timing --constant      Quality  Not controlled           Severity  high     Macrovascular complications  CAD, CABG X2 in July 2018      Microvascular complications ---neuropathy,  Mild DR, no renal disease      Current diabetes regimen      Insulin by injections            Current glucose monitoring         fingerstick      Checks 4 times      Dexcom G6      See below      Variable from         Patient  is a RN          Patient is has COVID and causing higher sugars          Review of Systems - General ROS: negative          Objective   Vital Signs:   There were no vitals taken for this visit.    Physical Exam  Neurological:      General: No focal deficit present.      Mental Status: She is alert.   Psychiatric:         Mood and Affect: Mood normal.         Thought Content: Thought content normal.         Judgment: Judgment normal.        Result Review :   The following data was reviewed by: TORIE Blank on 06/09/2022:  Common labs    Common Labs 7/18/22 7/18/22 7/18/22 7/18/22  7/18/22    0848 0848 0848 0848 0848   Glucose  111 (A)      BUN  7      Creatinine  0.95      Sodium  142      Potassium  4.6      Chloride  105      Calcium  10.1      Total Protein  7.1      Albumin  4.5      Total Bilirubin  0.4      Alkaline Phosphatase  124 (A)      AST (SGOT)  17      ALT (SGPT)  13      WBC 7.5       Hemoglobin 17.6 (A)       Hematocrit 51.4 (A)       Platelets 218       Total Cholesterol    99 (A)    Triglycerides    78    HDL Cholesterol    47    LDL Cholesterol     36    Hemoglobin A1C   8.9 (A)     Microalbumin, Urine     73.0   (A) Abnormal value       Comments are available for some flowsheets but are not being displayed.                       Assessment and Plan    Diagnoses and all orders for this visit:    1. Type 1 diabetes mellitus with hyperglycemia (HCC) (Primary)    2. Primary hypertension    3. Mixed hyperlipidemia    4. Diabetic polyneuropathy associated with type 1 diabetes mellitus (HCC)         Glycemic Management:     Diabetes mellitus type 1      Dexcom G6 --unable to download             Taking Toujeo 30 units at night --decrease  to 25 units         Taking Novolog 1 unit per 5 grams of CHO      For lower carb days or more active day  try 1 unit per 10 grams of CHO           Plus sliding scale      2 per 50 above 150                              Microvascular Complications Monitoring         Last eye exam---- April 2021      Neuropathy --yes      Taking gabapentin         Lipid Management:         Hyperlipidemia      Taking  repatha            Blood Pressure Management:     Hypertension      Taking toprol xl 50 mg one daily      Taking lasix 20 mg one daily      Taking catapres 0.1 mg daily         Thyroid Health     No results found for: TSH        Bone Health         Weight Management:                  Preventive Care:      Non smoker            Follow Up   No follow-ups on file.  Patient was given instructions and counseling regarding her condition or for health  maintenance advice. Please see specific information pulled into the AVS if appropriate.         This document has been electronically signed by TORIE Blank on September 21, 2022 10:00 CDT.

## 2022-09-29 ENCOUNTER — OFFICE VISIT (OUTPATIENT)
Dept: FAMILY MEDICINE CLINIC | Facility: CLINIC | Age: 47
End: 2022-09-29

## 2022-09-29 VITALS
TEMPERATURE: 97.7 F | OXYGEN SATURATION: 97 % | DIASTOLIC BLOOD PRESSURE: 76 MMHG | HEIGHT: 64 IN | BODY MASS INDEX: 30.32 KG/M2 | SYSTOLIC BLOOD PRESSURE: 111 MMHG | WEIGHT: 177.6 LBS | HEART RATE: 97 BPM

## 2022-09-29 DIAGNOSIS — E10.65 TYPE 1 DIABETES MELLITUS WITH HYPERGLYCEMIA: ICD-10-CM

## 2022-09-29 DIAGNOSIS — M79.642 LEFT HAND PAIN: Primary | ICD-10-CM

## 2022-09-29 PROCEDURE — 99213 OFFICE O/P EST LOW 20 MIN: CPT | Performed by: NURSE PRACTITIONER

## 2022-09-29 NOTE — PROGRESS NOTES
"Chief Complaint   Patient presents with   • left hand pain         Subjective   Krupaanton De La Rosa is a 46 y.o. female who presents today for left hand pain.     HPI   Two days ago she noticed her left hand was swollen. It hurts to extend and flex it. It is swollen and slightly red below the 1st and 2nd PIP joints. No known injury.     Allergies   Allergen Reactions   • Adhesive Tape Other (See Comments)   • Ciprofibrate    • Latex Other (See Comments)     Burns skin severely    • Levofloxacin Rash         OBJECTIVE:  Vitals:    09/29/22 1551   BP: 111/76   BP Location: Left arm   Patient Position: Sitting   Cuff Size: Adult   Pulse: 97   Temp: 97.7 °F (36.5 °C)   SpO2: 97%   Weight: 80.6 kg (177 lb 9.6 oz)   Height: 162.6 cm (64\")     Physical Exam  Vitals and nursing note reviewed.   Constitutional:       Appearance: Normal appearance.   Cardiovascular:      Rate and Rhythm: Normal rate and regular rhythm.      Pulses: Normal pulses.      Heart sounds: Normal heart sounds.   Pulmonary:      Effort: Pulmonary effort is normal.      Breath sounds: Normal breath sounds.   Musculoskeletal:      Left hand: Swelling, deformity and tenderness present. Decreased range of motion.        Arms:       Comments: No signs or symptoms of infection. It appears to be soft tissue swelling.    Skin:     General: Skin is warm and dry.   Neurological:      General: No focal deficit present.      Mental Status: She is alert and oriented to person, place, and time.   Psychiatric:         Mood and Affect: Mood normal.         Behavior: Behavior normal.         Thought Content: Thought content normal.         Judgment: Judgment normal.             We discussed using steroids for inflammation, but as a type 1 diabetic, she is not well enough controlled to consider them.       ASSESSMENT/ PLAN:    Diagnoses and all orders for this visit:    1. Left hand pain (Primary)  -     XR Hand 3+ View Left; Future    2. Type 1 diabetes mellitus " with hyperglycemia (HCC)      Procedures     Management Plan:     An After Visit Summary was printed and given to the patient at discharge.    Follow-up: Return if symptoms worsen or fail to improve.         Silverio Reed, TORIE 10/3/2022 12:32 CDT  This note was electronically signed.

## 2022-09-30 ENCOUNTER — TELEPHONE (OUTPATIENT)
Dept: FAMILY MEDICINE CLINIC | Facility: CLINIC | Age: 47
End: 2022-09-30

## 2022-09-30 DIAGNOSIS — M79.642 LEFT HAND PAIN: ICD-10-CM

## 2022-09-30 NOTE — TELEPHONE ENCOUNTER
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

## 2022-10-12 DIAGNOSIS — R52 GENERALIZED PAIN: Primary | ICD-10-CM

## 2022-10-12 RX ORDER — GABAPENTIN 300 MG/1
300 CAPSULE ORAL 3 TIMES DAILY
Qty: 90 CAPSULE | Refills: 2 | Status: SHIPPED | OUTPATIENT
Start: 2022-10-12 | End: 2023-02-02

## 2022-11-01 ENCOUNTER — TELEPHONE (OUTPATIENT)
Dept: FAMILY MEDICINE CLINIC | Facility: CLINIC | Age: 47
End: 2022-11-01

## 2022-11-03 ENCOUNTER — OFFICE VISIT (OUTPATIENT)
Dept: NEUROLOGY | Facility: CLINIC | Age: 47
End: 2022-11-03

## 2022-11-03 VITALS
HEART RATE: 97 BPM | HEIGHT: 64 IN | DIASTOLIC BLOOD PRESSURE: 72 MMHG | BODY MASS INDEX: 31.21 KG/M2 | WEIGHT: 182.8 LBS | OXYGEN SATURATION: 98 % | SYSTOLIC BLOOD PRESSURE: 128 MMHG

## 2022-11-03 DIAGNOSIS — R41.3 MEMORY LOSS: Primary | ICD-10-CM

## 2022-11-03 PROCEDURE — 99213 OFFICE O/P EST LOW 20 MIN: CPT | Performed by: PSYCHIATRY & NEUROLOGY

## 2022-11-03 NOTE — PROGRESS NOTES
Subjective        Krupa Salomón De La Rosa presents to [facility]    History of Present Illness  46 yo female here for follow up. Doing about the same with her memory. Does have trouble at night and then sleepy throughout the day.       Past Medical History:   Diagnosis Date   • Broken wrist     Right   • Diabetes mellitus (HCC)           Current Outpatient Medications:   •  ALPRAZolam (XANAX) 1 MG tablet, Take 1 tablet by mouth 3 (Three) Times a Day., Disp: 90 tablet, Rfl: 2  •  amitriptyline (ELAVIL) 100 MG tablet, Take 1 tablet by mouth Every Night., Disp: 90 tablet, Rfl: 3  •  aspirin 325 MG tablet, Take 325 mg by mouth Every Night., Disp: , Rfl:   •  azithromycin (Zithromax Z-John) 250 MG tablet, Take 2 tablets by mouth on day 1, then 1 tablet daily on days 2-5, Disp: 6 tablet, Rfl: 0  •  cloNIDine (CATAPRES) 0.1 MG tablet, Take 0.1 mg by mouth 2 (Two) Times a Day., Disp: , Rfl:   •  Continuous Blood Gluc Sensor (Dexcom G6 Sensor), Inject  under the skin into the appropriate area as directed Every 10 (Ten) Days., Disp: 1 each, Rfl: 5  •  desvenlafaxine (Pristiq) 50 MG 24 hr tablet, Take 1 tablet by mouth Daily., Disp: 30 tablet, Rfl: 5  •  Evolocumab (REPATHA) solution prefilled syringe injection, Inject 1 mL under the skin into the appropriate area as directed Every 14 (Fourteen) Days., Disp: 2 each, Rfl: 6  •  furosemide (LASIX) 20 MG tablet, Take 1 tablet by mouth Daily As Needed (SWELLING)., Disp: 90 tablet, Rfl: 3  •  gabapentin (NEURONTIN) 300 MG capsule, Take 1 capsule by mouth 3 (Three) Times a Day., Disp: 90 capsule, Rfl: 2  •  insulin aspart (NovoLOG FlexPen) 100 UNIT/ML solution pen-injector sc pen, Up to 20 units tid before meals, Disp: 5 pen, Rfl: 11  •  Insulin Glargine, 2 Unit Dial, (Toujeo Max SoloStar) 300 UNIT/ML solution pen-injector injection, Inject 50 Units under the skin into the appropriate area as directed Daily., Disp: 5 pen, Rfl: 11  •  irbesartan (AVAPRO)  "75 MG tablet, Take 75 mg by mouth Every Night., Disp: , Rfl:   •  isosorbide mononitrate (IMDUR) 30 MG 24 hr tablet, Take 1 tablet by mouth Daily., Disp: , Rfl:   •  meloxicam (MOBIC) 15 MG tablet, Take 15 mg by mouth As Needed., Disp: , Rfl:   •  methylPREDNISolone (MEDROL) 4 MG dose pack, Take as directed on package instructions., Disp: 1 each, Rfl: 0  •  methylPREDNISolone (MEDROL) 4 MG dose pack, Take as directed on package instructions., Disp: 1 each, Rfl: 0  •  metoprolol succinate XL (TOPROL-XL) 25 MG 24 hr tablet, Take 25 mg by mouth Daily. Take with an additional 50 MG to equal 75 MG, Disp: , Rfl:   •  metoprolol succinate XL (TOPROL-XL) 50 MG 24 hr tablet, Take 50 mg by mouth Daily. Patient takes 75 mg daily, Disp: , Rfl:   •  nitroglycerin (NITROSTAT) 0.4 MG SL tablet, Place 0.4 mg under the tongue Every 5 (Five) Minutes As Needed for Chest Pain., Disp: , Rfl:   •  omeprazole (priLOSEC) 40 MG capsule, Take 1 capsule by mouth Daily., Disp: 30 capsule, Rfl: 0  •  promethazine (PHENERGAN) 25 MG tablet, Take 1 tablet by mouth Every 6 (Six) Hours As Needed for Nausea or Vomiting., Disp: 20 tablet, Rfl: 0  •  promethazine-dextromethorphan (PROMETHAZINE-DM) 6.25-15 MG/5ML syrup, Take 5 mL by mouth 4 (Four) Times a Day As Needed for Cough., Disp: 180 mL, Rfl: 0       Objective   Vital Signs:   Ht 162.6 cm (64\")   BMI 30.48 kg/m²     Physical Exam  Constitutional:       General: She is awake.   Eyes:      Extraocular Movements: Extraocular movements intact.   Neurological:      Mental Status: She is alert.   Psychiatric:         Speech: Speech normal.        Neurological Exam  Mental Status  Awake and alert. Speech is normal. Language is fluent with no aphasia.    Cranial Nerves  CN III, IV, VI: Extraocular movements intact bilaterally.  CN VII: Full and symmetric facial movement.    Gait  Casual gait is normal including stance, stride, and arm swing.      Result Review :                Narrative & Impression "   EXAMINATION:   MRI BRAIN WO CONTRAST-  7/29/2022 10:48 AM CDT     HISTORY: MRI BRAIN WO CONTRAST- 7/29/2022 9:41 AM CDT     HISTORY: memory loss; R41.3-Other amnesia     COMPARISON: None.       TECHNIQUE: Multiplanar imaging of the brain was performed in a routine  fashion without contrast.     FINDINGS:   Diffusion: No restriction of diffusion to suggest acute ischemia.     Midline structures: Nondisplaced.     Ventricles: Normal in configuration and symmetric in size.     Masses: No masses or mass effect.     Basilar cisterns: Maintained.     Extra axial space: No abnormal extra-axial fluid.     Gray-white matter signal: Normal signal and differentiation.     Cerebellum: Normal.     Brainstem: Normal.     Other: Proximal cervical spinal cord is normal. Bilateral globes and  orbits are normal in appearance. Normal cerebrovascular flow voids  noted. Fluid signal is present in the right posterior ethmoid air cells.  The mastoid air cells are pneumatized. Maxillary air cells are  unremarkable.     IMPRESSION:  1. No acute intracranial abnormality is identified.  2. Fluid signal is present in the posterior right ethmoid air cells.  This report was finalized on 07/29/2022 10:50 by Dr. Ventura Martin MD.          Assessment and Plan   47-year-old female with DM1, CAD s/p CABG, neuropathy, HTN who was referred for evaluation of memory loss.  This is likely multifactorial due to known short-term memory issues occurring after CABG and significant stress and anxiety in her life.  She also may very well have sleep issues although she reports a normal sleep study 2 years ago.  MRI was normal. B12/TSH normal. Can follow up prn.        Follow Up   No follow-ups on file.  Patient was given instructions and counseling regarding her condition or for health maintenance advice. Please see specific information pulled into the AVS if appropriate.

## 2022-11-08 ENCOUNTER — TELEPHONE (OUTPATIENT)
Dept: CARDIOLOGY CLINIC | Age: 47
End: 2022-11-08

## 2022-11-08 RX ORDER — METOPROLOL SUCCINATE 50 MG/1
TABLET, EXTENDED RELEASE ORAL
Qty: 90 TABLET | Refills: 3 | Status: SHIPPED | OUTPATIENT
Start: 2022-11-08 | End: 2022-11-17 | Stop reason: SDUPTHER

## 2022-11-08 NOTE — TELEPHONE ENCOUNTER
Called and left VM to reschedule 11/9 due to [address] out, Pt can come in today 11/8 or a different day, BW

## 2022-11-17 ENCOUNTER — OFFICE VISIT (OUTPATIENT)
Dept: CARDIOLOGY CLINIC | Age: 47
End: 2022-11-17
Payer: MEDICAID

## 2022-11-17 VITALS
DIASTOLIC BLOOD PRESSURE: 88 MMHG | SYSTOLIC BLOOD PRESSURE: 122 MMHG | WEIGHT: 185 LBS | OXYGEN SATURATION: 98 % | BODY MASS INDEX: 31.58 KG/M2 | HEART RATE: 83 BPM | HEIGHT: 64 IN

## 2022-11-17 DIAGNOSIS — E78.5 DYSLIPIDEMIA: ICD-10-CM

## 2022-11-17 DIAGNOSIS — I25.10 CORONARY ARTERY DISEASE INVOLVING NATIVE CORONARY ARTERY OF NATIVE HEART WITHOUT ANGINA PECTORIS: Primary | ICD-10-CM

## 2022-11-17 DIAGNOSIS — I10 ESSENTIAL HYPERTENSION: ICD-10-CM

## 2022-11-17 DIAGNOSIS — R00.0 TACHYCARDIA: ICD-10-CM

## 2022-11-17 PROCEDURE — 3074F SYST BP LT 130 MM HG: CPT | Performed by: CLINICAL NURSE SPECIALIST

## 2022-11-17 PROCEDURE — 99214 OFFICE O/P EST MOD 30 MIN: CPT | Performed by: CLINICAL NURSE SPECIALIST

## 2022-11-17 PROCEDURE — 3078F DIAST BP <80 MM HG: CPT | Performed by: CLINICAL NURSE SPECIALIST

## 2022-11-17 RX ORDER — METOPROLOL SUCCINATE 50 MG/1
75 TABLET, EXTENDED RELEASE ORAL DAILY
Qty: 135 TABLET | Refills: 3 | Status: SHIPPED | OUTPATIENT
Start: 2022-11-17

## 2022-11-17 NOTE — PROGRESS NOTES
[address] [facility]  [facility] [place]  [address]  Phone: [phone]  Fax: [phone]    OFFICE VISIT:  2022    Diane Morales - : 1975    Reason For Visit:  Constantino Hanson is a 56 y.o. female who is here for 6 Month Follow-Up (edema), Coronary Artery Disease, and Hypertension    1. Tachycardia    2. Coronary artery disease involving native coronary artery of native heart without angina pectoris    3. Essential hypertension    4. Dyslipidemia       Patient with a history of dyslipidemia, hypertension, and coronary artery disease. Patient in 2018 CABG with LIMA placed to the LAD and a vein to the third obtuse marginal.   Repeat heart catheterization 2021 showed patent bypass grafts with normal LV function. She does have a history of statin intolerance and was placed on Repatha. She is a patient of Dr. Brian Ortiz. Overall she is doing fairly well. 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 She is on her feet a lot. She is seeing an endocrinologist in [facility]. She was having problems with her blood sugar getting lab was put back on Trajeo but does not seem to be controlling it. Has follow-up with endocrinologist soon. She had 1 episode where she felt like her heart was irregular and racing for about 10 minutes. She has not had any since. Otherwise she is active and doing well. Uses her Lasix as needed    Subjective  Cecile denies exertional chest pain, shortness of breath, orthopnea, paroxysmal nocturnal dyspnea, syncope, presyncope, arrhythmia, and fatigue. The patient denies numbness or weakness to suggest cerebrovascular accident or transient ischemic attack. NAVDEEP Fowler CNP is PCP and follows labs including lipids.     Diane Morales has the following history as recorded in [facility]:    Patient Active Problem List    Diagnosis Date Noted    Tachycardia 2018    Syncope 2018    Mixed hyperlipidemia 2018 Essential hypertension 08/22/2018    History of coronary artery bypass graft x 2 08/22/2018    Postoperative pain     ARDS (adult respiratory distress syndrome) ([address] 07/06/2018    Pneumonia 07/06/2018    Increased anion gap metabolic acidosis 79/58/7028    Respiratory insufficiency 07/05/2018    Hyponatremia 07/04/2018    Normocytic anemia 07/04/2018    CAD in native artery 06/28/2018    Abnormal nuclear stress test     Unstable angina pectoris (HCC)     Type 1 diabetes mellitus with complication (HCC)     Unstable angina ([address] 06/26/2018    Abnormal laboratory test 06/04/2018    Type 1 diabetes mellitus with neurological manifestations ([address] 06/04/2018    Anxiety and depression 06/04/2018    DJD (degenerative joint disease) 06/04/2018    Tobacco abuse 06/04/2018    Drug allergy 06/04/2018    Constipation 07/21/2014    Abdominal distention 07/21/2014    Gastroesophageal reflux disease without esophagitis 07/21/2014    Insomnia 06/19/2012     Past Medical History:   Diagnosis Date    Arthritis     Bipolar disorder ([address]     CAD (coronary artery disease)     Cancer ([address]     Cervical CA    COVID     Depression     Diabetes mellitus ([address] 20 years    Dysplasia of cervix     Endometriosis     Essential hypertension 08/22/2018    GERD (gastroesophageal reflux disease)     Histoplasmosis     History of cone biopsy of cervix     Mixed hyperlipidemia 08/22/2018    Pneumonia 07/06/2018    Snapping hip syndrome     Tuberculosis 2004     Past Surgical History:   Procedure Laterality Date    CHOLECYSTECTOMY      COLONOSCOPY      COLPOSCOPY  2000    CORONARY ARTERY BYPASS GRAFT      ENDOSCOPY, COLON, DIAGNOSTIC      HYSTERECTOMY (CERVIX STATUS UNKNOWN)  2012    Left ovary still there.     KNEE SURGERY Right     Lateral Release    KNEE SURGERY Right     Meniscus Repair    LAPAROSCOPY      OVARY REMOVAL Right 2012    CT CABG, ARTERY-VEIN, TWO N/A 7/2/2018    CORONARY ARTERY BYPASS GRAFT X2 WITH LEFT INTERNAL MAMMARY ARTERY WITH ENDOSCOPIC VEIN HARVESTING WITH PERFUSION TRANSESOPHAGEAL ECHOCARDIOGRAM performed by Jag Leach MD at [facility] History   Problem Relation Age of Onset    Cancer Maternal Grandmother     Cancer Paternal Grandfather     Colon Polyps Mother     Heart Attack Paternal Uncle     Colon Cancer Neg Hx     Esophageal Cancer Neg Hx     Liver Cancer Neg Hx     Stomach Cancer Neg Hx      Social History     Tobacco Use    Smoking status: Former     Types: Cigarettes     Quit date: 2018     Years since quittin.4    Smokeless tobacco: Never   Substance Use Topics    Alcohol use: Not Currently      Current Outpatient Medications   Medication Sig Dispense Refill    metoprolol succinate (TOPROL XL) 50 MG extended release tablet TAKE 1 TABLET BY MOUTH ONCE DAILY ALONG  WITH  25  MG  TABLET 90 tablet 3    irbesartan (AVAPRO) 75 MG tablet Take 1 tablet by mouth once daily 90 tablet 1    metoprolol succinate (TOPROL XL) 25 MG extended release tablet Take 25 mg by mouth daily      furosemide (LASIX) 20 MG tablet TAKE 1 TABLET BY MOUTH ONCE DAILY AS NEEDED 30 tablet 2    gabapentin (NEURONTIN) 300 MG capsule Take 300 mg by mouth nightly.       Evolocumab (REPATHA) 140 MG/ML SOSY Inject 140 mg into the skin every 14 days      nitroGLYCERIN (NITROSTAT) 0.4 MG SL tablet Place 1 tablet under the tongue every 5 minutes as needed for Chest pain 25 tablet 3    isosorbide mononitrate (IMDUR) 30 MG extended release tablet Take 1 tablet by mouth daily 90 tablet 3    meloxicam (MOBIC) 15 MG tablet 15 mg as needed for Pain       insulin glargine (LANTUS SOLOSTAR) 100 UNIT/ML injection pen 44 Units every morning       aspirin 325 MG tablet Take 325 mg by mouth daily      amitriptyline (ELAVIL) 100 MG tablet Take 100 mg by mouth nightly      cloNIDine (CATAPRES) 0.1 MG tablet TAKE 1 TABLET BY MOUTH TWICE DAILY  3    Magnesium 400 MG CAPS Take 3 times per week       insulin aspart (NOVOLOG) 100 UNIT/ML injection pen Inject into the skin daily Sliding scale      promethazine (PHENERGAN) 25 MG tablet Take 25 mg by mouth as needed for Nausea      ALPRAZolam (XANAX) 1 MG tablet Take 1 mg by mouth 3 times daily as needed. .       No current facility-administered medications for this visit. Allergies: Latex, Adhesive tape, Ciprofloxacin, and Levofloxacin    Review of Systems  Constitutional - no significant activity change, appetite change, or unexpected weight change. No fever, chills or diaphoresis. No fatigue. HEENT - no significant rhinorrhea or epistaxis. No tinnitus or significant hearing loss. Eyes - no sudden vision change or amaurosis. Respiratory - no significant wheezing, stridor, apnea or cough. No dyspnea on exertion or shortness of breath. Cardiovascular - no exertional chest pain, orthopnea or PND. No sensation of arrhythmia or slow heart rate. No claudication  + occasional leg edema. Gastrointestinal - no abdominal swelling or pain. No blood in stool. No severe constipation, diarrhea, nausea, or vomiting. Genitourinary - no difficulty urinating, dysuria, frequency, or urgency. No flank pain or hematuria. Musculoskeletal - no back pain, gait disturbance, or myalgia. Skin - no color change or rash. No pallor. No new surgical incision. Neurologic - no speech difficulty, facial asymmetry or lateralizing weakness. No seizures, presyncope, syncope, or significant dizziness. Hematologic - no easy bruising or excessive bleeding. Psychiatric - no severe anxiety or insomnia. No confusion. All other review of systems are negative. Objective  Vital Signs - /88   Pulse 83   Ht 5' 4\" (1.626 m)   Wt 185 lb (83.9 kg)   LMP 02/08/2012   SpO2 98%   BMI 31.76 kg/m²   General - Cecile is alert, cooperative, and pleasant. Well groomed. No acute distress. Body habitus is overweight. HEENT - The head is normocephalic. No circumoral cyanosis.   Dentition is normal.   EYES -  No Xanthelasma, no arcus senilis, no conjunctival hemorrhages or discharge. Neck - Supple, without increased jugular venous pressures. No carotid bruits. No mass. Respiratory - Lungs are clear bilaterally. No wheezes or rales. Normal effort without use of accessory muscles. Cardiovascular - Heart has regular rhythm and rate. No murmurs, rubs or gallops. + pedal pulses and no varicosities. Abdominal -  Soft, nontender, nondistended. Bowel sounds are intact. Extremities - No clubbing, cyanosis, or  edema. Musculoskeletal -  No clubbing . No Osler's nodes. Gait normal .  No kyphosis or scoliosis. Skin -  no statis ulcers or dermatitis. Neurological - No focal signs are identified. Oriented to person, place and time. Psychiatric -  Appropriate affect and mood. Assessment:     Diagnosis Orders   1. Coronary artery disease involving native coronary artery of native heart without angina pectoris        2. Tachycardia        3. Essential hypertension        4. Dyslipidemia          Data:  BP Readings from Last 3 Encounters:   11/17/22 122/88   07/29/22 112/68   05/03/22 130/74    Pulse Readings from Last 3 Encounters:   11/17/22 83   07/29/22 84   05/03/22 80        Wt Readings from Last 3 Encounters:   11/17/22 185 lb (83.9 kg)   07/29/22 184 lb (83.5 kg)   05/03/22 189 lb (85.7 kg)     Blood pressure and heart rate controlled. Medical management includes beta-blocker, ARB and long-acting nitrate. On low-dose diuretic. Intolerant to statins but is tolerating Repatha. Also on aspirin    She is working with her endocrinologist to get her blood sugars better controlled. She does have a CGM. 1 brief episode of palpitations/tachycardia. We discussed watching for increase in that    Having trouble with [employer] getting her metoprolol off. She is on a total of 75 mg daily. We will reorder that to make sure she has plenty    PCP and endocrinologist managing labs.   Tolerating her Repatha for her cholesterol  Discussed signs and symptoms to report. Heart catheterization 2021   Diagnostic procedure:DCA, Coronary Angiogram, Left Heart Cath, Left   Ventriculogram, Left Ventricular Pressure Measurement      Conclusions    Single-vessel, branch disease involving LAD and obtuse marginal branch of   dominant circumflex. Patent LIMA to distal LAD. Patent SVG to OM 3. Normal LV systolic function. Recommendations      Medical management.  ----------------------------------------   Electronically signed by Oh Infante MD(Performing Physician) on   2021 16:01   ----------------------------------------------------------------      Angiographic Findings      Cardiac Arteries and Lesion Findings     LMCA: Left main patent. LAD: LAD proximal to mid diffuse 55% stenosis. LAD mid to distal 100% occluded. LIMA to distal LAD widely patent. Lesion on Mid LAD: 100% stenosis 19 mm length. Lesion on Prox LAD: 55% stenosis 23 mm length. LCx: Circumflex is a large dominant vessel. Circumflex proximal 30% stenosis. Circumflex mid diffuse 35% stenosis. OM 3 proximal diffuse 90% stenosis. SVG to OM 3 is widely patent. Lesion on Mid CX: 35% stenosis 11 mm length. Lesion on 3rd Ob [place]% stenosis 7 mm length. Lesion on Prox CX: 30% stenosis 7 mm length. RCA: RCA is a small nondominant vessel. RCA proximal 70% diffuse stenosis. Lesion on Prox RCA: 70% stenosis 15 mm length. Cardiac Grafts      -  There is a graft that originates at the Aorta Left and attaches to the      Lat 3rd Ob Jamilah (SVG to OM 3 widely patent. ). -  There is a graft that originates at the LIMA and attaches to the Dist      LAD (LIMA to distal LAD widely patent.). [facility]     LV function assessed as:Normal.  Ejection Fraction    - Method: LV gram. EF%: 60.       States taking medications as prescribed  Stable cardiovascular status.  No evidence of overt heart failure, angina or dysrhythmia. 30 minutes were spent preparing, reviewing and seeing patient. All questions answered    Plan  Maintain good blood pressure control-goal<130/80 at rest  Maintain good cholesterol control LDL goal<70 with arterial disease  If you are diabetic work to keep/obtain hemoglobin A1c< 7    Follow up in 6 mos   Call with any questions or concerns  Follow up with NAVDEEP Fowler - CNP for non cardiac problems  Report any new problems  Cardiovascular Fitness-Exercise as tolerated. Strive for 30 minutes of exercise most days of the week. Cardiac / Healthy Diet- Avoid processed high fat foods, maintain low sodium/salt   Continue current medications as directed  Continue plan of treatment  It is always recommended that you bring your medications bottles with you to each visit - this is for your safety! NAVDEEP Gutierrez    EMR dragon/transcription disclaimer: Much of this encounter note is electronic transcription/translation of spoken language to printed tach. Electronic translation of spoken language may be erroneous, or at times, nonsensical words or phrases may be inadvertently transcribed.  Although, I have reviewed the note for such errors, some may still exist.

## 2022-11-17 NOTE — PATIENT INSTRUCTIONS
Maintain good blood pressure control-goal<130/80 at rest  Maintain good cholesterol control LDL goal<70 with arterial disease  If you are diabetic work to keep/obtain hemoglobin A1c< 7    Follow up in 6 mos   Call with any questions or concerns  Follow up with NAVDEEP Fowlre - CNP for non cardiac problems  Report any new problems  Cardiovascular Fitness-Exercise as tolerated. Strive for 30 minutes of exercise most days of the week. Cardiac / Healthy Diet- Avoid processed high fat foods, maintain low sodium/salt   Continue current medications as directed  Continue plan of treatment  It is always recommended that you bring your medications bottles with you to each visit - this is for your safety!

## 2022-11-28 DIAGNOSIS — F41.9 ANXIETY: ICD-10-CM

## 2022-11-29 RX ORDER — ALPRAZOLAM 1 MG/1
TABLET ORAL
Qty: 90 TABLET | Refills: 0 | Status: SHIPPED | OUTPATIENT
Start: 2022-11-29 | End: 2023-01-03

## 2022-11-29 NOTE — TELEPHONE ENCOUNTER
Rx Refill Note  Requested Prescriptions     Pending Prescriptions Disp Refills   • ALPRAZolam (XANAX) 1 MG tablet [Pharmacy Med Name: ALPRAZolam 1 MG Oral Tablet] 90 tablet 0     Sig: TAKE 1 TABLET BY MOUTH THREE TIMES DAILY      Last office visit with prescribing clinician: 9/29/2022   Last telemedicine visit with prescribing clinician: Visit date not found   Next office visit with prescribing clinician: Visit date not found   Mimi Cox MA  11/29/22, 14:55 CST      LABS: NO CDA

## 2023-01-02 DIAGNOSIS — F41.9 ANXIETY: ICD-10-CM

## 2023-01-03 RX ORDER — ALPRAZOLAM 1 MG/1
TABLET ORAL
Qty: 90 TABLET | Refills: 0 | Status: SHIPPED | OUTPATIENT
Start: 2023-01-03 | End: 2023-02-02

## 2023-01-03 NOTE — TELEPHONE ENCOUNTER
Rx Refill Note  Requested Prescriptions     Pending Prescriptions Disp Refills   • ALPRAZolam (XANAX) 1 MG tablet [Pharmacy Med Name: ALPRAZolam 1 MG Oral Tablet] 90 tablet 0     Sig: TAKE 1 TABLET BY MOUTH THREE TIMES DAILY      Last office visit with prescribing clinician: 9/29/2022   Last telemedicine visit with prescribing clinician: Visit date not found   Next office visit with prescribing clinician: Visit date not found   {Mimi Cox MA  01/03/23, 09:03 CST        LAB:NO CDA LABS:

## 2023-01-12 ENCOUNTER — LAB (OUTPATIENT)
Dept: LAB | Facility: HOSPITAL | Age: 48
End: 2023-01-12
Payer: COMMERCIAL

## 2023-01-12 ENCOUNTER — OFFICE VISIT (OUTPATIENT)
Dept: ENDOCRINOLOGY | Facility: CLINIC | Age: 48
End: 2023-01-12
Payer: MEDICAID

## 2023-01-12 ENCOUNTER — DOCUMENTATION (OUTPATIENT)
Dept: ENDOCRINOLOGY | Facility: CLINIC | Age: 48
End: 2023-01-12
Payer: COMMERCIAL

## 2023-01-12 VITALS
HEART RATE: 90 BPM | WEIGHT: 192.3 LBS | HEIGHT: 63 IN | OXYGEN SATURATION: 99 % | BODY MASS INDEX: 34.07 KG/M2 | SYSTOLIC BLOOD PRESSURE: 130 MMHG | DIASTOLIC BLOOD PRESSURE: 78 MMHG

## 2023-01-12 DIAGNOSIS — E10.42 DIABETIC POLYNEUROPATHY ASSOCIATED WITH TYPE 1 DIABETES MELLITUS: ICD-10-CM

## 2023-01-12 DIAGNOSIS — E78.2 MIXED HYPERLIPIDEMIA: ICD-10-CM

## 2023-01-12 DIAGNOSIS — I10 PRIMARY HYPERTENSION: ICD-10-CM

## 2023-01-12 DIAGNOSIS — E10.65 TYPE 1 DIABETES MELLITUS WITH HYPERGLYCEMIA: Primary | ICD-10-CM

## 2023-01-12 LAB
ALBUMIN SERPL-MCNC: 4.2 G/DL (ref 3.5–5.2)
ALBUMIN/GLOB SERPL: 1.4 G/DL
ALP SERPL-CCNC: 134 U/L (ref 39–117)
ALT SERPL W P-5'-P-CCNC: 13 U/L (ref 1–33)
ANION GAP SERPL CALCULATED.3IONS-SCNC: 11 MMOL/L (ref 5–15)
AST SERPL-CCNC: 19 U/L (ref 1–32)
BILIRUB SERPL-MCNC: 0.3 MG/DL (ref 0–1.2)
BUN SERPL-MCNC: 8 MG/DL (ref 6–20)
BUN/CREAT SERPL: 9.2 (ref 7–25)
CALCIUM SPEC-SCNC: 9.4 MG/DL (ref 8.6–10.5)
CHLORIDE SERPL-SCNC: 104 MMOL/L (ref 98–107)
CO2 SERPL-SCNC: 24 MMOL/L (ref 22–29)
CREAT SERPL-MCNC: 0.87 MG/DL (ref 0.57–1)
EGFRCR SERPLBLD CKD-EPI 2021: 83.3 ML/MIN/1.73
GLOBULIN UR ELPH-MCNC: 2.9 GM/DL
GLUCOSE SERPL-MCNC: 241 MG/DL (ref 65–99)
POTASSIUM SERPL-SCNC: 4.4 MMOL/L (ref 3.5–5.2)
PROT SERPL-MCNC: 7.1 G/DL (ref 6–8.5)
SODIUM SERPL-SCNC: 139 MMOL/L (ref 136–145)

## 2023-01-12 PROCEDURE — 80053 COMPREHEN METABOLIC PANEL: CPT | Performed by: NURSE PRACTITIONER

## 2023-01-12 PROCEDURE — 95251 CONT GLUC MNTR ANALYSIS I&R: CPT | Performed by: NURSE PRACTITIONER

## 2023-01-12 PROCEDURE — 99214 OFFICE O/P EST MOD 30 MIN: CPT | Performed by: NURSE PRACTITIONER

## 2023-01-12 PROCEDURE — 84681 ASSAY OF C-PEPTIDE: CPT | Performed by: NURSE PRACTITIONER

## 2023-01-12 PROCEDURE — 36415 COLL VENOUS BLD VENIPUNCTURE: CPT | Performed by: NURSE PRACTITIONER

## 2023-01-12 PROCEDURE — 83036 HEMOGLOBIN GLYCOSYLATED A1C: CPT | Performed by: NURSE PRACTITIONER

## 2023-01-12 RX ORDER — GLUCAGON 3 MG/1
1 POWDER NASAL AS NEEDED
Qty: 2 EACH | Refills: 11 | Status: SHIPPED | OUTPATIENT
Start: 2023-01-12

## 2023-01-12 NOTE — PROGRESS NOTES
"Chief Complaint  Diabetes    Subjective          Krupa Salomón De La Rosa presents to [facility] ENDOCRINOLOGY  History of Present Illness      In office visit         47 year old female presents for follow up      Reason diabetes mellitus type 1        Duration--diagnosed at age 17      Context --presented with symptoms      Timing --constant      Quality  Not controlled           Severity  high     Macrovascular complications  CAD, CABG X2 in July 2018      Microvascular complications ---neuropathy,  Mild DR, no renal disease      Current diabetes regimen      Insulin by injections            Current glucose monitoring         fingerstick      Checks 4 times      Dexcom G6      See below             Patient  is a RN        Review of Systems - General ROS: negative          Objective   Vital Signs:   /78   Pulse 90   Ht 160 cm (63\")   Wt 87.2 kg (192 lb 4.8 oz)   SpO2 99%   BMI 34.06 kg/m²     Physical Exam  Constitutional:       Appearance: Normal appearance.   Cardiovascular:      Rate and Rhythm: Regular rhythm.      Heart sounds: Normal heart sounds.   Pulmonary:      Breath sounds: Normal breath sounds.   Musculoskeletal:      Cervical back: Normal range of motion.   Neurological:      Mental Status: She is alert.        Result Review :   The following data was reviewed by: TORIE Blank on 06/09/2022:  Common labs    Common Labs 7/18/22 7/18/22 7/18/22 7/18/22 7/18/22    0848 0848 0848 0848 0848   Glucose  111 (A)      BUN  7      Creatinine  0.95      Sodium  142      Potassium  4.6      Chloride  105      Calcium  10.1      Total Protein  7.1      Albumin  4.5      Total Bilirubin  0.4      Alkaline Phosphatase  124 (A)      AST (SGOT)  17      ALT (SGPT)  13      WBC 7.5       Hemoglobin 17.6 (A)       Hematocrit 51.4 (A)       Platelets 218       Total Cholesterol    99 (A)    Triglycerides    78    HDL Cholesterol    47    LDL Cholesterol     36  "   Hemoglobin A1C   8.9 (A)     Microalbumin, Urine     73.0   (A) Abnormal value       Comments are available for some flowsheets but are not being displayed.                       Assessment and Plan    Diagnoses and all orders for this visit:    1. Type 1 diabetes mellitus with hyperglycemia (HCC) (Primary)  -     C-Peptide  -     Comprehensive Metabolic Panel  -     Hemoglobin A1c    2. Primary hypertension    3. Mixed hyperlipidemia    4. Diabetic polyneuropathy associated with type 1 diabetes mellitus (HCC)    Other orders  -     Glucagon (Baqsimi One Pack) 3 MG/DOSE powder; 1 each into the nostril(s) as directed by provider As Needed (hypoglycemia). Apply intranasal if hypoglycemia  Dispense: 2 each; Refill: 11         Glycemic Management:     Diabetes mellitus type 1      Dexcom G6     Downloaded and reviewed     Dated from dec. 30 to jan. 12, 2023     Average bg 190    Time in target 52%     High 24%     Very high 20 %     Low 3%     Very low less 1 %         Having hypoglycemia when not eating     Decrease basal then has rebound         Taking Lantus 38 units decrease to 34 units         Taking Novolog 1 unit per 10 grams of CHO                 Plus sliding scale      2 per 50 above 150            Send for baqsimi       approve tandem pump        The patient has diabetes mellitus, insulin-dependent.     Our Diabetes Department has evaluated the patient in the last six months and will continue counseling on insulin adjustment.      The patient performs blood glucose testing at least four times daily with proven glucose variability from 50 to 300 mg per dl.     The patient is administering basal insulin and prandial insulin four times per day for more than six months.     The patient uses a home blood glucose monitor to assess blood glucose at least four times daily for more than six months.     The patient requires frequent adjustment of insulin treatment regimen based on blood glucose readings.     The  patient has frequent variability in blood glucose readings due to activity and variability in meal content and time.      The patient has completed a diabetes education program with us.     The patient has demonstrated the ability to self-monitor her glucose.      The patient is motivated in improving diabetes control      The patient has hypoglycemia unawareness             Microvascular Complications Monitoring         Last eye exam---- April 2021      Neuropathy --yes      Taking gabapentin         Lipid Management:         Hyperlipidemia      Taking  repatha            Blood Pressure Management:     Hypertension      Taking toprol xl 50 mg one daily      Taking lasix 20 mg one daily      Taking catapres 0.1 mg daily         Thyroid Health     No results found for: TSH        Bone Health         Weight Management:     Obesity     Body mass index is 34.06 kg/m².               Preventive Care:      Non smoker            Follow Up   Return in about 3 months (around 4/12/2023) for Recheck.  Patient was given instructions and counseling regarding her condition or for health maintenance advice. Please see specific information pulled into the AVS if appropriate.         This document has been electronically signed by TORIE Blank on January 12, 2023 14:27 CST.

## 2023-01-13 LAB — HBA1C MFR BLD: 8.7 % (ref 4.8–5.6)

## 2023-01-14 LAB — C PEPTIDE SERPL-MCNC: <0.1 NG/ML (ref 1.1–4.4)

## 2023-01-16 ENCOUNTER — OFFICE VISIT (OUTPATIENT)
Dept: FAMILY MEDICINE CLINIC | Facility: CLINIC | Age: 48
End: 2023-01-16
Payer: MEDICAID

## 2023-01-16 VITALS
HEIGHT: 63 IN | HEART RATE: 84 BPM | DIASTOLIC BLOOD PRESSURE: 90 MMHG | SYSTOLIC BLOOD PRESSURE: 147 MMHG | BODY MASS INDEX: 34.23 KG/M2 | OXYGEN SATURATION: 98 % | WEIGHT: 193.2 LBS | TEMPERATURE: 97.7 F

## 2023-01-16 DIAGNOSIS — Z12.11 SCREENING FOR COLON CANCER: ICD-10-CM

## 2023-01-16 DIAGNOSIS — E10.65 TYPE 1 DIABETES MELLITUS WITH HYPERGLYCEMIA: ICD-10-CM

## 2023-01-16 DIAGNOSIS — Z79.899 ENCOUNTER FOR LONG-TERM (CURRENT) USE OF OTHER MEDICATIONS: ICD-10-CM

## 2023-01-16 DIAGNOSIS — I10 ESSENTIAL HYPERTENSION: Primary | ICD-10-CM

## 2023-01-16 PROCEDURE — 99214 OFFICE O/P EST MOD 30 MIN: CPT | Performed by: NURSE PRACTITIONER

## 2023-01-16 PROCEDURE — 3052F HG A1C>EQUAL 8.0%<EQUAL 9.0%: CPT | Performed by: NURSE PRACTITIONER

## 2023-01-16 RX ORDER — METOPROLOL SUCCINATE 50 MG/1
75 TABLET, EXTENDED RELEASE ORAL DAILY
Qty: 135 TABLET | Refills: 3 | Status: SHIPPED | OUTPATIENT
Start: 2023-01-16

## 2023-01-16 RX ORDER — INSULIN GLARGINE 100 [IU]/ML
30 INJECTION, SOLUTION SUBCUTANEOUS DAILY
COMMUNITY

## 2023-01-16 NOTE — PROGRESS NOTES
"Chief Complaint   Patient presents with   • Labs Only        Subjective   Krupa Salomón De La Rosa is a 47 y.o. female who presents today for follow up 6 month labs.     HPI   She went to see her endocrinologist and she suggested a tandem pump and so she is going to try it. She has been having hypoglycemia running in the 40s. She has had a pump in the past and it malfunctioned and she decided against it. She wants to review labs drawn at the endocrinologist's office.   Blood pressure is elevated today. She took her last dose of medication at 5 pm yesterday, rather than at 8 pm like she would normally do.     Allergies   Allergen Reactions   • Adhesive Tape Other (See Comments)   • Ciprofibrate    • Latex Other (See Comments)     Burns skin severely    • Levofloxacin Rash         OBJECTIVE:  Vitals:    01/16/23 1405   BP: 147/90   BP Location: Right arm   Patient Position: Sitting   Cuff Size: Adult   Pulse: 84   Temp: 97.7 °F (36.5 °C)   SpO2: 98%   Weight: 87.6 kg (193 lb 3.2 oz)   Height: 160 cm (63\")     Physical Exam  Vitals and nursing note reviewed.   Constitutional:       Appearance: Normal appearance.   Cardiovascular:      Rate and Rhythm: Normal rate and regular rhythm.      Pulses: Normal pulses.      Heart sounds: Normal heart sounds.   Pulmonary:      Effort: Pulmonary effort is normal.      Breath sounds: Normal breath sounds.   Skin:     General: Skin is warm and dry.   Neurological:      General: No focal deficit present.      Mental Status: She is alert and oriented to person, place, and time.   Psychiatric:         Mood and Affect: Mood normal.         Behavior: Behavior normal.         Thought Content: Thought content normal.         Judgment: Judgment normal.                    ASSESSMENT/ PLAN:    Diagnoses and all orders for this visit:    1. Essential hypertension (Primary)  -     metoprolol succinate XL (Toprol XL) 50 MG 24 hr tablet; Take 1.5 tablets by mouth Daily.  Dispense: 135 tablet; " Refill: 3  -     Cancel: Compliance Drug Analysis, Ur - Urine, Clean Catch; Future  -     Cancel: Compliance Drug Analysis, Ur - Urine, Clean Catch    2. Screening for colon cancer  -     Cologuard - Stool, Per Rectum; Future  -     Cancel: Compliance Drug Analysis, Ur - Urine, Clean Catch; Future  -     Cancel: Compliance Drug Analysis, Ur - Urine, Clean Catch    3. Type 1 diabetes mellitus with hyperglycemia (HCC)    Other orders  -     Cancel: FluLaval/Fluzone >6 mos ([id number])      Procedures     Management Plan:     An After Visit Summary was printed and given to the patient at discharge.    Follow-up: Return in about 3 months (around 4/16/2023) for ANNUAL PHYSICAL WITH LABS PRIOR.    I spent 30 minutes caring for Krupa on this date of service. This time includes time spent by me in the following activities: preparing for the visit, reviewing tests, performing a medically appropriate examination and/or evaluation, ordering medications, tests, or procedures and documenting information in the medical record.      Silverio Reed, APRN 1/16/2023 15:29 CST  This note was electronically signed.          Answers for HPI/ROS submitted by the patient on 1/15/2023  What is the primary reason for your visit?: Diabetes

## 2023-01-24 LAB — DRUGS UR: NORMAL

## 2023-01-30 ENCOUNTER — OFFICE VISIT (OUTPATIENT)
Dept: FAMILY MEDICINE CLINIC | Facility: CLINIC | Age: 48
End: 2023-01-30
Payer: MEDICAID

## 2023-01-30 VITALS
WEIGHT: 190 LBS | OXYGEN SATURATION: 99 % | HEART RATE: 88 BPM | SYSTOLIC BLOOD PRESSURE: 150 MMHG | HEIGHT: 63 IN | TEMPERATURE: 98.1 F | DIASTOLIC BLOOD PRESSURE: 89 MMHG | BODY MASS INDEX: 33.66 KG/M2

## 2023-01-30 DIAGNOSIS — M25.362 KNEE INSTABILITY, LEFT: ICD-10-CM

## 2023-01-30 DIAGNOSIS — M25.562 ACUTE PAIN OF LEFT KNEE: Primary | ICD-10-CM

## 2023-01-30 PROCEDURE — 3052F HG A1C>EQUAL 8.0%<EQUAL 9.0%: CPT | Performed by: NURSE PRACTITIONER

## 2023-01-30 PROCEDURE — 99213 OFFICE O/P EST LOW 20 MIN: CPT | Performed by: NURSE PRACTITIONER

## 2023-01-30 NOTE — PROGRESS NOTES
"Chief Complaint   Patient presents with   • Fall     Hurt knee         Subjective   Krupaanton De La Rosa is a 47 y.o. female who presents today for left knee pain.    HPI   She backed up in her family's driveway and she bent over and when she stood up she was dizzy and tried to make it up the steps to the deck and fell on her left knee. She is having difficulty ambulating and cannot manage steps.     Allergies   Allergen Reactions   • Adhesive Tape Other (See Comments)   • Ciprofibrate    • Latex Other (See Comments)     Burns skin severely    • Levofloxacin Rash         OBJECTIVE:  Vitals:    01/30/23 1512   BP: 150/89   BP Location: Right arm   Patient Position: Sitting   Cuff Size: Adult   Pulse: 88   Temp: 98.1 °F (36.7 °C)   SpO2: 99%   Weight: 86.2 kg (190 lb)   Height: 160 cm (63\")     Physical Exam  Vitals and nursing note reviewed.   Constitutional:       Appearance: Normal appearance.   Cardiovascular:      Rate and Rhythm: Normal rate and regular rhythm.      Pulses: Normal pulses.      Heart sounds: Normal heart sounds.   Pulmonary:      Effort: Pulmonary effort is normal.      Breath sounds: Normal breath sounds.   Musculoskeletal:      Left knee: Decreased range of motion. Tenderness present over the medial joint line and MCL. MCL laxity present.      Instability Tests: Medial Addis test positive.   Skin:     General: Skin is warm and dry.   Neurological:      General: No focal deficit present.      Mental Status: She is alert and oriented to person, place, and time.   Psychiatric:         Mood and Affect: Mood normal.         Behavior: Behavior normal.         Thought Content: Thought content normal.         Judgment: Judgment normal.                    ASSESSMENT/ PLAN:    Diagnoses and all orders for this visit:    1. Acute pain of left knee (Primary)  -     XR Knee 1 or 2 View Left; Future    2. Knee instability, left  -     XR Knee 1 or 2 View Left; Future      Procedures     Management " Plan:     An After Visit Summary was printed and given to the patient at discharge.    Follow-up: Return for follow up with x-ray results.    I spent 20 minutes caring for Krupa on this date of service. This time includes time spent by me in the following activities: preparing for the visit, reviewing tests, obtaining and/or reviewing a separately obtained history, performing a medically appropriate examination and/or evaluation, ordering medications, tests, or procedures and documenting information in the medical record.      Silverio Reed, APRN 1/30/2023 16:35 CST  This note was electronically signed.

## 2023-02-02 ENCOUNTER — TELEPHONE (OUTPATIENT)
Dept: FAMILY MEDICINE CLINIC | Facility: CLINIC | Age: 48
End: 2023-02-02

## 2023-02-02 DIAGNOSIS — F41.9 ANXIETY: ICD-10-CM

## 2023-02-02 DIAGNOSIS — R52 GENERALIZED PAIN: ICD-10-CM

## 2023-02-02 DIAGNOSIS — F33.2 SEVERE EPISODE OF RECURRENT MAJOR DEPRESSIVE DISORDER, WITHOUT PSYCHOTIC FEATURES: ICD-10-CM

## 2023-02-02 RX ORDER — GABAPENTIN 300 MG/1
CAPSULE ORAL
Qty: 90 CAPSULE | Refills: 2 | Status: SHIPPED | OUTPATIENT
Start: 2023-02-02

## 2023-02-02 RX ORDER — DESVENLAFAXINE SUCCINATE 50 MG/1
TABLET, EXTENDED RELEASE ORAL
Qty: 90 TABLET | Refills: 3 | Status: SHIPPED | OUTPATIENT
Start: 2023-02-02

## 2023-02-02 RX ORDER — ALPRAZOLAM 1 MG/1
TABLET ORAL
Qty: 90 TABLET | Refills: 2 | Status: SHIPPED | OUTPATIENT
Start: 2023-02-02

## 2023-02-03 DIAGNOSIS — M25.562 ACUTE PAIN OF LEFT KNEE: ICD-10-CM

## 2023-02-03 DIAGNOSIS — M25.362 KNEE INSTABILITY, LEFT: ICD-10-CM

## 2023-02-07 ENCOUNTER — DOCUMENTATION (OUTPATIENT)
Dept: ENDOCRINOLOGY | Facility: CLINIC | Age: 48
End: 2023-02-07
Payer: COMMERCIAL

## 2023-02-27 DIAGNOSIS — R07.89 OTHER CHEST PAIN: ICD-10-CM

## 2023-02-28 RX ORDER — ISOSORBIDE MONONITRATE 30 MG/1
TABLET, EXTENDED RELEASE ORAL
Qty: 90 TABLET | Refills: 2 | Status: SHIPPED | OUTPATIENT
Start: 2023-02-28

## 2023-02-28 RX ORDER — PROMETHAZINE HYDROCHLORIDE 25 MG/1
TABLET ORAL
Qty: 20 TABLET | Refills: 0 | Status: SHIPPED | OUTPATIENT
Start: 2023-02-28 | End: 2023-04-05

## 2023-02-28 RX ORDER — IRBESARTAN 75 MG/1
TABLET ORAL
Qty: 90 TABLET | Refills: 2 | Status: SHIPPED | OUTPATIENT
Start: 2023-02-28

## 2023-02-28 NOTE — TELEPHONE ENCOUNTER
Rx Refill Note  Requested Prescriptions     Pending Prescriptions Disp Refills   • promethazine (PHENERGAN) 25 MG tablet [Pharmacy Med Name: Promethazine HCl 25 MG Oral Tablet] 20 tablet 0     Sig: TAKE 1 TABLET BY MOUTH EVERY 6 HOURS AS NEEDED FOR NAUSEA FOR VOMITING      Last office visit with prescribing clinician: 1/30/2023   Last telemedicine visit with prescribing clinician: Visit date not found   Next office visit with prescribing clinician: Visit date not found     Gypsy Jones MA  02/28/23, 07:48 CST

## 2023-03-07 DIAGNOSIS — F51.01 PRIMARY INSOMNIA: Primary | ICD-10-CM

## 2023-03-07 RX ORDER — AMITRIPTYLINE HYDROCHLORIDE 100 MG/1
100 TABLET, FILM COATED ORAL NIGHTLY
Qty: 90 TABLET | Refills: 3 | Status: SHIPPED | OUTPATIENT
Start: 2023-03-07

## 2023-03-09 ENCOUNTER — TELEPHONE (OUTPATIENT)
Dept: ENDOCRINOLOGY | Facility: CLINIC | Age: 48
End: 2023-03-09
Payer: COMMERCIAL

## 2023-03-09 ENCOUNTER — DOCUMENTATION (OUTPATIENT)
Dept: ENDOCRINOLOGY | Facility: CLINIC | Age: 48
End: 2023-03-09
Payer: COMMERCIAL

## 2023-03-09 NOTE — PROGRESS NOTES
MESSAGE SENT TO [facility]OUGHERTY REGARDING RIGOBERTO'S TANDEM PUMP    [facility]AB[facility] IS SUPPOSED TO REACH OUT TO RIGOBERTO

## 2023-03-09 NOTE — TELEPHONE ENCOUNTER
Pt reports FBG 40s; BG during day . Pt reports taking 24 units Lantus nightly. Decreased Lantus to 17 units daily, with plans to decrease to 13 units in 3 days if she continues to have low BG.    Spoke to Evi with [employer]. Tandem TSlim x2 insulin pump ready, pt just needs to call [employer]. Provided pt with phone number for [employer]. Pt will call back with any questions/issues

## 2023-03-09 NOTE — TELEPHONE ENCOUNTER
Patient called and left VM returning your call.  Asked for a call back when you can.    Phone [phone]    Thank you

## 2023-03-30 ENCOUNTER — OFFICE VISIT (OUTPATIENT)
Dept: FAMILY MEDICINE CLINIC | Facility: CLINIC | Age: 48
End: 2023-03-30
Payer: COMMERCIAL

## 2023-03-30 VITALS
HEIGHT: 63 IN | DIASTOLIC BLOOD PRESSURE: 83 MMHG | HEART RATE: 95 BPM | TEMPERATURE: 98.1 F | OXYGEN SATURATION: 98 % | SYSTOLIC BLOOD PRESSURE: 140 MMHG | RESPIRATION RATE: 18 BRPM | BODY MASS INDEX: 32.78 KG/M2 | WEIGHT: 185 LBS

## 2023-03-30 DIAGNOSIS — M25.362 KNEE INSTABILITY, LEFT: ICD-10-CM

## 2023-03-30 DIAGNOSIS — E78.2 MIXED HYPERLIPIDEMIA: ICD-10-CM

## 2023-03-30 DIAGNOSIS — F31.63 BIPOLAR 1 DISORDER, MIXED, SEVERE: ICD-10-CM

## 2023-03-30 DIAGNOSIS — E10.65 TYPE 1 DIABETES MELLITUS WITH HYPERGLYCEMIA: ICD-10-CM

## 2023-03-30 DIAGNOSIS — I10 ESSENTIAL HYPERTENSION: ICD-10-CM

## 2023-03-30 DIAGNOSIS — S83.262D ACUTE LATERAL MENISCAL TEAR WITH PERIPHERAL DETACHMENT, LEFT, SUBSEQUENT ENCOUNTER: ICD-10-CM

## 2023-03-30 DIAGNOSIS — M25.562 ACUTE PAIN OF LEFT KNEE: Primary | ICD-10-CM

## 2023-03-30 RX ORDER — INSULIN GLARGINE 100 [IU]/ML
INJECTION, SOLUTION SUBCUTANEOUS
COMMUNITY
Start: 2023-02-10

## 2023-03-30 RX ORDER — PROCHLORPERAZINE 25 MG/1
SUPPOSITORY RECTAL
COMMUNITY
Start: 2023-03-08

## 2023-03-30 NOTE — PROGRESS NOTES
"Chief Complaint   Patient presents with   • Knee Injury   • Knee Pain        Subjective   Krupa De La Rosa is a 47 y.o. female who presents today for left knee pain.     HPI   She states she has a torn meniscus in her left knee which an orthopedic surgeon injected with steroid yesterday. She has run out of PTO time and has been using FLMA. She thinks she may be quitting her current job.   She is manic today due to the steroid. She also contributes her blood pressure elevation to the steroid.     Allergies   Allergen Reactions   • Adhesive Tape Other (See Comments)   • Ciprofibrate    • Latex Other (See Comments)     Burns skin severely    • Levofloxacin Rash         OBJECTIVE:  Vitals:    03/30/23 1130   BP: 140/83   BP Location: Left arm   Patient Position: Sitting   Cuff Size: Adult   Pulse: 95   Resp: 18   Temp: 98.1 °F (36.7 °C)   TempSrc: Temporal   SpO2: 98%   Weight: 83.9 kg (185 lb)   Height: 160 cm (63\")     Physical Exam  Vitals and nursing note reviewed.   Constitutional:       Appearance: Normal appearance.   Cardiovascular:      Rate and Rhythm: Normal rate and regular rhythm.      Pulses: Normal pulses.      Heart sounds: Normal heart sounds.   Pulmonary:      Effort: Pulmonary effort is normal.      Breath sounds: Normal breath sounds.   Musculoskeletal:      Left knee: Swelling present. Decreased range of motion. Tenderness present. Abnormal meniscus.   Skin:     General: Skin is warm and dry.   Neurological:      General: No focal deficit present.      Mental Status: She is alert and oriented to person, place, and time.   Psychiatric:         Mood and Affect: Mood is elated. Affect is inappropriate.         Speech: Speech is rapid and pressured.         Behavior: Behavior normal.         Thought Content: Thought content normal.         Judgment: Judgment normal.                    ASSESSMENT/ PLAN:    Diagnoses and all orders for this visit:    1. Acute pain of left knee (Primary)    2. Knee " instability, left    3. Acute lateral meniscal tear with peripheral detachment, left, subsequent encounter    4. Bipolar 1 disorder, mixed, severe (HCC)  -     Cariprazine HCl (Vraylar) 1.5 MG capsule capsule; Take 1 capsule by mouth Daily.  Dispense: 14 capsule; Refill: 0    5. Type 1 diabetes mellitus with hyperglycemia (HCC)  -     Hemoglobin A1c    6. Essential hypertension  -     CBC w AUTO Differential    7. Mixed hyperlipidemia  -     Lipid Panel With LDL / HDL Ratio      Procedures     Management Plan:   She is known for having major depression and today she is manic. I gave her some vraylar samples to try.   An After Visit Summary was printed and given to the patient at discharge.    Follow-up: Return in about 10 days (around 4/9/2023) for Next scheduled follow up meds and labs.         Silverio Reed, TORIE 3/30/2023 13:00 CDT  This note was electronically signed.

## 2023-03-31 ENCOUNTER — PATIENT MESSAGE (OUTPATIENT)
Dept: FAMILY MEDICINE CLINIC | Facility: CLINIC | Age: 48
End: 2023-03-31
Payer: COMMERCIAL

## 2023-03-31 ENCOUNTER — TELEPHONE (OUTPATIENT)
Dept: FAMILY MEDICINE CLINIC | Facility: CLINIC | Age: 48
End: 2023-03-31
Payer: COMMERCIAL

## 2023-03-31 DIAGNOSIS — D72.829 LEUKOCYTOSIS, UNSPECIFIED TYPE: Primary | ICD-10-CM

## 2023-03-31 LAB
BASOPHILS # BLD AUTO: 0 X10E3/UL (ref 0–0.2)
BASOPHILS NFR BLD AUTO: 0 %
CHOLEST SERPL-MCNC: 145 MG/DL (ref 100–199)
EOSINOPHIL # BLD AUTO: 0 X10E3/UL (ref 0–0.4)
EOSINOPHIL NFR BLD AUTO: 0 %
ERYTHROCYTE [DISTWIDTH] IN BLOOD BY AUTOMATED COUNT: 12.7 % (ref 11.7–15.4)
HBA1C MFR BLD: 9.3 % (ref 4.8–5.6)
HCT VFR BLD AUTO: 45.6 % (ref 34–46.6)
HDLC SERPL-MCNC: 65 MG/DL
HGB BLD-MCNC: 16 G/DL (ref 11.1–15.9)
IMM GRANULOCYTES # BLD AUTO: 0.1 X10E3/UL (ref 0–0.1)
IMM GRANULOCYTES NFR BLD AUTO: 1 %
LDLC SERPL CALC-MCNC: 64 MG/DL (ref 0–99)
LDLC/HDLC SERPL: 1 RATIO (ref 0–3.2)
LYMPHOCYTES # BLD AUTO: 1.5 X10E3/UL (ref 0.7–3.1)
LYMPHOCYTES NFR BLD AUTO: 10 %
MCH RBC QN AUTO: 29.6 PG (ref 26.6–33)
MCHC RBC AUTO-ENTMCNC: 35.1 G/DL (ref 31.5–35.7)
MCV RBC AUTO: 84 FL (ref 79–97)
MONOCYTES # BLD AUTO: 0.8 X10E3/UL (ref 0.1–0.9)
MONOCYTES NFR BLD AUTO: 5 %
NEUTROPHILS # BLD AUTO: 12.5 X10E3/UL (ref 1.4–7)
NEUTROPHILS NFR BLD AUTO: 84 %
PLATELET # BLD AUTO: 282 X10E3/UL (ref 150–450)
RBC # BLD AUTO: 5.4 X10E6/UL (ref 3.77–5.28)
TRIGL SERPL-MCNC: 84 MG/DL (ref 0–149)
VLDLC SERPL CALC-MCNC: 16 MG/DL (ref 5–40)
WBC # BLD AUTO: 14.8 X10E3/UL (ref 3.4–10.8)

## 2023-03-31 RX ORDER — AMOXICILLIN AND CLAVULANATE POTASSIUM 875; 125 MG/1; MG/1
1 TABLET, FILM COATED ORAL 2 TIMES DAILY
Qty: 20 TABLET | Refills: 0 | Status: SHIPPED | OUTPATIENT
Start: 2023-03-31

## 2023-03-31 NOTE — TELEPHONE ENCOUNTER
----- Message from TORIE Torres sent at 3/31/2023  7:55 AM CDT -----  A1C elevated and higher than it has been in the last 4 years. Some of this could be due to steroid injection but unlikely to spike this soon.  WBC elevated at 14.8.. Unsure where any infection is but she needs to be on an antibiotic. I have sent it to [facility]. She will need to repeat CBC after completing augmentin.

## 2023-03-31 NOTE — TELEPHONE ENCOUNTER
From: Krupa De La Rosa  To: Silverio DOMENICA Derek  Sent: 3/31/2023 7:22 AM CDT  Subject: Nausea    Severe nausea since yesterday at 1430 it has not let up. Phenergan has seemed to help keep me from vomiting but the constant salivation and an urpy stomach is really pushing me and I cant sleep/rest due to the steroid injection in my knee. If I would go go the BR when I feel it coming on and looked at the toilet I know I would vomit so i am trying not to do that. I've slept very little with a bucket right beside me. I think i did get a solid 4 hours of sleep which is better than the night before. Also my WBC count is high ugh. Can this just be a virus that too shall pass. I hate being nauseous

## 2023-04-05 LAB — NONINV COLON CA DNA+OCC BLD SCRN STL QL: NEGATIVE

## 2023-04-05 RX ORDER — PROMETHAZINE HYDROCHLORIDE 25 MG/1
TABLET ORAL
Qty: 20 TABLET | Refills: 0 | Status: SHIPPED | OUTPATIENT
Start: 2023-04-05

## 2023-04-05 NOTE — TELEPHONE ENCOUNTER
Rx Refill Note  Requested Prescriptions     Pending Prescriptions Disp Refills   • promethazine (PHENERGAN) 25 MG tablet [Pharmacy Med Name: Promethazine HCl 25 MG Oral Tablet] 20 tablet 0     Sig: TAKE 1 TABLET BY MOUTH EVERY 6 HOURS AS NEEDED FOR NAUSEA FOR VOMITING      Last office visit with prescribing clinician: 3/30/2023   Last telemedicine visit with prescribing clinician: 4/13/2023   Next office visit with prescribing clinician: 4/13/2023       S 1-12-23      Christa Caldera MA  04/05/23, 09:51 CDT

## 2023-04-10 ENCOUNTER — TELEPHONE (OUTPATIENT)
Dept: ENDOCRINOLOGY | Facility: CLINIC | Age: 48
End: 2023-04-10
Payer: COMMERCIAL

## 2023-04-10 RX ORDER — INSULIN ASPART 100 [IU]/ML
INJECTION, SOLUTION INTRAVENOUS; SUBCUTANEOUS
Qty: 30 ML | Refills: 11 | Status: SHIPPED | OUTPATIENT
Start: 2023-04-10

## 2023-04-10 NOTE — TELEPHONE ENCOUNTER
Pt in office, just finished her [employer] pump training and needs her RX for Novalog to be sent in vials to [facility] in [place].    Thanks

## 2023-04-13 DIAGNOSIS — D72.829 LEUKOCYTOSIS, UNSPECIFIED TYPE: Primary | ICD-10-CM

## 2023-04-14 LAB
BASOPHILS # BLD AUTO: 0.1 X10E3/UL (ref 0–0.2)
BASOPHILS NFR BLD AUTO: 1 %
EOSINOPHIL # BLD AUTO: 0.2 X10E3/UL (ref 0–0.4)
EOSINOPHIL NFR BLD AUTO: 2 %
ERYTHROCYTE [DISTWIDTH] IN BLOOD BY AUTOMATED COUNT: 13.1 % (ref 11.7–15.4)
HCT VFR BLD AUTO: 44.1 % (ref 34–46.6)
HGB BLD-MCNC: 14.7 G/DL (ref 11.1–15.9)
IMM GRANULOCYTES # BLD AUTO: 0 X10E3/UL (ref 0–0.1)
IMM GRANULOCYTES NFR BLD AUTO: 0 %
LYMPHOCYTES # BLD AUTO: 2.9 X10E3/UL (ref 0.7–3.1)
LYMPHOCYTES NFR BLD AUTO: 37 %
MCH RBC QN AUTO: 30.1 PG (ref 26.6–33)
MCHC RBC AUTO-ENTMCNC: 33.3 G/DL (ref 31.5–35.7)
MCV RBC AUTO: 90 FL (ref 79–97)
MONOCYTES # BLD AUTO: 0.6 X10E3/UL (ref 0.1–0.9)
MONOCYTES NFR BLD AUTO: 8 %
NEUTROPHILS # BLD AUTO: 4.1 X10E3/UL (ref 1.4–7)
NEUTROPHILS NFR BLD AUTO: 52 %
PLATELET # BLD AUTO: 235 X10E3/UL (ref 150–450)
RBC # BLD AUTO: 4.89 X10E6/UL (ref 3.77–5.28)
WBC # BLD AUTO: 7.8 X10E3/UL (ref 3.4–10.8)

## 2023-04-15 DIAGNOSIS — F31.63 BIPOLAR 1 DISORDER, MIXED, SEVERE: ICD-10-CM

## 2023-04-17 ENCOUNTER — TELEPHONE (OUTPATIENT)
Dept: FAMILY MEDICINE CLINIC | Facility: CLINIC | Age: 48
End: 2023-04-17

## 2023-04-17 RX ORDER — FUROSEMIDE 20 MG/1
20 TABLET ORAL DAILY PRN
Qty: 90 TABLET | Refills: 3 | Status: SHIPPED | OUTPATIENT
Start: 2023-04-17

## 2023-04-17 NOTE — TELEPHONE ENCOUNTER
Caller: Krupa De La Rosa    Relationship: Self    Best call back number: [phone]    What is the best time to reach you: ANYTIME    Who are you requesting to speak with (clinical staff, provider,  specific staff member): CLINICA    What was the call regarding: WANTING TO ASK TORIE CAT IF SHE NEEDS TO SEE PATIENT FOLLOWING NORMAL LABS    Do you require a callback: YES

## 2023-04-17 NOTE — TELEPHONE ENCOUNTER
Rx Refill Note  Requested Prescriptions     Pending Prescriptions Disp Refills   • furosemide (LASIX) 20 MG tablet 90 tablet 3     Sig: Take 1 tablet by mouth Daily As Needed (SWELLING).   • Cariprazine HCl (Vraylar) 1.5 MG capsule capsule 14 capsule 0     Sig: Take 1 capsule by mouth Daily.      Last office visit with prescribing clinician: 3/30/2023     Next office visit with prescribing clinician: Visit date not found   {Sonia Estrada MA  04/17/23, 10:21 CDT

## 2023-04-19 ENCOUNTER — OFFICE VISIT (OUTPATIENT)
Dept: FAMILY MEDICINE CLINIC | Facility: CLINIC | Age: 48
End: 2023-04-19
Payer: COMMERCIAL

## 2023-04-19 VITALS
RESPIRATION RATE: 18 BRPM | WEIGHT: 186.4 LBS | OXYGEN SATURATION: 99 % | HEIGHT: 63 IN | BODY MASS INDEX: 33.03 KG/M2 | DIASTOLIC BLOOD PRESSURE: 84 MMHG | TEMPERATURE: 97.9 F | SYSTOLIC BLOOD PRESSURE: 134 MMHG | HEART RATE: 72 BPM

## 2023-04-19 DIAGNOSIS — Z00.00 ENCOUNTER FOR ANNUAL PHYSICAL EXAM: Primary | ICD-10-CM

## 2023-04-19 DIAGNOSIS — R52 GENERALIZED PAIN: ICD-10-CM

## 2023-04-19 DIAGNOSIS — F31.63 BIPOLAR 1 DISORDER, MIXED, SEVERE: ICD-10-CM

## 2023-04-19 DIAGNOSIS — E10.42 TYPE 1 DIABETES MELLITUS WITH DIABETIC POLYNEUROPATHY: ICD-10-CM

## 2023-04-19 DIAGNOSIS — E66.09 CLASS 1 OBESITY DUE TO EXCESS CALORIES WITH SERIOUS COMORBIDITY AND BODY MASS INDEX (BMI) OF 33.0 TO 33.9 IN ADULT: ICD-10-CM

## 2023-04-19 DIAGNOSIS — E10.65 UNCONTROLLED TYPE 1 DIABETES MELLITUS WITH HYPERGLYCEMIA, WITH LONG-TERM CURRENT USE OF INSULIN: ICD-10-CM

## 2023-04-19 RX ORDER — GABAPENTIN 300 MG/1
300 CAPSULE ORAL 3 TIMES DAILY
Qty: 90 CAPSULE | Refills: 2 | Status: SHIPPED | OUTPATIENT
Start: 2023-04-19

## 2023-04-19 NOTE — PROGRESS NOTES
"Chief Complaint   Patient presents with   • Annual Exam        Subjective   Krupa De La Rosa is a 47 y.o. female who presents today for annual physical exam.    HPI   She states her mother has told her she is manic. She was diagnosed as bipolar at age 17. She states she feels really good. She recently finished 2 weeks of Vraylar samples and feels the medication worked. She would like to continue this medication.   She is being followed by an endocrinologist for her diabetes. She has had an insulin pump x 1 week and her readings have been mostly normal.    Allergies   Allergen Reactions   • Adhesive Tape Other (See Comments)   • Ciprofibrate    • Latex Other (See Comments)     Burns skin severely    • Levofloxacin Rash         OBJECTIVE:  Vitals:    04/19/23 0850   BP: 134/84   BP Location: Left arm   Patient Position: Sitting   Cuff Size: Adult   Pulse: 72   Resp: 18   Temp: 97.9 °F (36.6 °C)   SpO2: 99%   Weight: 84.6 kg (186 lb 6.4 oz)   Height: 160 cm (63\")     Physical Exam  Vitals and nursing note reviewed.   Constitutional:       Appearance: Normal appearance.   HENT:      Head: Normocephalic and atraumatic.      Right Ear: Tympanic membrane, ear canal and external ear normal.      Left Ear: Tympanic membrane, ear canal and external ear normal.      Nose: Nose normal.      Mouth/Throat:      Mouth: Mucous membranes are moist.      Pharynx: Oropharynx is clear.   Eyes:      Extraocular Movements: Extraocular movements intact.      Pupils: Pupils are equal, round, and reactive to light.   Cardiovascular:      Rate and Rhythm: Normal rate and regular rhythm.      Pulses: Normal pulses.      Heart sounds: Normal heart sounds.   Pulmonary:      Effort: Pulmonary effort is normal.      Breath sounds: Normal breath sounds.   Abdominal:      General: Abdomen is flat. Bowel sounds are normal.      Palpations: Abdomen is soft.   Musculoskeletal:         General: Normal range of motion.      Cervical back: Normal " range of motion and neck supple.   Skin:     General: Skin is warm and dry.      Capillary Refill: Capillary refill takes less than 2 seconds.   Neurological:      General: No focal deficit present.      Mental Status: She is alert and oriented to person, place, and time.   Psychiatric:         Mood and Affect: Mood normal.         Behavior: Behavior normal.         Thought Content: Thought content normal.         Judgment: Judgment normal.         BMI is >= 30 and <35. (Class 1 Obesity). The following options were offered after discussion;: weight loss educational material (shared in after visit summary), exercise counseling/recommendations and nutrition counseling/recommendations       CMP        7/18/2022    08:48 1/12/2023    14:38   CMP   Glucose 111   241     BUN 7   8     Creatinine 0.95   0.87     EGFR  83.3     Sodium 142   139     Potassium 4.6   4.4     Chloride 105   104     Calcium 10.1   9.4     Total Protein 7.1      Total Protein  7.1     Albumin 4.5   4.2     Globulin 2.6      Globulin  2.9     Total Bilirubin 0.4   0.3     Alkaline Phosphatase 124   134     AST (SGOT) 17   19     ALT (SGPT) 13   13     Albumin/Globulin Ratio  1.4     BUN/Creatinine Ratio 7   9.2     Anion Gap  11.0       CBC w/diff        7/18/2022    08:48 3/30/2023    10:56 4/13/2023    09:03   CBC w/Diff   WBC 7.5   14.8   7.8     RBC 5.81   5.40   4.89     Hemoglobin 17.6   16.0   14.7     Hematocrit 51.4   45.6   44.1     MCV 89   84   90     MCH 30.3   29.6   30.1     MCHC 34.2   35.1   33.3     RDW 12.8   12.7   13.1     Platelets 218   282   235     Neutrophil Rel % 59   84   52     Lymphocyte Rel % 30   10   37     Monocyte Rel % 7   5   8     Eosinophil Rel % 3   0   2     Basophil Rel % 1   0   1       Lipid Panel        7/18/2022    08:48 3/30/2023    10:56   Lipid Panel   Total Cholesterol 99   145     Triglycerides 78   84     HDL Cholesterol 47   65     VLDL Cholesterol 16   16     LDL Cholesterol  36   64      LDL/HDL Ratio 0.8   1.0       TSH        7/14/2022    10:00   TSH   TSH 1.010       Most Recent A1C        3/30/2023    10:56   HGBA1C Most Recent   Hemoglobin A1C 9.3         ASSESSMENT/ PLAN:    Diagnoses and all orders for this visit:    1. Encounter for annual physical exam (Primary)    2. Bipolar 1 disorder, mixed, severe  -     Cariprazine HCl (Vraylar) 3 MG capsule capsule; Take 1 capsule by mouth Daily.  Dispense: 90 capsule; Refill: 3    3. Uncontrolled type 1 diabetes mellitus with hyperglycemia, with long-term current use of insulin    4. Type 1 diabetes mellitus with diabetic polyneuropathy  -     gabapentin (NEURONTIN) 300 MG capsule; Take 1 capsule by mouth 3 (Three) Times a Day.  Dispense: 90 capsule; Refill: 2    5. Generalized pain  -     gabapentin (NEURONTIN) 300 MG capsule; Take 1 capsule by mouth 3 (Three) Times a Day.  Dispense: 90 capsule; Refill: 2    6. Class 1 obesity due to excess calories with serious comorbidity and body mass index (BMI) of 33.0 to 33.9 in adult      Procedures     Management Plan:   Encouraged to eat a diet rich in fruit, vegetables, and lean protein.  Encouraged to drink 64 oz water daily.  Encouraged to exercise 150 minutes per week moderate cardio.  An After Visit Summary was printed and given to the patient at discharge.    Follow-up: No follow-ups on file.         TORIE Torres 4/19/2023 10:51 CDT  This note was electronically signed.

## 2023-04-27 DIAGNOSIS — F41.9 ANXIETY: ICD-10-CM

## 2023-04-27 RX ORDER — ALPRAZOLAM 1 MG/1
TABLET ORAL
Qty: 90 TABLET | Refills: 0 | Status: SHIPPED | OUTPATIENT
Start: 2023-04-27

## 2023-04-27 RX ORDER — PROCHLORPERAZINE 25 MG/1
SUPPOSITORY RECTAL
Qty: 3 EACH | Refills: 3 | Status: SHIPPED | OUTPATIENT
Start: 2023-04-27

## 2023-04-27 NOTE — TELEPHONE ENCOUNTER
Rx Refill Note  Requested Prescriptions     Pending Prescriptions Disp Refills   • Continuous Blood Gluc Sensor (Dexcom G6 Sensor) [Pharmacy Med Name: DEXCOM G6 SENSOR    MIS] 3 each 0     Sig: INJECT UNDER THE SKIN INTO THE APPROPRIATE AREA AS DIRECTED EVERY 10 DAYS      Last office visit with prescribing clinician: 4/19/2023   Last telemedicine visit with prescribing clinician: Visit date not found   Next office visit with prescribing clinician: Visit date not found     Gypsy Jones MA  04/27/23, 12:13 CDT

## 2023-04-27 NOTE — TELEPHONE ENCOUNTER
Rx Refill Note  Requested Prescriptions     Pending Prescriptions Disp Refills   • ALPRAZolam (XANAX) 1 MG tablet [Pharmacy Med Name: ALPRAZolam 1 MG Oral Tablet] 90 tablet 0     Sig: TAKE 1 TABLET BY MOUTH THREE TIMES DAILY      Last office visit with prescribing clinician: 4/19/2023   Last telemedicine visit with prescribing clinician: Visit date not found   Next office visit with prescribing clinician: Visit date not found   UDS 1/16/23  Diana Akbar MA  04/27/23, 10:18 CDT

## 2023-04-27 NOTE — TELEPHONE ENCOUNTER
Rx Refill Note  Requested Prescriptions     Pending Prescriptions Disp Refills   • ALPRAZolam (XANAX) 1 MG tablet [Pharmacy Med Name: ALPRAZolam 1 MG Oral Tablet] 90 tablet 0     Sig: TAKE 1 TABLET BY MOUTH THREE TIMES DAILY      Last office visit with prescribing clinician: 4/19/2023   Last telemedicine visit with prescribing clinician: Visit date not found   Next office visit with prescribing clinician: Visit date not found   {      Diana Akbar MA  04/27/23, 10:24 CDT

## 2023-05-01 ENCOUNTER — TELEPHONE (OUTPATIENT)
Dept: ENDOCRINOLOGY | Facility: CLINIC | Age: 48
End: 2023-05-01
Payer: COMMERCIAL

## 2023-05-01 NOTE — TELEPHONE ENCOUNTER
"Pt reports -400; unable to download Dexcom/Tandem reports. Pt reports feeling \"ketotic\" but has not yet checked for ketones. Advised pt to check for ketones and go to ER if moderate/large ketones present. Pt reports pump settings as follows :MN .7, Carb 13, Correction 50. Adjusted pump settings to MN 0.8, Carb 10, Correction 40. Pt using Control IQ. Pt will call back with any further questions,.   "

## 2023-05-03 ENCOUNTER — DOCUMENTATION (OUTPATIENT)
Dept: ENDOCRINOLOGY | Facility: CLINIC | Age: 48
End: 2023-05-03
Payer: COMMERCIAL

## 2023-05-03 NOTE — PROGRESS NOTES
PA FOR BAQSIMI ONE PACK 3 MG SUBMITTED VIA COVER MY MEDS.    BASQIMI 3 MG SPRAY APPROVED FROM 5/3/23 TO 5/2/24    SENT TO MED REC

## 2023-05-08 ENCOUNTER — OFFICE VISIT (OUTPATIENT)
Dept: FAMILY MEDICINE CLINIC | Facility: CLINIC | Age: 48
End: 2023-05-08
Payer: MEDICAID

## 2023-05-08 VITALS
TEMPERATURE: 98 F | OXYGEN SATURATION: 98 % | WEIGHT: 188.4 LBS | DIASTOLIC BLOOD PRESSURE: 76 MMHG | HEIGHT: 64 IN | HEART RATE: 92 BPM | RESPIRATION RATE: 18 BRPM | BODY MASS INDEX: 32.17 KG/M2 | SYSTOLIC BLOOD PRESSURE: 122 MMHG

## 2023-05-08 DIAGNOSIS — E10.65 UNCONTROLLED TYPE 1 DIABETES MELLITUS WITH HYPERGLYCEMIA, WITH LONG-TERM CURRENT USE OF INSULIN: Primary | ICD-10-CM

## 2023-05-08 DIAGNOSIS — E11.649 HYPOGLYCEMIC EPISODE IN PATIENT WITH DIABETES MELLITUS: ICD-10-CM

## 2023-05-08 DIAGNOSIS — R11.0 NAUSEA: ICD-10-CM

## 2023-05-08 DIAGNOSIS — F31.63 BIPOLAR 1 DISORDER, MIXED, SEVERE: ICD-10-CM

## 2023-05-08 NOTE — LETTER
May 8, 2023     Patient: Krupa eD La Rosa   YOB: 1975   Date of Visit: 5/8/2023       To Whom It May Concern:    It is my medical opinion that Krupa De La Rosa  should be excused from work on 4/5/2023 .           Sincerely,        TORIE Torres    CC: No Recipients

## 2023-05-08 NOTE — PROGRESS NOTES
"Chief Complaint   Patient presents with   • Follow-up     Insult pump         Subjective   Krupa Salomón De La Rosa is a 47 y.o. female who presents today for follow up medications.     HPI   She states she is doing really well on her current medications. Her insulin pump malfunctioned for a few days and she was having to use her basil and fast acting insulin. This problem has been fixed.      She is doing well on the vraylar at 3 mg. She states she feels good and her mood is stable. She seems to be more able to focus and agrees with this.    She has quit her job at the [place] and is going to work at [employer]. She is afraid of the consequences of not having a medication plan with her insurance. She feels we will have to change some of her medications due to insurance change.     She needs a work excuse for 4/5/2023 at which time she sent me a my chart message that she was sick with nausea and hypoglycemia    Allergies   Allergen Reactions   • Adhesive Tape Other (See Comments)   • Ciprofibrate    • Latex Other (See Comments)     Burns skin severely    • Levofloxacin Rash         OBJECTIVE:  Vitals:    05/08/23 0907   BP: 122/76   BP Location: Left arm   Patient Position: Sitting   Cuff Size: Adult   Pulse: 92   Resp: 18   Temp: 98 °F (36.7 °C)   TempSrc: Temporal   SpO2: 98%   Weight: 85.5 kg (188 lb 6.4 oz)   Height: 162.6 cm (64\")     Physical Exam  Vitals and nursing note reviewed.   Constitutional:       Appearance: Normal appearance.   Cardiovascular:      Rate and Rhythm: Normal rate and regular rhythm.      Pulses: Normal pulses.      Heart sounds: Normal heart sounds.   Pulmonary:      Effort: Pulmonary effort is normal.      Breath sounds: Normal breath sounds.   Skin:     General: Skin is warm and dry.   Neurological:      General: No focal deficit present.      Mental Status: She is alert and oriented to person, place, and time.   Psychiatric:         Mood and Affect: Mood normal.         Behavior: " Behavior normal.         Thought Content: Thought content normal.         Judgment: Judgment normal.         BMI is >= 30 and <35. (Class 1 Obesity). The following options were offered after discussion;: weight loss educational material (shared in after visit summary), exercise counseling/recommendations and nutrition counseling/recommendations           ASSESSMENT/ PLAN:    Diagnoses and all orders for this visit:    1. Uncontrolled type 1 diabetes mellitus with hyperglycemia, with long-term current use of insulin (Primary)    2. Bipolar 1 disorder, mixed, severe    3. Hypoglycemic episode in patient with diabetes mellitus    4. Nausea      Procedures     Management Plan:   Work excuse was written. We will make decisions about changing any medications after she gets her new insurance.   We will continue her on her vraylar 3 mg.   An After Visit Summary was printed and given to the patient at discharge.    Follow-up: Return in about 7 weeks (around 6/28/2023) for Next scheduled follow up with labs prior.         TORIE Torres 5/9/2023 08:24 CDT  This note was electronically signed.

## 2023-05-08 NOTE — LETTER
May 8, 2023     Patient: Krupa De La Rosa   YOB: 1975   Date of Visit: 4/5/2023       To Whom It May Concern:    It is my medical opinion that Krupa De La Rosa  should be excused from work on 4/5/2023 .           Sincerely,        TORIE Torres    CC: No Recipients

## 2023-05-19 DIAGNOSIS — I25.708 CORONARY ARTERY DISEASE OF BYPASS GRAFT OF NATIVE HEART WITH STABLE ANGINA PECTORIS: ICD-10-CM

## 2023-05-19 DIAGNOSIS — R00.0 TACHYCARDIA: Primary | ICD-10-CM

## 2023-05-19 DIAGNOSIS — I25.10 CORONARY ARTERY DISEASE INVOLVING NATIVE CORONARY ARTERY OF NATIVE HEART WITHOUT ANGINA PECTORIS: ICD-10-CM

## 2023-05-27 DIAGNOSIS — F41.9 ANXIETY: ICD-10-CM

## 2023-05-30 RX ORDER — ALPRAZOLAM 1 MG/1
TABLET ORAL
Qty: 90 TABLET | Refills: 0 | Status: SHIPPED | OUTPATIENT
Start: 2023-05-30

## 2023-05-30 NOTE — TELEPHONE ENCOUNTER
UDS 01/16/2023  CC 01/16/2023    Rx Refill Note  Requested Prescriptions     Pending Prescriptions Disp Refills    ALPRAZolam (XANAX) 1 MG tablet [Pharmacy Med Name: ALPRAZolam 1 MG Oral Tablet] 90 tablet 0     Sig: TAKE 1 TABLET BY MOUTH THREE TIMES DAILY      Last office visit with prescribing clinician: 5/8/2023   Last telemedicine visit with prescribing clinician: Visit date not found   Next office visit with prescribing clinician: Visit date not found                         Would you like a call back once the refill request has been completed: [] Yes [] No    If the office needs to give you a call back, can they leave a voicemail: [] Yes [] No    Shanna Bales LPN  05/30/23, 10:41 CDT

## 2023-05-31 ENCOUNTER — TELEPHONE (OUTPATIENT)
Dept: FAMILY MEDICINE CLINIC | Facility: CLINIC | Age: 48
End: 2023-05-31

## 2023-05-31 NOTE — TELEPHONE ENCOUNTER
Records not in chart- will check with med records to see if there is a copy from paper chart or pre epic file.

## 2023-05-31 NOTE — TELEPHONE ENCOUNTER
Caller: Krupa De La Rosa Salomón    Relationship: Self    Best call back number: [phone]    What form or medical record are you requesting: PROOF OF THE INH THERAPY FOR POSITIVE TB SKIN TEST    Who is requesting this form or medical record from you: SELF    How would you like to receive the form or medical records (pick-up, mail, fax):     Timeframe paperwork needed: AS SOON AS POSSIBLE    Additional notes: PATIENT TESTED POSITIVE FOR TB IN ABOUT 2006/2007. SHE IS WONDERING IF WE STILL HAVE THE PAPERWORK THAT SHOWS SHE DID RECEIVE THE THERAPY. SHE GOT A NEW JOB AND THEY ARE NEEDING THAT PAPERWORK. IF WE DO HAVE IT PLEASE CALL BACK TO LET HER KNOW IT'S READY FOR .

## 2023-05-31 NOTE — TELEPHONE ENCOUNTER
Called [facility] med records at  the hospital to see if they could locate any previous documentation for the pt. Nothing found on file for pt. 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 Pt verbalized understanding.

## 2023-06-03 DIAGNOSIS — I25.708 CORONARY ARTERY DISEASE OF BYPASS GRAFT OF NATIVE HEART WITH STABLE ANGINA PECTORIS: ICD-10-CM

## 2023-06-05 RX ORDER — EVOLOCUMAB 140 MG/ML
INJECTION, SOLUTION SUBCUTANEOUS
Qty: 2 ML | Refills: 5 | Status: SHIPPED | OUTPATIENT
Start: 2023-06-05

## 2023-06-05 NOTE — TELEPHONE ENCOUNTER
Rx Refill Note  Requested Prescriptions     Pending Prescriptions Disp Refills    Repatha solution prefilled syringe injection [Pharmacy Med Name: Repatha 140 MG/ML Subcutaneous Solution Prefilled Syringe] 2 mL 0     Sig: INJECT 1 ML UNDER THE SKIN INTO THE APPROPRIATE AREA AS DIRECTED EVERY 14 DAYS      Last office visit with prescribing clinician: 5/8/2023   Last telemedicine visit with prescribing clinician: Visit date not found   Next office visit with prescribing clinician: Visit date not found     Gypsy Jones MA  06/05/23, 07:44 CDT

## 2023-06-12 ENCOUNTER — OFFICE VISIT (OUTPATIENT)
Dept: CARDIOLOGY | Facility: CLINIC | Age: 48
End: 2023-06-12
Payer: MEDICAID

## 2023-06-12 VITALS
SYSTOLIC BLOOD PRESSURE: 112 MMHG | BODY MASS INDEX: 32.44 KG/M2 | DIASTOLIC BLOOD PRESSURE: 78 MMHG | HEART RATE: 70 BPM | OXYGEN SATURATION: 99 % | WEIGHT: 190 LBS | HEIGHT: 64 IN

## 2023-06-12 DIAGNOSIS — E78.2 MIXED HYPERLIPIDEMIA: ICD-10-CM

## 2023-06-12 DIAGNOSIS — I25.708 CORONARY ARTERY DISEASE OF BYPASS GRAFT OF NATIVE HEART WITH STABLE ANGINA PECTORIS: Primary | ICD-10-CM

## 2023-06-12 DIAGNOSIS — I10 ESSENTIAL HYPERTENSION: ICD-10-CM

## 2023-06-12 PROBLEM — I25.810 CORONARY ARTERY DISEASE INVOLVING CORONARY BYPASS GRAFT OF NATIVE HEART: Status: ACTIVE | Noted: 2023-06-12

## 2023-06-12 PROCEDURE — 3078F DIAST BP <80 MM HG: CPT | Performed by: INTERNAL MEDICINE

## 2023-06-12 PROCEDURE — 3074F SYST BP LT 130 MM HG: CPT | Performed by: INTERNAL MEDICINE

## 2023-06-12 PROCEDURE — 99204 OFFICE O/P NEW MOD 45 MIN: CPT | Performed by: INTERNAL MEDICINE

## 2023-06-12 PROCEDURE — 93000 ELECTROCARDIOGRAM COMPLETE: CPT | Performed by: INTERNAL MEDICINE

## 2023-06-12 NOTE — LETTER
June 12, 2023     TORIE Ortiz  [address] [facility] [address]    Patient: Krupa De La Rosa   YOB: 1975   Date of Visit: 6/12/2023       Dear TORIE Ortiz,    Thank you for referring Krupa De La Rosa to me for evaluation. Below is a copy of my consult note.    If you have questions, please do not hesitate to call me. I look forward to following Krupa along with you.         Sincerely,        Eben Fernandez MD        CC: No Recipients      Reason for Visit: Tachycardia, coronary artery disease with history of bypass surgery.    HPI:  Krupa De La Rosa is a 47 y.o. female is being seen for consultation today at the request of TORIE Ortiz for evaluation of tachycardia and assistance with management of coronary artery disease.  She is a former patient of Dr. Gonzalez and is transitioning care.  She last had a heart catheterization on 6/22/2021 at [facility] showing single-vessel disease of the LAD and OM1 with patent LIMA to LAD and SVG to OM 3.  Medical management was recommended.  She is considering knee surgery in the near future.  She has a meniscal tear but is trying to put off the surgery as long as possible.  She reports occasional twinges in her chest.  It is hard for her to describe the pain.  The pain is deep and dull in quality.  It happens off and on all day long.  It tends to come on randomly.  She notices it more when she is sitting down at night.  She does not have any exertional symptoms but she does not do much exertion other than walking up stairs.  She is starting to get her diabetes better now that she has an insulin pump.      Previous Cardiac Testing and Procedures:  -PFT (7/9/2018) normal spirometry, peak expiratory flow is normal  -2-vessel CABG (7/2018) LIMA to LAD, SVG to OM 3  -Echo (11/14/2018) EF 60-65%, normal diastolic function, mild MR and TR, normal RVSP  -Nuclear stress (6/9/2021) possible small to moderate,  mild mid to apical lateral ischemia, EF 80%  -[facility] (2021) single-vessel branch disease involving the LAD and OM of dominant LCx, patent LIMA to LAD, patent SVG to OM 3, medical management recommended    Lab data:  -BMP (2023) creatinine 0.87, potassium 4.4, sodium 139  -Lipid panel (3/30/2023) total cholesterol 145, HDL 65, LDL 64, triglycerides 84      Patient Active Problem List   Diagnosis   • Type 1 diabetes mellitus with hyperglycemia   • Essential hypertension   • Diabetic polyneuropathy associated with type 1 diabetes mellitus   • Mixed hyperlipidemia   • Severe episode of recurrent major depressive disorder, without psychotic features   • Class 1 obesity due to excess calories with serious comorbidity and body mass index (BMI) of 32.0 to 32.9 in adult   • Generalized pain   • Coronary artery disease of bypass 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       Social History     Tobacco Use   • Smoking status: Former     Packs/day: 0.25     Years: 15.00     Pack years: 3.75     Types: Cigarettes     Quit date:      Years since quittin.4   • Smokeless tobacco: Never   Vaping Use   • Vaping Use: Never used   Substance Use Topics   • Alcohol use: Not Currently   • Drug use: Never       Family History   Problem Relation Age of Onset   • No Known Problems Mother    • Cancer Father    • Cancer Maternal Grandmother    • No Known Problems Maternal Grandfather    • Cancer Paternal Grandmother    • Heart disease Paternal Grandmother        The following portions of the patient's history were reviewed and updated as appropriate: allergies, current medications, past family history, past medical history, past social history, past surgical history and problem list.      Current Outpatient Medications:   •  ALPRAZolam (XANAX) 1 MG tablet, TAKE 1 TABLET BY MOUTH THREE TIMES DAILY, Disp: 90 tablet, Rfl: 0  •  amitriptyline (ELAVIL) 100 MG tablet, Take 1 tablet by mouth Every Night., Disp: 90 tablet, Rfl:  3  •  aspirin 325 MG tablet, Take 1 tablet by mouth Every Night., Disp: , Rfl:   •  Cariprazine HCl (Vraylar) 3 MG capsule capsule, Take 1 capsule by mouth Daily., Disp: 90 capsule, Rfl: 3  •  cloNIDine (CATAPRES) 0.1 MG tablet, Take 1 tablet by mouth 2 (Two) Times a Day., Disp: , Rfl:   •  Continuous Blood Gluc Sensor (Dexcom G6 Sensor), INJECT UNDER THE SKIN INTO THE APPROPRIATE AREA AS DIRECTED EVERY 10 DAYS, Disp: 3 each, Rfl: 3  •  Continuous Blood Gluc Transmit (Dexcom G6 Transmitter) misc, 4 TIMES DAILY, Disp: , Rfl:   •  desvenlafaxine (PRISTIQ) 50 MG 24 hr tablet, Take 1 tablet by mouth once daily, Disp: 90 tablet, Rfl: 3  •  Evolocumab (Repatha) solution prefilled syringe injection, INJECT 1 ML UNDER THE SKIN INTO THE APPROPRIATE AREA AS DIRECTED EVERY 14 DAYS, Disp: 2 mL, Rfl: 5  •  furosemide (LASIX) 20 MG tablet, Take 1 tablet by mouth Daily As Needed (SWELLING)., Disp: 90 tablet, Rfl: 3  •  gabapentin (NEURONTIN) 300 MG capsule, Take 1 capsule by mouth 3 (Three) Times a Day., Disp: 90 capsule, Rfl: 2  •  Glucagon (Baqsimi One Pack) 3 MG/DOSE powder, 1 each into the nostril(s) as directed by provider As Needed (hypoglycemia). Apply intranasal if hypoglycemia, Disp: 2 each, Rfl: 11  •  Insulin Aspart (novoLOG) 100 UNIT/ML injection, 90 units daily , E11.65, Disp: 30 mL, Rfl: 11  •  insulin glargine (LANTUS, SEMGLEE) 100 UNIT/ML injection, Inject 30 Units under the skin into the appropriate area as directed Daily., Disp: , Rfl:   •  Insulin Glargine, 2 Unit Dial, (Toujeo Max SoloStar) 300 UNIT/ML solution pen-injector injection, Inject 50 Units under the skin into the appropriate area as directed Daily., Disp: 5 pen, Rfl: 11  •  irbesartan (AVAPRO) 75 MG tablet, Take 1 tablet by mouth Every Night., Disp: , Rfl:   •  isosorbide mononitrate (IMDUR) 30 MG 24 hr tablet, Take 1 tablet by mouth Daily., Disp: , Rfl:   •  metoprolol succinate XL (Toprol XL) 50 MG 24 hr tablet, Take 1.5 tablets by mouth Daily.,  "Disp: 135 tablet, Rfl: 3  •  nitroglycerin (NITROSTAT) 0.4 MG SL tablet, Place 1 tablet under the tongue Every 5 (Five) Minutes As Needed for Chest Pain., Disp: , Rfl:   •  omeprazole (priLOSEC) 40 MG capsule, Take 1 capsule by mouth Daily., Disp: 30 capsule, Rfl: 0  •  promethazine (PHENERGAN) 25 MG tablet, TAKE 1 TABLET BY MOUTH EVERY 6 HOURS AS NEEDED FOR NAUSEA FOR VOMITING, Disp: 20 tablet, Rfl: 0    Review of Systems   Constitutional: Negative for chills and fever.   Cardiovascular:  Positive for chest pain and dyspnea on exertion. Negative for paroxysmal nocturnal dyspnea.   Respiratory:  Negative for cough and wheezing.    Skin:  Negative for rash.   Gastrointestinal:  Negative for abdominal pain and heartburn.   Neurological:  Negative for dizziness and numbness.     Objective    /78 (BP Location: Left arm, Patient Position: Sitting, Cuff Size: Adult)   Pulse 70   Ht 162.6 cm (64\")   Wt 86.2 kg (190 lb)   SpO2 99%   BMI 32.61 kg/m²   Constitutional:       Appearance: Well-developed.   HENT:      Head: Normocephalic and atraumatic.   Pulmonary:      Effort: Pulmonary effort is normal.      Breath sounds: Normal breath sounds.   Cardiovascular:      Normal rate. Regular rhythm.      Murmurs: There is no murmur.      No gallop.  No click.   Edema:     Peripheral edema absent.   Skin:     General: Skin is warm and dry.   Neurological:      Mental Status: Alert and oriented to person, place, and time.       ECG 12 Lead    Date/Time: 6/12/2023 9:37 AM  Performed by: Eben Fernandez MD  Authorized by: Eben Fernandez MD   Comparison: compared with previous ECG from 7/18/2022  Similar to previous ECG  Rhythm: sinus rhythm  Rate: normal    Clinical impression: normal ECG          ICD-10-CM ICD-9-CM   1. Coronary artery disease of bypass 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  I25.708 414.05     413.9   2. Essential hypertension  I10 401.9   3. Mixed hyperlipidemia  E78.2 272.2 "         Assessment/Plan:  1.  Coronary artery disease: History of two-vessel CABG 7/2019.  Patent bypass grafts on [facility] from 6/22/2021.  Patient reports intermittent chest pain symptoms with atypical features.  Evaluate further with a stress echo.  Consider changing aspirin to 81 mg.  Continue Repatha, Imdur, metoprolol, and nitroglycerin.    2.  Essential hypertension: Blood pressures well controlled today.  Continue metoprolol and irbesartan.    3.  Mixed hyperlipidemia: Lipid panel from 3/30/2023 showed good control.  Continue Repatha.

## 2023-06-12 NOTE — PROGRESS NOTES
Reason for Visit: Tachycardia, coronary artery disease with history of bypass surgery.    HPI:  Krupa De La Rosa is a 47 y.o. female is being seen for consultation today at the request of TORIE Ortiz for evaluation of tachycardia and assistance with management of coronary artery disease.  She is a former patient of Dr. Gonzalez and is transitioning care.  She last had a heart catheterization on 6/22/2021 at [facility] showing single-vessel disease of the LAD and OM1 with patent LIMA to LAD and SVG to OM 3.  Medical management was recommended.  She is considering knee surgery in the near future.  She has a meniscal tear but is trying to put off the surgery as long as possible.  She reports occasional twinges in her chest.  It is hard for her to describe the pain.  The pain is deep and dull in quality.  It happens off and on all day long.  It tends to come on randomly.  She notices it more when she is sitting down at night.  She does not have any exertional symptoms but she does not do much exertion other than walking up stairs.  She is starting to get her diabetes better now that she has an insulin pump.      Previous Cardiac Testing and Procedures:  -PFT (7/9/2018) normal spirometry, peak expiratory flow is normal  -2-vessel CABG (7/2018) LIMA to LAD, SVG to OM 3  -Echo (11/14/2018) EF 60-65%, normal diastolic function, mild MR and TR, normal RVSP  -Nuclear stress (6/9/2021) possible small to moderate, mild mid to apical lateral ischemia, EF 80%  -LHC (6/22/2021) single-vessel branch disease involving the LAD and OM of dominant LCx, patent LIMA to LAD, patent SVG to OM 3, medical management recommended    Lab data:  -BMP (1/12/2023) creatinine 0.87, potassium 4.4, sodium 139  -Lipid panel (3/30/2023) total cholesterol 145, HDL 65, LDL 64, triglycerides 84      Patient Active Problem List   Diagnosis   • Type 1 diabetes mellitus with hyperglycemia   • Essential hypertension   • Diabetic  polyneuropathy associated with type 1 diabetes mellitus   • Mixed hyperlipidemia   • Severe episode of recurrent major depressive disorder, without psychotic features   • Class 1 obesity due to excess calories with serious comorbidity and body mass index (BMI) of 32.0 to 32.9 in adult   • Generalized pain   • Coronary artery disease of bypass 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       Social History     Tobacco Use   • Smoking status: Former     Packs/day: 0.25     Years: 15.00     Pack years: 3.75     Types: Cigarettes     Quit date:      Years since quittin.4   • Smokeless tobacco: Never   Vaping Use   • Vaping Use: Never used   Substance Use Topics   • Alcohol use: Not Currently   • Drug use: Never       Family History   Problem Relation Age of Onset   • No Known Problems Mother    • Cancer Father    • Cancer Maternal Grandmother    • No Known Problems Maternal Grandfather    • Cancer Paternal Grandmother    • Heart disease Paternal Grandmother        The following portions of the patient's history were reviewed and updated as appropriate: allergies, current medications, past family history, past medical history, past social history, past surgical history and problem list.      Current Outpatient Medications:   •  ALPRAZolam (XANAX) 1 MG tablet, TAKE 1 TABLET BY MOUTH THREE TIMES DAILY, Disp: 90 tablet, Rfl: 0  •  amitriptyline (ELAVIL) 100 MG tablet, Take 1 tablet by mouth Every Night., Disp: 90 tablet, Rfl: 3  •  aspirin 325 MG tablet, Take 1 tablet by mouth Every Night., Disp: , Rfl:   •  Cariprazine HCl (Vraylar) 3 MG capsule capsule, Take 1 capsule by mouth Daily., Disp: 90 capsule, Rfl: 3  •  cloNIDine (CATAPRES) 0.1 MG tablet, Take 1 tablet by mouth 2 (Two) Times a Day., Disp: , Rfl:   •  Continuous Blood Gluc Sensor (Dexcom G6 Sensor), INJECT UNDER THE SKIN INTO THE APPROPRIATE AREA AS DIRECTED EVERY 10 DAYS, Disp: 3 each, Rfl: 3  •  Continuous Blood Gluc Transmit (Dexcom G6  Transmitter) misc, 4 TIMES DAILY, Disp: , Rfl:   •  desvenlafaxine (PRISTIQ) 50 MG 24 hr tablet, Take 1 tablet by mouth once daily, Disp: 90 tablet, Rfl: 3  •  Evolocumab (Repatha) solution prefilled syringe injection, INJECT 1 ML UNDER THE SKIN INTO THE APPROPRIATE AREA AS DIRECTED EVERY 14 DAYS, Disp: 2 mL, Rfl: 5  •  furosemide (LASIX) 20 MG tablet, Take 1 tablet by mouth Daily As Needed (SWELLING)., Disp: 90 tablet, Rfl: 3  •  gabapentin (NEURONTIN) 300 MG capsule, Take 1 capsule by mouth 3 (Three) Times a Day., Disp: 90 capsule, Rfl: 2  •  Glucagon (Baqsimi One Pack) 3 MG/DOSE powder, 1 each into the nostril(s) as directed by provider As Needed (hypoglycemia). Apply intranasal if hypoglycemia, Disp: 2 each, Rfl: 11  •  Insulin Aspart (novoLOG) 100 UNIT/ML injection, 90 units daily , E11.65, Disp: 30 mL, Rfl: 11  •  insulin glargine (LANTUS, SEMGLEE) 100 UNIT/ML injection, Inject 30 Units under the skin into the appropriate area as directed Daily., Disp: , Rfl:   •  Insulin Glargine, 2 Unit Dial, (Toujeo Max SoloStar) 300 UNIT/ML solution pen-injector injection, Inject 50 Units under the skin into the appropriate area as directed Daily., Disp: 5 pen, Rfl: 11  •  irbesartan (AVAPRO) 75 MG tablet, Take 1 tablet by mouth Every Night., Disp: , Rfl:   •  isosorbide mononitrate (IMDUR) 30 MG 24 hr tablet, Take 1 tablet by mouth Daily., Disp: , Rfl:   •  metoprolol succinate XL (Toprol XL) 50 MG 24 hr tablet, Take 1.5 tablets by mouth Daily., Disp: 135 tablet, Rfl: 3  •  nitroglycerin (NITROSTAT) 0.4 MG SL tablet, Place 1 tablet under the tongue Every 5 (Five) Minutes As Needed for Chest Pain., Disp: , Rfl:   •  omeprazole (priLOSEC) 40 MG capsule, Take 1 capsule by mouth Daily., Disp: 30 capsule, Rfl: 0  •  promethazine (PHENERGAN) 25 MG tablet, TAKE 1 TABLET BY MOUTH EVERY 6 HOURS AS NEEDED FOR NAUSEA FOR VOMITING, Disp: 20 tablet, Rfl: 0    Review of Systems   Constitutional: Negative for chills and fever.  "  Cardiovascular:  Positive for chest pain and dyspnea on exertion. Negative for paroxysmal nocturnal dyspnea.   Respiratory:  Negative for cough and wheezing.    Skin:  Negative for rash.   Gastrointestinal:  Negative for abdominal pain and heartburn.   Neurological:  Negative for dizziness and numbness.     Objective   /78 (BP Location: Left arm, Patient Position: Sitting, Cuff Size: Adult)   Pulse 70   Ht 162.6 cm (64\")   Wt 86.2 kg (190 lb)   SpO2 99%   BMI 32.61 kg/m²   Constitutional:       Appearance: Well-developed.   HENT:      Head: Normocephalic and atraumatic.   Pulmonary:      Effort: Pulmonary effort is normal.      Breath sounds: Normal breath sounds.   Cardiovascular:      Normal rate. Regular rhythm.      Murmurs: There is no murmur.      No gallop.  No click.   Edema:     Peripheral edema absent.   Skin:     General: Skin is warm and dry.   Neurological:      Mental Status: Alert and oriented to person, place, and time.       ECG 12 Lead    Date/Time: 6/12/2023 9:37 AM  Performed by: Eben Fernandez MD  Authorized by: Eben Fernandez MD   Comparison: compared with previous ECG from 7/18/2022  Similar to previous ECG  Rhythm: sinus rhythm  Rate: normal    Clinical impression: normal ECG          ICD-10-CM ICD-9-CM   1. Coronary artery disease of bypass 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  I25.708 414.05     413.9   2. Essential hypertension  I10 401.9   3. Mixed hyperlipidemia  E78.2 272.2         Assessment/Plan:  1.  Coronary artery disease: History of two-vessel CABG 7/2019.  Patent bypass grafts on [facility] from 6/22/2021.  Patient reports intermittent chest pain symptoms with atypical features.  Evaluate further with a stress echo.  Consider changing aspirin to 81 mg.  Continue Repatha, Imdur, metoprolol, and nitroglycerin.    2.  Essential hypertension: Blood pressures well controlled today.  Continue metoprolol and irbesartan.    3.  Mixed hyperlipidemia: Lipid panel " from 3/30/2023 showed good control.  Continue Repatha.

## 2023-06-21 LAB
BH CV STRESS BP STAGE 1: NORMAL
BH CV STRESS BP STAGE 2: NORMAL
BH CV STRESS DURATION MIN STAGE 1: 3
BH CV STRESS DURATION MIN STAGE 2: 3
BH CV STRESS DURATION MIN STAGE 3: 0
BH CV STRESS DURATION SEC STAGE 1: 0
BH CV STRESS DURATION SEC STAGE 2: 0
BH CV STRESS DURATION SEC STAGE 3: 39
BH CV STRESS GRADE STAGE 1: 10
BH CV STRESS GRADE STAGE 2: 12
BH CV STRESS GRADE STAGE 3: 14
BH CV STRESS HR STAGE 1: 109
BH CV STRESS HR STAGE 2: 123
BH CV STRESS HR STAGE 3: 130
BH CV STRESS METS STAGE 1: 5
BH CV STRESS METS STAGE 2: 7.5
BH CV STRESS METS STAGE 3: 10
BH CV STRESS PROTOCOL 1: NORMAL
BH CV STRESS PROTOCOL 2 BP STAGE 1: NORMAL
BH CV STRESS PROTOCOL 2 BP STAGE 2: NORMAL
BH CV STRESS PROTOCOL 2 BP STAGE 3: 131
BH CV STRESS PROTOCOL 2 BP STAGE 4: 139
BH CV STRESS PROTOCOL 2 BP STAGE 5: 148
BH CV STRESS PROTOCOL 2 DOSE DOBUTAMINE STAGE 1: 10
BH CV STRESS PROTOCOL 2 DOSE DOBUTAMINE STAGE 2: 20
BH CV STRESS PROTOCOL 2 DOSE DOBUTAMINE STAGE 3: 30
BH CV STRESS PROTOCOL 2 DOSE DOBUTAMINE STAGE 4: 40
BH CV STRESS PROTOCOL 2 DURATION MIN STAGE 1: 3
BH CV STRESS PROTOCOL 2 DURATION MIN STAGE 2: 3
BH CV STRESS PROTOCOL 2 DURATION MIN STAGE 3: 3
BH CV STRESS PROTOCOL 2 DURATION MIN STAGE 4: 3
BH CV STRESS PROTOCOL 2 DURATION MIN STAGE 5: 1 MIN
BH CV STRESS PROTOCOL 2 DURATION SEC STAGE 1: 0
BH CV STRESS PROTOCOL 2 DURATION SEC STAGE 2: 0
BH CV STRESS PROTOCOL 2 DURATION SEC STAGE 3: 0
BH CV STRESS PROTOCOL 2 DURATION SEC STAGE 4: 0
BH CV STRESS PROTOCOL 2 DURATION SEC STAGE 5: 37 SEC
BH CV STRESS PROTOCOL 2 HR STAGE 1: 86
BH CV STRESS PROTOCOL 2 HR STAGE 2: 95
BH CV STRESS PROTOCOL 2 HR STAGE 3: NORMAL
BH CV STRESS PROTOCOL 2 HR STAGE 4: NORMAL
BH CV STRESS PROTOCOL 2 HR STAGE 5: NORMAL BPM
BH CV STRESS PROTOCOL 2 STAGE 1: 1
BH CV STRESS PROTOCOL 2 STAGE 2: 2
BH CV STRESS PROTOCOL 2 STAGE 3: 3
BH CV STRESS PROTOCOL 2 STAGE 4: 4
BH CV STRESS PROTOCOL 2 STAGE 5: 5
BH CV STRESS PROTOCOL 2: NORMAL
BH CV STRESS RECOVERY BP: NORMAL MMHG
BH CV STRESS RECOVERY HR: 100 BPM
BH CV STRESS SPEED STAGE 1: 1.7
BH CV STRESS SPEED STAGE 2: 2.5
BH CV STRESS SPEED STAGE 3: 3.4
BH CV STRESS STAGE 1: 1
BH CV STRESS STAGE 2: 2
BH CV STRESS STAGE 3: 3
MAXIMAL PREDICTED HEART RATE: 174 BPM
PERCENT MAX PREDICTED HR: 85.06 %
STRESS BASELINE BP: NORMAL MMHG
STRESS BASELINE HR: 75 BPM
STRESS PERCENT HR: 100 %
STRESS POST EXERCISE DUR MIN: 13 MIN
STRESS POST EXERCISE DUR SEC: 37 SEC
STRESS POST PEAK BP: NORMAL MMHG
STRESS POST PEAK HR: 148 BPM
STRESS TARGET HR: 148 BPM

## 2023-07-25 ENCOUNTER — OFFICE VISIT (OUTPATIENT)
Dept: ENDOCRINOLOGY | Facility: CLINIC | Age: 48
End: 2023-07-25
Payer: MEDICAID

## 2023-07-25 ENCOUNTER — DOCUMENTATION (OUTPATIENT)
Dept: ENDOCRINOLOGY | Facility: CLINIC | Age: 48
End: 2023-07-25
Payer: MEDICAID

## 2023-07-25 VITALS
WEIGHT: 195.8 LBS | HEART RATE: 65 BPM | BODY MASS INDEX: 33.43 KG/M2 | HEIGHT: 64 IN | OXYGEN SATURATION: 99 % | DIASTOLIC BLOOD PRESSURE: 80 MMHG | SYSTOLIC BLOOD PRESSURE: 110 MMHG

## 2023-07-25 DIAGNOSIS — E10.42 DIABETIC POLYNEUROPATHY ASSOCIATED WITH TYPE 1 DIABETES MELLITUS: ICD-10-CM

## 2023-07-25 DIAGNOSIS — I10 ESSENTIAL HYPERTENSION: ICD-10-CM

## 2023-07-25 DIAGNOSIS — E10.65 TYPE 1 DIABETES MELLITUS WITH HYPERGLYCEMIA: Primary | ICD-10-CM

## 2023-07-25 DIAGNOSIS — E78.2 MIXED HYPERLIPIDEMIA: ICD-10-CM

## 2023-07-25 PROCEDURE — 95251 CONT GLUC MNTR ANALYSIS I&R: CPT | Performed by: NURSE PRACTITIONER

## 2023-07-25 PROCEDURE — 3074F SYST BP LT 130 MM HG: CPT | Performed by: NURSE PRACTITIONER

## 2023-07-25 PROCEDURE — 99214 OFFICE O/P EST MOD 30 MIN: CPT | Performed by: NURSE PRACTITIONER

## 2023-07-25 PROCEDURE — 1159F MED LIST DOCD IN RCRD: CPT | Performed by: NURSE PRACTITIONER

## 2023-07-25 PROCEDURE — 3046F HEMOGLOBIN A1C LEVEL >9.0%: CPT | Performed by: NURSE PRACTITIONER

## 2023-07-25 PROCEDURE — 3079F DIAST BP 80-89 MM HG: CPT | Performed by: NURSE PRACTITIONER

## 2023-07-25 PROCEDURE — 1160F RVW MEDS BY RX/DR IN RCRD: CPT | Performed by: NURSE PRACTITIONER

## 2023-07-25 NOTE — PROGRESS NOTES
"Chief Complaint  Diabetes (Tandem /Dexcom/)    Subjective          Krupa Salomón De La Rosa presents to [facility]  Diabetes      In office visit       47 year old female presents for follow up     Diabetes mellitus type 1    Diagnosed at age 17    Timing constant    Quality not controlled    Severity high     Complications CAD, CABG x2 , July 2018 , neuropathy        Current diabetes regimen      Insulin     Tandem pump with control IQ            Current glucose monitoring         fingerstick      Checks 4 times      Dexcom G6      See below             Patient  is a RN          Review of Systems - General ROS: negative        Objective   Vital Signs:   /80   Pulse 65   Ht 162.6 cm (64\")   Wt 88.8 kg (195 lb 12.8 oz)   SpO2 99%   BMI 33.61 kg/m²     Physical Exam  Constitutional:       Appearance: Normal appearance.   Cardiovascular:      Rate and Rhythm: Regular rhythm.      Heart sounds: Normal heart sounds.   Pulmonary:      Breath sounds: Normal breath sounds.   Musculoskeletal:      Cervical back: Normal range of motion.   Neurological:      Mental Status: She is alert.      Result Review :   The following data was reviewed by: TORIE Blank on 06/09/2022:  Common labs          1/12/2023    14:38 3/30/2023    10:56 4/13/2023    09:03   Common Labs   Glucose 241      BUN 8      Creatinine 0.87      Sodium 139      Potassium 4.4      Chloride 104      Calcium 9.4      Albumin 4.2      Total Bilirubin 0.3      Alkaline Phosphatase 134      AST (SGOT) 19      ALT (SGPT) 13      WBC  14.8  7.8    Hemoglobin  16.0  14.7    Hematocrit  45.6  44.1    Platelets  282  235    Total Cholesterol  145     Triglycerides  84     HDL Cholesterol  65     LDL Cholesterol   64     Hemoglobin A1C 8.70  9.3               Assessment and Plan    There are no diagnoses linked to this encounter.       Glycemic Management:     Diabetes mellitus type 1      Dexcom G6 "       Ambulatory Glucose Profile Report    Days Analyzed : 2 week period ending on 07/25/23      Continuous Glucose Monitory Device:  Dexcom G6     0% very high target range  56% high target range  43% in target range  1% below target range  GMI 7.9 %  Average glucose 198 mg/dl    Interpretation : Diabetes Type 1 Uncontrolled         She has improved     But missing boluses for meals    No change to pump     Bolus for meals      Basal     0.8    Carb ratio    10    Correction     40      Having scar tissue -- change to sure T infusion set                 Previous regimen        Taking Lantus 38 units decrease to 34 units         Taking Novolog 1 unit per 10 grams of CHO                 Plus sliding scale      2 per 50 above 150            Send for baqsimi                 Microvascular Complications Monitoring         Last eye exam---- April 2021      Neuropathy --yes      Taking gabapentin         Lipid Management:         Hyperlipidemia      Taking  repatha            Blood Pressure Management:     Hypertension      Taking toprol xl 50 mg one daily      Taking lasix 20 mg one daily      Taking catapres 0.1 mg daily         Thyroid Health     No results found for: TSH        Bone Health         Weight Management:     Obesity     Body mass index is 33.61 kg/m².               Preventive Care:      Non smoker            Follow Up   No follow-ups on file.  Patient was given instructions and counseling regarding her condition or for health maintenance advice. Please see specific information pulled into the AVS if appropriate.         This document has been electronically signed by TORIE Blank on July 25, 2023 11:50 CDT.

## 2023-07-25 NOTE — PROGRESS NOTES
[email or web address] [employer]

## 2023-08-02 ENCOUNTER — PATIENT OUTREACH (OUTPATIENT)
Dept: CASE MANAGEMENT | Facility: OTHER | Age: 48
End: 2023-08-02
Payer: MEDICAID

## 2023-08-03 DIAGNOSIS — E10.42 TYPE 1 DIABETES MELLITUS WITH DIABETIC POLYNEUROPATHY: ICD-10-CM

## 2023-08-03 DIAGNOSIS — F41.9 ANXIETY: ICD-10-CM

## 2023-08-03 DIAGNOSIS — R52 GENERALIZED PAIN: ICD-10-CM

## 2023-08-03 RX ORDER — ALPRAZOLAM 1 MG/1
TABLET ORAL
Qty: 90 TABLET | Refills: 0 | Status: SHIPPED | OUTPATIENT
Start: 2023-08-03

## 2023-08-04 RX ORDER — GABAPENTIN 300 MG/1
CAPSULE ORAL
Qty: 90 CAPSULE | Refills: 0 | Status: SHIPPED | OUTPATIENT
Start: 2023-08-04

## 2023-08-16 NOTE — TELEPHONE ENCOUNTER
Pt called again regarding not getting her Tandem pump. She said she spoke to Nash and he have her a number to call. She said that [employer] advised they haven't sent the pump because the never received the RX. Spoke to Christianne and she said [employer] has the order and to let her check with them to see what was going on. Advised pt that Christianne would contact her once she speaks with [employer]. Pt voiced understanding.   Consent: Written consent was obtained and risks were reviewed including but not limited to scarring, infection, bleeding, scabbing, incomplete removal, nerve damage and allergy to anesthesia.

## 2023-08-21 RX ORDER — PROCHLORPERAZINE 25 MG/1
SUPPOSITORY RECTAL
Qty: 3 EACH | Refills: 0 | Status: SHIPPED | OUTPATIENT
Start: 2023-08-21

## 2023-08-21 NOTE — TELEPHONE ENCOUNTER
Rx Refill Note  Requested Prescriptions     Pending Prescriptions Disp Refills    Continuous Blood Gluc Sensor (Dexcom G6 Sensor) [Pharmacy Med Name: DEXCOM G6 SENSOR    MIS] 3 each 0     Sig: USE 1  AS DIRECTED EVERY  10  DAYS        Gypsy Jones MA  08/21/23, 07:47 CDT

## 2023-08-31 DIAGNOSIS — F41.9 ANXIETY: ICD-10-CM

## 2023-08-31 RX ORDER — PROMETHAZINE HYDROCHLORIDE 25 MG/1
TABLET ORAL
Qty: 20 TABLET | Refills: 0 | Status: SHIPPED | OUTPATIENT
Start: 2023-08-31

## 2023-08-31 RX ORDER — ALPRAZOLAM 1 MG/1
TABLET ORAL
Qty: 90 TABLET | Refills: 0 | Status: SHIPPED | OUTPATIENT
Start: 2023-08-31

## 2023-08-31 NOTE — TELEPHONE ENCOUNTER
Rx Refill Note  Requested Prescriptions     Pending Prescriptions Disp Refills    ALPRAZolam (XANAX) 1 MG tablet [Pharmacy Med Name: ALPRAZolam 1 MG Oral Tablet] 90 tablet 0     Sig: TAKE 1 TABLET BY MOUTH THREE TIMES DAILY      Last office visit with prescribing clinician: 5/8/2023         Diana Akbar MA  08/31/23, 12:29 CDT

## 2023-08-31 NOTE — TELEPHONE ENCOUNTER
Rx Refill Note  Requested Prescriptions     Pending Prescriptions Disp Refills    promethazine (PHENERGAN) 25 MG tablet [Pharmacy Med Name: Promethazine HCl 25 MG Oral Tablet] 20 tablet 0     Sig: TAKE 1 TABLET BY MOUTH EVERY 6 HOURS AS NEEDED FOR NAUSEA AND VOMITING        Gypsy Jones MA  08/31/23, 10:15 CDT

## 2023-09-01 DIAGNOSIS — R52 GENERALIZED PAIN: ICD-10-CM

## 2023-09-01 DIAGNOSIS — E10.42 TYPE 1 DIABETES MELLITUS WITH DIABETIC POLYNEUROPATHY: ICD-10-CM

## 2023-09-01 RX ORDER — GABAPENTIN 300 MG/1
CAPSULE ORAL
Qty: 90 CAPSULE | Refills: 0 | Status: SHIPPED | OUTPATIENT
Start: 2023-09-01

## 2023-09-01 NOTE — TELEPHONE ENCOUNTER
Rx Refill Note  Requested Prescriptions     Pending Prescriptions Disp Refills    gabapentin (NEURONTIN) 300 MG capsule [Pharmacy Med Name: Gabapentin 300 MG Oral Capsule] 90 capsule 0     Sig: TAKE 1 CAPSULE BY MOUTH THREE TIMES DAILY      Last office visit with prescribing clinician: 5/8/2023         Diana Akbar MA  09/01/23, 08:51 CDT

## 2023-09-12 ENCOUNTER — TELEPHONE (OUTPATIENT)
Dept: FAMILY MEDICINE CLINIC | Facility: CLINIC | Age: 48
End: 2023-09-12

## 2023-09-12 ENCOUNTER — OFFICE VISIT (OUTPATIENT)
Dept: FAMILY MEDICINE CLINIC | Facility: CLINIC | Age: 48
End: 2023-09-12
Payer: MEDICAID

## 2023-09-12 VITALS
RESPIRATION RATE: 18 BRPM | BODY MASS INDEX: 34.49 KG/M2 | DIASTOLIC BLOOD PRESSURE: 78 MMHG | OXYGEN SATURATION: 95 % | WEIGHT: 202 LBS | HEART RATE: 81 BPM | HEIGHT: 64 IN | SYSTOLIC BLOOD PRESSURE: 122 MMHG | TEMPERATURE: 95.8 F

## 2023-09-12 DIAGNOSIS — K20.90 ESOPHAGITIS: ICD-10-CM

## 2023-09-12 DIAGNOSIS — I10 ESSENTIAL HYPERTENSION: ICD-10-CM

## 2023-09-12 DIAGNOSIS — R60.9 PERIPHERAL EDEMA: Primary | ICD-10-CM

## 2023-09-12 DIAGNOSIS — R52 GENERALIZED PAIN: ICD-10-CM

## 2023-09-12 DIAGNOSIS — F41.9 ANXIETY: ICD-10-CM

## 2023-09-12 DIAGNOSIS — I25.708 CORONARY ARTERY DISEASE OF BYPASS GRAFT OF NATIVE HEART WITH STABLE ANGINA PECTORIS: ICD-10-CM

## 2023-09-12 DIAGNOSIS — I20.8 ANGINA OF EFFORT: ICD-10-CM

## 2023-09-12 DIAGNOSIS — F33.2 SEVERE EPISODE OF RECURRENT MAJOR DEPRESSIVE DISORDER, WITHOUT PSYCHOTIC FEATURES: ICD-10-CM

## 2023-09-12 DIAGNOSIS — E10.42 TYPE 1 DIABETES MELLITUS WITH DIABETIC POLYNEUROPATHY: ICD-10-CM

## 2023-09-12 DIAGNOSIS — F31.63 BIPOLAR 1 DISORDER, MIXED, SEVERE: ICD-10-CM

## 2023-09-12 DIAGNOSIS — F51.01 PRIMARY INSOMNIA: ICD-10-CM

## 2023-09-12 DIAGNOSIS — E66.09 CLASS 1 OBESITY DUE TO EXCESS CALORIES WITH SERIOUS COMORBIDITY AND BODY MASS INDEX (BMI) OF 34.0 TO 34.9 IN ADULT: ICD-10-CM

## 2023-09-12 PROCEDURE — 1160F RVW MEDS BY RX/DR IN RCRD: CPT | Performed by: NURSE PRACTITIONER

## 2023-09-12 PROCEDURE — 3074F SYST BP LT 130 MM HG: CPT | Performed by: NURSE PRACTITIONER

## 2023-09-12 PROCEDURE — 3078F DIAST BP <80 MM HG: CPT | Performed by: NURSE PRACTITIONER

## 2023-09-12 PROCEDURE — 1159F MED LIST DOCD IN RCRD: CPT | Performed by: NURSE PRACTITIONER

## 2023-09-12 PROCEDURE — 99214 OFFICE O/P EST MOD 30 MIN: CPT | Performed by: NURSE PRACTITIONER

## 2023-09-12 PROCEDURE — 3046F HEMOGLOBIN A1C LEVEL >9.0%: CPT | Performed by: NURSE PRACTITIONER

## 2023-09-12 RX ORDER — IRBESARTAN 75 MG/1
75 TABLET ORAL NIGHTLY
Qty: 90 TABLET | Refills: 1 | Status: SHIPPED | OUTPATIENT
Start: 2023-09-12

## 2023-09-12 RX ORDER — NITROGLYCERIN 0.4 MG/1
0.4 TABLET SUBLINGUAL
Qty: 30 TABLET | Refills: 3 | Status: SHIPPED | OUTPATIENT
Start: 2023-09-12

## 2023-09-12 RX ORDER — EVOLOCUMAB 140 MG/ML
INJECTION, SOLUTION SUBCUTANEOUS
Qty: 2 ML | Refills: 5 | Status: SHIPPED | OUTPATIENT
Start: 2023-09-12

## 2023-09-12 RX ORDER — FUROSEMIDE 20 MG/1
20 TABLET ORAL DAILY PRN
Qty: 90 TABLET | Refills: 3 | Status: SHIPPED | OUTPATIENT
Start: 2023-09-12

## 2023-09-12 RX ORDER — GABAPENTIN 300 MG/1
300 CAPSULE ORAL 3 TIMES DAILY
Qty: 90 CAPSULE | Refills: 5 | Status: SHIPPED | OUTPATIENT
Start: 2023-09-12

## 2023-09-12 RX ORDER — ISOSORBIDE MONONITRATE 30 MG/1
30 TABLET, EXTENDED RELEASE ORAL DAILY
Qty: 90 TABLET | Refills: 1 | Status: SHIPPED | OUTPATIENT
Start: 2023-09-12

## 2023-09-12 RX ORDER — INSULIN ASPART 100 [IU]/ML
INJECTION, SOLUTION INTRAVENOUS; SUBCUTANEOUS
Qty: 30 ML | Refills: 11 | Status: SHIPPED | OUTPATIENT
Start: 2023-09-12

## 2023-09-12 RX ORDER — AMITRIPTYLINE HYDROCHLORIDE 100 MG/1
100 TABLET ORAL NIGHTLY
Qty: 90 TABLET | Refills: 3 | Status: SHIPPED | OUTPATIENT
Start: 2023-09-12

## 2023-09-12 RX ORDER — EVOLOCUMAB 140 MG/ML
INJECTION, SOLUTION SUBCUTANEOUS
Qty: 2 ML | Refills: 5 | Status: SHIPPED | OUTPATIENT
Start: 2023-09-12 | End: 2023-09-12 | Stop reason: SDUPTHER

## 2023-09-12 RX ORDER — OMEPRAZOLE 40 MG/1
40 CAPSULE, DELAYED RELEASE ORAL DAILY
Qty: 30 CAPSULE | Refills: 0 | Status: SHIPPED | OUTPATIENT
Start: 2023-09-12

## 2023-09-12 RX ORDER — ALPRAZOLAM 1 MG/1
1 TABLET ORAL 3 TIMES DAILY
Qty: 90 TABLET | Refills: 2 | Status: SHIPPED | OUTPATIENT
Start: 2023-09-12

## 2023-09-12 RX ORDER — CLONIDINE HYDROCHLORIDE 0.1 MG/1
0.1 TABLET ORAL 2 TIMES DAILY
Qty: 180 TABLET | Refills: 1 | Status: SHIPPED | OUTPATIENT
Start: 2023-09-12

## 2023-09-12 RX ORDER — METOPROLOL SUCCINATE 50 MG/1
75 TABLET, EXTENDED RELEASE ORAL DAILY
Qty: 135 TABLET | Refills: 3 | Status: SHIPPED | OUTPATIENT
Start: 2023-09-12

## 2023-09-12 RX ORDER — DESVENLAFAXINE SUCCINATE 50 MG/1
50 TABLET, EXTENDED RELEASE ORAL DAILY
Qty: 90 TABLET | Refills: 1 | Status: SHIPPED | OUTPATIENT
Start: 2023-09-12

## 2023-09-12 NOTE — PROGRESS NOTES
"Chief Complaint   Patient presents with    Med Refill        Subjective   Krupa Salomón De La Rosa is a 47 y.o. female who presents today for medication refills.     HPI   NO complaints today.     Allergies   Allergen Reactions    Adhesive Tape Other (See Comments)    Ciprofibrate     Latex Other (See Comments)     Burns skin severely     Levofloxacin Rash         OBJECTIVE:  Vitals:    09/12/23 1325   BP: 122/78   BP Location: Left arm   Patient Position: Sitting   Cuff Size: Adult   Pulse: 81   Resp: 18   Temp: 95.8 °F (35.4 °C)   TempSrc: Temporal   SpO2: 95%   Weight: 91.6 kg (202 lb)   Height: 162.6 cm (64\")     Physical Exam  Vitals and nursing note reviewed.   Constitutional:       Appearance: Normal appearance. She is obese.   Cardiovascular:      Rate and Rhythm: Normal rate and regular rhythm.      Pulses: Normal pulses.      Heart sounds: Normal heart sounds.   Pulmonary:      Effort: Pulmonary effort is normal.      Breath sounds: Normal breath sounds.   Skin:     General: Skin is warm and dry.   Neurological:      General: No focal deficit present.      Mental Status: She is alert and oriented to person, place, and time.   Psychiatric:         Mood and Affect: Mood normal.         Behavior: Behavior normal.         Thought Content: Thought content normal.         Judgment: Judgment normal.       BMI is >= 30 and <35. (Class 1 Obesity). The following options were offered after discussion;: weight loss educational material (shared in after visit summary), exercise counseling/recommendations, and nutrition counseling/recommendations           ASSESSMENT/ PLAN:    Diagnoses and all orders for this visit:    1. Peripheral edema (Primary)  -     furosemide (LASIX) 20 MG tablet; Take 1 tablet by mouth Daily As Needed (SWELLING).  Dispense: 90 tablet; Refill: 3    2. Anxiety  -     ALPRAZolam (XANAX) 1 MG tablet; Take 1 tablet by mouth 3 (Three) Times a Day.  Dispense: 90 tablet; Refill: 2    3. Primary " insomnia  -     amitriptyline (ELAVIL) 100 MG tablet; Take 1 tablet by mouth Every Night.  Dispense: 90 tablet; Refill: 3    4. Bipolar 1 disorder, mixed, severe  -     Cariprazine HCl (Vraylar) 3 MG capsule capsule; Take 1 capsule by mouth Daily.  Dispense: 90 capsule; Refill: 3    5. Severe episode of recurrent major depressive disorder, without psychotic features  -     desvenlafaxine (PRISTIQ) 50 MG 24 hr tablet; Take 1 tablet by mouth Daily.  Dispense: 90 tablet; Refill: 1    6. Coronary artery disease of bypass 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  -     Evolocumab (Repatha) solution prefilled syringe injection; Inject 2 ml every two weeks  Dispense: 2 mL; Refill: 5    7. Generalized pain  -     gabapentin (NEURONTIN) 300 MG capsule; Take 1 capsule by mouth 3 (Three) Times a Day.  Dispense: 90 capsule; Refill: 5    8. Type 1 diabetes mellitus with diabetic polyneuropathy  -     gabapentin (NEURONTIN) 300 MG capsule; Take 1 capsule by mouth 3 (Three) Times a Day.  Dispense: 90 capsule; Refill: 5  -     Insulin Aspart (novoLOG) 100 UNIT/ML injection; 90 units daily , E11.65  Dispense: 30 mL; Refill: 11  -     irbesartan (AVAPRO) 75 MG tablet; Take 1 tablet by mouth Every Night.  Dispense: 90 tablet; Refill: 1  -     isosorbide mononitrate (IMDUR) 30 MG 24 hr tablet; Take 1 tablet by mouth Daily.  Dispense: 90 tablet; Refill: 1    9. Essential hypertension  -     cloNIDine (CATAPRES) 0.1 MG tablet; Take 1 tablet by mouth 2 (Two) Times a Day.  Dispense: 180 tablet; Refill: 1  -     metoprolol succinate XL (Toprol XL) 50 MG 24 hr tablet; Take 1.5 tablets by mouth Daily.  Dispense: 135 tablet; Refill: 3    10. Esophagitis  -     omeprazole (priLOSEC) 40 MG capsule; Take 1 capsule by mouth Daily.  Dispense: 30 capsule; Refill: 0    11. Angina of effort  -     nitroglycerin (NITROSTAT) 0.4 MG SL tablet; Place 1 tablet under the tongue Every 5 (Five) Minutes As Needed for Chest Pain.  Dispense: 30 tablet;  Refill: 3    12. Class 1 obesity due to excess calories with serious comorbidity and body mass index (BMI) of 34.0 to 34.9 in adult      Procedures     Management Plan:   Continue present medications.   Will check labs in October due to already being checked by cardiology and endocrinologist. We are going to try to get her back on schedule time wise.     An After Visit Summary was printed and given to the patient at discharge.    Follow-up: Return in about 4 weeks (around 10/10/2023) for Next scheduled follow up with 6 month labs.         TORIE Torres 9/12/2023 13:50 CDT  This note was electronically signed.

## 2023-09-12 NOTE — TELEPHONE ENCOUNTER
Carlos Manuel from [facility] pharmacy called stating he is needing to speak with Silver[employer] in regards to her Repatha that was just sent over to them. Their call back number is [phone].

## 2023-09-15 ENCOUNTER — OFFICE VISIT (OUTPATIENT)
Dept: CARDIOLOGY | Facility: CLINIC | Age: 48
End: 2023-09-15
Payer: MEDICAID

## 2023-09-15 VITALS
HEART RATE: 77 BPM | WEIGHT: 201 LBS | SYSTOLIC BLOOD PRESSURE: 110 MMHG | OXYGEN SATURATION: 98 % | DIASTOLIC BLOOD PRESSURE: 70 MMHG | HEIGHT: 64 IN | BODY MASS INDEX: 34.31 KG/M2

## 2023-09-15 DIAGNOSIS — E78.2 MIXED HYPERLIPIDEMIA: ICD-10-CM

## 2023-09-15 DIAGNOSIS — E66.09 CLASS 1 OBESITY DUE TO EXCESS CALORIES WITH SERIOUS COMORBIDITY AND BODY MASS INDEX (BMI) OF 34.0 TO 34.9 IN ADULT: ICD-10-CM

## 2023-09-15 DIAGNOSIS — I25.810 CORONARY ARTERY DISEASE INVOLVING CORONARY BYPASS GRAFT OF NATIVE HEART WITHOUT ANGINA PECTORIS: Primary | ICD-10-CM

## 2023-09-15 DIAGNOSIS — I10 ESSENTIAL HYPERTENSION: ICD-10-CM

## 2023-09-15 PROCEDURE — 3078F DIAST BP <80 MM HG: CPT | Performed by: INTERNAL MEDICINE

## 2023-09-15 PROCEDURE — 3074F SYST BP LT 130 MM HG: CPT | Performed by: INTERNAL MEDICINE

## 2023-09-15 PROCEDURE — 99214 OFFICE O/P EST MOD 30 MIN: CPT | Performed by: INTERNAL MEDICINE

## 2023-09-15 NOTE — PROGRESS NOTES
Reason for Visit: cardiovascular follow up.    HPI:  Krupa De La Rosa is a 47 y.o. female is here today for follow-up.  She was seen in cardiology consultation on 6/12/2023 due to history of coronary artery disease and CABG after transferring care from Dr. Gonzalez at [facility].  She reported intermittent chest pain symptoms and a dobutamine stress echo was done on 6/21/2023 which was low risk for ischemia.  She has been eating more and has gained weight, approximately 20 lbs on her scale.  Her diabetes is under better control and she has an insulin pump.  She has not been exercising much.      Previous Cardiac Testing and Procedures:  -PFT (7/9/2018) normal spirometry, peak expiratory flow is normal  -2-vessel CABG (7/2018) LIMA to LAD, SVG to OM 3  -Echo (11/14/2018) EF 60-65%, normal diastolic function, mild MR and TR, normal RVSP  -Nuclear stress (6/9/2021) possible small to moderate, mild mid to apical lateral ischemia, EF 80%  -LHC (6/22/2021) single-vessel branch disease involving the LAD and OM of dominant LCx, patent LIMA to LAD, patent SVG to OM 3, medical management recommended  -Dobutamine stress echo (6/21/2023) low risk for ischemia     Lab data:  -BMP (1/12/2023) creatinine 0.87, potassium 4.4, sodium 139  -Lipid panel (3/30/2023) total cholesterol 145, HDL 65, LDL 64, triglycerides 84    Patient Active Problem List   Diagnosis    Type 1 diabetes mellitus with hyperglycemia    Essential hypertension    Diabetic polyneuropathy associated with type 1 diabetes mellitus    Mixed hyperlipidemia    Severe episode of recurrent major depressive disorder, without psychotic features    Class 1 obesity due to excess calories with serious comorbidity and body mass index (BMI) of 34.0 to 34.9 in adult    Generalized pain    Coronary artery disease involving coronary bypass graft of native heart without angina pectoris       Social History     Tobacco Use    Smoking status: Former     Packs/day: 0.25      Years: 15.00     Pack years: 3.75     Types: Cigarettes     Quit date:      Years since quittin.7    Smokeless tobacco: Never   Vaping Use    Vaping Use: Never used   Substance Use Topics    Alcohol use: Not Currently    Drug use: Never       Family History   Problem Relation Age of Onset    No Known Problems Mother     Cancer Father     Cancer Maternal Grandmother     No Known Problems Maternal Grandfather     Cancer Paternal Grandmother     Heart disease Paternal Grandmother        The following portions of the patient's history were reviewed and updated as appropriate: allergies, current medications, past family history, past medical history, past social history, past surgical history, and problem list.      Current Outpatient Medications:     ALPRAZolam (XANAX) 1 MG tablet, Take 1 tablet by mouth 3 (Three) Times a Day., Disp: 90 tablet, Rfl: 2    amitriptyline (ELAVIL) 100 MG tablet, Take 1 tablet by mouth Every Night., Disp: 90 tablet, Rfl: 3    Aspirin 81 MG capsule, Take 81 mg by mouth Every Night., Disp: , Rfl:     Cariprazine HCl (Vraylar) 3 MG capsule capsule, Take 1 capsule by mouth Daily., Disp: 90 capsule, Rfl: 3    cloNIDine (CATAPRES) 0.1 MG tablet, Take 1 tablet by mouth 2 (Two) Times a Day., Disp: 180 tablet, Rfl: 1    Continuous Blood Gluc Sensor (Dexcom G6 Sensor), USE 1  AS DIRECTED EVERY  10  DAYS, Disp: 3 each, Rfl: 0    Continuous Blood Gluc Transmit (Dexcom G6 Transmitter) misc, 1 each by Other route Continuous for 90 days., Disp: 1 each, Rfl: 0    desvenlafaxine (PRISTIQ) 50 MG 24 hr tablet, Take 1 tablet by mouth Daily., Disp: 90 tablet, Rfl: 1    Evolocumab (Repatha) solution prefilled syringe injection, Inject 1 ml every two weeks, Disp: 2 mL, Rfl: 5    furosemide (LASIX) 20 MG tablet, Take 1 tablet by mouth Daily As Needed (SWELLING)., Disp: 90 tablet, Rfl: 3    gabapentin (NEURONTIN) 300 MG capsule, Take 1 capsule by mouth 3 (Three) Times a Day., Disp: 90 capsule, Rfl: 5     "Glucagon (Baqsimi One Pack) 3 MG/DOSE powder, 1 each into the nostril(s) as directed by provider As Needed (hypoglycemia). Apply intranasal if hypoglycemia, Disp: 2 each, Rfl: 11    Insulin Aspart (novoLOG) 100 UNIT/ML injection, 90 units daily , E11.65, Disp: 30 mL, Rfl: 11    irbesartan (AVAPRO) 75 MG tablet, Take 1 tablet by mouth Every Night., Disp: 90 tablet, Rfl: 1    isosorbide mononitrate (IMDUR) 30 MG 24 hr tablet, Take 1 tablet by mouth Daily., Disp: 90 tablet, Rfl: 1    metoprolol succinate XL (Toprol XL) 50 MG 24 hr tablet, Take 1.5 tablets by mouth Daily., Disp: 135 tablet, Rfl: 3    omeprazole (priLOSEC) 40 MG capsule, Take 1 capsule by mouth Daily., Disp: 30 capsule, Rfl: 0    promethazine (PHENERGAN) 25 MG tablet, TAKE 1 TABLET BY MOUTH EVERY 6 HOURS AS NEEDED FOR NAUSEA AND VOMITING, Disp: 20 tablet, Rfl: 0    nitroglycerin (NITROSTAT) 0.4 MG SL tablet, Place 1 tablet under the tongue Every 5 (Five) Minutes As Needed for Chest Pain. (Patient not taking: Reported on 9/15/2023), Disp: 30 tablet, Rfl: 3    Review of Systems   Constitutional: Positive for weight gain. Negative for chills and fever.   Cardiovascular:  Negative for chest pain and paroxysmal nocturnal dyspnea.   Respiratory:  Negative for cough and shortness of breath.    Skin:  Negative for rash.   Gastrointestinal:  Negative for abdominal pain and heartburn.   Neurological:  Negative for dizziness and numbness.     Objective   /70 (BP Location: Left arm, Patient Position: Sitting, Cuff Size: Adult)   Pulse 77   Ht 162.6 cm (64.02\")   Wt 91.2 kg (201 lb)   SpO2 98%   BMI 34.48 kg/m²   Constitutional:       Appearance: Well-developed. Obese.   HENT:      Head: Normocephalic and atraumatic.   Pulmonary:      Effort: Pulmonary effort is normal.      Breath sounds: Normal breath sounds.   Cardiovascular:      Normal rate. Regular rhythm.      Murmurs: There is no murmur.      No gallop.    Edema:     Peripheral edema absent. "   Skin:     General: Skin is warm and dry.   Neurological:      Mental Status: Alert and oriented to person, place, and time.     Procedures      ICD-10-CM ICD-9-CM   1. Coronary artery disease involving coronary bypass graft of native heart without angina pectoris  I25.810 414.05   2. Essential hypertension  I10 401.9   3. Mixed hyperlipidemia  E78.2 272.2   4. Class 1 obesity due to excess calories with serious comorbidity and body mass index (BMI) of 34.0 to 34.9 in adult  E66.09 278.00    Z68.34 V85.34         Assessment/Plan:  1.  Coronary artery disease: 2-vessel CABG, 7/2019.  Patent bypass grafts on C from 6/22/2021.  Dobutamine stress echo from 6/21/2023 was low risk.  Continue aspirin, Repatha, Imdur, metoprolol, and nitroglycerin.     2.  Essential hypertension: Blood pressures remains well controlled today. Continue metoprolol and irbesartan.     3.  Mixed hyperlipidemia: Good control on lipid panel from 3/30/2023.  Continue Repatha.  She has repeat blood work scheduled for October.      4.  Obesity: Body mass index is 34.48 kg/m².  Weight is increasing.   on lifestyle modification with decreased caloric intake and increased exercise.

## 2023-09-15 NOTE — LETTER
September 15, 2023     TORIE Torres  [address] [facility] [address] [facility] [address]    Patient: Krupa De La Rosa   YOB: 1975   Date of Visit: 9/15/2023       Dear TORIE Torres    Krupa De La Rosa was in my office today. Below is a copy of my note.    If you have questions, please do not hesitate to call me. I look forward to following Krupa along with you.         Sincerely,        Eben Fernandez MD        CC: No Recipients      Reason for Visit: cardiovascular follow up.    HPI:  Krupa De La Rosa is a 47 y.o. female is here today for follow-up.  She was seen in cardiology consultation on 6/12/2023 due to history of coronary artery disease and CABG after transferring care from Dr. Gonzalez at [facility].  She reported intermittent chest pain symptoms and a dobutamine stress echo was done on 6/21/2023 which was low risk for ischemia.  She has been eating more and has gained weight, approximately 20 lbs on her scale.  Her diabetes is under better control and she has an insulin pump.  She has not been exercising much.      Previous Cardiac Testing and Procedures:  -PFT (7/9/2018) normal spirometry, peak expiratory flow is normal  -2-vessel CABG (7/2018) LIMA to LAD, SVG to OM 3  -Echo (11/14/2018) EF 60-65%, normal diastolic function, mild MR and TR, normal RVSP  -Nuclear stress (6/9/2021) possible small to moderate, mild mid to apical lateral ischemia, EF 80%  -LHC (6/22/2021) single-vessel branch disease involving the LAD and OM of dominant LCx, patent LIMA to LAD, patent SVG to OM 3, medical management recommended  -Dobutamine stress echo (6/21/2023) low risk for ischemia     Lab data:  -BMP (1/12/2023) creatinine 0.87, potassium 4.4, sodium 139  -Lipid panel (3/30/2023) total cholesterol 145, HDL 65, LDL 64, triglycerides 84    Patient Active Problem List   Diagnosis   • Type 1 diabetes mellitus with hyperglycemia   • Essential hypertension   • Diabetic polyneuropathy  associated with type 1 diabetes mellitus   • Mixed hyperlipidemia   • Severe episode of recurrent major depressive disorder, without psychotic features   • Class 1 obesity due to excess calories with serious comorbidity and body mass index (BMI) of 34.0 to 34.9 in adult   • Generalized pain   • Coronary artery disease involving coronary bypass graft of native heart without angina pectoris       Social History     Tobacco Use   • Smoking status: Former     Packs/day: 0.25     Years: 15.00     Pack years: 3.75     Types: Cigarettes     Quit date:      Years since quittin.7   • Smokeless tobacco: Never   Vaping Use   • Vaping Use: Never used   Substance Use Topics   • Alcohol use: Not Currently   • Drug use: Never       Family History   Problem Relation Age of Onset   • No Known Problems Mother    • Cancer Father    • Cancer Maternal Grandmother    • No Known Problems Maternal Grandfather    • Cancer Paternal Grandmother    • Heart disease Paternal Grandmother        The following portions of the patient's history were reviewed and updated as appropriate: allergies, current medications, past family history, past medical history, past social history, past surgical history, and problem list.      Current Outpatient Medications:   •  ALPRAZolam (XANAX) 1 MG tablet, Take 1 tablet by mouth 3 (Three) Times a Day., Disp: 90 tablet, Rfl: 2  •  amitriptyline (ELAVIL) 100 MG tablet, Take 1 tablet by mouth Every Night., Disp: 90 tablet, Rfl: 3  •  Aspirin 81 MG capsule, Take 81 mg by mouth Every Night., Disp: , Rfl:   •  Cariprazine HCl (Vraylar) 3 MG capsule capsule, Take 1 capsule by mouth Daily., Disp: 90 capsule, Rfl: 3  •  cloNIDine (CATAPRES) 0.1 MG tablet, Take 1 tablet by mouth 2 (Two) Times a Day., Disp: 180 tablet, Rfl: 1  •  Continuous Blood Gluc Sensor (Dexcom G6 Sensor), USE 1  AS DIRECTED EVERY  10  DAYS, Disp: 3 each, Rfl: 0  •  Continuous Blood Gluc Transmit (Dexcom G6 Transmitter) misc, 1 each by Other  route Continuous for 90 days., Disp: 1 each, Rfl: 0  •  desvenlafaxine (PRISTIQ) 50 MG 24 hr tablet, Take 1 tablet by mouth Daily., Disp: 90 tablet, Rfl: 1  •  Evolocumab (Repatha) solution prefilled syringe injection, Inject 1 ml every two weeks, Disp: 2 mL, Rfl: 5  •  furosemide (LASIX) 20 MG tablet, Take 1 tablet by mouth Daily As Needed (SWELLING)., Disp: 90 tablet, Rfl: 3  •  gabapentin (NEURONTIN) 300 MG capsule, Take 1 capsule by mouth 3 (Three) Times a Day., Disp: 90 capsule, Rfl: 5  •  Glucagon (Baqsimi One Pack) 3 MG/DOSE powder, 1 each into the nostril(s) as directed by provider As Needed (hypoglycemia). Apply intranasal if hypoglycemia, Disp: 2 each, Rfl: 11  •  Insulin Aspart (novoLOG) 100 UNIT/ML injection, 90 units daily , E11.65, Disp: 30 mL, Rfl: 11  •  irbesartan (AVAPRO) 75 MG tablet, Take 1 tablet by mouth Every Night., Disp: 90 tablet, Rfl: 1  •  isosorbide mononitrate (IMDUR) 30 MG 24 hr tablet, Take 1 tablet by mouth Daily., Disp: 90 tablet, Rfl: 1  •  metoprolol succinate XL (Toprol XL) 50 MG 24 hr tablet, Take 1.5 tablets by mouth Daily., Disp: 135 tablet, Rfl: 3  •  omeprazole (priLOSEC) 40 MG capsule, Take 1 capsule by mouth Daily., Disp: 30 capsule, Rfl: 0  •  promethazine (PHENERGAN) 25 MG tablet, TAKE 1 TABLET BY MOUTH EVERY 6 HOURS AS NEEDED FOR NAUSEA AND VOMITING, Disp: 20 tablet, Rfl: 0  •  nitroglycerin (NITROSTAT) 0.4 MG SL tablet, Place 1 tablet under the tongue Every 5 (Five) Minutes As Needed for Chest Pain. (Patient not taking: Reported on 9/15/2023), Disp: 30 tablet, Rfl: 3    Review of Systems   Constitutional: Positive for weight gain. Negative for chills and fever.   Cardiovascular:  Negative for chest pain and paroxysmal nocturnal dyspnea.   Respiratory:  Negative for cough and shortness of breath.    Skin:  Negative for rash.   Gastrointestinal:  Negative for abdominal pain and heartburn.   Neurological:  Negative for dizziness and numbness.     Objective  /70 (BP  "Location: Left arm, Patient Position: Sitting, Cuff Size: Adult)   Pulse 77   Ht 162.6 cm (64.02\")   Wt 91.2 kg (201 lb)   SpO2 98%   BMI 34.48 kg/m²   Constitutional:       Appearance: Well-developed. Obese.   HENT:      Head: Normocephalic and atraumatic.   Pulmonary:      Effort: Pulmonary effort is normal.      Breath sounds: Normal breath sounds.   Cardiovascular:      Normal rate. Regular rhythm.      Murmurs: There is no murmur.      No gallop.    Edema:     Peripheral edema absent.   Skin:     General: Skin is warm and dry.   Neurological:      Mental Status: Alert and oriented to person, place, and time.     Procedures      ICD-10-CM ICD-9-CM   1. Coronary artery disease involving coronary bypass graft of native heart without angina pectoris  I25.810 414.05   2. Essential hypertension  I10 401.9   3. Mixed hyperlipidemia  E78.2 272.2   4. Class 1 obesity due to excess calories with serious comorbidity and body mass index (BMI) of 34.0 to 34.9 in adult  E66.09 278.00    Z68.34 V85.34         Assessment/Plan:  1.  Coronary artery disease: 2-vessel CABG, 7/2019.  Patent bypass grafts on LHC from 6/22/2021.  Dobutamine stress echo from 6/21/2023 was low risk.  Continue aspirin, Repatha, Imdur, metoprolol, and nitroglycerin.     2.  Essential hypertension: Blood pressures remains well controlled today. Continue metoprolol and irbesartan.     3.  Mixed hyperlipidemia: Good control on lipid panel from 3/30/2023.  Continue Repatha.  She has repeat blood work scheduled for October.      4.  Obesity: Body mass index is 34.48 kg/m².  Weight is increasing.   on lifestyle modification with decreased caloric intake and increased exercise.   "

## 2023-10-19 ENCOUNTER — OFFICE VISIT (OUTPATIENT)
Dept: FAMILY MEDICINE CLINIC | Facility: CLINIC | Age: 48
End: 2023-10-19
Payer: MEDICAID

## 2023-10-19 VITALS
HEIGHT: 64 IN | TEMPERATURE: 96 F | HEART RATE: 71 BPM | SYSTOLIC BLOOD PRESSURE: 122 MMHG | WEIGHT: 200.6 LBS | BODY MASS INDEX: 34.25 KG/M2 | RESPIRATION RATE: 18 BRPM | DIASTOLIC BLOOD PRESSURE: 78 MMHG | OXYGEN SATURATION: 98 %

## 2023-10-19 DIAGNOSIS — E66.09 CLASS 1 OBESITY DUE TO EXCESS CALORIES WITH SERIOUS COMORBIDITY AND BODY MASS INDEX (BMI) OF 34.0 TO 34.9 IN ADULT: ICD-10-CM

## 2023-10-19 DIAGNOSIS — E78.00 ELEVATED LDL CHOLESTEROL LEVEL: ICD-10-CM

## 2023-10-19 DIAGNOSIS — Z23 NEED FOR INFLUENZA VACCINATION: ICD-10-CM

## 2023-10-19 DIAGNOSIS — E10.65 UNCONTROLLED TYPE 1 DIABETES MELLITUS WITH HYPERGLYCEMIA, WITH LONG-TERM CURRENT USE OF INSULIN: Primary | ICD-10-CM

## 2023-10-19 DIAGNOSIS — Z91.199 MEDICALLY NONCOMPLIANT: ICD-10-CM

## 2023-10-19 DIAGNOSIS — Z23 NEED FOR PNEUMOCOCCAL 20-VALENT CONJUGATE VACCINATION: ICD-10-CM

## 2023-10-19 NOTE — PROGRESS NOTES
Patient presented to office for low dose flu injection. Given in right Deltoid. Patient waited in lobby 15 minutes and tolerated well. VIS was given and Consent in media

## 2023-10-19 NOTE — PROGRESS NOTES
"Chief Complaint   Patient presents with    folklow up      Labs and adema        Subjective   Krupa Salomón De La Rosa is a 48 y.o. female who presents today for follow up lab and peripheral edema.    HPI   She returns today to discuss labs and recheck peripheral edema. She states she is not having any trouble with edema currently.     Allergies   Allergen Reactions    Adhesive Tape Other (See Comments)    Ciprofibrate     Latex Other (See Comments)     Burns skin severely     Levofloxacin Rash         OBJECTIVE:  Vitals:    10/19/23 1258   BP: 122/78   Pulse: 71   Resp: 18   Temp: 96 °F (35.6 °C)   TempSrc: Temporal   SpO2: 98%   Weight: 91 kg (200 lb 9.6 oz)   Height: 162.6 cm (64\")     Physical Exam  Vitals and nursing note reviewed.   Constitutional:       Appearance: Normal appearance.   Cardiovascular:      Rate and Rhythm: Normal rate and regular rhythm.      Pulses: Normal pulses.      Heart sounds: Normal heart sounds.   Pulmonary:      Effort: Pulmonary effort is normal.      Breath sounds: Normal breath sounds.   Musculoskeletal:      Comments: Mild edema present bilateral lower extremities. Non pitting.    Skin:     General: Skin is warm and dry.   Neurological:      General: No focal deficit present.      Mental Status: She is alert and oriented to person, place, and time.   Psychiatric:         Mood and Affect: Mood normal.         Behavior: Behavior normal.         Thought Content: Thought content normal.         Judgment: Judgment normal.         BMI is >= 30 and <35. (Class 1 Obesity). The following options were offered after discussion;: weight loss educational material (shared in after visit summary), exercise counseling/recommendations, and nutrition counseling/recommendations       CMP          1/12/2023    14:38 10/10/2023    13:14   CMP   Glucose 241  273    BUN 8  11    Creatinine 0.87  0.83    EGFR 83.3     Sodium 139  136    Potassium 4.4  4.6    Chloride 104  100    Calcium 9.4  8.9  "   Total Protein  6.1    Total Protein 7.1     Albumin 4.2  3.8    Globulin  2.3    Globulin 2.9     Total Bilirubin 0.3  0.2    Alkaline Phosphatase 134  120    AST (SGOT) 19  13    ALT (SGPT) 13  11    Albumin/Globulin Ratio 1.4     BUN/Creatinine Ratio 9.2  13    Anion Gap 11.0       CBC w/diff          3/30/2023    10:56 4/13/2023    09:03   CBC w/Diff   WBC 14.8  7.8    RBC 5.40  4.89    Hemoglobin 16.0  14.7    Hematocrit 45.6  44.1    MCV 84  90    MCH 29.6  30.1    MCHC 35.1  33.3    RDW 12.7  13.1    Platelets 282  235    Neutrophil Rel % 84  52    Lymphocyte Rel % 10  37    Monocyte Rel % 5  8    Eosinophil Rel % 0  2    Basophil Rel % 0  1      Lipid Panel          3/30/2023    10:56 10/10/2023    13:14   Lipid Panel   Total Cholesterol 145  138    Triglycerides 84  66    HDL Cholesterol 65  53    VLDL Cholesterol 16  14    LDL Cholesterol  64  71    LDL/HDL Ratio 1.0  1.3      TSH          10/10/2023    13:14   TSH   TSH 3.660      Most Recent A1C          10/10/2023    13:14   HGBA1C Most Recent   Hemoglobin A1C 9.1      Microalbumin          10/10/2023    13:14   Microalbumin   Microalbumin, Urine <3.0    Blood work reviewed and discussed with patient      ASSESSMENT/ PLAN:    Diagnoses and all orders for this visit:    1. Uncontrolled type 1 diabetes mellitus with hyperglycemia, with long-term current use of insulin (Primary)    2. Elevated LDL cholesterol level    3. Medically noncompliant    4. Class 1 obesity due to excess calories with serious comorbidity and body mass index (BMI) of 34.0 to 34.9 in adult    5. Need for pneumococcal 20-valent conjugate vaccination  -     Pneumococcal Conjugate Vaccine 20-Valent (PCV20)    6. Need for influenza vaccination  -     Fluzone >6 Months ([id number])      Procedures     Management Plan:   She is to start taking her Repatha q 2 weeks instead of what she has been doing at q 3 weeks. LDL and A1C need to improve. She states she will be compliant with Repatha  and insulin. She gets her insulin pump checked tomorrow.    An After Visit Summary was printed and given to the patient at discharge.    Follow-up: No follow-ups on file.         Silverio Reed, TORIE 10/19/2023 13:43 CDT  This note was electronically signed.

## 2023-11-01 ENCOUNTER — OFFICE VISIT (OUTPATIENT)
Dept: FAMILY MEDICINE CLINIC | Facility: CLINIC | Age: 48
End: 2023-11-01

## 2023-11-01 VITALS
HEART RATE: 78 BPM | BODY MASS INDEX: 34.72 KG/M2 | SYSTOLIC BLOOD PRESSURE: 120 MMHG | HEIGHT: 64 IN | TEMPERATURE: 97.3 F | WEIGHT: 203.4 LBS | DIASTOLIC BLOOD PRESSURE: 81 MMHG | OXYGEN SATURATION: 98 %

## 2023-11-01 DIAGNOSIS — M65.311 TRIGGER FINGER OF RIGHT THUMB: ICD-10-CM

## 2023-11-01 DIAGNOSIS — M65.321 TRIGGER INDEX FINGER OF RIGHT HAND: Primary | ICD-10-CM

## 2023-11-01 NOTE — PROGRESS NOTES
"Chief Complaint   Patient presents with    Hand Pain     Right hand pain         Subjective   Krupaanton De La Rosa is a 48 y.o. female who presents today for trigger index finger and trigger thumb right hand.     HPI   She states her right index finger and thumb draw up and she has to pry them out to straighten them. 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     Allergies   Allergen Reactions    Adhesive Tape Other (See Comments)    Ciprofibrate     Latex Other (See Comments)     Burns skin severely     Levofloxacin Rash         OBJECTIVE:  Vitals:    11/01/23 1306   BP: 120/81   BP Location: Left arm   Patient Position: Sitting   Cuff Size: Large Adult   Pulse: 78   Temp: 97.3 °F (36.3 °C)   SpO2: 98%   Weight: 92.3 kg (203 lb 6.4 oz)   Height: 162.6 cm (64\")     Physical Exam  Musculoskeletal:      Right hand: Tenderness present. Decreased range of motion. Decreased strength.      Comments: Limited range of motion with assistance.                     ASSESSMENT/ PLAN:    Diagnoses and all orders for this visit:    1. Trigger index finger of right hand (Primary)    2. Trigger finger of right thumb    Other orders  -     Cancel: Inject Trigger Points, > 3  -     Inject Trigger Point, 1 or 2      Inject Trigger Point, 1 or 2    Date/Time: 11/1/2023 3:12 PM    Performed by: Silverio Reed APRN  Authorized by: Silverio Reed APRN  Preparation: Patient was prepped and draped in the usual sterile fashion.  Local anesthesia used: yes  Anesthesia: local infiltration    Anesthesia:  Local anesthesia used: yes  Local Anesthetic: lidocaine 2% without epinephrine  Anesthetic total: 3 mL    Sedation:  Patient sedated: no    Patient tolerance: patient tolerated the procedure well with no immediate complications  Comments: Right thumb and 2nd finger prepped with alcohol and lidocaine 2% without epi. Kenalog 20 mg injected " proximal to tendon sheath. Areas massaged. Bandaids applied.   NDC [address]-7  LOT: [id number]  EXP:07/25             Management Plan:   Resume normal activities.   An After Visit Summary was printed and given to the patient at discharge.    Follow-up: Return if symptoms worsen or fail to improve.         Silverio Reed, TORIE 11/1/2023 15:16 CDT  This note was electronically signed.

## 2023-11-27 RX ORDER — PROCHLORPERAZINE 25 MG/1
SUPPOSITORY RECTAL
Qty: 3 EACH | Refills: 3 | Status: SHIPPED | OUTPATIENT
Start: 2023-11-27

## 2023-11-27 NOTE — TELEPHONE ENCOUNTER
Rx Refill Note  Requested Prescriptions     Pending Prescriptions Disp Refills    Continuous Blood Gluc Sensor (Dexcom G6 Sensor) [Pharmacy Med Name: DEXCOM G6 SENSOR    MIS] 3 each 0     Sig: USE 1  AS DIRECTED EVERY  10  DAYS        Gypsy Jones MA  11/27/23, 07:58 CST

## 2023-12-06 ENCOUNTER — TELEPHONE (OUTPATIENT)
Dept: FAMILY MEDICINE CLINIC | Facility: CLINIC | Age: 48
End: 2023-12-06
Payer: MEDICAID

## 2024-01-08 DIAGNOSIS — F41.9 ANXIETY: ICD-10-CM

## 2024-01-09 RX ORDER — ALPRAZOLAM 1 MG/1
1 TABLET ORAL 3 TIMES DAILY
Qty: 90 TABLET | Refills: 0 | Status: SHIPPED | OUTPATIENT
Start: 2024-01-09

## 2024-01-09 NOTE — TELEPHONE ENCOUNTER
Rx Refill Note  Requested Prescriptions     Pending Prescriptions Disp Refills    ALPRAZolam (XANAX) 1 MG tablet [Pharmacy Med Name: ALPRAZolam 1 MG Oral Tablet] 90 tablet 0     Sig: TAKE 1 TABLET BY MOUTH THREE TIMES DAILY       Sonia Estrada MA  01/09/24, 08:40 CST

## 2024-01-23 ENCOUNTER — TELEPHONE (OUTPATIENT)
Dept: FAMILY MEDICINE CLINIC | Facility: CLINIC | Age: 49
End: 2024-01-23
Payer: COMMERCIAL

## 2024-01-26 ENCOUNTER — TELEPHONE (OUTPATIENT)
Dept: FAMILY MEDICINE CLINIC | Facility: CLINIC | Age: 49
End: 2024-01-26

## 2024-01-26 NOTE — TELEPHONE ENCOUNTER
Hub staff attempted to follow warm transfer process and was unsuccessful     Caller: Krupa De La Rosa    Relationship to patient: Self    Best call back number: [phone]     Patient is needing: PATIENT WAS RETURNING A CALL. PLEASE ADVISE.

## 2024-02-05 DIAGNOSIS — F41.9 ANXIETY: ICD-10-CM

## 2024-02-05 RX ORDER — ALPRAZOLAM 1 MG/1
1 TABLET ORAL 3 TIMES DAILY
Qty: 90 TABLET | Refills: 0 | Status: SHIPPED | OUTPATIENT
Start: 2024-02-05

## 2024-02-05 NOTE — TELEPHONE ENCOUNTER
Rx Refill Note  Requested Prescriptions     Pending Prescriptions Disp Refills    ALPRAZolam (XANAX) 1 MG tablet [Pharmacy Med Name: ALPRAZolam 1 MG Oral Tablet] 90 tablet 0     Sig: TAKE 1 TABLET BY MOUTH THREE TIMES DAILY      Last office visit with prescribing clinician: 11/1/2023         Diana Akbar MA  02/05/24, 15:26 CST

## 2024-03-01 DIAGNOSIS — I10 ESSENTIAL HYPERTENSION: ICD-10-CM

## 2024-03-01 RX ORDER — CLONIDINE HYDROCHLORIDE 0.1 MG/1
0.1 TABLET ORAL 2 TIMES DAILY
Qty: 180 TABLET | Refills: 1 | Status: SHIPPED | OUTPATIENT
Start: 2024-03-01

## 2024-03-01 NOTE — TELEPHONE ENCOUNTER
Rx Refill Note  Requested Prescriptions     Pending Prescriptions Disp Refills    cloNIDine (CATAPRES) 0.1 MG tablet [Pharmacy Med Name: cloNIDine HCl 0.1 MG Oral Tablet] 180 tablet 0     Sig: Take 1 tablet by mouth twice daily        Gypsy Jones MA  03/01/24, 07:32 CST

## 2024-03-07 DIAGNOSIS — F41.9 ANXIETY: ICD-10-CM

## 2024-03-07 RX ORDER — ALPRAZOLAM 1 MG/1
1 TABLET ORAL 3 TIMES DAILY
Qty: 90 TABLET | Refills: 0 | Status: SHIPPED | OUTPATIENT
Start: 2024-03-07 | End: 2024-03-11 | Stop reason: SDUPTHER

## 2024-03-07 NOTE — TELEPHONE ENCOUNTER
Rx Refill Note  Requested Prescriptions     Pending Prescriptions Disp Refills    ALPRAZolam (XANAX) 1 MG tablet [Pharmacy Med Name: ALPRAZolam 1 MG Oral Tablet] 90 tablet 0     Sig: TAKE 1 TABLET BY MOUTH THREE TIMES DAILY    Patient is due for compliance OV.    Gypsy Jones MA  03/07/24, 11:31 CST

## 2024-03-11 ENCOUNTER — OFFICE VISIT (OUTPATIENT)
Dept: FAMILY MEDICINE CLINIC | Facility: CLINIC | Age: 49
End: 2024-03-11
Payer: COMMERCIAL

## 2024-03-11 VITALS
TEMPERATURE: 97 F | SYSTOLIC BLOOD PRESSURE: 122 MMHG | DIASTOLIC BLOOD PRESSURE: 78 MMHG | HEIGHT: 64 IN | RESPIRATION RATE: 18 BRPM | WEIGHT: 210 LBS | BODY MASS INDEX: 35.85 KG/M2 | HEART RATE: 80 BPM | OXYGEN SATURATION: 98 %

## 2024-03-11 DIAGNOSIS — E10.65 UNCONTROLLED TYPE 1 DIABETES MELLITUS WITH HYPERGLYCEMIA, WITH LONG-TERM CURRENT USE OF INSULIN: Primary | ICD-10-CM

## 2024-03-11 DIAGNOSIS — Z79.899 LONG-TERM USE OF HIGH-RISK MEDICATION: ICD-10-CM

## 2024-03-11 DIAGNOSIS — M65.312 TRIGGER THUMB OF LEFT HAND: ICD-10-CM

## 2024-03-11 DIAGNOSIS — R60.9 PERIPHERAL EDEMA: ICD-10-CM

## 2024-03-11 DIAGNOSIS — F41.9 ANXIETY: ICD-10-CM

## 2024-03-11 DIAGNOSIS — M65.322 TRIGGER INDEX FINGER OF LEFT HAND: ICD-10-CM

## 2024-03-11 PROCEDURE — 99213 OFFICE O/P EST LOW 20 MIN: CPT | Performed by: NURSE PRACTITIONER

## 2024-03-11 RX ORDER — ALPRAZOLAM 1 MG/1
1 TABLET ORAL 3 TIMES DAILY
Qty: 90 TABLET | Refills: 0 | Status: SHIPPED | OUTPATIENT
Start: 2024-03-11

## 2024-03-11 RX ORDER — FUROSEMIDE 20 MG/1
20 TABLET ORAL DAILY PRN
Qty: 90 TABLET | Refills: 3 | Status: SHIPPED | OUTPATIENT
Start: 2024-03-11

## 2024-03-11 RX ORDER — PROCHLORPERAZINE 25 MG/1
SUPPOSITORY RECTAL
COMMUNITY
Start: 2023-11-26 | End: 2024-03-18

## 2024-03-11 NOTE — PROGRESS NOTES
"Chief Complaint   Patient presents with    Follow-up     Compliance         Subjective   Krupaanton De La Rosa is a 48 y.o. female who presents today for medication refills.     HPI   She is here for a compliance visit. She needs a contract and UDS because hers is . She takes gabapentin and xanax. Both are working well for her.   JOEL, UDS, and controlled substance contract in EMR. Advised patient of risks associated with controlled substances including physical and mental dependence, abuse, and mental impairment. Advised patient to use least amount of possible and never overuse or share medications with other people. Patient states understanding of risks and instructions on proper use.    Allergies   Allergen Reactions    Adhesive Tape Other (See Comments)    Ciprofibrate     Latex Other (See Comments)     Burns skin severely     Levofloxacin Rash         OBJECTIVE:  Vitals:    24 1430   BP: 122/78   Pulse: 80   Resp: 18   Temp: 97 °F (36.1 °C)   TempSrc: Temporal   SpO2: 98%   Weight: 95.3 kg (210 lb)   Height: 162.6 cm (64\")     Physical Exam  Vitals and nursing note reviewed.   Constitutional:       Appearance: Normal appearance.   Cardiovascular:      Rate and Rhythm: Normal rate and regular rhythm.      Pulses: Normal pulses.      Heart sounds: Normal heart sounds.   Pulmonary:      Effort: Pulmonary effort is normal.      Breath sounds: Normal breath sounds.   Musculoskeletal:      Left hand: Tenderness present. Decreased strength. Decreased sensation.      Comments: Left index and left thumb trigger fingers.    Feet:      Comments: No edema today.  Skin:     General: Skin is warm and dry.   Neurological:      General: No focal deficit present.      Mental Status: She is alert and oriented to person, place, and time.   Psychiatric:         Mood and Affect: Mood normal.         Behavior: Behavior normal.         Thought Content: Thought content normal.         Judgment: Judgment normal. "                    ASSESSMENT/ PLAN:    Diagnoses and all orders for this visit:    1. Uncontrolled type 1 diabetes mellitus with hyperglycemia, with long-term current use of insulin (Primary)    2. Peripheral edema  -     furosemide (LASIX) 20 MG tablet; Take 1 tablet by mouth Daily As Needed (SWELLING).  Dispense: 90 tablet; Refill: 3    3. Anxiety  -     ALPRAZolam (XANAX) 1 MG tablet; Take 1 tablet by mouth 3 (Three) Times a Day.  Dispense: 90 tablet; Refill: 0    4. Trigger index finger of left hand    5. Trigger thumb of left hand      Procedures     Management Plan:   Continue present medications.  She will schedule for injection left thumb and index finger trigger fingers.   An After Visit Summary was printed and given to the patient at discharge.    Follow-up: Return in about 6 weeks (around 4/25/2024) for Recheck with diabetes labs.         TORIE Torres 3/11/2024 15:10 CDT  This note was electronically signed.

## 2024-03-18 RX ORDER — PROCHLORPERAZINE 25 MG/1
SUPPOSITORY RECTAL
Qty: 1 EACH | Refills: 0 | Status: SHIPPED | OUTPATIENT
Start: 2024-03-18

## 2024-03-18 NOTE — TELEPHONE ENCOUNTER
Rx Refill Note  Requested Prescriptions     Pending Prescriptions Disp Refills    Continuous Blood Gluc Transmit (Dexcom G6 Transmitter) misc [Pharmacy Med Name: DEXCOM G6 TRANSMIT  MIS] 1 each 0     Si EACH BY OTHER ROUTE CONTINOUS FOR 90 DAYS      Last office visit with prescribing clinician: 3/11/2024   Last telemedicine visit with prescribing clinician: Visit date not found   Next office visit with prescribing clinician: 3/20/2024       Diana Akbar MA  24, 09:56 CDT

## 2024-03-19 LAB — DRUGS UR: NORMAL

## 2024-03-20 ENCOUNTER — PROCEDURE VISIT (OUTPATIENT)
Dept: FAMILY MEDICINE CLINIC | Facility: CLINIC | Age: 49
End: 2024-03-20
Payer: COMMERCIAL

## 2024-03-20 VITALS
HEIGHT: 64 IN | RESPIRATION RATE: 18 BRPM | OXYGEN SATURATION: 98 % | SYSTOLIC BLOOD PRESSURE: 122 MMHG | BODY MASS INDEX: 35.85 KG/M2 | WEIGHT: 210 LBS | TEMPERATURE: 97 F | HEART RATE: 91 BPM | DIASTOLIC BLOOD PRESSURE: 76 MMHG

## 2024-03-20 DIAGNOSIS — M65.312 TRIGGER THUMB OF LEFT HAND: ICD-10-CM

## 2024-03-20 DIAGNOSIS — M65.322 TRIGGER INDEX FINGER OF LEFT HAND: Primary | ICD-10-CM

## 2024-03-20 NOTE — PROGRESS NOTES
"Chief Complaint   Patient presents with    Procedure     Trigger finger injection index finger/ thumb  left hand        Subjective   Krupa De La Rosa is a 48 y.o. female who presents today for trigger finger and trigger thumb.     HPI   She has a left trigger finger and trigger thumb that she would like injected with a steroid today.     Allergies   Allergen Reactions    Adhesive Tape Other (See Comments)    Ciprofibrate     Latex Other (See Comments)     Burns skin severely     Levofloxacin Rash         OBJECTIVE:  Vitals:    03/20/24 1329   BP: 122/76   Pulse: 91   Resp: 18   Temp: 97 °F (36.1 °C)   TempSrc: Temporal   SpO2: 98%   Weight: 95.3 kg (210 lb)   Height: 162.6 cm (64\")     Physical Exam  Vitals and nursing note reviewed.   Constitutional:       Appearance: Normal appearance.   Musculoskeletal:      Right hand: Normal.      Left hand: Tenderness present. Decreased range of motion.      Comments: Left index finger bends and catches and has to be pried off the palm.  Left thumb bends and catches and has to be pried off the palm.   There is a click in the PIP area on full flexion.   Tenderness over extensor tendons below PIP joint.    Neurological:      Mental Status: She is alert.                    ASSESSMENT/ PLAN:    Diagnoses and all orders for this visit:    1. Trigger index finger of left hand (Primary)    2. Trigger thumb of left hand    Other orders  -     Inject Trigger Point, 1 or 2      Inject Trigger Point, 1 or 2    Date/Time: 3/20/2024 2:20 PM    Performed by: Silverio Reed APRN  Authorized by: Silverio Reed APRN  Preparation: Patient was prepped and draped in the usual sterile fashion.  Local anesthesia used: yes  Anesthesia: local infiltration    Anesthesia:  Local anesthesia used: yes  Local Anesthetic: lidocaine 1% without epinephrine  Anesthetic total: 1 mL    Sedation:  Patient sedated: no    Patient tolerance: patient tolerated the procedure well with no immediate " complications  Comments: Left index finger and thumb prepped with alcohol below PIP joints. Combination of lidocaine 1% without epi and kenalog 40 mg injected into the area between the tendons and the palm for both areas.   NDC [address]  Lot [id number]  Exp.: 2025-11           Management Plan:   Resume normal activities.  An After Visit Summary was printed and given to the patient at discharge.    Follow-up: Return if symptoms worsen or fail to improve.         TORIE Torres 3/20/2024 14:25 CDT  This note was electronically signed.

## 2024-03-22 ENCOUNTER — TELEPHONE (OUTPATIENT)
Dept: FAMILY MEDICINE CLINIC | Facility: CLINIC | Age: 49
End: 2024-03-22
Payer: COMMERCIAL

## 2024-03-26 DIAGNOSIS — R52 GENERALIZED PAIN: ICD-10-CM

## 2024-03-26 DIAGNOSIS — E10.42 TYPE 1 DIABETES MELLITUS WITH DIABETIC POLYNEUROPATHY: ICD-10-CM

## 2024-03-26 RX ORDER — GABAPENTIN 300 MG/1
CAPSULE ORAL
Qty: 90 CAPSULE | Refills: 0 | Status: SHIPPED | OUTPATIENT
Start: 2024-03-26

## 2024-03-26 NOTE — TELEPHONE ENCOUNTER
Rx Refill Note  Requested Prescriptions     Pending Prescriptions Disp Refills    gabapentin (NEURONTIN) 300 MG capsule [Pharmacy Med Name: Gabapentin 300 MG Oral Capsule] 90 capsule 0     Sig: TAKE 1 CAPSULE BY MOUTH THREE TIMES DAILY . APPOINTMENT REQUIRED FOR FUTURE REFILLS     Requirements are up to date  Gypsy Jones MA  03/26/24, 14:32 CDT

## 2024-03-27 ENCOUNTER — TELEPHONE (OUTPATIENT)
Dept: FAMILY MEDICINE CLINIC | Facility: CLINIC | Age: 49
End: 2024-03-27
Payer: COMMERCIAL

## 2024-03-28 ENCOUNTER — TELEPHONE (OUTPATIENT)
Dept: FAMILY MEDICINE CLINIC | Facility: CLINIC | Age: 49
End: 2024-03-28
Payer: COMMERCIAL

## 2024-04-04 NOTE — TELEPHONE ENCOUNTER
Called plan to follow up on status of PA- plan doesn't cover any name brand meds or diabetic supplies. No generic CGM available. Please advise

## 2024-04-04 NOTE — TELEPHONE ENCOUNTER
Called plan to follow up on PA status- reports that no name brand medications are covered under her plan. Med is not sold in generic. Please advise on alternative med for pt.

## 2024-04-11 ENCOUNTER — TELEPHONE (OUTPATIENT)
Dept: FAMILY MEDICINE CLINIC | Facility: CLINIC | Age: 49
End: 2024-04-11

## 2024-04-11 DIAGNOSIS — E10.42 TYPE 1 DIABETES MELLITUS WITH DIABETIC POLYNEUROPATHY: ICD-10-CM

## 2024-04-24 ENCOUNTER — TELEPHONE (OUTPATIENT)
Dept: FAMILY MEDICINE CLINIC | Facility: CLINIC | Age: 49
End: 2024-04-24
Payer: COMMERCIAL

## 2024-05-07 RX ORDER — PROCHLORPERAZINE 25 MG/1
SUPPOSITORY RECTAL
Qty: 1 EACH | Refills: 0 | Status: SHIPPED | OUTPATIENT
Start: 2024-05-07

## 2024-05-12 DIAGNOSIS — E10.42 TYPE 1 DIABETES MELLITUS WITH DIABETIC POLYNEUROPATHY: ICD-10-CM

## 2024-05-13 DIAGNOSIS — E10.42 TYPE 1 DIABETES MELLITUS WITH DIABETIC POLYNEUROPATHY: ICD-10-CM

## 2024-05-13 DIAGNOSIS — F41.9 ANXIETY: ICD-10-CM

## 2024-05-13 DIAGNOSIS — R52 GENERALIZED PAIN: ICD-10-CM

## 2024-05-13 RX ORDER — IRBESARTAN 75 MG/1
75 TABLET ORAL NIGHTLY
Qty: 90 TABLET | Refills: 1 | Status: SHIPPED | OUTPATIENT
Start: 2024-05-13

## 2024-05-13 NOTE — TELEPHONE ENCOUNTER
Rx Refill Note  Requested Prescriptions     Pending Prescriptions Disp Refills    ALPRAZolam (XANAX) 1 MG tablet [Pharmacy Med Name: ALPRAZolam 1 MG Oral Tablet] 90 tablet 0     Sig: TAKE 1 TABLET BY MOUTH THREE TIMES DAILY      Last office visit with prescribing clinician: 3/11/2024   Last telemedicine visit with prescribing clinician: Visit date not found   Next office visit with prescribing clinician:compliance due 6/11/24        Diana Akbar MA  05/13/24, 16:27 CDT

## 2024-05-13 NOTE — TELEPHONE ENCOUNTER
Rx Refill Note  Requested Prescriptions     Pending Prescriptions Disp Refills    irbesartan (AVAPRO) 75 MG tablet [Pharmacy Med Name: Irbesartan 75 MG Oral Tablet] 90 tablet 0     Sig: TAKE 1 TABLET BY MOUTH ONCE DAILY AT NIGHT      Last office visit with prescribing clinician: 3/11/2024   Last telemedicine visit with prescribing clinician: Visit date not found   Next office visit with prescribing clinician: Visit date not found       Diana Akbar MA  05/13/24, 08:46 CDT

## 2024-05-14 RX ORDER — GABAPENTIN 300 MG/1
CAPSULE ORAL
Qty: 90 CAPSULE | Refills: 2 | Status: SHIPPED | OUTPATIENT
Start: 2024-05-14

## 2024-05-14 RX ORDER — ALPRAZOLAM 1 MG/1
1 TABLET ORAL 3 TIMES DAILY
Qty: 90 TABLET | Refills: 2 | Status: SHIPPED | OUTPATIENT
Start: 2024-05-14

## 2024-05-14 NOTE — TELEPHONE ENCOUNTER
Rx Refill Note  Requested Prescriptions     Pending Prescriptions Disp Refills    gabapentin (NEURONTIN) 300 MG capsule [Pharmacy Med Name: Gabapentin 300 MG Oral Capsule] 90 capsule 0     Sig: TAKE 1 CAPSULE BY MOUTH THREE TIMES DAILY APPOINTMENT  REQUIRED  FOR  FUTURE  REFILLS      Last office visit with prescribing clinician: 3/11/2024     Next office visit with prescribing clinician: Visit date not found       Sonia Estrada MA  05/14/24, 07:13 CDT

## 2024-05-16 DIAGNOSIS — E10.42 TYPE 1 DIABETES MELLITUS WITH DIABETIC POLYNEUROPATHY: ICD-10-CM

## 2024-05-16 RX ORDER — ISOSORBIDE MONONITRATE 30 MG/1
30 TABLET, EXTENDED RELEASE ORAL DAILY
Qty: 90 TABLET | Refills: 1 | Status: SHIPPED | OUTPATIENT
Start: 2024-05-16

## 2024-05-16 NOTE — TELEPHONE ENCOUNTER
Rx Refill Note  Requested Prescriptions     Pending Prescriptions Disp Refills    isosorbide mononitrate (IMDUR) 30 MG 24 hr tablet [Pharmacy Med Name: Isosorbide Mononitrate ER 30 MG Oral Tablet Extended Release 24 Hour] 90 tablet 0     Sig: Take 1 tablet by mouth once daily      Last office visit with prescribing clinician: 3/11/2024   Last telemedicine visit with prescribing clinician: Visit date not found   Next office visit with prescribing clinician: Visit date not found       Diana Akbar MA  05/16/24, 08:41 CDT

## 2024-06-18 ENCOUNTER — TELEPHONE (OUTPATIENT)
Dept: FAMILY MEDICINE CLINIC | Facility: CLINIC | Age: 49
End: 2024-06-18
Payer: COMMERCIAL

## 2024-08-06 DIAGNOSIS — F33.2 SEVERE EPISODE OF RECURRENT MAJOR DEPRESSIVE DISORDER, WITHOUT PSYCHOTIC FEATURES: ICD-10-CM

## 2024-08-06 RX ORDER — DESVENLAFAXINE SUCCINATE 50 MG/1
TABLET, EXTENDED RELEASE ORAL DAILY
Qty: 30 TABLET | Refills: 0 | Status: SHIPPED | OUTPATIENT
Start: 2024-08-06

## 2024-08-06 NOTE — TELEPHONE ENCOUNTER
Rx Refill Note  Requested Prescriptions     Pending Prescriptions Disp Refills    desvenlafaxine (PRISTIQ) 50 MG 24 hr tablet [Pharmacy Med Name: Desvenlafaxine Succinate ER 50 MG Oral Tablet Extended Release 24 Hour] 90 tablet 0     Sig: Take 1 tablet by mouth once daily      Last office visit with prescribing clinician: 3/11/2024   Last telemedicine visit with prescribing clinician: Visit date not found   Next office visit with prescribing clinician:    Joanna Olivera MA  08/06/24, 08:55 CDT

## 2024-08-17 DIAGNOSIS — F41.9 ANXIETY: ICD-10-CM

## 2024-08-19 RX ORDER — ALPRAZOLAM 1 MG/1
1 TABLET ORAL 3 TIMES DAILY
Qty: 90 TABLET | Refills: 0 | Status: SHIPPED | OUTPATIENT
Start: 2024-08-19

## 2024-08-19 NOTE — TELEPHONE ENCOUNTER
Rx Refill Note  Requested Prescriptions     Pending Prescriptions Disp Refills    ALPRAZolam (XANAX) 1 MG tablet [Pharmacy Med Name: ALPRAZolam 1 MG Oral Tablet] 90 tablet 0     Sig: TAKE 1 TABLET BY MOUTH THREE TIMES DAILY    Patient is overdue for annual  and labs prior. 30 days pended. Please schedule.   Last office visit with office: 3.11.2024  Next office visit with office:     UDS: 3.12.2024      DATE OF LAST REFILL:     Controlled Substance Agreement: up to date        Gypsy Jones MA  08/19/24, 10:12 CDT

## 2024-08-26 ENCOUNTER — OFFICE VISIT (OUTPATIENT)
Dept: FAMILY MEDICINE CLINIC | Facility: CLINIC | Age: 49
End: 2024-08-26
Payer: COMMERCIAL

## 2024-08-26 VITALS
OXYGEN SATURATION: 97 % | SYSTOLIC BLOOD PRESSURE: 105 MMHG | BODY MASS INDEX: 34.56 KG/M2 | TEMPERATURE: 97.6 F | HEIGHT: 64 IN | WEIGHT: 202.4 LBS | DIASTOLIC BLOOD PRESSURE: 75 MMHG | HEART RATE: 69 BPM

## 2024-08-26 DIAGNOSIS — E66.09 CLASS 1 OBESITY DUE TO EXCESS CALORIES WITH SERIOUS COMORBIDITY AND BODY MASS INDEX (BMI) OF 34.0 TO 34.9 IN ADULT: ICD-10-CM

## 2024-08-26 DIAGNOSIS — E10.42 TYPE 1 DIABETES MELLITUS WITH DIABETIC POLYNEUROPATHY: Primary | ICD-10-CM

## 2024-08-26 DIAGNOSIS — Z00.00 ENCOUNTER FOR ANNUAL PHYSICAL EXAM: ICD-10-CM

## 2024-08-26 DIAGNOSIS — R53.82 CHRONIC FATIGUE: ICD-10-CM

## 2024-08-26 DIAGNOSIS — E78.00 ELEVATED LDL CHOLESTEROL LEVEL: ICD-10-CM

## 2024-08-26 PROCEDURE — 99213 OFFICE O/P EST LOW 20 MIN: CPT | Performed by: NURSE PRACTITIONER

## 2024-08-26 RX ORDER — INSULIN LISPRO 100 [IU]/ML
20 INJECTION, SOLUTION INTRAVENOUS; SUBCUTANEOUS
Qty: 18 ML | Refills: 6 | Status: SHIPPED | OUTPATIENT
Start: 2024-08-26 | End: 2025-03-24

## 2024-08-26 RX ORDER — INSULIN LISPRO 100 [IU]/ML
INJECTION, SOLUTION INTRAVENOUS; SUBCUTANEOUS
COMMUNITY
Start: 2024-04-12 | End: 2024-08-26 | Stop reason: SDUPTHER

## 2024-08-26 NOTE — PROGRESS NOTES
"Chief Complaint   Patient presents with    Med Refill     Pt states she needs novolog called in. Pt states that she is here for a medication check up, she already has the xanax filled. She also is no longer taking repatha because insurance won't cover it.         Subjective   Krupa Salomón De La Rosa is a 48 y.o. female who presents today for the following: compliance visit and fasting labs.    HPI   She is compliant and xanax is still helping her anxiety.JOEL, UDS, and controlled substance contract in EMR. Advised patient of risks associated with controlled substances including physical and mental dependence, abuse, and mental impairment. Advised patient to use least amount of possible and never overuse or share medications with other people. Patient states understanding of risks and instructions on proper use.      Allergies   Allergen Reactions    Adhesive Tape Other (See Comments)    Ciprofibrate     Latex Other (See Comments)     Burns skin severely     Levofloxacin Rash         OBJECTIVE:  Vitals:    08/26/24 0816   BP: 105/75   BP Location: Left arm   Patient Position: Sitting   Pulse: 69   Temp: 97.6 °F (36.4 °C)   TempSrc: Infrared   SpO2: 97%   Weight: 91.8 kg (202 lb 6.4 oz)   Height: 162.6 cm (64.02\")     Physical Exam  Vitals and nursing note reviewed.   Constitutional:       Appearance: Normal appearance.   Cardiovascular:      Rate and Rhythm: Normal rate and regular rhythm.      Pulses: Normal pulses.      Heart sounds: Normal heart sounds.   Pulmonary:      Effort: Pulmonary effort is normal.      Breath sounds: Normal breath sounds.   Feet:      Right foot:      Protective Sensation: 5 sites tested.  5 sites sensed.      Left foot:      Protective Sensation: 5 sites tested.  5 sites sensed.   Skin:     General: Skin is warm and dry.   Neurological:      General: No focal deficit present.      Mental Status: She is alert and oriented to person, place, and time.   Psychiatric:         Mood and " Affect: Mood normal.         Behavior: Behavior normal.         Thought Content: Thought content normal.         Judgment: Judgment normal.                    ASSESSMENT/ PLAN:    Diagnoses and all orders for this visit:    1. Type 1 diabetes mellitus with diabetic polyneuropathy (Primary)  -     Insulin Lispro (humaLOG) 100 UNIT/ML injection; Inject 20 Units under the skin into the appropriate area as directed 3 (Three) Times a Day Before Meals for 210 days.  Dispense: 18 mL; Refill: 6  -     Hemoglobin A1c  -     MicroAlbumin, Urine, Random - Urine, Clean Catch    2. Encounter for annual physical exam  -     CBC Auto Differential  -     Comprehensive Metabolic Panel  -     Lipid Panel With LDL / HDL Ratio  -     T4 & TSH ([facility])    3. Elevated LDL cholesterol level  -     Lipid Panel With LDL / HDL Ratio    4. Chronic fatigue  -     T4 & TSH ([facility])    5. Class 1 obesity due to excess calories with serious comorbidity and body mass index (BMI) of 34.0 to 34.9 in adult      Procedures     Management Plan:   Continue present medications. Additional orders will be determined when the patient's labs return.   An After Visit Summary was printed and given to the patient at discharge.    Follow-up: Return in about 1 week (around 9/2/2024) for ANNUAL PHYSICAL WITH LABS PRIOR.         TORIE Torres 8/26/2024 08:53 CDT  This note was electronically signed.

## 2024-08-27 LAB
ALBUMIN SERPL-MCNC: 3.9 G/DL (ref 3.9–4.9)
ALP SERPL-CCNC: 162 IU/L (ref 44–121)
ALT SERPL-CCNC: 11 IU/L (ref 0–32)
AST SERPL-CCNC: 13 IU/L (ref 0–40)
BASOPHILS # BLD AUTO: 0.1 X10E3/UL (ref 0–0.2)
BASOPHILS NFR BLD AUTO: 1 %
BILIRUB SERPL-MCNC: <0.2 MG/DL (ref 0–1.2)
BUN SERPL-MCNC: 6 MG/DL (ref 6–24)
BUN/CREAT SERPL: 7 (ref 9–23)
CALCIUM SERPL-MCNC: 9.3 MG/DL (ref 8.7–10.2)
CHLORIDE SERPL-SCNC: 104 MMOL/L (ref 96–106)
CHOLEST SERPL-MCNC: 231 MG/DL (ref 100–199)
CO2 SERPL-SCNC: 23 MMOL/L (ref 20–29)
CREAT SERPL-MCNC: 0.91 MG/DL (ref 0.57–1)
EGFRCR SERPLBLD CKD-EPI 2021: 78 ML/MIN/1.73
EOSINOPHIL # BLD AUTO: 0.2 X10E3/UL (ref 0–0.4)
EOSINOPHIL NFR BLD AUTO: 3 %
ERYTHROCYTE [DISTWIDTH] IN BLOOD BY AUTOMATED COUNT: 11.8 % (ref 11.7–15.4)
GLOBULIN SER CALC-MCNC: 2.2 G/DL (ref 1.5–4.5)
GLUCOSE SERPL-MCNC: 165 MG/DL (ref 70–99)
HBA1C MFR BLD: 8.3 % (ref 4.8–5.6)
HCT VFR BLD AUTO: 44.2 % (ref 34–46.6)
HDLC SERPL-MCNC: 58 MG/DL
HGB BLD-MCNC: 14 G/DL (ref 11.1–15.9)
IMM GRANULOCYTES # BLD AUTO: 0 X10E3/UL (ref 0–0.1)
IMM GRANULOCYTES NFR BLD AUTO: 0 %
LDLC SERPL CALC-MCNC: 157 MG/DL (ref 0–99)
LDLC/HDLC SERPL: 2.7 RATIO (ref 0–3.2)
LYMPHOCYTES # BLD AUTO: 2.6 X10E3/UL (ref 0.7–3.1)
LYMPHOCYTES NFR BLD AUTO: 36 %
MCH RBC QN AUTO: 29.9 PG (ref 26.6–33)
MCHC RBC AUTO-ENTMCNC: 31.7 G/DL (ref 31.5–35.7)
MCV RBC AUTO: 94 FL (ref 79–97)
MICROALBUMIN UR-MCNC: 5 UG/ML
MONOCYTES # BLD AUTO: 0.5 X10E3/UL (ref 0.1–0.9)
MONOCYTES NFR BLD AUTO: 8 %
NEUTROPHILS # BLD AUTO: 3.7 X10E3/UL (ref 1.4–7)
NEUTROPHILS NFR BLD AUTO: 52 %
PLATELET # BLD AUTO: 242 X10E3/UL (ref 150–450)
POTASSIUM SERPL-SCNC: 4.6 MMOL/L (ref 3.5–5.2)
PROT SERPL-MCNC: 6.1 G/DL (ref 6–8.5)
RBC # BLD AUTO: 4.68 X10E6/UL (ref 3.77–5.28)
SODIUM SERPL-SCNC: 140 MMOL/L (ref 134–144)
T4 SERPL-MCNC: 6.7 UG/DL (ref 4.5–12)
TRIGL SERPL-MCNC: 92 MG/DL (ref 0–149)
TSH SERPL DL<=0.005 MIU/L-ACNC: 2.31 UIU/ML (ref 0.45–4.5)
VLDLC SERPL CALC-MCNC: 16 MG/DL (ref 5–40)
WBC # BLD AUTO: 7.2 X10E3/UL (ref 3.4–10.8)

## 2024-09-05 ENCOUNTER — OFFICE VISIT (OUTPATIENT)
Dept: FAMILY MEDICINE CLINIC | Facility: CLINIC | Age: 49
End: 2024-09-05
Payer: COMMERCIAL

## 2024-09-05 VITALS
DIASTOLIC BLOOD PRESSURE: 77 MMHG | OXYGEN SATURATION: 98 % | SYSTOLIC BLOOD PRESSURE: 114 MMHG | BODY MASS INDEX: 34.49 KG/M2 | TEMPERATURE: 97.5 F | HEIGHT: 64 IN | HEART RATE: 80 BPM | WEIGHT: 202 LBS | RESPIRATION RATE: 18 BRPM

## 2024-09-05 DIAGNOSIS — F41.9 ANXIETY: ICD-10-CM

## 2024-09-05 DIAGNOSIS — K20.90 ESOPHAGITIS: ICD-10-CM

## 2024-09-05 DIAGNOSIS — I25.708 CORONARY ARTERY DISEASE OF BYPASS GRAFT OF NATIVE HEART WITH STABLE ANGINA PECTORIS: ICD-10-CM

## 2024-09-05 DIAGNOSIS — R52 GENERALIZED PAIN: ICD-10-CM

## 2024-09-05 DIAGNOSIS — I10 ESSENTIAL HYPERTENSION: ICD-10-CM

## 2024-09-05 DIAGNOSIS — E10.42 TYPE 1 DIABETES MELLITUS WITH DIABETIC POLYNEUROPATHY: ICD-10-CM

## 2024-09-05 DIAGNOSIS — F51.01 PRIMARY INSOMNIA: ICD-10-CM

## 2024-09-05 DIAGNOSIS — Z12.31 ENCOUNTER FOR SCREENING MAMMOGRAM FOR MALIGNANT NEOPLASM OF BREAST: ICD-10-CM

## 2024-09-05 DIAGNOSIS — E66.09 CLASS 1 OBESITY DUE TO EXCESS CALORIES WITH SERIOUS COMORBIDITY AND BODY MASS INDEX (BMI) OF 34.0 TO 34.9 IN ADULT: ICD-10-CM

## 2024-09-05 DIAGNOSIS — E78.00 ELEVATED LDL CHOLESTEROL LEVEL: ICD-10-CM

## 2024-09-05 DIAGNOSIS — Z00.00 ENCOUNTER FOR ANNUAL PHYSICAL EXAM: Primary | ICD-10-CM

## 2024-09-05 DIAGNOSIS — F33.2 SEVERE EPISODE OF RECURRENT MAJOR DEPRESSIVE DISORDER, WITHOUT PSYCHOTIC FEATURES: ICD-10-CM

## 2024-09-05 DIAGNOSIS — I20.89 ANGINA OF EFFORT: ICD-10-CM

## 2024-09-05 DIAGNOSIS — Z78.9 STATIN INTOLERANCE: ICD-10-CM

## 2024-09-05 PROCEDURE — 99396 PREV VISIT EST AGE 40-64: CPT | Performed by: NURSE PRACTITIONER

## 2024-09-05 RX ORDER — ALIROCUMAB 150 MG/ML
150 INJECTION, SOLUTION SUBCUTANEOUS
Qty: 2.24 ML | Refills: 3 | Status: SHIPPED | OUTPATIENT
Start: 2024-09-05

## 2024-09-05 NOTE — PROGRESS NOTES
"Chief Complaint   Patient presents with    Annual Exam        Subjective   Krupaanton De La Rosa is a 48 y.o. female who presents today for the following: annual physical with lab review.    HPI   She has no complaints today.   She is followed by Sebastian Brizuela NP, out of [place] for her insulin pump adjustments.   Her insurance denied her repatha that was working well. I am going to try to send in praluent. She is statin intolerant.     Allergies   Allergen Reactions    Adhesive Tape Other (See Comments)    Ciprofibrate     Latex Other (See Comments)     Burns skin severely     Levofloxacin Rash         OBJECTIVE:  Vitals:    09/05/24 0839   BP: 114/77   BP Location: Left arm   Patient Position: Sitting   Cuff Size: Large Adult   Pulse: 80   Resp: 18   Temp: 97.5 °F (36.4 °C)   TempSrc: Infrared   SpO2: 98%   Weight: 91.6 kg (202 lb)   Height: 162.6 cm (64\")     Physical Exam  Vitals and nursing note reviewed.   Constitutional:       Appearance: Normal appearance. She is obese.   HENT:      Head: Normocephalic and atraumatic.      Right Ear: Tympanic membrane, ear canal and external ear normal.      Left Ear: Tympanic membrane, ear canal and external ear normal.      Nose: Nose normal.      Mouth/Throat:      Mouth: Mucous membranes are moist.      Pharynx: Oropharynx is clear.   Eyes:      Extraocular Movements: Extraocular movements intact.      Pupils: Pupils are equal, round, and reactive to light.   Cardiovascular:      Rate and Rhythm: Normal rate and regular rhythm.      Pulses: Normal pulses.      Heart sounds: Normal heart sounds.   Pulmonary:      Effort: Pulmonary effort is normal.      Breath sounds: Normal breath sounds.   Chest:      Comments: Well healed scar mid chest from CABG.  Abdominal:      General: Abdomen is flat. Bowel sounds are normal.      Palpations: Abdomen is soft.   Musculoskeletal:         General: Normal range of motion.      Cervical back: Normal range of motion and neck " supple.   Skin:     General: Skin is warm and dry.      Capillary Refill: Capillary refill takes less than 2 seconds.   Neurological:      General: No focal deficit present.      Mental Status: She is alert and oriented to person, place, and time.   Psychiatric:         Mood and Affect: Mood normal.         Behavior: Behavior normal.         Thought Content: Thought content normal.         Judgment: Judgment normal.       Lab Choices: CBC:  Lab Results - Last 18 Months   Lab Units 08/26/24  0800 04/13/23  0903 03/30/23  1056   WBC x10E3/uL 7.2 7.8 14.8*   HEMOGLOBIN g/dL 14.0 14.7 16.0*   HEMATOCRIT % 44.2 44.1 45.6   PLATELETS x10E3/uL 242 235 282      A1C:  Lab Results - Last 18 Months   Lab Units 08/26/24  0800 01/26/24  0754 10/10/23  1314 03/30/23  1056   HEMOGLOBIN A1C % 8.3* 9.2* 9.1* 9.3*     BMP/CMP:  Lab Results - Last 18 Months   Lab Units 08/26/24  0800 01/26/24  0754 10/10/23  1314   SODIUM mmol/L 140 140 136   POTASSIUM mmol/L 4.6 4.5 4.6   CHLORIDE mmol/L 104 102 100   CO2 mmol/L 23 25 21   GLUCOSE mg/dL 165* 133* 273*   BUN mg/dL 6 8 11   CREATININE mg/dL 0.91 0.84 0.83   EGFR RESULT mL/min/1.73 78 86 88   CALCIUM mg/dL 9.3 9.4 8.9     THYROID:  Lab Results - Last 18 Months   Lab Units 08/26/24  0800 10/10/23  1314   TSH uIU/mL 2.310 3.660     LIPID:  Lab Results - Last 18 Months   Lab Units 08/26/24  0800 10/10/23  1314 03/30/23  1056   CHOLESTEROL mg/dL 231* 138 145   LDL CHOL mg/dL 157* 71 64   HDL CHOL mg/dL 58 53 65   TRIGLYCERIDES mg/dL 92 66 84      Blood work reviewed and discussed with patient               ASSESSMENT/ PLAN:    Diagnoses and all orders for this visit:    1. Encounter for annual physical exam (Primary)    2. Type 1 diabetes mellitus with diabetic polyneuropathy  -     Alirocumab (Praluent) 150 MG/ML injection pen; Inject 1 mL under the skin into the appropriate area as directed Every 14 (Fourteen) Days.  Dispense: 2.24 mL; Refill: 3    3. Encounter for screening mammogram for  malignant neoplasm of breast  -     Cancel: Mammo Screening Digital Tomosynthesis Bilateral With CAD; Future  -     Mammo Screening Digital Tomosynthesis Bilateral With CAD; Future    4. Elevated LDL cholesterol level  -     Alirocumab (Praluent) 150 MG/ML injection pen; Inject 1 mL under the skin into the appropriate area as directed Every 14 (Fourteen) Days.  Dispense: 2.24 mL; Refill: 3    5. Class 1 obesity due to excess calories with serious comorbidity and body mass index (BMI) of 34.0 to 34.9 in adult    6. Anxiety    7. Severe episode of recurrent major depressive disorder, without psychotic features    8. Generalized pain    9. Essential hypertension    10. Coronary artery disease of bypass 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    11. Primary insomnia    12. Angina of effort    13. Esophagitis    14. Statin intolerance      Procedures     Management Plan:   MEDICATION Instructions:     Encouraged patient to continue routine medicines as prescribed and maintain compliance. Patient instructed to report any adverse side effects or reactions to medicines promptly to the office. Patient instructed to make us aware of any OTC or herbal meds or supplement use.           DIET Recommendations:       Patient instructed and provided information on the following nutrition and diet recommendations: Calorie restriction for weight reduction and maintenance. Necessity for adequate daily intake of fluids/water. Also, diet information provided in AVS for specifics.           EXERCISE Instructions:         Discussed with patient the need for routine aerobic activity for cardiovascular fitness, 3 times a week for about 30 minutes. Daily exercise for increased fitness and weight reduction goals.           HEALTH MAINTENANCE:                 Counseling provided to patient/family about routine health maintenance and ANNUAL physicals/labs. Counseling on recommended Vaccinations appropriate for age needed.   An After  Visit Summary was printed and given to the patient at discharge.    Follow-up: Return in about 3 months (around 12/5/2024) for Recheck with diabetes labs.         TORIE Torres 9/5/2024 09:13 CDT  This note was electronically signed.

## 2024-09-20 DIAGNOSIS — F41.9 ANXIETY: ICD-10-CM

## 2024-09-20 RX ORDER — ALPRAZOLAM 1 MG
1 TABLET ORAL 3 TIMES DAILY
Qty: 90 TABLET | Refills: 0 | Status: SHIPPED | OUTPATIENT
Start: 2024-09-20

## 2024-09-23 DIAGNOSIS — F33.2 SEVERE EPISODE OF RECURRENT MAJOR DEPRESSIVE DISORDER, WITHOUT PSYCHOTIC FEATURES: ICD-10-CM

## 2024-09-23 RX ORDER — DESVENLAFAXINE 50 MG/1
TABLET, FILM COATED, EXTENDED RELEASE ORAL DAILY
Qty: 30 TABLET | Refills: 5 | Status: SHIPPED | OUTPATIENT
Start: 2024-09-23

## 2024-09-30 DIAGNOSIS — F51.01 PRIMARY INSOMNIA: ICD-10-CM

## 2024-09-30 RX ORDER — AMITRIPTYLINE HYDROCHLORIDE 100 MG/1
100 TABLET ORAL NIGHTLY
Qty: 90 TABLET | Refills: 0 | Status: SHIPPED | OUTPATIENT
Start: 2024-09-30

## 2024-09-30 NOTE — TELEPHONE ENCOUNTER
Rx Refill Note  Requested Prescriptions     Pending Prescriptions Disp Refills    amitriptyline (ELAVIL) 100 MG tablet [Pharmacy Med Name: Amitriptyline HCl 100 MG Oral Tablet] 90 tablet 1     Sig: TAKE 1 TABLET BY MOUTH ONCE DAILY AT NIGHT        Gypsy Jones MA  09/30/24, 08:47 CDT

## 2024-10-03 ENCOUNTER — TRANSCRIBE ORDERS (OUTPATIENT)
Dept: ADMINISTRATIVE | Age: 49
End: 2024-10-03

## 2024-10-03 DIAGNOSIS — Z12.31 ENCOUNTER FOR SCREENING MAMMOGRAM FOR MALIGNANT NEOPLASM OF BREAST: Primary | ICD-10-CM

## 2024-10-11 DIAGNOSIS — I10 ESSENTIAL HYPERTENSION: ICD-10-CM

## 2024-10-11 RX ORDER — METOPROLOL SUCCINATE 50 MG/1
75 TABLET, EXTENDED RELEASE ORAL DAILY
Qty: 45 TABLET | Refills: 2 | Status: SHIPPED | OUTPATIENT
Start: 2024-10-11

## 2024-10-11 NOTE — TELEPHONE ENCOUNTER
Rx Refill Note  Requested Prescriptions     Pending Prescriptions Disp Refills    metoprolol succinate XL (TOPROL-XL) 50 MG 24 hr tablet [Pharmacy Med Name: Metoprolol Succinate ER 50 MG Oral Tablet Extended Release 24 Hour] 45 tablet 0     Sig: TAKE 1 & 1/2 (ONE & ONE-HALF) TABLETS BY MOUTH ONCE DAILY      Last office visit with prescribing clinician: 9/5/2024   Last telemedicine visit with prescribing clinician: Visit date not found   Next office visit with prescribing clinician: 12/5/2024       {TIP  Please add Last Relevant Lab 8/26/24    Joanna Olivera MA  10/11/24, 07:35 CDT

## 2024-10-15 ENCOUNTER — HOSPITAL ENCOUNTER (OUTPATIENT)
Dept: WOMENS IMAGING | Age: 49
Discharge: HOME | End: 2024-10-15
Payer: COMMERCIAL

## 2024-10-15 VITALS — WEIGHT: 200 LBS | HEIGHT: 64 IN | BODY MASS INDEX: 34.15 KG/M2

## 2024-10-15 DIAGNOSIS — Z12.31 ENCOUNTER FOR SCREENING MAMMOGRAM FOR MALIGNANT NEOPLASM OF BREAST: ICD-10-CM

## 2024-10-15 PROCEDURE — 77063 BREAST TOMOSYNTHESIS BI: CPT

## 2024-10-16 DIAGNOSIS — Z12.31 ENCOUNTER FOR SCREENING MAMMOGRAM FOR MALIGNANT NEOPLASM OF BREAST: ICD-10-CM

## 2024-10-18 DIAGNOSIS — F41.9 ANXIETY: ICD-10-CM

## 2024-10-18 RX ORDER — ALPRAZOLAM 1 MG
1 TABLET ORAL 3 TIMES DAILY
Qty: 90 TABLET | Refills: 0 | Status: SHIPPED | OUTPATIENT
Start: 2024-10-18

## 2024-10-26 DIAGNOSIS — E10.42 TYPE 1 DIABETES MELLITUS WITH DIABETIC POLYNEUROPATHY: ICD-10-CM

## 2024-10-26 DIAGNOSIS — R52 GENERALIZED PAIN: ICD-10-CM

## 2024-10-28 RX ORDER — GABAPENTIN 300 MG/1
300 CAPSULE ORAL 3 TIMES DAILY
Qty: 90 CAPSULE | Refills: 0 | Status: SHIPPED | OUTPATIENT
Start: 2024-10-28

## 2024-10-28 NOTE — TELEPHONE ENCOUNTER
Rx Refill Note  Requested Prescriptions     Pending Prescriptions Disp Refills    gabapentin (NEURONTIN) 300 MG capsule [Pharmacy Med Name: Gabapentin 300 MG Oral Capsule] 90 capsule 0     Sig: TAKE 1 CAPSULE BY MOUTH THREE TIMES DAILY      Last office visit with office: 9.5.2024  Next office visit with office: 12.5.2024    UDS: 3.11.2024    DATE OF LAST REFILL:     Controlled Substance Agreement: up to date    Gypsy Jones MA  10/28/24, 13:14 CDT

## 2024-11-18 DIAGNOSIS — F41.9 ANXIETY: ICD-10-CM

## 2024-11-18 RX ORDER — ALPRAZOLAM 1 MG/1
1 TABLET ORAL 3 TIMES DAILY
Qty: 90 TABLET | Refills: 0 | Status: SHIPPED | OUTPATIENT
Start: 2024-11-18

## 2024-11-18 NOTE — TELEPHONE ENCOUNTER
Rx Refill Note  Requested Prescriptions     Pending Prescriptions Disp Refills    ALPRAZolam (XANAX) 1 MG tablet [Pharmacy Med Name: ALPRAZolam 1 MG Oral Tablet] 90 tablet 0     Sig: TAKE 1 TABLET BY MOUTH THREE TIMES DAILY      Last office visit with office: 9.5.2024  Next office visit with office: 12.05.2024    UDS: 3.12.2024    DATE OF LAST REFILL:     Controlled Substance Agreement: up to date        Gypsy Jones MA  11/18/24, 12:35 CST

## 2024-11-20 DIAGNOSIS — E10.42 TYPE 1 DIABETES MELLITUS WITH DIABETIC POLYNEUROPATHY: ICD-10-CM

## 2024-11-20 RX ORDER — IRBESARTAN 75 MG/1
75 TABLET ORAL NIGHTLY
Qty: 90 TABLET | Refills: 1 | Status: SHIPPED | OUTPATIENT
Start: 2024-11-20

## 2024-11-20 NOTE — TELEPHONE ENCOUNTER
Rx Refill Note  Requested Prescriptions     Pending Prescriptions Disp Refills    irbesartan (AVAPRO) 75 MG tablet [Pharmacy Med Name: Irbesartan 75 MG Oral Tablet] 90 tablet 0     Sig: TAKE 1 TABLET BY MOUTH ONCE DAILY AT NIGHT       Gypsy Jones MA  11/20/24, 07:55 CST

## 2024-11-26 ENCOUNTER — OFFICE VISIT (OUTPATIENT)
Dept: FAMILY MEDICINE CLINIC | Facility: CLINIC | Age: 49
End: 2024-11-26
Payer: COMMERCIAL

## 2024-11-26 VITALS
OXYGEN SATURATION: 98 % | WEIGHT: 199.8 LBS | DIASTOLIC BLOOD PRESSURE: 88 MMHG | TEMPERATURE: 98.2 F | HEIGHT: 64 IN | SYSTOLIC BLOOD PRESSURE: 130 MMHG | RESPIRATION RATE: 17 BRPM | BODY MASS INDEX: 34.11 KG/M2 | HEART RATE: 85 BPM

## 2024-11-26 DIAGNOSIS — Z23 NEEDS FLU SHOT: ICD-10-CM

## 2024-11-26 DIAGNOSIS — E66.811 CLASS 1 OBESITY DUE TO EXCESS CALORIES WITH SERIOUS COMORBIDITY AND BODY MASS INDEX (BMI) OF 34.0 TO 34.9 IN ADULT: ICD-10-CM

## 2024-11-26 DIAGNOSIS — E11.40 TYPE 2 DIABETES MELLITUS WITH DIABETIC NEUROPATHY, WITH LONG-TERM CURRENT USE OF INSULIN: Primary | ICD-10-CM

## 2024-11-26 DIAGNOSIS — Z79.4 TYPE 2 DIABETES MELLITUS WITH DIABETIC NEUROPATHY, WITH LONG-TERM CURRENT USE OF INSULIN: Primary | ICD-10-CM

## 2024-11-26 DIAGNOSIS — E66.09 CLASS 1 OBESITY DUE TO EXCESS CALORIES WITH SERIOUS COMORBIDITY AND BODY MASS INDEX (BMI) OF 34.0 TO 34.9 IN ADULT: ICD-10-CM

## 2024-11-26 DIAGNOSIS — G25.81 RLS (RESTLESS LEGS SYNDROME): ICD-10-CM

## 2024-11-26 RX ORDER — GABAPENTIN 600 MG/1
600 TABLET ORAL 3 TIMES DAILY
Qty: 90 TABLET | Refills: 2 | Status: SHIPPED | OUTPATIENT
Start: 2024-11-26

## 2024-11-26 RX ORDER — ROPINIROLE 0.5 MG/1
0.5 TABLET, FILM COATED ORAL NIGHTLY
Qty: 30 TABLET | Refills: 3 | Status: SHIPPED | OUTPATIENT
Start: 2024-11-26

## 2024-11-26 RX ORDER — HYDROCHLOROTHIAZIDE 12.5 MG/1
CAPSULE ORAL
COMMUNITY
Start: 2024-11-12

## 2024-11-26 NOTE — PROGRESS NOTES
"Chief Complaint   Patient presents with    Foot Pain     Bilateral     Leg Pain     Bilateral         Subjective   Krupa Salomón De La Rosa is a 49 y.o. female who presents today for the following:       Symptoms are chronic.   Onset was 6 to12 months.   Symptoms occur intermittently.   Symptoms have been worse since onset.   Symptoms include joint pain and myalgias.    Aggravating factors include bending and walking.   Treatments tried include heat, NSAIDs, relaxation, walking, acetaminophen, ice, rest, immobilization, position changes, sleep and lying down.   Improvement on treatment was no relief.   Treatment and/or Medications comments include: gabapentin   Leg Pain        She complains of her feet and legs  with neuropathy. It starts at the bottom of her feet and moves up her legs and she is having a hard time dealing with it. This has been occurring for 5 months. She cannot lay still and is not getting much sleep. Nothing makes it better and she has tried several modalities to help.    Allergies   Allergen Reactions    Adhesive Tape Other (See Comments)    Ciprofibrate     Latex Other (See Comments)     Burns skin severely     Levofloxacin Rash         OBJECTIVE:  Vitals:    11/26/24 1349   BP: 130/88   BP Location: Left arm   Patient Position: Sitting   Cuff Size: Adult   Pulse: 85   Resp: 17   Temp: 98.2 °F (36.8 °C)   TempSrc: Oral   SpO2: 98%   Weight: 90.6 kg (199 lb 12.8 oz)   Height: 162.6 cm (64\")     Physical Exam  Vitals and nursing note reviewed.   Constitutional:       Appearance: Normal appearance.   Cardiovascular:      Rate and Rhythm: Normal rate and regular rhythm.      Pulses: Normal pulses.      Heart sounds: Normal heart sounds.   Pulmonary:      Effort: Pulmonary effort is normal.      Breath sounds: Normal breath sounds.   Skin:     General: Skin is warm and dry.   Neurological:      General: No focal deficit present.      Mental Status: She is alert and oriented to person, place, and " time.   Psychiatric:         Mood and Affect: Mood normal.         Behavior: Behavior normal.         Thought Content: Thought content normal.         Judgment: Judgment normal.         BMI is >= 30 and <35. (Class 1 Obesity). The following options were offered after discussion;: weight loss educational material (shared in after visit summary), exercise counseling/recommendations, and nutrition counseling/recommendations          ASSESSMENT/ PLAN:    Diagnoses and all orders for this visit:    1. Type 2 diabetes mellitus with diabetic neuropathy, with long-term current use of insulin (Primary)  -     gabapentin (NEURONTIN) 600 MG tablet; Take 1 tablet by mouth 3 (Three) Times a Day.  Dispense: 90 tablet; Refill: 2  -     Hemoglobin A1c    2. Needs flu shot  -     Fluzone >6mos ([id number])    3. RLS (restless legs syndrome)  -     rOPINIRole (REQUIP) 0.5 MG tablet; Take 1 tablet by mouth Every Night. Take 1 hour before bedtime.  Dispense: 30 tablet; Refill: 3    4. Class 1 obesity due to excess calories with serious comorbidity and body mass index (BMI) of 34.0 to 34.9 in adult      Procedures     Management Plan:   Decrease fat in diet and increase exercise.  An After Visit Summary was printed and given to the patient at discharge.    Follow-up: Return in about 1 week (around 12/3/2024) for follow up labs.         TORIE Torres 11/27/2024 07:05 CST  This note was electronically signed.

## 2024-11-27 DIAGNOSIS — E10.42 TYPE 1 DIABETES MELLITUS WITH DIABETIC POLYNEUROPATHY: ICD-10-CM

## 2024-11-27 LAB — HBA1C MFR BLD: 10.2 % (ref 4.8–5.6)

## 2024-11-27 RX ORDER — ISOSORBIDE MONONITRATE 30 MG/1
30 TABLET, EXTENDED RELEASE ORAL DAILY
Qty: 90 TABLET | Refills: 1 | Status: SHIPPED | OUTPATIENT
Start: 2024-11-27

## 2024-11-27 NOTE — TELEPHONE ENCOUNTER
Rx Refill Note  Requested Prescriptions     Pending Prescriptions Disp Refills    isosorbide mononitrate (IMDUR) 30 MG 24 hr tablet [Pharmacy Med Name: Isosorbide Mononitrate ER 30 MG Oral Tablet Extended Release 24 Hour] 90 tablet 0     Sig: Take 1 tablet by mouth once daily        Gypsy Jones MA  11/27/24, 12:08 CST

## 2024-12-03 ENCOUNTER — OFFICE VISIT (OUTPATIENT)
Dept: FAMILY MEDICINE CLINIC | Facility: CLINIC | Age: 49
End: 2024-12-03
Payer: COMMERCIAL

## 2024-12-03 VITALS
RESPIRATION RATE: 17 BRPM | BODY MASS INDEX: 33.19 KG/M2 | DIASTOLIC BLOOD PRESSURE: 60 MMHG | HEART RATE: 90 BPM | TEMPERATURE: 98.3 F | HEIGHT: 64 IN | OXYGEN SATURATION: 98 % | SYSTOLIC BLOOD PRESSURE: 114 MMHG | WEIGHT: 194.4 LBS

## 2024-12-03 DIAGNOSIS — Z78.9 STATIN INTOLERANCE: ICD-10-CM

## 2024-12-03 DIAGNOSIS — I25.708 CORONARY ARTERY DISEASE OF BYPASS GRAFT OF NATIVE HEART WITH STABLE ANGINA PECTORIS: ICD-10-CM

## 2024-12-03 DIAGNOSIS — E78.00 ELEVATED LDL CHOLESTEROL LEVEL: ICD-10-CM

## 2024-12-03 DIAGNOSIS — G25.81 RLS (RESTLESS LEGS SYNDROME): ICD-10-CM

## 2024-12-03 DIAGNOSIS — F41.9 ANXIETY: ICD-10-CM

## 2024-12-03 DIAGNOSIS — E10.65 UNCONTROLLED TYPE 1 DIABETES MELLITUS WITH HYPERGLYCEMIA, WITH LONG-TERM CURRENT USE OF INSULIN: ICD-10-CM

## 2024-12-03 DIAGNOSIS — F33.2 SEVERE EPISODE OF RECURRENT MAJOR DEPRESSIVE DISORDER, WITHOUT PSYCHOTIC FEATURES: ICD-10-CM

## 2024-12-03 DIAGNOSIS — E10.42 TYPE 1 DIABETES MELLITUS WITH DIABETIC POLYNEUROPATHY: Primary | ICD-10-CM

## 2024-12-03 PROCEDURE — 99213 OFFICE O/P EST LOW 20 MIN: CPT | Performed by: NURSE PRACTITIONER

## 2024-12-03 NOTE — PROGRESS NOTES
"Chief Complaint   Patient presents with    Diabetes     3 mth recheck        Subjective   Krupa Salomón De La Rosa is a 49 y.o. female who presents today for the following: follow up diabetes labs.      She hasn't been able to afford her diabetes medications x 2 months and has no insurance. A1C is 10.2. She is applying for jobs to get better insurance.   She is doing better with her RLS and neuropathy.    Allergies   Allergen Reactions    Adhesive Tape Other (See Comments)    Ciprofibrate     Latex Other (See Comments)     Burns skin severely     Levofloxacin Rash         OBJECTIVE:  Vitals:    12/03/24 0923   BP: 114/60   BP Location: Left arm   Patient Position: Sitting   Cuff Size: Adult   Pulse: 90   Resp: 17   Temp: 98.3 °F (36.8 °C)   TempSrc: Infrared   SpO2: 98%   Weight: 88.2 kg (194 lb 6.4 oz)   Height: 162.6 cm (64\")     Physical Exam  Vitals and nursing note reviewed.   Constitutional:       Appearance: Normal appearance.   Cardiovascular:      Rate and Rhythm: Normal rate and regular rhythm.      Pulses: Normal pulses.      Heart sounds: Normal heart sounds.   Pulmonary:      Effort: Pulmonary effort is normal.      Breath sounds: Normal breath sounds.   Skin:     General: Skin is warm and dry.   Neurological:      General: No focal deficit present.      Mental Status: She is alert and oriented to person, place, and time.   Psychiatric:         Mood and Affect: Mood normal.         Behavior: Behavior normal.         Thought Content: Thought content normal.         Judgment: Judgment normal.                Most Recent A1C          11/25/2024    23:00   HGBA1C Most Recent   Hemoglobin A1C 10.2    Blood work reviewed and discussed with patient      ASSESSMENT/ PLAN:    Diagnoses and all orders for this visit:    1. Type 1 diabetes mellitus with diabetic polyneuropathy (Primary)    2. Uncontrolled type 1 diabetes mellitus with hyperglycemia, with long-term current use of insulin      Procedures "     Management Plan:   Patient is going to try to get a job with better benefits. She expects her next A1C to be better.   An After Visit Summary was printed and given to the patient at discharge.    Follow-up: Return in about 3 months (around 3/3/2025) for Recheck with diabetes meds.         TORIE Torres 12/4/2024 08:24 CST  This note was electronically signed.

## 2024-12-06 NOTE — PROGRESS NOTES
Diagnosis:       R  Plantar fasciitis (M72.2)    Referring Provider: Shae Cobb  Date of Evaluation:    11/01/2024    Precautions:  None Next MD visit:   none scheduled  Date of Surgery: n/a   Insurance Primary/Secondary: [employer] MCR / MEDICAID     # Auth Visits: 8 per POC [employer]            Subjective: Pt reports her pain is a bit better but hurts more on the medial side lately. She denies doing anything different, it still hurts with standing and walking. Her foot cont to feel better at rest, but will hurt again with movement.   Pain:6/10  (but can increase when it is bad)       Objective:   Symptom irritation; high with walking  TTP: peroneal tendon, mid calf, soleus  -------------------  AROM: (* denotes performed with pain)  Foot/Ankle   DF: R 3 deg (prone knee bent); L WFL  PF: R WFL*; L WFL  INV: R 15deg* (inside); L WDL  EV: R 8 deg; L WFL      Accessory motion: limited all calcaneal mob of R foot, painful to palpate navicular and cuboid; hypomobile post glide talus     Flexibility:     Gastroc-soleus: R mod limited; L min limited     Strength/MMT: (* denotes performed with pain)  Hip Foot/Ankle   NT today DF: R 4+/5; L 5/5  PF: R 5/5; L 5/5  INV: R 5/5; L 5/5  EV: R 5/5; L 5/5  Great toe ext: R NT/5; L  NT/5  Great toe flex:5/5 B       Assessment:  Pt did feel relief after Dumont taping and manual. Performed plantar fascial support tape as well as navicular, told her to take it off in 4-5 days. Educated pt to call MD about orthotics or potential injection due to prolonged symptoms. She cont to have pain when she first wakes up and with walking which cont to be indicative of a tenontitis.Will cont to strengthen, tape and work on improving calcaneal mobility.     Goals:   (to be met in 8 visits)  Pt will demonstrate improved DF AROM to >= 8 degrees to promote proper foot clearance during gait and greater ease descending stairs without compensation. progressing  Pt will have increased ankle strength  [facility]  Progress Note                            Date:  6/29/2018  Patient: Susan Mayberry  Admission:  6/26/2018 11:35 AM  Admit DX: Abnormal result of other cardiovascular function study [R94.39]  Unstable angina ([address] [I20.0]  Age:  40 y.o., 1975     LOS: 3 days     Reason for evaluation:   coronary artery disease      SUBJECTIVE:    The patient was seen and examined. Notes and labs reviewed. There were not complications over night. Patient's cardiac review of systems: negative for chest pain. The patient is  gradually improving. denies chest pain. OBJECTIVE:    Telemetry: Sinus  Lungs are clear.  metoprolol succinate  25 mg Oral BID    insulin lispro  0-12 Units Subcutaneous TID WC    insulin lispro  0-6 Units Subcutaneous Nightly    ALPRAZolam  1 mg Oral Q4H    aspirin  81 mg Oral Daily    enoxaparin  40 mg Subcutaneous Q24H    amitriptyline  100 mg Oral Nightly    linaclotide  290 mcg Oral QAM AC    gabapentin  300 mg Oral TID    polyethylene glycol  17 g Oral Daily    sodium chloride flush  10 mL Intravenous 2 times per day    insulin glargine  46 Units Subcutaneous Daily        sodium chloride 125 mL/hr at 06/26/18 1334    dextrose             /77   Pulse 92   Temp 97 °F (36.1 °C) (Temporal)   Resp 18   Ht 5' 4\" (1.626 m)   Wt 179 lb 9.6 oz (81.5 kg)   LMP 01/22/2012   SpO2 99%   Breastfeeding?  No   BMI 30.83 kg/m²     Intake/Output Summary (Last 24 hours) at 06/29/18 0916  Last data filed at 06/29/18 0857   Gross per 24 hour   Intake             1660 ml   Output             4200 ml   Net            -2540 ml           Labs:   CBC:   Recent Labs      06/26/18   1158  06/29/18   0154   WBC  10.7  11.3*   HGB  15.0  14.3   HCT  44.4  42.8   PLT  233  233     BMP: Recent Labs      06/26/18   1158  06/29/18   0154   NA  139  138   K  4.2  3.6   CO2  26  25   BUN  8  10   CREATININE  0.6  0.7   LABGLOM  >60  >60   GLUCOSE  181* 143*     BNP: No results for input(s): BNP in the last 72 hours. PT/INR: No results for input(s): PROTIME, INR in the last 72 hours. APTT:No results for input(s): APTT in the last 72 hours. CARDIAC ENZYMES:No results for input(s): CKTOTAL, CKMB, CKMBINDEX, TROPONINI in the last 72 hours. FASTING LIPID PANEL:No results found for: HDL, LDLDIRECT, LDLCALC, TRIG  LIVER PROFILE:No results for input(s): AST, ALT, LABALBU in the last 72 hours. NURSE:  Debbie Live RN     [facility] : 1975, Female, 40 y.o. History:  Severe coronary artery disease, type I diabetes    Nursing note and diagnostics above reviewed and evaluated    [Allergies/Contraindications:  Ciprofloxacin and Levofloxacin]     Todays status: no further angina. Physical Examination    Respiratory -  clear. Cardiovascular   Regular rhythm  Abdominal -  Soft. Bowel sounds present. Nontender. Extremities -  Pulses intact. Assessment/Plan     CABG next week    Discussed with patient and nursing      C.  Shaneka Kimbrough MD  [facility] to 5/5 throughout for improved ankle control with ADLs such as prolonged gait and stair negotiation. Progressing  Pt will have improved R SLS to >18s for increased ankle stability with ambulation on uneven surfaces such as gravel and grass   Pt will report <3/10 pain with work and home activities such as walking, stairs.   Pt will be independent and compliant with comprehensive HEP to maintain progress achieved in PT. Progressing.    Plan: Progress tolerance to loaded positions.   Date: 11/8/2024  TX#: 2/8 Date:  11/13/24               TX#: 3/8 Date:11/22/2024                 TX#: 4/8 Date: 11/29/2024                TX#: 5/8 Date: 12/6/2024   Tx#: 6/8   Therex:  3x20\" slantboard stretch  2x10 eccentric calf raises on the R from step with HHA  10x towel curls with toe flexion, R  2x10 ankle eversion, blue t band  15 toe yoga  10 arch lifts, 5\" Hold  2x10  big toe/toe flexion followed by pF into red t band in long sitting. TE: 23 mins  - PPU - no change  - calf stretch R  - peroneal nn   - s/l SLR abd, 2x10 R - triggers pain in lateral leg and foot  - PPU - relief  - wedge calf stretch 2x1'  - lateral stepping, x2 laps  - review HEP Therex:  2x30\" slant stretch gatroc  2x30\" slant stretch soleus  2x10 heel raises with ball between heels for peroneals   5x10\" standing plantar fascia stretch/toe flexors stretch   Seated plantar fascial self roll out on green roller x2min Therex:  2x30\" slant stretch gastroc  2x30\" slant soleus  3x lateral walk c t band at met heads, red  10x standing hip ext  2x10 seated ankle inversion and eversion strengthening blue t band  KT taping peroneal tendons. There:  McConnel tape R foot for arch support and navicular lift x 6 min    Manual:  Gastroc release.  PA to lumbar spine did not recreate foot pain  Prone for calcaneal mobs x 8 min   Manual (20 min):  Pt in supine for GR III post talar mob followed by DF (improvements noted at end)  Prone for knee bent medial calcaneal glide GR II-III  and soleus stretch  IASTM to plantar fascia and achilles MT: 15 mins  - STM /trigger pt release peroneal, gastroc, soleus  - CFM peroneal tendon  - TC jt mobs and distraction NR-ed:  Standing on airex, with posterior lunge for ankle stability x15 B  Standing peroneal n [id number]  Seated baps board, 20x A/P, 20x Med/lat, R LE NR-ed:  Standing baps board R foot 20x A/P, 20x Med lat  10x SLS on airex with overhead ball lift, R LE (increased pain) NR-ed:  Standing airex for runners balance on R, x15     -  MT: 15 mins  - STM and IASTM /trigger pt release peroneal, gastroc, soleus  -   - TC jt mobs and distraction MT: 15 mins  - STM and IASTM /trigger pt release peroneal, gastroc, soleus  -   - TC jt mobs and distraction, medial calcaneal mob Therex:  Standing hip abd 2x10 for stabilization, B  2x30\" deep calf stretch on baps board  2x10 SL shuttle press with stabilization red board, B  2x10 eccentric lowers for R gastroc    HEP: Access Code: [email or web address]  URL: [email or web address]  Date: 11/01/2024  Prepared by: Nano Barnes     Exercises  - Gastroc Stretch on Wall  - 1 x daily - 7 x weekly - 3 sets - 30 hold  - Long Sitting Ankle Dorsiflexion with Anchored Resistance  - 1 x daily - 7 x weekly - 2 sets - 10 reps  - Seated Arch Lifts  - 1 x daily - 7 x weekly - 2 sets - 10 reps  - Toe Yoga - Alternating Great Toe and Lesser Toe Extension  - 1 x daily - 7 x weekly - 2 sets - 10 reps  Added in eccentric heel raises  11/13/24: add PPU and reinforced calf stretches  Access Code: [email or web address]  URL: [email or web address]  Date: 11/29/2024  Prepared by: Nano Barnes    Exercises  - Ankle Eversion with Resistance  - 1 x daily - 7 x weekly - 3 sets - 10 reps  - Ankle Inversion with Resistance  - 1 x daily - 7 x weekly - 3 sets - 10 reps  - Single Leg Stance  - 1 x daily - 7 x weekly - 3 sets - 15 sec hold  - Mini Squat with Counter Support  - 1 x daily - 7 x weekly - 2 sets - 10 reps  Charges:  manual:1, therex: 2       Total Timed Treatment: 45 min  Total Treatment Time: 45 min

## 2024-12-17 DIAGNOSIS — F41.9 ANXIETY: ICD-10-CM

## 2024-12-17 RX ORDER — ALPRAZOLAM 1 MG/1
1 TABLET ORAL 3 TIMES DAILY
Qty: 90 TABLET | Refills: 0 | Status: SHIPPED | OUTPATIENT
Start: 2024-12-17

## 2024-12-17 NOTE — TELEPHONE ENCOUNTER
Rx Refill Note  Requested Prescriptions     Pending Prescriptions Disp Refills    ALPRAZolam (XANAX) 1 MG tablet [Pharmacy Med Name: ALPRAZolam 1 MG Oral Tablet] 90 tablet 0     Sig: TAKE 1 TABLET BY MOUTH THREE TIMES DAILY      Last office visit with office: 12.3.2024  Next office visit with office: 3.3.2025    UDS: 3.12.2024    DATE OF LAST REFILL: 11.18.2024    Controlled Substance Agreement: up to date      Gypsy Jones MA  12/17/24, 15:28 CST

## 2024-12-24 DIAGNOSIS — F51.01 PRIMARY INSOMNIA: ICD-10-CM

## 2024-12-27 RX ORDER — AMITRIPTYLINE HYDROCHLORIDE 100 MG/1
100 TABLET ORAL NIGHTLY
Qty: 90 TABLET | Refills: 0 | Status: SHIPPED | OUTPATIENT
Start: 2024-12-27

## 2024-12-27 NOTE — TELEPHONE ENCOUNTER
UPCOMING APPTS  With Family Medicine (TORIE Torres)  03/03/2025 at 9:30 AM  LAST OFFICE VISIT - THIS DEPT  12/3/2024 Silverio Reed APRN  LAST TELEMEDICINE - THIS DEPT  None in last 365 days

## 2024-12-30 ENCOUNTER — OFFICE VISIT (OUTPATIENT)
Dept: FAMILY MEDICINE CLINIC | Facility: CLINIC | Age: 49
End: 2024-12-30
Payer: COMMERCIAL

## 2024-12-30 VITALS
HEIGHT: 64 IN | TEMPERATURE: 98.7 F | SYSTOLIC BLOOD PRESSURE: 122 MMHG | OXYGEN SATURATION: 98 % | HEART RATE: 87 BPM | DIASTOLIC BLOOD PRESSURE: 72 MMHG | RESPIRATION RATE: 17 BRPM | WEIGHT: 200 LBS | BODY MASS INDEX: 34.15 KG/M2

## 2024-12-30 DIAGNOSIS — E10.42 TYPE 1 DIABETES MELLITUS WITH DIABETIC POLYNEUROPATHY: ICD-10-CM

## 2024-12-30 DIAGNOSIS — M79.672 LEFT FOOT PAIN: Primary | ICD-10-CM

## 2024-12-30 DIAGNOSIS — M79.671 RIGHT FOOT PAIN: ICD-10-CM

## 2024-12-30 PROCEDURE — 99213 OFFICE O/P EST LOW 20 MIN: CPT | Performed by: NURSE PRACTITIONER

## 2024-12-30 NOTE — PROGRESS NOTES
"Chief Complaint   Patient presents with    Foot Pain     Pt feels that she has a possible fracture in L foot        Subjective   Krupa De La Rosa is a 49 y.o. female who presents today for the following: bilateral feet pain.    Foot Pain     She thinks she has a broken bone in her left foot in the center with no known injury. She has difficulty ambulating without pain.   Neuropathy is somewhat improved with gabapentin.     Allergies   Allergen Reactions    Adhesive Tape Other (See Comments)    Ciprofibrate     Latex Other (See Comments)     Burns skin severely     Levofloxacin Rash         OBJECTIVE:  Vitals:    12/30/24 1313   BP: 122/72   BP Location: Left arm   Patient Position: Sitting   Cuff Size: Adult   Pulse: 87   Resp: 17   Temp: 98.7 °F (37.1 °C)   TempSrc: Infrared   SpO2: 98%   Weight: 90.7 kg (200 lb)   Height: 162.6 cm (64\")     Physical Exam  Vitals and nursing note reviewed.   Constitutional:       Appearance: Normal appearance.   Cardiovascular:      Rate and Rhythm: Normal rate and regular rhythm.      Pulses: Normal pulses.      Heart sounds: Normal heart sounds.   Pulmonary:      Effort: Pulmonary effort is normal.      Breath sounds: Normal breath sounds.   Feet:      Right foot:      Protective Sensation: 5 sites tested.  3 sites sensed.      Left foot:      Protective Sensation: 3 sites tested.     Skin:     General: Skin is warm and dry.   Neurological:      General: No focal deficit present.      Mental Status: She is alert and oriented to person, place, and time.   Psychiatric:         Mood and Affect: Mood normal.         Behavior: Behavior normal.         Thought Content: Thought content normal.         Judgment: Judgment normal.                    ASSESSMENT/ PLAN:    Diagnoses and all orders for this visit:    1. Left foot pain (Primary)  -     XR Foot 3+ View Left; Future  -     Ambulatory Referral to Podiatry    2. Right foot pain  -     Ambulatory Referral to Podiatry    3. " Type 1 diabetes mellitus with diabetic polyneuropathy  -     Ambulatory Referral to Podiatry      Procedures     Management Plan:   Further orders after imaging results return  An After Visit Summary was printed and given to the patient at discharge.    Follow-up: No follow-ups on file.         Silverio Reed, TORIE 12/30/2024 13:41 CST  This note was electronically signed.

## 2025-01-04 DIAGNOSIS — I10 ESSENTIAL HYPERTENSION: ICD-10-CM

## 2025-01-06 RX ORDER — METOPROLOL SUCCINATE 50 MG/1
75 TABLET, EXTENDED RELEASE ORAL DAILY
Qty: 45 TABLET | Refills: 0 | Status: SHIPPED | OUTPATIENT
Start: 2025-01-06

## 2025-01-06 NOTE — TELEPHONE ENCOUNTER
UPCOMING APPTS  With Family Medicine (TORIE Torres)  03/03/2025 at 9:30 AM  LAST OFFICE VISIT - THIS DEPT  12/30/2024 Silverio Reed APRN

## 2025-01-09 DIAGNOSIS — M79.672 LEFT FOOT PAIN: ICD-10-CM

## 2025-01-09 DIAGNOSIS — F41.9 ANXIETY: ICD-10-CM

## 2025-01-09 RX ORDER — ALPRAZOLAM 1 MG/1
1 TABLET ORAL 3 TIMES DAILY
Qty: 90 TABLET | Refills: 2 | Status: SHIPPED | OUTPATIENT
Start: 2025-01-09

## 2025-01-09 NOTE — TELEPHONE ENCOUNTER
Rx Refill Note  Requested Prescriptions     Pending Prescriptions Disp Refills    ALPRAZolam (XANAX) 1 MG tablet [Pharmacy Med Name: ALPRAZolam 1 MG Oral Tablet] 90 tablet 0     Sig: TAKE 1 TABLET BY MOUTH THREE TIMES DAILY      Last office visit with office: 12.30.2024  Next office visit with office: 3.3.2025    UDS: 3.12.2024    DATE OF LAST REFILL: 12.17.2024    Controlled Substance Agreement: up to date      Gypsy Jones MA  01/09/25, 13:29 CST

## 2025-01-14 ENCOUNTER — TELEPHONE (OUTPATIENT)
Dept: FAMILY MEDICINE CLINIC | Facility: CLINIC | Age: 50
End: 2025-01-14

## 2025-01-14 NOTE — TELEPHONE ENCOUNTER
Caller: Krupa De La Rosa    Relationship: Self    Best call back number:     [phone] (Mobile)     What specialty or service is being requested: PODIATRY AT [facility]    What is the provider, practice or medical service name: NO PROVIDER MENTIONED    What is the office location: [place]    THE PATIENT STATES THAT SHE NEEDS THE REFERRAL SWITCHED TO [facility] IN [place]; THE PATIENT STATES THAT SHE JUST GOT A JOB AT THE HOSPITAL THEREFORE THE  REFERRAL WILL BE IN NETWORK FOR HER.

## 2025-01-14 NOTE — TELEPHONE ENCOUNTER
Caller: Krupa De La Rosa    Relationship: Self    Best call back number:  [phone]     What is the medical concern/diagnosis: WE SENT THE REFERRAL TO THE WRONG DR. ROBB.  SHE NEEDS REFERRAL TO PODIATRY, DR. ROBB.  WE SENT IN REFERRAL TO DR. ROBB OPTOMETRY.  NEEDS CORRECTED.      What specialty or service is being requested: SEE ABOVE       PLEASE CALL PATIENT WHEN WE HAVE COMPLETED THIS.

## 2025-01-15 DIAGNOSIS — M79.671 RIGHT FOOT PAIN: ICD-10-CM

## 2025-01-15 DIAGNOSIS — E10.42 TYPE 1 DIABETES MELLITUS WITH DIABETIC POLYNEUROPATHY: ICD-10-CM

## 2025-01-15 DIAGNOSIS — M79.672 LEFT FOOT PAIN: Primary | ICD-10-CM

## 2025-01-29 ENCOUNTER — TELEPHONE (OUTPATIENT)
Dept: FAMILY MEDICINE CLINIC | Facility: CLINIC | Age: 50
End: 2025-01-29

## 2025-02-02 DIAGNOSIS — I10 ESSENTIAL HYPERTENSION: ICD-10-CM

## 2025-02-03 RX ORDER — METOPROLOL SUCCINATE 50 MG/1
75 TABLET, EXTENDED RELEASE ORAL DAILY
Qty: 45 TABLET | Refills: 3 | Status: SHIPPED | OUTPATIENT
Start: 2025-02-03

## 2025-02-03 NOTE — TELEPHONE ENCOUNTER
Rx Refill Note  Requested Prescriptions     Pending Prescriptions Disp Refills    metoprolol succinate XL (TOPROL-XL) 50 MG 24 hr tablet [Pharmacy Med Name: Metoprolol Succinate ER 50 MG Oral Tablet Extended Release 24 Hour] 45 tablet 0     Sig: TAKE 1 & 1/2 (ONE & ONE-HALF) TABLETS BY MOUTH ONCE DAILY        Gypsy Jones  02/03/25, 15:18 CST

## 2025-02-04 RX ORDER — PROMETHAZINE HYDROCHLORIDE 25 MG/1
TABLET ORAL
Qty: 20 TABLET | Refills: 0 | Status: SHIPPED | OUTPATIENT
Start: 2025-02-04

## 2025-02-04 NOTE — TELEPHONE ENCOUNTER
Rx Refill Note  Requested Prescriptions     Pending Prescriptions Disp Refills    promethazine (PHENERGAN) 25 MG tablet [Pharmacy Med Name: Promethazine HCl 25 MG Oral Tablet] 20 tablet 0     Sig: TAKE 1 TABLET BY MOUTH EVERY 6 HOURS AS NEEDED FOR NAUSEA AND VOMITING        Gypsy Jones  02/04/25, 11:34 CST

## 2025-02-05 DIAGNOSIS — Z79.4 TYPE 2 DIABETES MELLITUS WITH DIABETIC NEUROPATHY, WITH LONG-TERM CURRENT USE OF INSULIN: ICD-10-CM

## 2025-02-05 DIAGNOSIS — E11.40 TYPE 2 DIABETES MELLITUS WITH DIABETIC NEUROPATHY, WITH LONG-TERM CURRENT USE OF INSULIN: ICD-10-CM

## 2025-02-05 RX ORDER — GABAPENTIN 800 MG/1
800 TABLET ORAL 3 TIMES DAILY
Qty: 90 TABLET | Refills: 2 | Status: SHIPPED | OUTPATIENT
Start: 2025-02-05

## 2025-02-24 ENCOUNTER — TELEPHONE (OUTPATIENT)
Dept: FAMILY MEDICINE CLINIC | Facility: CLINIC | Age: 50
End: 2025-02-24

## 2025-02-24 NOTE — TELEPHONE ENCOUNTER
Caller: Krupa De La Rosa    Relationship: Self    Best call back number: [phone]    What medication are you requesting: REPATHA     What are your current symptoms: HIGH CHOLESTEROL       Have you had these symptoms before:    [x] Yes  [] No    Have you been treated for these symptoms before:   [x] Yes  [] No    If a prescription is needed, what is your preferred pharmacy and phone number: [facility] - [address] [phone] [facility] [phone] FX     Additional notes: WOULD LIKE TO TRY AGAIN NOW THAT INSURANCE HAS CHANGED ,  PATIENT UPLOADED NEW INSURANCE CARD THIS MORNING

## 2025-02-25 DIAGNOSIS — E78.00 ELEVATED LDL CHOLESTEROL LEVEL: Primary | ICD-10-CM

## 2025-02-25 DIAGNOSIS — I25.708 CORONARY ARTERY DISEASE OF BYPASS GRAFT OF NATIVE HEART WITH STABLE ANGINA PECTORIS: ICD-10-CM

## 2025-02-27 ENCOUNTER — OFFICE VISIT (OUTPATIENT)
Dept: FAMILY MEDICINE CLINIC | Facility: CLINIC | Age: 50
End: 2025-02-27
Payer: COMMERCIAL

## 2025-02-27 VITALS
RESPIRATION RATE: 18 BRPM | HEART RATE: 82 BPM | DIASTOLIC BLOOD PRESSURE: 69 MMHG | HEIGHT: 64 IN | TEMPERATURE: 98 F | BODY MASS INDEX: 33.8 KG/M2 | OXYGEN SATURATION: 98 % | WEIGHT: 198 LBS | SYSTOLIC BLOOD PRESSURE: 102 MMHG

## 2025-02-27 DIAGNOSIS — I25.2 HISTORY OF MI (MYOCARDIAL INFARCTION): ICD-10-CM

## 2025-02-27 DIAGNOSIS — F33.1 MODERATE EPISODE OF RECURRENT MAJOR DEPRESSIVE DISORDER: ICD-10-CM

## 2025-02-27 DIAGNOSIS — E10.42 TYPE 1 DIABETES MELLITUS WITH DIABETIC POLYNEUROPATHY: Primary | Chronic | ICD-10-CM

## 2025-02-27 DIAGNOSIS — G89.4 PAIN SYNDROME, CHRONIC: ICD-10-CM

## 2025-02-27 DIAGNOSIS — Z95.1 HX OF CABG: ICD-10-CM

## 2025-02-27 PROCEDURE — 99214 OFFICE O/P EST MOD 30 MIN: CPT | Performed by: NURSE PRACTITIONER

## 2025-02-27 RX ORDER — DULOXETIN HYDROCHLORIDE 30 MG/1
30 CAPSULE, DELAYED RELEASE ORAL DAILY
Qty: 30 CAPSULE | Refills: 3 | Status: SHIPPED | OUTPATIENT
Start: 2025-02-27

## 2025-02-27 NOTE — PROGRESS NOTES
"Chief Complaint   Patient presents with    Foot Pain     Follow up after podiatry consult     Hyperlipidemia     Wants to start Repatha injections        Subjective   Krupa De La Rosa is a 49 y.o. female who presents today for the following: medication follow up  HPI   She went to the podiatrist yesterday and she got an injection in one heel. She has two fractures total in both feet that are healing. She had been having difficulty with neuropathy but that has improved.   She needs a cardiology referral as she is behind on checkups. She is working in [place] and that is where she would like to see her specialists.   She is statin intolerant and did take repatha until she lost her insurance. Repatha worked well.  She would like to try cymbalta along with gabapentin to see if that helps her neuropathy and depression.    Allergies   Allergen Reactions    Adhesive Tape Other (See Comments)    Ciprofibrate     Latex Other (See Comments)     Burns skin severely     Levofloxacin Rash         OBJECTIVE:  Vitals:    02/27/25 1411   BP: 102/69   BP Location: Left arm   Patient Position: Sitting   Cuff Size: Adult   Pulse: 82   Resp: 18   Temp: 98 °F (36.7 °C)   TempSrc: Oral   SpO2: 98%   Weight: 89.8 kg (198 lb)   Height: 162.6 cm (64\")     Physical Exam  Vitals and nursing note reviewed.   Constitutional:       Appearance: Normal appearance.   Cardiovascular:      Rate and Rhythm: Normal rate and regular rhythm.      Pulses: Normal pulses.      Heart sounds: Normal heart sounds.   Pulmonary:      Effort: Pulmonary effort is normal.      Breath sounds: Normal breath sounds.   Skin:     General: Skin is warm and dry.   Neurological:      General: No focal deficit present.      Mental Status: She is alert and oriented to person, place, and time.   Psychiatric:         Mood and Affect: Mood normal.         Behavior: Behavior normal.         Thought Content: Thought content normal.         Judgment: Judgment normal. "                    ASSESSMENT/ PLAN:    Diagnoses and all orders for this visit:    1. Type 1 diabetes mellitus with diabetic polyneuropathy (Primary)  -     Microalbumin / Creatinine Urine Ratio - Urine, Clean Catch  -     DULoxetine (CYMBALTA) 30 MG capsule; Take 1 capsule by mouth Daily.  Dispense: 30 capsule; Refill: 3    2. History of MI (myocardial infarction)  -     Ambulatory Referral to Cardiology    3. Hx of CABG  -     Ambulatory Referral to Cardiology    4. Pain syndrome, chronic  -     DULoxetine (CYMBALTA) 30 MG capsule; Take 1 capsule by mouth Daily.  Dispense: 30 capsule; Refill: 3    5. Moderate episode of recurrent major depressive disorder  -     DULoxetine (CYMBALTA) 30 MG capsule; Take 1 capsule by mouth Daily.  Dispense: 30 capsule; Refill: 3      Procedures     Management Plan:   Continue on the same medications and try cymbalta.  An After Visit Summary was printed and given to the patient at discharge.    Follow-up: Return in about 1 month (around 3/27/2025) for Recheck meds.         TORIE Torres 3/3/2025 07:02 CST  This note was electronically signed.

## 2025-02-28 LAB
ALBUMIN/CREAT UR: <14 MG/G CREAT (ref 0–29)
CREAT UR-MCNC: 22 MG/DL
MICROALBUMIN UR-MCNC: <3 UG/ML

## 2025-03-03 PROBLEM — G89.4 PAIN SYNDROME, CHRONIC: Status: ACTIVE | Noted: 2025-03-03

## 2025-03-03 PROBLEM — I25.2 HISTORY OF MI (MYOCARDIAL INFARCTION): Status: ACTIVE | Noted: 2025-03-03

## 2025-03-03 PROBLEM — Z95.1 HX OF CABG: Status: ACTIVE | Noted: 2025-03-03

## 2025-03-03 PROBLEM — F33.1 MODERATE EPISODE OF RECURRENT MAJOR DEPRESSIVE DISORDER: Status: ACTIVE | Noted: 2025-03-03

## 2025-03-05 NOTE — TELEPHONE ENCOUNTER
[place] [facility] [place] has [place] been [place] but her knee is broken.   
Rx Refill Note  Requested Prescriptions     Pending Prescriptions Disp Refills   • ALPRAZolam (XANAX) 1 MG tablet [Pharmacy Med Name: ALPRAZolam 1 MG Oral Tablet] 90 tablet 0     Sig: TAKE 1 TABLET BY MOUTH THREE TIMES DAILY   • gabapentin (NEURONTIN) 300 MG capsule [Pharmacy Med Name: Gabapentin 300 MG Oral Capsule] 90 capsule 0     Sig: TAKE 1 CAPSULE BY MOUTH THREE TIMES DAILY   • desvenlafaxine (PRISTIQ) 50 MG 24 hr tablet [Pharmacy Med Name: Desvenlafaxine Succinate ER 50 MG Oral Tablet Extended Release 24 Hour] 30 tablet 0     Sig: Take 1 tablet by mouth once daily      Last office visit with prescribing clinician: 1/30/2023           LABS:01/16/2023          Mimi Cox MA  02/02/23, 09:02 CST    
Rx Refill Note  Requested Prescriptions     Pending Prescriptions Disp Refills   • ALPRAZolam (XANAX) 1 MG tablet [Pharmacy Med Name: ALPRAZolam 1 MG Oral Tablet] 90 tablet 0     Sig: TAKE 1 TABLET BY MOUTH THREE TIMES DAILY   • gabapentin (NEURONTIN) 300 MG capsule [Pharmacy Med Name: Gabapentin 300 MG Oral Capsule] 90 capsule 0     Sig: TAKE 1 CAPSULE BY MOUTH THREE TIMES DAILY   • desvenlafaxine (PRISTIQ) 50 MG 24 hr tablet [Pharmacy Med Name: Desvenlafaxine Succinate ER 50 MG Oral Tablet Extended Release 24 Hour] 30 tablet 0     Sig: Take 1 tablet by mouth once daily      Last office visit with prescribing clinician: 1/30/2023   Next office visit with prescribing clinician: Visit date not found       Grecia Chung MA  02/02/23, 08:31 CST    
This was a shared visit with the JIGNA. I reviewed and verified the documentation.

## 2025-03-20 DIAGNOSIS — F33.2 SEVERE EPISODE OF RECURRENT MAJOR DEPRESSIVE DISORDER, WITHOUT PSYCHOTIC FEATURES: ICD-10-CM

## 2025-03-21 RX ORDER — DESVENLAFAXINE 50 MG/1
50 TABLET, FILM COATED, EXTENDED RELEASE ORAL DAILY
Qty: 30 TABLET | Refills: 5 | Status: SHIPPED | OUTPATIENT
Start: 2025-03-21

## 2025-04-01 DIAGNOSIS — I10 ESSENTIAL HYPERTENSION: ICD-10-CM

## 2025-04-02 RX ORDER — CLONIDINE HYDROCHLORIDE 0.1 MG/1
0.1 TABLET ORAL EVERY 12 HOURS SCHEDULED
Qty: 180 TABLET | Refills: 1 | Status: SHIPPED | OUTPATIENT
Start: 2025-04-02

## 2025-04-11 DIAGNOSIS — F51.01 PRIMARY INSOMNIA: ICD-10-CM

## 2025-04-11 RX ORDER — AMITRIPTYLINE HYDROCHLORIDE 100 MG/1
100 TABLET ORAL NIGHTLY
Qty: 90 TABLET | Refills: 0 | Status: SHIPPED | OUTPATIENT
Start: 2025-04-11

## 2025-04-15 DIAGNOSIS — F41.9 ANXIETY: ICD-10-CM

## 2025-04-16 RX ORDER — ALPRAZOLAM 1 MG/1
1 TABLET ORAL 3 TIMES DAILY
Qty: 90 TABLET | Refills: 2 | Status: SHIPPED | OUTPATIENT
Start: 2025-04-16

## 2025-05-05 NOTE — TELEPHONE ENCOUNTER
Rx Refill Note  Requested Prescriptions     Pending Prescriptions Disp Refills   • amitriptyline (ELAVIL) 100 MG tablet 90 tablet 3     Sig: Take 1 tablet by mouth Every Night.      Last office visit with prescribing clinician: Visit date not found      Next office visit with prescribing clinician: Visit date not found       {TIP  Is Refill Pharmacy correct?    Rachael Gray CMA  03/16/22, 08:53 CDT   06-May-2025

## 2025-05-19 DIAGNOSIS — E10.42 TYPE 1 DIABETES MELLITUS WITH DIABETIC POLYNEUROPATHY: ICD-10-CM

## 2025-05-19 RX ORDER — IRBESARTAN 75 MG/1
75 TABLET ORAL NIGHTLY
Qty: 90 TABLET | Refills: 1 | Status: SHIPPED | OUTPATIENT
Start: 2025-05-19

## 2025-05-21 ENCOUNTER — TELEPHONE (OUTPATIENT)
Dept: FAMILY MEDICINE CLINIC | Facility: CLINIC | Age: 50
End: 2025-05-21
Payer: COMMERCIAL

## 2025-05-21 DIAGNOSIS — E11.40 TYPE 2 DIABETES MELLITUS WITH DIABETIC NEUROPATHY, WITH LONG-TERM CURRENT USE OF INSULIN: ICD-10-CM

## 2025-05-21 DIAGNOSIS — Z79.4 TYPE 2 DIABETES MELLITUS WITH DIABETIC NEUROPATHY, WITH LONG-TERM CURRENT USE OF INSULIN: ICD-10-CM

## 2025-05-21 DIAGNOSIS — E78.2 MIXED HYPERLIPIDEMIA: Primary | ICD-10-CM

## 2025-05-21 DIAGNOSIS — F41.9 ANXIETY: ICD-10-CM

## 2025-05-21 RX ORDER — GABAPENTIN 800 MG/1
800 TABLET ORAL 3 TIMES DAILY
Qty: 90 TABLET | Refills: 2 | OUTPATIENT
Start: 2025-05-21

## 2025-05-21 RX ORDER — ALPRAZOLAM 1 MG/1
1 TABLET ORAL 3 TIMES DAILY
Qty: 21 TABLET | Refills: 0 | Status: SHIPPED | OUTPATIENT
Start: 2025-05-21 | End: 2025-05-28

## 2025-05-21 RX ORDER — GABAPENTIN 800 MG/1
800 TABLET ORAL 3 TIMES DAILY
Qty: 21 TABLET | Refills: 0 | Status: SHIPPED | OUTPATIENT
Start: 2025-05-21 | End: 2025-05-28

## 2025-05-21 NOTE — TELEPHONE ENCOUNTER
Rx Refill Note  Requested Prescriptions     Pending Prescriptions Disp Refills    gabapentin (NEURONTIN) 800 MG tablet [Pharmacy Med Name: Gabapentin 800 MG Oral Tablet (NEURONTIN)] 90 tablet 2     Sig: Take 1 tablet (800 mg) by mouth 3 times daily      Last office visit with office: 02/27/25  Next office visit with office: none scheduled    UDS: 03/11/24    DATE OF LAST REFILL: 02/05/25    Controlled Substance Agreement: not up to date    Patient is aware she needs UDS/CSA updated, will come in the morning to get those done.    Lurdes Bedoya MA  05/21/25, 11:24 CDT

## 2025-05-21 NOTE — TELEPHONE ENCOUNTER
Patient is coming in the morning to complete her UDS/CSA, would also like to get her diabetic labs done as well. Orders have been pended.

## 2025-05-21 NOTE — TELEPHONE ENCOUNTER
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          [facility] - [place], [place]     99.7

## 2025-05-22 ENCOUNTER — CLINICAL SUPPORT (OUTPATIENT)
Dept: FAMILY MEDICINE CLINIC | Facility: CLINIC | Age: 50
End: 2025-05-22
Payer: COMMERCIAL

## 2025-05-22 DIAGNOSIS — Z79.899 LONG-TERM USE OF HIGH-RISK MEDICATION: Primary | ICD-10-CM

## 2025-05-22 DIAGNOSIS — E10.65 TYPE 1 DIABETES MELLITUS WITH HYPERGLYCEMIA: ICD-10-CM

## 2025-05-22 DIAGNOSIS — G89.4 PAIN SYNDROME, CHRONIC: ICD-10-CM

## 2025-05-22 DIAGNOSIS — F41.9 ANXIETY: ICD-10-CM

## 2025-05-22 DIAGNOSIS — E10.42 DIABETIC POLYNEUROPATHY ASSOCIATED WITH TYPE 1 DIABETES MELLITUS: ICD-10-CM

## 2025-05-23 LAB
ALBUMIN SERPL-MCNC: 4 G/DL (ref 3.9–4.9)
ALP SERPL-CCNC: 154 IU/L (ref 44–121)
ALT SERPL-CCNC: 18 IU/L (ref 0–32)
AST SERPL-CCNC: 26 IU/L (ref 0–40)
BILIRUB SERPL-MCNC: 0.5 MG/DL (ref 0–1.2)
BUN SERPL-MCNC: 11 MG/DL (ref 6–24)
BUN/CREAT SERPL: 10 (ref 9–23)
CALCIUM SERPL-MCNC: 9.1 MG/DL (ref 8.7–10.2)
CHLORIDE SERPL-SCNC: 90 MMOL/L (ref 96–106)
CO2 SERPL-SCNC: 22 MMOL/L (ref 20–29)
CREAT SERPL-MCNC: 1.13 MG/DL (ref 0.57–1)
EGFRCR SERPLBLD CKD-EPI 2021: 60 ML/MIN/1.73
GLOBULIN SER CALC-MCNC: 2.5 G/DL (ref 1.5–4.5)
GLUCOSE SERPL-MCNC: 89 MG/DL (ref 70–99)
HBA1C MFR BLD: 8.8 % (ref 4.8–5.6)
POTASSIUM SERPL-SCNC: 4.2 MMOL/L (ref 3.5–5.2)
PROT SERPL-MCNC: 6.5 G/DL (ref 6–8.5)
SODIUM SERPL-SCNC: 129 MMOL/L (ref 134–144)

## 2025-05-29 LAB — DRUGS UR: NORMAL

## 2025-06-02 ENCOUNTER — OFFICE VISIT (OUTPATIENT)
Dept: FAMILY MEDICINE CLINIC | Facility: CLINIC | Age: 50
End: 2025-06-02
Payer: COMMERCIAL

## 2025-06-02 VITALS
WEIGHT: 210.6 LBS | RESPIRATION RATE: 18 BRPM | HEART RATE: 76 BPM | HEIGHT: 64 IN | DIASTOLIC BLOOD PRESSURE: 76 MMHG | OXYGEN SATURATION: 96 % | SYSTOLIC BLOOD PRESSURE: 118 MMHG | BODY MASS INDEX: 35.96 KG/M2 | TEMPERATURE: 97.3 F

## 2025-06-02 DIAGNOSIS — R07.82 INTERCOSTAL PAIN: Primary | ICD-10-CM
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
## 2025-06-02 DIAGNOSIS — E10.42 DIABETIC POLYNEUROPATHY ASSOCIATED WITH TYPE 1 DIABETES MELLITUS: ICD-10-CM

## 2025-06-02 PROBLEM — Z79.4 TYPE 2 DIABETES MELLITUS WITH DIABETIC NEUROPATHY, WITH LONG-TERM CURRENT USE OF INSULIN: Status: ACTIVE | Noted: 2025-06-02

## 2025-06-02 PROBLEM — E11.40 TYPE 2 DIABETES MELLITUS WITH DIABETIC NEUROPATHY, WITH LONG-TERM CURRENT USE OF INSULIN: Status: ACTIVE | Noted: 2025-06-02

## 2025-06-02 PROCEDURE — 99214 OFFICE O/P EST MOD 30 MIN: CPT | Performed by: NURSE PRACTITIONER

## 2025-06-02 NOTE — PROGRESS NOTES
Chief Complaint   Patient presents with    Diabetes     3 mth recheck     Fall     Approximately 4 weeks ago    Covid-19      Symptoms started on 5/24/2025        Subjective   Krupa Salomón De La Rosa is a 49 y.o. female who presents today for the following: follow up 3 month labs      Diabetes  Visit type:  Follow-up  Diabetes type:  Type 1  MedicAlert ID: no    Disease duration:  40 years  Disease course:  Stable  Associated symptoms:     foot paresthesias    Symptom course:  Stable  Diabetic complications:     heart disease and peripheral neuropathy    CAD risks:  Dyslipidemia, diabetes mellitus, post-menopausal, sedentary lifestyle, stress, hypertension and obesity  Current treatments:  Insulin pump  Treatment compliance:  All of the time  Blood glucose trend:  Decreasing steadily  Below 70:  Occasionally  Weight trend:  Stable  Current diet:  Low fat/cholesterol  Meal planning:  Low carbohydrate diet, carbohydrate counting, calorie counting and avoidance of concentrated sweets  Dietitian visit: no    Exercise:  Never  ACE-I / ARB:  Is being taken  Eye exam current: yes    Sees podiatrist: no    Fall  The accident occurred 3 to 5 days ago. The fall occurred while walking. She fell from a height of 1 to 2 ft. Point of impact: right breast and ribs beneath. Pain location: right breast. The pain is at a severity of 5/10. The symptoms are aggravated by standing, pressure on injury, movement, ambulation and extension. She has tried nothing for the symptoms.      Patient feels she is having lingering COVID symptoms still, alana. Nausea.  She is here for follow up labs for diabetes.   She fell two weeks ago and hurt her ribs under her right breast.   Allergies   Allergen Reactions    Adhesive Tape Other (See Comments)    Ciprofibrate     Latex Other (See Comments)     Burns skin severely     Levofloxacin Rash         OBJECTIVE:  Vitals:    06/02/25 1415   BP: 118/76   BP Location: Left arm   Patient Position: Sitting  "  Cuff Size: Adult   Pulse: 76   Resp: 18   Temp: 97.3 °F (36.3 °C)   TempSrc: Infrared   SpO2: 96%   Weight: 95.5 kg (210 lb 9.6 oz)   Height: 162.6 cm (64\")     Physical Exam  Vitals and nursing note reviewed.   Constitutional:       Appearance: Normal appearance. She is obese.   Cardiovascular:      Rate and Rhythm: Normal rate and regular rhythm.      Pulses: Normal pulses.      Heart sounds: Normal heart sounds.   Pulmonary:      Effort: Pulmonary effort is normal.      Breath sounds: Normal breath sounds.   Musculoskeletal:      Comments: Tenderness to palpation under left breast in the rib area.   Skin:     General: Skin is warm and dry.      Comments: No evidence of ecchymosis or any other marks right  breast   Neurological:      General: No focal deficit present.      Mental Status: She is alert and oriented to person, place, and time.   Psychiatric:         Mood and Affect: Mood normal.         Behavior: Behavior normal.         Thought Content: Thought content normal.         Judgment: Judgment normal.                CMP          8/26/2024    08:00 5/22/2025    08:12   CMP   Glucose 165  89    BUN 6  11    Creatinine 0.91  1.13    EGFR 78  60    Sodium 140  129    Potassium 4.6  4.2    Chloride 104  90    Calcium 9.3  9.1    Total Protein 6.1  6.5    Albumin 3.9  4.0    Globulin 2.2  2.5    Total Bilirubin <0.2  0.5    Alkaline Phosphatase 162  154    AST (SGOT) 13  26    ALT (SGPT) 11  18    BUN/Creatinine Ratio 7  10      CBC w/diff          8/26/2024    08:00   CBC w/Diff   WBC 7.2    RBC 4.68    Hemoglobin 14.0    Hematocrit 44.2    MCV 94    MCH 29.9    MCHC 31.7    RDW 11.8    Platelets 242    Neutrophil Rel % 52    Lymphocyte Rel % 36    Monocyte Rel % 8    Eosinophil Rel % 3    Basophil Rel % 1      Lipid Panel          8/26/2024    08:00   Lipid Panel   Total Cholesterol 231    Triglycerides 92    HDL Cholesterol 58    VLDL Cholesterol 16    LDL Cholesterol  157    LDL/HDL Ratio 2.7      Most " Recent A1C          5/22/2025    08:12   HGBA1C Most Recent   Hemoglobin A1C 8.8    Blood work reviewed and discussed with patient      ASSESSMENT/ PLAN:    Diagnoses and all orders for this visit:    1. Intercostal pain (Primary)    2. Diabetic polyneuropathy associated with type 1 diabetes mellitus    3. Fall, initial encounter      Procedures     Management Plan:   Patient is doing well on current insulin pump and CGM.  We discussed the importance of yearly eye exams and she states she will get around to scheduling soon.  An After Visit Summary was printed and given to the patient at discharge.    Follow-up: No follow-ups on file.    I spent 33 minutes caring for Krupa on this date of service. This time includes time spent by me in the following activities: preparing for the visit, reviewing tests, performing a medically appropriate examination and/or evaluation, counseling and educating the patient/family/caregiver, and documenting information in the medical record.      Silverio Reed, APRN 6/3/2025 07:18 CDT  This note was electronically signed.

## 2025-06-04 RX ORDER — EVOLOCUMAB 140 MG/ML
INJECTION, SOLUTION SUBCUTANEOUS
Qty: 2 ML | Refills: 3 | Status: SHIPPED | OUTPATIENT
Start: 2025-06-04

## 2025-06-05 DIAGNOSIS — E10.42 TYPE 1 DIABETES MELLITUS WITH DIABETIC POLYNEUROPATHY: ICD-10-CM

## 2025-06-05 DIAGNOSIS — I10 ESSENTIAL HYPERTENSION: ICD-10-CM

## 2025-06-05 RX ORDER — METOPROLOL SUCCINATE 50 MG/1
75 TABLET, EXTENDED RELEASE ORAL DAILY
Qty: 45 TABLET | Refills: 3 | Status: SHIPPED | OUTPATIENT
Start: 2025-06-05

## 2025-06-05 RX ORDER — ISOSORBIDE MONONITRATE 30 MG/1
30 TABLET, EXTENDED RELEASE ORAL DAILY
Qty: 90 TABLET | Refills: 1 | Status: SHIPPED | OUTPATIENT
Start: 2025-06-05

## 2025-06-05 RX ORDER — METOPROLOL SUCCINATE 50 MG/1
75 TABLET, EXTENDED RELEASE ORAL DAILY
Qty: 45 TABLET | Refills: 3 | Status: SHIPPED | OUTPATIENT
Start: 2025-06-05 | End: 2025-06-05 | Stop reason: SDUPTHER

## 2025-06-05 NOTE — TELEPHONE ENCOUNTER
See scanned request in media.  Rx Refill Note  Requested Prescriptions     Pending Prescriptions Disp Refills    metoprolol succinate XL (TOPROL-XL) 50 MG 24 hr tablet 45 tablet 3     Sig: Take 1.5 tablets by mouth Daily.     Signed Prescriptions Disp Refills    isosorbide mononitrate (IMDUR) 30 MG 24 hr tablet 90 tablet 1     Sig: Take 1 tablet by mouth daily.     Authorizing Provider: CARMEN CAT    metoprolol succinate XL (TOPROL-XL) 50 MG 24 hr tablet 45 tablet 3     Sig: Take one and one-half tablets (75 mg) by mouth daily     Authorizing Provider: CARMEN CAT      Last office visit with prescribing clinician: 6/2/2025   Last telemedicine visit with prescribing clinician: Visit date not found   Next office visit with prescribing clinician: Visit date not found                         Would you like a call back once the refill request has been completed: [] Yes [] No    If the office needs to give you a call back, can they leave a voicemail: [] Yes [] No    Iraida Lara Rep  06/05/25, 10:24 CDT

## 2025-06-10 ENCOUNTER — TELEPHONE (OUTPATIENT)
Dept: FAMILY MEDICINE CLINIC | Facility: CLINIC | Age: 50
End: 2025-06-10

## 2025-06-10 NOTE — TELEPHONE ENCOUNTER
"  Caller: Krupa De La Rosa    Relationship: Self    Best call back number: [phone]     What is the best time to reach you: ANY    Who are you requesting to speak with (clinical staff, provider,  specific staff member): NONE SPECIFIED     What was the call regarding:     PATIENT IS WONDERING IF HER PROVIDER IS ABLE TO ADMINISTER AN INJECTION TO HER RIGHT HAND FOR ISSUES BENDING AND \"LOCKING UP.\"     Is it okay if the provider responds through [employer]: PLEASE CALL     "

## 2025-06-17 ENCOUNTER — TELEPHONE (OUTPATIENT)
Dept: FAMILY MEDICINE CLINIC | Facility: CLINIC | Age: 50
End: 2025-06-17
Payer: COMMERCIAL

## 2025-06-17 DIAGNOSIS — F41.9 ANXIETY: Primary | ICD-10-CM

## 2025-06-17 RX ORDER — ALPRAZOLAM 1 MG/1
1 TABLET ORAL 3 TIMES DAILY PRN
Qty: 90 TABLET | Refills: 0 | Status: SHIPPED | OUTPATIENT
Start: 2025-06-17

## 2025-06-17 NOTE — TELEPHONE ENCOUNTER
Caller: Krupa De La Rosa    Relationship: Self    Best call back number: [phone]     Requested Prescriptions:   XANAX 1 MG (COULD NOT LOCATE ON MEDICATION LIST)     Pharmacy where request should be sent: [facility] - [address] - [phone] [facility] [phone] FX     Last office visit with prescribing clinician: 6/2/2025   Last telemedicine visit with prescribing clinician: Visit date not found   Next office visit with prescribing clinician: Visit date not found     Additional details provided by patient:     PATIENT IS WONDERING IF HER PROVIDER WOULD ALLOW HER TO REFILL THIS MEDICATION THREE DAYS EARLY. PATIENT IS GOING OUT OF TOWN ON 06.18.25 AND WILL BE BACK ON 06.26.25. PATIENT WILL BE OUT OF MEDICATION ON 06.18.25, AND WOULD LIKE TO PICK IT UP ON 06.17.25.    Does the patient have less than a 3 day supply:  [x] Yes  [] No    Would you like a call back once the refill request has been completed: [x] Yes [] No    If the office needs to give you a call back, can they leave a voicemail: [] Yes [] No    Iraida Michel Rep   06/17/25 12:45 CDT

## 2025-07-10 DIAGNOSIS — F51.01 PRIMARY INSOMNIA: ICD-10-CM

## 2025-07-10 RX ORDER — AMITRIPTYLINE HYDROCHLORIDE 100 MG/1
100 TABLET ORAL NIGHTLY
Qty: 90 TABLET | Refills: 1 | Status: SHIPPED | OUTPATIENT
Start: 2025-07-10

## 2025-07-14 DIAGNOSIS — G89.4 PAIN SYNDROME, CHRONIC: ICD-10-CM

## 2025-07-14 DIAGNOSIS — F33.1 MODERATE EPISODE OF RECURRENT MAJOR DEPRESSIVE DISORDER: ICD-10-CM

## 2025-07-14 DIAGNOSIS — E10.42 TYPE 1 DIABETES MELLITUS WITH DIABETIC POLYNEUROPATHY: Chronic | ICD-10-CM

## 2025-07-14 RX ORDER — DULOXETIN HYDROCHLORIDE 30 MG/1
30 CAPSULE, DELAYED RELEASE ORAL DAILY
Qty: 90 CAPSULE | Refills: 3 | Status: SHIPPED | OUTPATIENT
Start: 2025-07-14

## 2025-07-14 NOTE — TELEPHONE ENCOUNTER
Rx Refill Note  Requested Prescriptions     Pending Prescriptions Disp Refills    DULoxetine (CYMBALTA) 30 MG capsule [Pharmacy Med Name: DULoxetine HCl 30 MG Oral Capsule Delayed Release Particles (CYMBALTA)] 30 capsule 3     Sig: Take 1 capsule (30 mg) by mouth daily      Last office visit with prescribing clinician: 6/2/2025       Adina Akbar MA  07/14/25, 13:50 CDT

## 2025-07-15 ENCOUNTER — OFFICE VISIT (OUTPATIENT)
Dept: FAMILY MEDICINE CLINIC | Facility: CLINIC | Age: 50
End: 2025-07-15
Payer: COMMERCIAL

## 2025-07-15 VITALS
HEIGHT: 64 IN | SYSTOLIC BLOOD PRESSURE: 140 MMHG | TEMPERATURE: 98.7 F | WEIGHT: 199 LBS | RESPIRATION RATE: 17 BRPM | BODY MASS INDEX: 33.97 KG/M2 | HEART RATE: 83 BPM | OXYGEN SATURATION: 98 % | DIASTOLIC BLOOD PRESSURE: 80 MMHG

## 2025-07-15 DIAGNOSIS — M65.322 TRIGGER INDEX FINGER OF LEFT HAND: Primary | ICD-10-CM

## 2025-07-15 PROCEDURE — 99213 OFFICE O/P EST LOW 20 MIN: CPT | Performed by: NURSE PRACTITIONER

## 2025-07-16 PROCEDURE — 20552 NJX 1/MLT TRIGGER POINT 1/2: CPT | Performed by: NURSE PRACTITIONER

## 2025-07-16 NOTE — PROGRESS NOTES
"Chief Complaint   Patient presents with    trigger finger        Subjective   Krupa Salomón De La Rosa is a 49 y.o. female who presents today for the following: trigger finger right hand.    HPI   Patient has had trouble with her index finger right hand. Her finger locks up and is difficult to  on its own. She is requesting a steroid injection into this finger.    Allergies   Allergen Reactions    Adhesive Tape Other (See Comments)    Ciprofibrate     Latex Other (See Comments)     Burns skin severely     Levofloxacin Rash         OBJECTIVE:  Vitals:    07/15/25 1521   BP: 140/80   BP Location: Left arm   Patient Position: Sitting   Cuff Size: Adult   Pulse: 83   Resp: 17   Temp: 98.7 °F (37.1 °C)   TempSrc: Infrared   SpO2: 98%   Weight: 90.3 kg (199 lb)   Height: 162.6 cm (64\")     Physical Exam  Musculoskeletal:      Comments: Right index finger with a locking sensation on flexion and extension.                     ASSESSMENT/ PLAN:    Diagnoses and all orders for this visit:    1. Trigger index finger of left hand (Primary)    Other orders  -     Inject Trigger Point, 1 or 2      Inject Trigger Point, 1 or 2    Date/Time: 7/16/2025 11:05 AM    Performed by: Silverio Reed APRN  Authorized by: Silverio Reed APRN  Preparation: Patient was prepped and draped in the usual sterile fashion.  Local anesthesia used: yes  Anesthesia: local infiltration    Anesthesia:  Local anesthesia used: yes  Local Anesthetic: lidocaine 1% without epinephrine  Anesthetic total: 3 mL    Sedation:  Patient sedated: no    Patient tolerance: patient tolerated the procedure well with no immediate complications  Comments: Right index finger prepped with betadine and lidocaine 1% without epi. Lidocaine was injected into the base of the right finger followed by kenalog 40 mg. Area massaged and bandaid applied.            Management Plan:   Resume normal activities.   Lidocaine 1% without epi was used as she is a type 1 diabetic and " witnessed by Donna Katz MA.  An After Visit Summary was printed and given to the patient at discharge.    Follow-up: Return if symptoms worsen or fail to improve.         Silverio Reed, TORIE 7/16/2025 11:09 CDT  This note was electronically signed.

## 2025-07-25 ENCOUNTER — TELEPHONE (OUTPATIENT)
Dept: FAMILY MEDICINE CLINIC | Facility: CLINIC | Age: 50
End: 2025-07-25
Payer: COMMERCIAL

## 2025-07-25 DIAGNOSIS — F41.9 ANXIETY: ICD-10-CM

## 2025-07-25 NOTE — TELEPHONE ENCOUNTER
Caller: Krupa De La Rosa    Relationship: Self    Best call back number: [phone]     What is the best time to reach you: AS SOON AS POSSIBLE    Who are you requesting to speak with (clinical staff, provider,  specific staff member): CLINICAL    What was the call regarding: ALPRAZolam (Xanax) 1 MG tablet PATIENT JUST PICKED UP AT PHARMACY. THEY ONLY GAVE HER 21 PILLS. PATIENT IS WONDERING WHY?    [facility] - [address] [phone] [facility] [phone] FX     Is it okay if the provider responds through Mocap[employer]: DOESN'T USE my4oneone SO PLEASE CALL

## 2025-07-25 NOTE — TELEPHONE ENCOUNTER
Spoke to pt - she states that she picked up the script for 6/17/2025, #90 with 0 refills. After review of her chart, no additional refill requests have come in for Xanax. Pt is aware and states that she will check with her pharmacy

## 2025-07-28 RX ORDER — ALPRAZOLAM 1 MG/1
1 TABLET ORAL 3 TIMES DAILY PRN
Qty: 90 TABLET | Refills: 0 | Status: SHIPPED | OUTPATIENT
Start: 2025-07-28

## (undated) DEVICE — MEDI-TRACE CADENCE ADULT DEFIBRILLATION ELECTRODE (10 PR/PK) - PHYSIO-CONTROL: Brand: MEDI-TRACE CADENCE

## (undated) DEVICE — DISCONTINUED SUTURE E PACK HEART 9586E

## (undated) DEVICE — Z INACTIVE USE 2540311 LEAD PACE L475MM CHN A OR V MYOCARDIAL STEROID ELUT SIL

## (undated) DEVICE — SUTURE PROL SZ 5-0 L36IN NONABSORBABLE BLU L17MM RB-1 1/2 8556H

## (undated) DEVICE — SOLUTION IV 1000ML 0.9% SOD CHL PH 5 INJ USP VIAFLX PLAS

## (undated) DEVICE — CATHETER THRMDIL 7.5FR L110CM PROXIMAL/DISTAL PRT L30CM STD

## (undated) DEVICE — CABLE THRESHOLD DISP FOR PACE ANALZR MERLIN

## (undated) DEVICE — SOLUTION CARDPLG H2O DIL 1000 ML CARD PERF VIAFLX

## (undated) DEVICE — Device: Brand: RETRACT-O-TAPE 18G X 30.5CM SEMI-POINTED NEEDLE

## (undated) DEVICE — GLOVE SURG SZ 8 L11.6IN THK9.8MIL STRW LTX POLYMER BEAD CUF

## (undated) DEVICE — SOLUTION IV IRRIG WATER 1000ML POUR BRL 2F7114

## (undated) DEVICE — 3M™ TEGADERM™ TRANSPARENT FILM DRESSING FRAME STYLE, 1628, 6 IN X 8 IN (15 CM X 20 CM), 10/CT 8CT/CASE: Brand: 3M™ TEGADERM™

## (undated) DEVICE — SUTURE PROL SZ 7-0 L24IN NONABSORBABLE BLU L9.3MM BV-1 3/8 M8702

## (undated) DEVICE — YANKAUER,TAPERED BULBOUS TIP,W/O VENT: Brand: MEDLINE

## (undated) DEVICE — SURGICAL PROCEDURE PACK OPN HRT LOURDES HOSP

## (undated) DEVICE — SUTURE PROL SZ 4-0 L36IN NONABSORBABLE BLU L17MM RB-1 1/2 M8557

## (undated) DEVICE — SOLUTION IV 500ML 0.9% SOD CHL PH 5 INJ USP VIAFLX PLAS

## (undated) DEVICE — Z INACTIVE USE 2535480 CLIP LIG M BLU TI HRT SHP WIRE HORZ 180 PER BX

## (undated) DEVICE — KIT INTRO 8.5FR L4IN PERC POLYUR SHTH RADPQ W/ INTEGR

## (undated) DEVICE — E-Z CLEAN, NON-STICK, PTFE COATED, ELECTROSURGICAL BLADE ELECTRODE, MODIFIED EXTENDED INSULATION, 2.5 INCH (6.35 CM): Brand: MEGADYNE

## (undated) DEVICE — JP CHANNEL DRAIN, 24FR HUBLESS: Brand: CARDINAL HEALTH

## (undated) DEVICE — DRESSING FOAM W4XL12IN SIL RECT ADH WTRPRF FLM BK W/ BORD

## (undated) DEVICE — Z INACTIVE USE 2662641 SOLUTION IV 1000ML 140MEQ/L SOD 5MEQ/L K 3MEQ/L MG 27MEQ/L

## (undated) DEVICE — DRAIN SURG L3/8-1/2IN DIA3/16IN SIL CARD CONN 1:1 BLAK

## (undated) DEVICE — AGENT HEMSTAT W4XL8IN OXIDIZED REGENERATED CELOS ABSRB

## (undated) DEVICE — ROYAL SILK SURGICAL GOWN, XXL: Brand: CONVERTORS

## (undated) DEVICE — BLADE SAW W6.35XL32MM STRNM CUT STRNOTMY

## (undated) DEVICE — SYSTEM VES HARV ENDOSCP VASOVIEW HEMOPRO

## (undated) DEVICE — SUTURE PROL SZ 6-0 L30IN NONABSORBABLE BLU L13MM RB-2 1/2 8711H

## (undated) DEVICE — DRAIN SURG SGL COLL PT TB FOR ATS BG OASIS

## (undated) DEVICE — SYSTEM SKIN CLSR 22CM DERMBND PRINEO

## (undated) DEVICE — E-Z CLEAN, NON-STICK, PTFE COATED, ELECTROSURGICAL BLADE ELECTRODE, MODIFIED EXTENDED INSULATION, 6.5 INCH (16.5 CM): Brand: MEGADYNE

## (undated) DEVICE — KIT BLWR MISTER 5P 15L W/ TBNG SET IRRIG MIST TO IMPROVE

## (undated) DEVICE — PLEDGET SURG W3.5XL7MM THK1.5MM WHT PTFE RECT SFT TFE

## (undated) DEVICE — LOOP VES VASCO 18 G SIL W/ BLNT NDL

## (undated) DEVICE — DEVICE RESUS AD TB L40IN SELF INFL MASK TEXT BG DBL SWVL EL

## (undated) DEVICE — DRAIN SURG 19FR SIL RND HUBLESS W/ 0.25IN BEND TRCR BLAK

## (undated) DEVICE — 3M™ STERI-STRIP™ REINFORCED ADHESIVE SKIN CLOSURES, R1546, 1/4 IN X 4 IN (6 MM X 100 MM), 10 STRIPS/ENVELOPE: Brand: 3M™ STERI-STRIP™

## (undated) DEVICE — Device: Brand: CLEANCUT ROTATING AORTIC PUNCH

## (undated) DEVICE — SOLUTION IV 250ML 0.9% SOD CHL PH 5 INJ USP VIAFLX PLAS

## (undated) DEVICE — Z DUP USE 2266075 STABILIZER SURG VAC STD BLDE ACCESSRAIL PLATFRM SUCT POD

## (undated) DEVICE — Z DISCONTINUED PER MEDLINE USE 2718072 DRESSING FOAM W5XL12.5CM SIL ADH THN BORDED CNFRM LO PROF

## (undated) DEVICE — VESSEL SHUNT 1.50MM TAPERED TIP, 12MM SHAFT: Brand: SOF-FLO ATRAUMATIC CORONARY ARTERY SHUNT

## (undated) DEVICE — ACCESSORY TOWEL PACK: Brand: MEDLINE INDUSTRIES, INC.

## (undated) DEVICE — WAX SURG 2.5GM HEMSTAT BNE BEESWAX PARAFFIN ISO PALMITATE

## (undated) DEVICE — BLANKET THER AD W24XL60IN FAB COVERING SUP SFT ULT THN LTWT

## (undated) DEVICE — CONNECTOR DRNGE W3/8-0.5XH3/16XL3/16IN 2:1 SIL CARD STR

## (undated) DEVICE — DRESSING FOAM W4XL5CM THN ABSRB SELF ADH W/ SAFETAC MEPILEX

## (undated) DEVICE — SOLUTION IV IRRIG POUR BRL 0.9% SODIUM CHL 2F7124